# Patient Record
Sex: FEMALE | Race: WHITE | NOT HISPANIC OR LATINO | Employment: OTHER | URBAN - METROPOLITAN AREA
[De-identification: names, ages, dates, MRNs, and addresses within clinical notes are randomized per-mention and may not be internally consistent; named-entity substitution may affect disease eponyms.]

---

## 2017-03-24 ENCOUNTER — ALLIED HEALTH/NURSE VISIT (OUTPATIENT)
Dept: CARDIOLOGY | Facility: CLINIC | Age: 37
End: 2017-03-24
Attending: INTERNAL MEDICINE
Payer: MEDICARE

## 2017-03-24 DIAGNOSIS — I47.20 VENTRICULAR TACHYCARDIA (H): Primary | ICD-10-CM

## 2017-03-24 PROCEDURE — 93282 PRGRMG EVAL IMPLANTABLE DFB: CPT | Mod: ZF

## 2017-03-24 PROCEDURE — 93282 PRGRMG EVAL IMPLANTABLE DFB: CPT | Mod: 26 | Performed by: INTERNAL MEDICINE

## 2017-03-24 NOTE — MR AVS SNAPSHOT
"              After Visit Summary   3/24/2017    Chikis Hannon    MRN: [id number]           Patient Information     Date Of Birth          1980        Visit Information        Provider Department      3/24/2017 10:30 AM 1, Uc Cv Device [facility]        Today's Diagnoses     Ventricular tachycardia (H)    -  1       Follow-ups after your visit        Follow-up notes from your care team     Return in about 6 months (around 9/24/2017) for Medtronic, ICD check.      Your next 10 appointments already scheduled     Mar 24, 2017 10:30 AM CDT   (Arrive by 10:15 AM)   Implanted Defibulator with Uc Cv Device 1   [facility] ([facility])    [address]  [address] [facility] [address] [phone]              Who to contact     If you have questions or need follow up information about today's clinic visit or your schedule please contact [facility] directly at [phone].  Normal or non-critical lab and imaging results will be communicated to you by [employer], letter or phone within 4 business days after the clinic has received the results. If you do not hear from us within 7 days, please contact the clinic through [employer] or phone. If you have a critical or abnormal lab result, we will notify you by phone as soon as possible.  Submit refill requests through [employer] or call your pharmacy and they will forward the refill request to us. Please allow 3 business days for your refill to be completed.          Additional Information About Your Visit        [employer] Information     [employer] lets you send messages to your doctor, view your test results, renew your prescriptions, schedule appointments and more. To sign up, go to [email or web address] . Click on \"Log in\" on the left side of the screen, which will take you to the Welcome page. Then click on \"Sign up Now\" on the right side of the page.     You will be asked to enter the access code listed below, as " well as some personal information. Please follow the directions to create your username and password.     Your access code is: [email or web address]  Expires: 2017 10:20 AM     Your access code will  in 90 days. If you need help or a new code, please call your [place] clinic or [phone].        Care EveryWhere ID     This is your Care EveryWhere ID. This could be used by other organizations to access your [place] medical records  [id number]         Blood Pressure from Last 3 Encounters:   16 114/66   16 100/60   16 124/58    Weight from Last 3 Encounters:   16 78 kg (172 lb)   16 78.5 kg (173 lb)   16 79.7 kg (175 lb 12.8 oz)              We Performed the Following     ICD DEVICE PROGRAMMING EVAL, SINGLE LEAD ICD        Primary Care Provider Office Phone # Fax #    Brenda KIMMIE Dominguez [facility] [phone]       [facility] [address] [place] [address]        Thank you!     Thank you for choosing [facility]  for your care. Our goal is always to provide you with excellent care. Hearing back from our patients is one way we can continue to improve our services. Please take a few minutes to complete the written survey that you may receive in the mail after your visit with us. Thank you!             Your Updated Medication List - Protect others around you: Learn how to safely use, store and throw away your medicines at www.disposemymeds.org.          This list is accurate as of: 3/24/17 10:20 AM.  Always use your most recent med list.                   Brand Name Dispense Instructions for use    ACE NOT PRESCRIBED (INTENTIONAL)     0 each    1 each as needed for other ACE Inhibitor not prescribed due to Other: BP at goal, pending microalbumin test       ALEVE PO      Take 220 mg by mouth       amoxicillin 500 MG capsule    AMOXIL    12 capsule    Take 4 capsules (2000mg) 30-60 minutes prior to dental work.       atorvastatin 40 MG  tablet    LIPITOR    90 tablet    Take 1 tablet (40 mg) by mouth daily       blood glucose monitoring lancets     1 Box    Use to test blood sugar 1 times daily or as directed.       blood glucose monitoring test strip    ACCU-CHEK SMARTVIEW    100 each    Use to test blood sugar 1 times daily or as directed.       gabapentin 300 MG capsule    NEURONTIN    90 capsule    Take 1 tablet (300 mg) every night       medroxyPROGESTERone 150 MG/ML injection    DEPO-PROVERA    1 mL    Inject 1 mL (150 mg) into the muscle every 3 months       MELATONIN PO      Take 3 mg by mouth nightly as needed       metFORMIN 500 MG tablet    GLUCOPHAGE    90 tablet    Take 1 tablet (500 mg) by mouth daily (with dinner)       multivitamin per tablet      ONE DAILY       OMEGA-3 FISH OIL PO          sotalol 80 MG tablet    BETAPACE    180 tablet    Take 1 tablet (80 mg) by mouth every 12 hours       VITAMIN C PO          VITAMIN D (CHOLECALCIFEROL) PO      Take by mouth daily

## 2017-03-24 NOTE — PROGRESS NOTES
Pt seen in the [facility] for evaluation and iterative programming of a Medtronic single lead ICD, per MD orders. Today her intrinsic rhythm is SR 88 bpm. Normal ICD function. 8 VT episodes recorded. The available EGMs show episodes lasting 10 seconds to 5 minutes. The morphology matches her sinus beats. OptiVol thoracic impedance is near her baseline.  <0.1%. No short v-v intervals recorded. Lead trends appear stable. Pt reports that she is feeling well. Battery estimates 10.6 years to [facility]. Plan for pt to send a Carelink remote transmission on 6/26/17 and RTC in 6 months.

## 2017-05-25 ENCOUNTER — OFFICE VISIT (OUTPATIENT)
Dept: FAMILY MEDICINE | Facility: CLINIC | Age: 37
End: 2017-05-25
Payer: MEDICARE

## 2017-05-25 ENCOUNTER — RADIANT APPOINTMENT (OUTPATIENT)
Dept: GENERAL RADIOLOGY | Facility: CLINIC | Age: 37
End: 2017-05-25
Attending: FAMILY MEDICINE
Payer: MEDICARE

## 2017-05-25 VITALS
HEIGHT: 62 IN | BODY MASS INDEX: 33.13 KG/M2 | WEIGHT: 180 LBS | TEMPERATURE: 98.1 F | HEART RATE: 105 BPM | RESPIRATION RATE: 20 BRPM | SYSTOLIC BLOOD PRESSURE: 125 MMHG | DIASTOLIC BLOOD PRESSURE: 72 MMHG

## 2017-05-25 DIAGNOSIS — I47.20 VENTRICULAR TACHYCARDIA (H): ICD-10-CM

## 2017-05-25 DIAGNOSIS — E11.59 TYPE 2 DIABETES MELLITUS WITH OTHER CIRCULATORY COMPLICATIONS (H): Primary | ICD-10-CM

## 2017-05-25 DIAGNOSIS — M79.644 THUMB PAIN, RIGHT: ICD-10-CM

## 2017-05-25 DIAGNOSIS — T78.40XS ALLERGIC STATE, SEQUELA: ICD-10-CM

## 2017-05-25 DIAGNOSIS — G47.00 INSOMNIA, UNSPECIFIED TYPE: ICD-10-CM

## 2017-05-25 DIAGNOSIS — Z30.013 ENCOUNTER FOR INITIAL PRESCRIPTION OF INJECTABLE CONTRACEPTIVE: ICD-10-CM

## 2017-05-25 DIAGNOSIS — Z13.6 CARDIOVASCULAR SCREENING; LDL GOAL LESS THAN 70: ICD-10-CM

## 2017-05-25 DIAGNOSIS — J30.89 SMOKE HYPERSENSITIVITY: ICD-10-CM

## 2017-05-25 DIAGNOSIS — M79.2 NEUROPATHIC PAIN: ICD-10-CM

## 2017-05-25 LAB
BETA HCG QUAL IFA URINE: NEGATIVE
HBA1C MFR BLD: 6.1 % (ref 4.3–6)

## 2017-05-25 PROCEDURE — 80053 COMPREHEN METABOLIC PANEL: CPT | Performed by: FAMILY MEDICINE

## 2017-05-25 PROCEDURE — 73140 X-RAY EXAM OF FINGER(S): CPT | Mod: RT

## 2017-05-25 PROCEDURE — 96372 THER/PROPH/DIAG INJ SC/IM: CPT | Performed by: FAMILY MEDICINE

## 2017-05-25 PROCEDURE — 99215 OFFICE O/P EST HI 40 MIN: CPT | Mod: 25 | Performed by: FAMILY MEDICINE

## 2017-05-25 PROCEDURE — 82043 UR ALBUMIN QUANTITATIVE: CPT | Performed by: FAMILY MEDICINE

## 2017-05-25 PROCEDURE — 84443 ASSAY THYROID STIM HORMONE: CPT | Performed by: FAMILY MEDICINE

## 2017-05-25 PROCEDURE — 84703 CHORIONIC GONADOTROPIN ASSAY: CPT | Performed by: FAMILY MEDICINE

## 2017-05-25 PROCEDURE — 83036 HEMOGLOBIN GLYCOSYLATED A1C: CPT | Performed by: FAMILY MEDICINE

## 2017-05-25 PROCEDURE — 36415 COLL VENOUS BLD VENIPUNCTURE: CPT | Performed by: FAMILY MEDICINE

## 2017-05-25 RX ORDER — SOTALOL HYDROCHLORIDE 80 MG/1
80 TABLET ORAL EVERY 12 HOURS
Qty: 180 TABLET | Refills: 1 | Status: SHIPPED | OUTPATIENT
Start: 2017-05-25 | End: 2017-11-29

## 2017-05-25 RX ORDER — LANCETS
EACH MISCELLANEOUS
Qty: 1 BOX | Refills: 3 | Status: CANCELLED | OUTPATIENT
Start: 2017-05-25

## 2017-05-25 RX ORDER — GABAPENTIN 300 MG/1
CAPSULE ORAL
Qty: 90 CAPSULE | Refills: 1 | Status: SHIPPED | OUTPATIENT
Start: 2017-05-25 | End: 2017-11-24

## 2017-05-25 RX ORDER — AMOXICILLIN 500 MG/1
CAPSULE ORAL
Qty: 12 CAPSULE | Refills: 3 | Status: CANCELLED | OUTPATIENT
Start: 2017-05-25

## 2017-05-25 RX ORDER — ALBUTEROL SULFATE 90 UG/1
2 AEROSOL, METERED RESPIRATORY (INHALATION) EVERY 6 HOURS PRN
Qty: 1 INHALER | Refills: 3 | Status: SHIPPED | OUTPATIENT
Start: 2017-05-25 | End: 2018-08-16

## 2017-05-25 RX ORDER — ATORVASTATIN CALCIUM 40 MG/1
40 TABLET, FILM COATED ORAL DAILY
Qty: 90 TABLET | Refills: 3 | Status: SHIPPED | OUTPATIENT
Start: 2017-05-25 | End: 2019-04-30

## 2017-05-25 RX ORDER — MEDROXYPROGESTERONE ACETATE 150 MG/ML
150 INJECTION, SUSPENSION INTRAMUSCULAR
Qty: 1 ML | Refills: 4 | OUTPATIENT
Start: 2017-05-25 | End: 2018-08-16

## 2017-05-25 ASSESSMENT — ANXIETY QUESTIONNAIRES
1. FEELING NERVOUS, ANXIOUS, OR ON EDGE: SEVERAL DAYS
5. BEING SO RESTLESS THAT IT IS HARD TO SIT STILL: NOT AT ALL
3. WORRYING TOO MUCH ABOUT DIFFERENT THINGS: SEVERAL DAYS
2. NOT BEING ABLE TO STOP OR CONTROL WORRYING: SEVERAL DAYS
GAD7 TOTAL SCORE: 4
7. FEELING AFRAID AS IF SOMETHING AWFUL MIGHT HAPPEN: NOT AT ALL
6. BECOMING EASILY ANNOYED OR IRRITABLE: SEVERAL DAYS

## 2017-05-25 ASSESSMENT — PATIENT HEALTH QUESTIONNAIRE - PHQ9: 5. POOR APPETITE OR OVEREATING: NOT AT ALL

## 2017-05-25 NOTE — LETTER
[facility]  [address] [place], [address]  [phone]      June 1, 2017    Chikis Hannon  [address]     [facility] [address]          Dear Chikis,    The results of your recent tests were normal. All your labs are fine. Remember to take all medications as prescribed.  Enclosed is a copy of the results.  It was a pleasure to see you at your last appointment.  Results for orders placed or performed in visit on 05/25/17   Hemoglobin A1c   Result Value Ref Range    Hemoglobin A1C 6.1 (H) 4.3 - 6.0 %   Albumin Random Urine Quantitative   Result Value Ref Range    Creatinine Urine 18 mg/dL    Albumin Urine mg/L <5 mg/L    Albumin Urine mg/g Cr Unable to calculate due to low value 0 - 25 mg/g Cr   Comprehensive metabolic panel   Result Value Ref Range    Sodium 140 133 - 144 mmol/L    Potassium 3.9 3.4 - 5.3 mmol/L    Chloride 107 94 - 109 mmol/L    Carbon Dioxide 25 20 - 32 mmol/L    Anion Gap 8 3 - 14 mmol/L    Glucose 167 (H) 70 - 99 mg/dL    Urea Nitrogen 13 7 - 30 mg/dL    Creatinine 0.47 (L) 0.52 - 1.04 mg/dL    GFR Estimate >90  Non  GFR Calc   >60 mL/min/1.7m2    GFR Estimate If Black >90   GFR Calc   >60 mL/min/1.7m2    Calcium 9.2 8.5 - 10.1 mg/dL    Bilirubin Total 0.6 0.2 - 1.3 mg/dL    Albumin 4.0 3.4 - 5.0 g/dL    Protein Total 7.9 6.8 - 8.8 g/dL    Alkaline Phosphatase 117 40 - 150 U/L    ALT 39 0 - 50 U/L    AST 22 0 - 45 U/L   Beta HCG qual IFA urine   Result Value Ref Range    Beta HCG Qual IFA Urine Negative NEG   TSH with free T4 reflex   Result Value Ref Range    TSH 2.07 0.40 - 4.00 mU/L       If you have any questions or concerns, please call myself or my nurse at [phone].          Sincerely,    Tammy Benedict MD  [id number]

## 2017-05-25 NOTE — PROGRESS NOTES
SUBJECTIVE:                                                    Chikis Hannon is a 36 year old female who presents to clinic today for the following health issues:    1. Pain in right thumb x 5 months   Pain with bending and twisting. Reports some popping.   Thumb injury about a year ago- cut thumb with .   Is a  and also hand an injury about 6 months ago that injured her thumb.   Reports some numbness and tingling in the thumb. No swelling     2. DM II  Treated with meformin. Last A1c- 6.3.   Blood sugars over the last few days unknown. Usually in the 150-160's.   Needs refills of all meds.     3. Smoke irritation  Was around her father over a few days who smokes and her throat closed up and had tons of mucus.   Took an anti-histamine and a mucus medication which helped some.   Would like something to help when around him  Usually takes benadryl for seasonal allergies and that seems to help.     4. Contraceptive.   Used to be on depo shot but stopped about 6 months ago. Would like to restart that.   No LMP yet.     Also needs refill of all meds.       Problem list and histories reviewed & adjusted, as indicated.  Additional history: as documented    Patient Active Problem List   Diagnosis     Corrected transposition of great vessels     Allergic state     Attention deficit disorder     Impaired fasting glucose     Obesity     Anxiety     GERD (gastroesophageal reflux disease)     Major depression in complete remission (H)     * * * SBE PROPHYLAXIS * * *     CARDIOVASCULAR SCREENING; LDL GOAL LESS THAN 70     Mildly mentally retarded     Transaminase or LDH elevation     Health Care Home     Congenital heart disease     Ventricular tachycardia (H)     Cardiac ICD- Medtronic- single chamber- NOT dependent     Type 2 diabetes, HbA1C goal < 8% (H)     Type 2 diabetes mellitus with other circulatory complications (H)     Patient is followed by the [facility]  "Clinic     Past Surgical History:   Procedure Laterality Date     C NONSPECIFIC PROCEDURE  11/98    open heart surgery x 3, transposition of major vessels     C NONSPECIFIC PROCEDURE  10/03    Additional heart surgery     CARDIAC SURGERY  Had 4 surgeries    transposition of the major arteries     SURGICAL HISTORY OF -   2014    ablation for v. tach; ICD placed       Social History   Substance Use Topics     Smoking status: Never Smoker     Smokeless tobacco: Never Used     Alcohol use No     Family History   Problem Relation Age of Onset     Adopted: Yes     Family History Negative Mother      Family History Negative Father            Reviewed and updated as needed this visit by clinical staff  Tobacco  Allergies  Med Hx  Fam Hx  Soc Hx      Reviewed and updated as needed this visit by Provider         ROS:  Constitutional, HEENT, cardiovascular, pulmonary, GI, , musculoskeletal, neuro, skin, endocrine and psych systems are negative, except as otherwise noted.    OBJECTIVE:                                                    /72 (BP Location: Right arm, Patient Position: Chair, Cuff Size: Adult Large)  Pulse 105  Temp 98.1  F (36.7  C) (Oral)  Resp 20  Ht 5' 2.25\" (1.581 m)  Wt 180 lb (81.6 kg)  LMP   Breastfeeding? No  BMI 32.66 kg/m2  Body mass index is 32.66 kg/(m^2).  GENERAL: healthy, alert and no distress  EYES: Eyes grossly normal to inspection,  HENT: ear canals and TM's normal, nose and mouth without ulcers or lesions  NECK: no adenopathy, no asymmetry, masses, or scars and thyroid normal to palpation  RESP: lungs clear to auscultation - no rales, rhonchi or wheezes  CV: regular rate and rhythm, normal S1 S2, no S3 or S4, no murmur, click or rub, no peripheral edema and peripheral pulses strong  ABDOMEN: soft, nontender, no hepatosplenomegaly, no masses and bowel sounds normal  MS: right thumb- TTP MCP joint, pain with active ROM   PSYCH: mentation appears normal, affect " normal/bright  Diabetic foot exam: normal DP and PT pulses, no trophic changes or ulcerative lesions and normal sensory exam    Diagnostic Test Results:    Lab Results   Component Value Date    A1C 6.1 05/25/2017    A1C 6.3 07/06/2016    A1C 6.9 01/15/2016    A1C 6.6 06/01/2015    A1C 6.7 04/02/2015          XR thumb- I personally reviewed images- no obvious fracture- mild degenerative changes     ASSESSMENT/PLAN:                                                      1. Thumb pain, right  - will place in brace. Discussed physical therapy. Patient will try at home for now and consider hand therapy for later. NSAID prn pain.   - XR Finger Right G/E 2 Views; Future  - order for DME; Thumb splint  Dispense: 1 Package; Refill: 0    2. Type 2 diabetes mellitus with other circulatory complications (H)  -A1c improved, still above goal (<7.0).  - Hemoglobin A1c  - Albumin Random Urine Quantitative  - Comprehensive metabolic panel  - TSH with free T4 reflex  - gabapentin (NEURONTIN) 300 MG capsule; Take 1 tablet (300 mg) every night  Dispense: 90 capsule; Refill: 1  - metFORMIN (GLUCOPHAGE) 500 MG tablet; Take 1 tablet (500 mg) by mouth daily (with dinner)  Dispense: 90 tablet; Refill: 3    3. Encounter for initial prescription of injectable contraceptive  - UPT negative. Will restart on depo for contraceptive management   - Beta HCG qual IFA urine  - medroxyPROGESTERone (DEPO-PROVERA) 150 MG/ML injection; Inject 1 mL (150 mg) into the muscle every 3 months  Dispense: 1 mL; Refill: 4    4. Allergic state, sequela  - patient advised to take OTC allergy med daily    5. Smoke hypersensitivity  - will start on trial of albuterol for when at father's house and having SOB/wheezing.   - albuterol (PROAIR HFA/PROVENTIL HFA/VENTOLIN HFA) 108 (90 BASE) MCG/ACT Inhaler; Inhale 2 puffs into the lungs every 6 hours as needed for shortness of breath / dyspnea or wheezing  Dispense: 1 Inhaler; Refill: 3    6. CARDIOVASCULAR SCREENING; LDL  GOAL LESS THAN 70  -refill   - atorvastatin (LIPITOR) 40 MG tablet; Take 1 tablet (40 mg) by mouth daily  Dispense: 90 tablet; Refill: 3    7. Neuropathic pain  - gabapentin (NEURONTIN) 300 MG capsule; Take 1 tablet (300 mg) every night  Dispense: 90 capsule; Refill: 1    8. Ventricular tachycardia (H)  - stable   - sotalol (BETAPACE) 80 MG tablet; Take 1 tablet (80 mg) by mouth every 12 hours  Dispense: 180 tablet; Refill: 1    9. Insomnia, unspecified type  -refill   - melatonin 3 MG CAPS; Take 3 mg by mouth nightly as needed  Dispense: 90 capsule; Refill: 1    See Patient Instructions    Total time spent face to face was 50 minutes, greater than 50% in education and counseling regarding the above issues.     Tammy Benedict MD  [facility]

## 2017-05-25 NOTE — MR AVS SNAPSHOT
After Visit Summary   5/25/2017    Chikis Hannon    MRN: [id number]           Patient Information     Date Of Birth          1980        Visit Information        Provider Department      5/25/2017 1:00 PM Tammy Benedict MD [facility]        Today's Diagnoses     Type 2 diabetes mellitus with other circulatory complications (H)    -  1    Allergic state, sequela        Thumb pain, right        Encounter for initial prescription of injectable contraceptive        Smoke hypersensitivity        CARDIOVASCULAR SCREENING; LDL GOAL LESS THAN 70        Neuropathic pain        Ventricular tachycardia (H)        Insomnia, unspecified type          Care Instructions    Avoid overuse of albuterol as this can interfere with sotalol.   Use thumb splint especially when working for support.   Follow up in 3 months or sooner if needed  What is De Quervain Tenosynovitis?  De Quervain tenosynovitis is caused by an irritation of tissue at the base of the thumb. Strong fibers called tendons lead into the thumb. The tendons can become compressed and irritated due to thickening of overlying tissues. This can cause pain and swelling.        You may feel pain when you pinch or grasp things, turn or twist your wrist, or make a fist.      Inside your thumb  Tendons connect muscles in your wrist and forearm to the bones in your thumb. The tendons are covered by a protective sheath. This sheath is lined with tissue called synovium. It produces a fluid that lets the tendons slide easily when you move your thumb. The tendons and sheaths are covered by tissue called a retinaculum that creates a tunnel. If the retinaculum becomes thickened, the tendons can become  irritated or injured .  What causes it?  Making the same wrist motion over and over may irritate the tendons (for example, opening jar lids or grasping a tool). Picking up a child under the arms can cause tendon irritation. So can an  "injury to the thumb side of the wrist.  The road to healing  To help the tendons heal, rest, adjust your activity level, and use splints, ice or anti-inflammatory medicines. Sometimes, a cortisone-like injection of the tendon compartment, or even surgery may be needed to treat the condition. After the tendons heal, you may need to regain strength and movement in your thumb. Your doctor may give you exercises. Or you may be referred to a therapist who can help you. Follow your doctor s directions. They will help you get back to your normal activities.    7047-9631 [employer]. [address] [place] [address]. All rights reserved. This information is not intended as a substitute for professional medical care. Always follow your healthcare professional's instructions.                Follow-ups after your visit        Who to contact     If you have questions or need follow up information about today's clinic visit or your schedule please contact [facility] directly at [phone].  Normal or non-critical lab and imaging results will be communicated to you by embraasehart, letter or phone within 4 business days after the clinic has received the results. If you do not hear from us within 7 days, please contact the clinic through [employer] or phone. If you have a critical or abnormal lab result, we will notify you by phone as soon as possible.  Submit refill requests through [employer] or call your pharmacy and they will forward the refill request to us. Please allow 3 business days for your refill to be completed.          Additional Information About Your Visit        [employer] Information     [employer] lets you send messages to your doctor, view your test results, renew your prescriptions, schedule appointments and more. To sign up, go to [email or web address] . Click on \"Log in\" on the left side of the screen, which will take you to the Welcome page. Then click on \"Sign up Now\" on the " "right side of the page.     You will be asked to enter the access code listed below, as well as some personal information. Please follow the directions to create your username and password.     Your access code is: [email or web address]  Expires: 2017 10:20 AM     Your access code will  in 90 days. If you need help or a new code, please call your [place] clinic or [phone].        Care EveryWhere ID     This is your Care EveryWhere ID. This could be used by other organizations to access your [place] medical records  [id number]        Your Vitals Were     Pulse Temperature Respirations Height Breastfeeding?       105 98.1  F (36.7  C) (Oral) 20 5' 2.25\" (1.581 m) No     BMI (Body Mass Index)                   32.66 kg/m2            Blood Pressure from Last 3 Encounters:   17 125/72   16 114/66   16 100/60    Weight from Last 3 Encounters:   17 180 lb (81.6 kg)   16 172 lb (78 kg)   16 173 lb (78.5 kg)              We Performed the Following     Albumin Random Urine Quantitative     Beta HCG qual IFA urine     Comprehensive metabolic panel     Hemoglobin A1c     TSH with free T4 reflex          Today's Medication Changes          These changes are accurate as of: 17  2:31 PM.  If you have any questions, ask your nurse or doctor.               Start taking these medicines.        Dose/Directions    albuterol 108 (90 BASE) MCG/ACT Inhaler   Commonly known as:  PROAIR HFA/PROVENTIL HFA/VENTOLIN HFA   Used for:  Smoke hypersensitivity   Started by:  Tammy Benedict MD        Dose:  2 puff   Inhale 2 puffs into the lungs every 6 hours as needed for shortness of breath / dyspnea or wheezing   Quantity:  1 Inhaler   Refills:  3       order for DME   Used for:  Thumb pain, right   Started by:  Tammy Benedict MD        Thumb splint   Quantity:  1 Package   Refills:  0         These medicines have changed or have updated prescriptions.        " Dose/Directions    melatonin 3 MG Caps   This may have changed:  medication strength   Used for:  Insomnia, unspecified type   Changed by:  Tammy Benedict MD        Dose:  3 mg   Take 3 mg by mouth nightly as needed   Quantity:  90 capsule   Refills:  1            Where to get your medicines      These medications were sent to [facility] [address] [address] [place] [address]  [address] [facility] [address], [facility] [address]     Phone:  [phone]     albuterol 108 (90 BASE) MCG/ACT Inhaler    atorvastatin 40 MG tablet    gabapentin 300 MG capsule    melatonin 3 MG Caps    metFORMIN 500 MG tablet    sotalol 80 MG tablet         Some of these will need a paper prescription and others can be bought over the counter.  Ask your nurse if you have questions.     Bring a paper prescription for each of these medications     order for DME       You don't need a prescription for these medications     medroxyPROGESTERone 150 MG/ML injection                Primary Care Provider Office Phone # Fax #    KIMMIE Jones [facility] [phone]       [facility] [address] [facility] [address]        Thank you!     Thank you for choosing [facility]  for your care. Our goal is always to provide you with excellent care. Hearing back from our patients is one way we can continue to improve our services. Please take a few minutes to complete the written survey that you may receive in the mail after your visit with us. Thank you!             Your Updated Medication List - Protect others around you: Learn how to safely use, store and throw away your medicines at www.disposemymeds.org.          This list is accurate as of: 5/25/17  2:31 PM.  Always use your most recent med list.                   Brand Name Dispense Instructions for use    ACE NOT PRESCRIBED (INTENTIONAL)     0 each    1 each as needed for other ACE Inhibitor  not prescribed due to Other: BP at goal, pending microalbumin test       albuterol 108 (90 BASE) MCG/ACT Inhaler    PROAIR HFA/PROVENTIL HFA/VENTOLIN HFA    1 Inhaler    Inhale 2 puffs into the lungs every 6 hours as needed for shortness of breath / dyspnea or wheezing       ALEVE PO      Take 220 mg by mouth       amoxicillin 500 MG capsule    AMOXIL    12 capsule    Take 4 capsules (2000mg) 30-60 minutes prior to dental work.       atorvastatin 40 MG tablet    LIPITOR    90 tablet    Take 1 tablet (40 mg) by mouth daily       blood glucose monitoring lancets     1 Box    Use to test blood sugar 1 times daily or as directed.       blood glucose monitoring test strip    ACCU-CHEK SMARTVIEW    100 each    Use to test blood sugar 1 times daily or as directed.       gabapentin 300 MG capsule    NEURONTIN    90 capsule    Take 1 tablet (300 mg) every night       medroxyPROGESTERone 150 MG/ML injection    DEPO-PROVERA    1 mL    Inject 1 mL (150 mg) into the muscle every 3 months       melatonin 3 MG Caps     90 capsule    Take 3 mg by mouth nightly as needed       metFORMIN 500 MG tablet    GLUCOPHAGE    90 tablet    Take 1 tablet (500 mg) by mouth daily (with dinner)       multivitamin per tablet      ONE DAILY       OMEGA-3 FISH OIL PO          order for DME     1 Package    Thumb splint       sotalol 80 MG tablet    BETAPACE    180 tablet    Take 1 tablet (80 mg) by mouth every 12 hours       VITAMIN C PO          VITAMIN D (CHOLECALCIFEROL) PO      Take by mouth daily

## 2017-05-25 NOTE — NURSING NOTE
"Chief Complaint   Patient presents with     Physical     PE---non-fasting     Sexual Problem     decreased Libido     Contraception     wanting to restart contraception     Allergies     has bad reaction with cigerette smoke       Initial /72 (BP Location: Right arm, Patient Position: Chair, Cuff Size: Adult Large)  Pulse 105  Temp 98.1  F (36.7  C) (Oral)  Resp 20  Ht 5' 2.25\" (1.581 m)  Wt 180 lb (81.6 kg)  Breastfeeding? No  BMI 32.66 kg/m2 Estimated body mass index is 32.66 kg/(m^2) as calculated from the following:    Height as of this encounter: 5' 2.25\" (1.581 m).    Weight as of this encounter: 180 lb (81.6 kg).  Medication Reconciliation: complete     Marysol Burton/KOSTA  [place]---[facility]          "

## 2017-05-25 NOTE — PATIENT INSTRUCTIONS
Avoid overuse of albuterol as this can interfere with sotalol.   Use thumb splint especially when working for support.   Follow up in 3 months or sooner if needed  What is De Quervain Tenosynovitis?  De Quervain tenosynovitis is caused by an irritation of tissue at the base of the thumb. Strong fibers called tendons lead into the thumb. The tendons can become compressed and irritated due to thickening of overlying tissues. This can cause pain and swelling.        You may feel pain when you pinch or grasp things, turn or twist your wrist, or make a fist.      Inside your thumb  Tendons connect muscles in your wrist and forearm to the bones in your thumb. The tendons are covered by a protective sheath. This sheath is lined with tissue called synovium. It produces a fluid that lets the tendons slide easily when you move your thumb. The tendons and sheaths are covered by tissue called a retinaculum that creates a tunnel. If the retinaculum becomes thickened, the tendons can become  irritated or injured .  What causes it?  Making the same wrist motion over and over may irritate the tendons (for example, opening jar lids or grasping a tool). Picking up a child under the arms can cause tendon irritation. So can an injury to the thumb side of the wrist.  The road to healing  To help the tendons heal, rest, adjust your activity level, and use splints, ice or anti-inflammatory medicines. Sometimes, a cortisone-like injection of the tendon compartment, or even surgery may be needed to treat the condition. After the tendons heal, you may need to regain strength and movement in your thumb. Your doctor may give you exercises. Or you may be referred to a therapist who can help you. Follow your doctor s directions. They will help you get back to your normal activities.    2649-8157 [employer]. [address] [place] [address], [place] [address]. All rights reserved. This information is not intended as a substitute for  professional medical care. Always follow your healthcare professional's instructions.
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
## 2017-05-26 LAB
ALBUMIN SERPL-MCNC: 4 G/DL (ref 3.4–5)
ALP SERPL-CCNC: 117 U/L (ref 40–150)
ALT SERPL W P-5'-P-CCNC: 39 U/L (ref 0–50)
ANION GAP SERPL CALCULATED.3IONS-SCNC: 8 MMOL/L (ref 3–14)
AST SERPL W P-5'-P-CCNC: 22 U/L (ref 0–45)
BILIRUB SERPL-MCNC: 0.6 MG/DL (ref 0.2–1.3)
BUN SERPL-MCNC: 13 MG/DL (ref 7–30)
CALCIUM SERPL-MCNC: 9.2 MG/DL (ref 8.5–10.1)
CHLORIDE SERPL-SCNC: 107 MMOL/L (ref 94–109)
CO2 SERPL-SCNC: 25 MMOL/L (ref 20–32)
CREAT SERPL-MCNC: 0.47 MG/DL (ref 0.52–1.04)
CREAT UR-MCNC: 18 MG/DL
GFR SERPL CREATININE-BSD FRML MDRD: ABNORMAL ML/MIN/1.7M2
GLUCOSE SERPL-MCNC: 167 MG/DL (ref 70–99)
MICROALBUMIN UR-MCNC: <5 MG/L
MICROALBUMIN/CREAT UR: NORMAL MG/G CR (ref 0–25)
POTASSIUM SERPL-SCNC: 3.9 MMOL/L (ref 3.4–5.3)
PROT SERPL-MCNC: 7.9 G/DL (ref 6.8–8.8)
SODIUM SERPL-SCNC: 140 MMOL/L (ref 133–144)
TSH SERPL DL<=0.005 MIU/L-ACNC: 2.07 MU/L (ref 0.4–4)

## 2017-05-26 ASSESSMENT — ANXIETY QUESTIONNAIRES: GAD7 TOTAL SCORE: 4

## 2017-05-26 ASSESSMENT — PATIENT HEALTH QUESTIONNAIRE - PHQ9: SUM OF ALL RESPONSES TO PHQ QUESTIONS 1-9: 2

## 2017-06-01 NOTE — PROGRESS NOTES
Please send patient a letter to let them know results are normal.    All your labs are fine. Remember to take all medications as prescribed.   For further questions or concerns please let us know.     Sincerely,    Dr. Case

## 2017-06-26 ENCOUNTER — ALLIED HEALTH/NURSE VISIT (OUTPATIENT)
Dept: CARDIOLOGY | Facility: CLINIC | Age: 37
End: 2017-06-26
Attending: INTERNAL MEDICINE
Payer: MEDICARE

## 2017-06-26 DIAGNOSIS — I47.20 VENTRICULAR TACHYCARDIA (H): Primary | ICD-10-CM

## 2017-06-26 PROCEDURE — 93296 REM INTERROG EVL PM/IDS: CPT | Mod: ZF

## 2017-06-26 PROCEDURE — 93294 REM INTERROG EVL PM/LDLS PM: CPT | Performed by: INTERNAL MEDICINE

## 2017-06-26 NOTE — MR AVS SNAPSHOT
"              After Visit Summary   2017    Chikis Hannon    MRN: [id number]           Patient Information     Date Of Birth          1980        Visit Information        Provider Department      2017 6:00 AM  ICD REMOTE [facility]        Today's Diagnoses     Ventricular tachycardia (H)    -  1       Follow-ups after your visit        Who to contact     If you have questions or need follow up information about today's clinic visit or your schedule please contact [facility] directly at [phone].  Normal or non-critical lab and imaging results will be communicated to you by [employer], letter or phone within 4 business days after the clinic has received the results. If you do not hear from us within 7 days, please contact the clinic through [employer] or phone. If you have a critical or abnormal lab result, we will notify you by phone as soon as possible.  Submit refill requests through [employer] or call your pharmacy and they will forward the refill request to us. Please allow 3 business days for your refill to be completed.          Additional Information About Your Visit        MyChart Information     [employer] lets you send messages to your doctor, view your test results, renew your prescriptions, schedule appointments and more. To sign up, go to [email or web address] . Click on \"Log in\" on the left side of the screen, which will take you to the Welcome page. Then click on \"Sign up Now\" on the right side of the page.     You will be asked to enter the access code listed below, as well as some personal information. Please follow the directions to create your username and password.     Your access code is: [email or web address]  Expires: 2017 10:33 AM     Your access code will  in 90 days. If you need help or a new code, please call your [place] clinic or [phone].        Care EveryWhere ID     This is your Care EveryWhere ID. This could be used by other organizations to access " your [facility] medical records  [id number]         Blood Pressure from Last 3 Encounters:   05/25/17 125/72   09/23/16 114/66   09/22/16 100/60    Weight from Last 3 Encounters:   05/25/17 81.6 kg (180 lb)   09/23/16 78 kg (172 lb)   09/22/16 78.5 kg (173 lb)              We Performed the Following     INTERROGATION DEVICE EVAL REMOTE, PACER/ICD        Primary Care Provider Office Phone # Fax #    Brenda KIMMIE Dominguez [phone]       [facility] [address]  [facility] [address]        Equal Access to Services     CAREN STARR : Hadii aad shanae vidaleso Sokaty, waaxda luqadaha, qaybta kaalmada adeegyada, sara mohan . So [facility] [phone].    ATENCIÓN: Si habla español, tiene a mustafa disposición servicios gratuitos de asistencia lingüística. Llame al [phone].    We comply with applicable federal civil rights laws and [place] laws. We do not discriminate on the basis of race, color, national origin, age, disability sex, sexual orientation or gender identity.            Thank you!     Thank you for choosing [facility]  for your care. Our goal is always to provide you with excellent care. Hearing back from our patients is one way we can continue to improve our services. Please take a few minutes to complete the written survey that you may receive in the mail after your visit with us. Thank you!             Your Updated Medication List - Protect others around you: Learn how to safely use, store and throw away your medicines at www.disposemymeds.org.          This list is accurate as of: 6/26/17 10:33 AM.  Always use your most recent med list.                   Brand Name Dispense Instructions for use Diagnosis    ACE NOT PRESCRIBED (INTENTIONAL)     0 each    1 each as needed for other ACE Inhibitor not prescribed due to Other: BP at goal, pending microalbumin test    Type 2 diabetes, HbA1c goal < 7% (H)       albuterol 108 (90 BASE)  MCG/ACT Inhaler    PROAIR HFA/PROVENTIL HFA/VENTOLIN HFA    1 Inhaler    Inhale 2 puffs into the lungs every 6 hours as needed for shortness of breath / dyspnea or wheezing    Smoke hypersensitivity       ALEVE PO      Take 220 mg by mouth        amoxicillin 500 MG capsule    AMOXIL    12 capsule    Take 4 capsules (2000mg) 30-60 minutes prior to dental work.    Corrected transposition of great vessels       atorvastatin 40 MG tablet    LIPITOR    90 tablet    Take 1 tablet (40 mg) by mouth daily    CARDIOVASCULAR SCREENING; LDL GOAL LESS THAN 70       blood glucose monitoring lancets     1 Box    Use to test blood sugar 1 times daily or as directed.    Type 2 diabetes mellitus with other circulatory complications (H)       blood glucose monitoring test strip    ACCU-CHEK SMARTVIEW    100 each    Use to test blood sugar 1 times daily or as directed.    Type 2 diabetes mellitus without complication (H)       gabapentin 300 MG capsule    NEURONTIN    90 capsule    Take 1 tablet (300 mg) every night    Type 2 diabetes mellitus with other circulatory complications (H), Neuropathic pain       medroxyPROGESTERone 150 MG/ML injection    DEPO-PROVERA    1 mL    Inject 1 mL (150 mg) into the muscle every 3 months    Encounter for initial prescription of injectable contraceptive       melatonin 3 MG Caps     90 capsule    Take 3 mg by mouth nightly as needed    Insomnia, unspecified type       metFORMIN 500 MG tablet    GLUCOPHAGE    90 tablet    Take 1 tablet (500 mg) by mouth daily (with dinner)    Type 2 diabetes mellitus with other circulatory complications (H)       multivitamin per tablet      ONE DAILY        OMEGA-3 FISH OIL PO           order for DME     1 Package    Thumb splint    Thumb pain, right       sotalol 80 MG tablet    BETAPACE    180 tablet    Take 1 tablet (80 mg) by mouth every 12 hours    Ventricular tachycardia (H)       VITAMIN C PO           VITAMIN D (CHOLECALCIFEROL) PO      Take by mouth daily

## 2017-06-26 NOTE — PROGRESS NOTES
Pt sent in a routine remote ICD check for evaluation per MD order.  Her ICD check shows 5 NSVT episodes, and 24 SVT episodes, all of the stored EGM's show 1:1 AV conduction and the episodes were 4 sec - 7 min 18 sec with average rates 160's - 188 bpm and she was active during all of the episodes.  She RV paces <0.1% of the time, her heart rate histograms show good heart rate variation and her optivol trend is at baseline.  Her ICD battery estimates 10 years left and her ICD is functioning normally.  Pt was called with the results and she reports feeling well and she states that she is working out during the times of her fast heart rates, she feels well and she denies complaints.   We will plan for her to send another remote check on 9/26/17.    Remote ICD

## 2017-09-19 ENCOUNTER — PRE VISIT (OUTPATIENT)
Dept: CARDIOLOGY | Facility: CLINIC | Age: 37
End: 2017-09-19

## 2017-09-19 DIAGNOSIS — I47.20 VENTRICULAR TACHYCARDIA (H): Primary | ICD-10-CM

## 2017-09-19 DIAGNOSIS — Q20.5 CORRECTED TRANSPOSITION OF GREAT VESSELS: ICD-10-CM

## 2017-09-22 ENCOUNTER — ALLIED HEALTH/NURSE VISIT (OUTPATIENT)
Dept: CARDIOLOGY | Facility: CLINIC | Age: 37
End: 2017-09-22
Attending: INTERNAL MEDICINE
Payer: MEDICARE

## 2017-09-22 VITALS
DIASTOLIC BLOOD PRESSURE: 59 MMHG | WEIGHT: 180.1 LBS | HEART RATE: 86 BPM | SYSTOLIC BLOOD PRESSURE: 106 MMHG | BODY MASS INDEX: 33.14 KG/M2 | OXYGEN SATURATION: 96 % | HEIGHT: 62 IN

## 2017-09-22 DIAGNOSIS — Q20.5 CORRECTED TRANSPOSITION OF GREAT VESSELS: ICD-10-CM

## 2017-09-22 DIAGNOSIS — I47.20 VENTRICULAR TACHYCARDIA (H): Primary | ICD-10-CM

## 2017-09-22 DIAGNOSIS — I47.20 VENTRICULAR TACHYCARDIA (H): ICD-10-CM

## 2017-09-22 PROCEDURE — 93010 ELECTROCARDIOGRAM REPORT: CPT | Mod: 59 | Performed by: INTERNAL MEDICINE

## 2017-09-22 PROCEDURE — 93282 PRGRMG EVAL IMPLANTABLE DFB: CPT | Mod: 26 | Performed by: INTERNAL MEDICINE

## 2017-09-22 PROCEDURE — 99212 OFFICE O/P EST SF 10 MIN: CPT | Mod: ZF

## 2017-09-22 PROCEDURE — 93282 PRGRMG EVAL IMPLANTABLE DFB: CPT | Mod: ZF

## 2017-09-22 PROCEDURE — 93005 ELECTROCARDIOGRAM TRACING: CPT

## 2017-09-22 PROCEDURE — 99213 OFFICE O/P EST LOW 20 MIN: CPT | Mod: 25 | Performed by: INTERNAL MEDICINE

## 2017-09-22 ASSESSMENT — PAIN SCALES - GENERAL: PAINLEVEL: NO PAIN (0)

## 2017-09-22 NOTE — LETTER
9/22/2017      RE: Chikis Hannon  [id number]   [facility] [address]       Dear Colleague,    Thank you for the opportunity to participate in the care of your patient, Chikis Hannon, at the [facility] HEART [facility] at [facility]. Please see a copy of my visit note below.    HPI:  Ms. 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 The patient underwent VT ablation on 3/13/2014 and a subsequent ICD implant single-chamber single coil on 3/14/2014, later Rv lead malfunction due to isolation break and failure to deliver shock for recurrent fast VT (VT spontaneously terminated) s/p replacement of entire system 6/2/2015. She was discharged on sotalol 120 mg twice a day. She was seen in clinic on 4/22/14 and her device interrogation showed no recurrence of ventricular arrhythmias. She reported some mild fatigue on Sotalol and her dose was decreased to 80 mg BID. At her July 2014 follow up appointment she only had one 4 second NSVT at 160 bpm which was asymptomatic. At her clinic visit in 2/15 she again had no ventricular arrhythmias and her Sotalol was stopped. After recurrence of VT in June 2015, the patient was restarted on sotalol 80 mg twice a day. We re-attempted VT ablation after holding sotalol for one week on 8/24/2015 with no inducible VT.  [facility] EP Follow up 9/22/15: She presents today for follow-up.  Her device interrogation shows no ventricular  arrhythmias.  Her device site has healed well. She denies any syncope or presyncope, no chest pain or shortness of breath on exertion at rest, no orthopnea, no lower extremity edema. Her EKG shows normal sinus rhythm with RBBB,  ms.  [facility] EP Visit 9/23/17: She presents today for follow up. She reports feeling well. She denies any chest pain, dizziness, lightheadedness, shortness of breath, palpitations, leg/ankle swelling, PND, orthopnea, or syncopal symptoms. She has started exercise classes and is increasing her activity levels. Device interrogation shows normal device function.  17 episodes recorded. 16 VT zone monitored events when she was exercising EGMS shows ST with rates of 168-172 BPM.  1 NSVT for 4 beats at a rate of 211 for 2 second duration.  Intrinsic rhythm = SR @ 90 bpm. = <0.1%. OptiVol fluid index is at baseline. No short v-v intervals recorded.  RV lead impedance trends appear stable. Presenting 12 lead ECG shows SR with RBBB Vent Rate 84 bpm,  ms,  ms, QTc 505 ms. Current cardiac medications include: Sotalol.   She presents today for follow up. She reports feeling well. She denies any chest pain/pressures, dizziness, lightheadedness, shortness of breath, leg/ankle swelling, PND, orthopnea, palpitations, or syncopal symptoms. She continues to exercise and is without limitations. Device interrogation shows normal device function.  2  NSVT episodes recorded, lasting 1-2 seconds. 8 episodes recorded as VT, these EGMs are consistent with ST.  <0.1%. No short v-v intervals recorded. Lead trends appear stable. Presenting 12 lead ECG shows NSR with RBBB Vent Rate 85 bpm,  ms,  ms, QTc 504 ms. Current cardiac medications include: Sotalol.     Current Outpatient Prescriptions   Medication Sig Dispense Refill     sotalol (BETAPACE) 80 MG tablet Take 1 tablet (80 mg) by mouth every 12 hours 60 tablet 3     amoxicillin (AMOXIL) 500 MG capsule Take 4 capsules (2000mg) 30-60  minutes prior to dental work. 12 capsule 3     acetaminophen-codeine (TYLENOL #3) 300-30 MG per tablet Take 1 tablet by mouth every 4 hours as needed for other (mild or moderate pain) 10 tablet 0     Naproxen Sodium (ALEVE PO) Take 220 mg by mouth       fluticasone (FLONASE) 50 MCG/ACT nasal spray Spray 1-2 sprays into both nostrils daily 1 Package 11     metFORMIN (GLUCOPHAGE) 500 MG tablet Take 1 tablet (500 mg) by mouth daily (with dinner) 90 tablet 3     STATIN NOT PRESCRIBED, INTENTIONAL, Statin not prescribed intentionally due to Not indicated based on age 0 each 0     gabapentin (NEURONTIN) 300 MG capsule Take 1 tablet (300 mg) every night 90 capsule 0     MELATONIN PO Take 3 mg by mouth nightly as needed        VITAMIN D, CHOLECALCIFEROL, PO Take by mouth daily       Ascorbic Acid (VITAMIN C PO)        Omega-3 Fatty Acids (OMEGA-3 FISH OIL PO)        ACE NOT PRESCRIBED, INTENTIONAL, 1 each as needed for other ACE Inhibitor not prescribed due to Other: BP at goal, pending microalbumin test 0 each 0     blood glucose (ACCU-CHEK SMARTVIEW) test strip Use to test blood sugar 1 times daily or as directed. 100 strip prn     blood glucose monitoring (ACCU-CHEK FASTCLIX) lancets Use to test blood sugar 1 times daily or as directed. 1 Box prn     medroxyPROGESTERone (DEPO-PROVERA) 150 MG/ML injection Inject 1 mL (150 mg) into the muscle every 3 months 1 mL 4     MULTIVITAMINS PO TABS ONE DAILY  3       Past Medical History   Diagnosis Date     Allergy, unspecified not elsewhere classified      Attention deficit disorder without mention of hyperactivity      Mild MR; difficulty attention span     Corrected transposition of great vessels      still needs SBE prophlaxis     Impaired fasting glucose      103 7/06     Congenital heart disease      Ventricular tachycardia 3/4/2014     s/p ablation and ICD placement 3/13/14     Diabetes mellitus type 2, noninsulin dependent        Past Surgical History   Procedure  Laterality Date     C nonspecific procedure  11/98     open heart surgery x 3, transposition of major vessels     C nonspecific procedure  10/03     Additional heart surgery     Cardiac surgery  Had 4 surgeries     transposition of the major arteries     Surgical history of -   2014     ablation for v. tach; ICD placed      N/A 6/1/2015     Procedure: ANESTHESIA ICD/PACEMAKER CHANGE ADULT;  Surgeon: GENERIC ANESTHESIA PROVIDER;  Location: [facility] OR       Family History   Problem Relation Age of Onset     Adopted: Yes     Family History Negative Mother      Family History Negative Father        History   Substance Use Topics     Smoking status: Never Smoker      Smokeless tobacco: Never Used     Alcohol Use: No       ROS:   A comprehensive 10 system ROS was negative except for HPI.    Physical Examination:  There were no vitals taken for this visit.  GENERAL APPEARANCE: healthy, alert and no distress  HEENT: no icterus, no xanthelasmas, normal pupil size and reaction, normal palate, mucosa moist, no cyanosis.  NECK: no adenopathy, no asymmetry, masses, or scars, thyroid normal to palpation, carotid upstrokes are normal bilaterally, no cervical bruits, JVP is not visible  CHEST: lungs clear to auscultation - no rales, rhonchi or wheezes, no use of accessory muscles, no retractions, respirations are unlabored, normal respiratory rate, no kyphosis, no scoliosis  CARDIOVASCULAR: regular rhythm, S1S2, 3/6 KRISTI best heard at the left sternal border at the base.  ABDOMEN: soft, non tender, without hepatosplenomegaly, no masses palpable, bowel sounds normal, aorta not enlarged by palpation, no abdominal bruits  EXTREMITIES: no clubbing, cyanosis or edema.  NEURO: alert and oriented to person/place/time, normal speech, gait and affect  VASC: Radial, dorsalis pedis and posterior tibialis pulses are normal.  SKIN: no ecchymoses, no rashes    Laboratory: Reviewed.    Procedures:  LAST DEVICE INTERROGATION:  4/8/14  No ventricular  arrhythmias recorded. Intrinsic rhythm = vent rate 74 bpm. Estimated battery longevity = 7.6-9 years. Plan for patient to RTC in 3 mos for next ICD check. MD notified of interrogation results.  LAST ECHO:  3/10/14  IMPRESSION:   1. Normal coronary anatomy with no detectable stenosis or plaque. The coronaries arise from the anteriorly positioned aorta.   2. Please review Radiology report for incidental noncardiac findings that will follow separately.   FINDINGS:   DOMINANCE: Right dominant system.   LEFT MAIN: The left main arises normally from the left coronary cusp and is widely patent without any stenosis or plaque.   LEFT ANTERIOR DESCENDING: The left anterior descending and its major diagonal branches are widely patent without any detectable stenosis or plaque.   CIRCUMFLEX: The circumflex and its major branches are widely patent without any detectable stenosis or plaque. The circumflex is a small caliber vessel patent small obtuse marginal arteries.   RIGHT CORONARY ARTERY: The right coronary artery and its major branches are widely patent without any detectable stenosis or plaque.   ADDITIONAL FINDINGS: The aortic root has an unusual appearance probably from the reconstructive surgery for transposition. The appearance is nearly of 2 aortic roots one small 1 which ends in a cul-de-sac and the other which leads into the ascending aorta. The coronary arteries arise from that aortic root which feeds into the ascending aorta . There is a conduit from the right ventricle to the pulmonary trunk. Normal pulmonary venous anatomy with all four pulmonary veins draining into the left atrium. There is no left atrial or left ventricular mass or thrombus. Normal pericardial thickness.There is no pericardial effusion. The proximal pulmonary arteries are well opacified.  LAST CARDIAC MRA:  3/6/14  FINDINGS:   SITUS: There is a normal spleen in the left upper quadrant. There is situs solitus in the chest, as demonstrated by a  normal airway pulmonary artery relationship bilaterally.   CAVAE: Single right-sided inferior and superior vena cavae drain normally into the right atrium unobstructed.   PULMONARY VEINS: Two right and two left pulmonary veins drain into the left atrium unobstructed.   ATRIA: There is no interatrial communication demonstrated. The atrial sizes are normal.   ATRIOVENTRICULAR CONNECTION: Concordant. Separate mitral and tricuspid valves without evidence of significant regurgitation or stenosis.   VENTRICLES: D-loop ventricles with levocardia. Ventricles are normal in size and contraction. Right ventricular end-diastolic volume is upper limits of normal for BSA. If utilizing an ideal body weight, this would be mildly enlarged. No residual interventricular communication is demonstrated. No myocardial hyperenhancement identified on delayed post gadolinium imaging, except at the ventricular insertion points.   VENTRICULOARTERIAL CONNECTION: Concordant. Trileaflet aortic valve without regurgitation or stenosis. The oversewn native pulmonary artery is directly posterior to the aorta. There is now an RV to PA conduit. Mild stenosis of the RVOT conduit with moderate regurgitation, 31% by direct flow measurement, and 33% by ventricular stroke volume comparison.   AORTA AND SUPRA-AORTIC VESSELS: A left-sided aortic arch is demonstrated with normal cervical branching pattern. No evidence of patent ductus arteriosus, coarctation, or aortopulmonary collateral arteries. The coronary arteries are not well visualized in this study.   PULMONARY ARTERY: The RV to PA conduit is patent with normal pulmonary artery branching pattern.   LAST ECHO:  2/25/14  Color Flow CONCLUSION: Echocardiogram performed in a 33 year old female with d-transposition of the great arteries. Pulmonary stenosis status post Rastelli procedure and RV to PA conduit. There is low normal left ventricular systolic function with an ejection fraction of about 50%. This  is unchanged from previous echocardiogram. There is no obstruction at the level of the RVOT or LVOT. There is a 21 mmHg across the right ventricular to PA conduit. This is also unchanged from previous echo. There is no residual shunt and no pericardial effusion.   M-Mode Report   LV end Diastolic 56 mm   LV end Systolic 46 mm   Shortening Fraction 50%   Intravent Septum 9 mm   LV Post Wall 9 mm     1. ECG shows normal sinus rhythm at a rate of 91 bpm.   2. Normal left atrial dimension.   3. Normal left ventricular dimension and contractility.     2-Dimensional Report   Recordings are performed from parasternal, apical, subcostal and suprasternal notch windows. Anatomy consistent with d-transposition of the great vessels status post Rastelli procedure. The neoaorta, mitral, and tricuspid valve motions are normal. The systemic venous return was demonstrated and is normal. The atrial septum appears intact. The coronary sinus is mildly dilated. There is the presence of a left superior vena cava draining into the coronary sinus was not demonstrated in this exam. Left atrial size is normal. Right atrial size is normal. Right ventricular size and contractility is normal. The left ventricle has normal size and low normal systolic function with an ejection fraction about 50%. This is unchanged from previous echocardiogram. There is a patch in the interventricular septum with no evidence of residual defect. There is no evidence of pericardial effusion. The RV to PA conduit is not very well visualized due to poor acoustic windows. The RPA has normal caliber. The LPA was not visualized.     Not Evaluated   Aortic arch situs and coronary arteries.     Doppler Report   Color flow and spectral Doppler are utilized. There is no mitral regurgitation. There is trivial tricuspid regurgitation. The estimated right ventricular systolic pressure based on TR jet signal is 36 mmHg plus CVP. There is normal flow across the left ventricular  outflow tract (1.1 m/sec), velocity across the neoaortic valve is 1.2 m/sec. There is normal flow in the descending aorta with a velocity of 1 m/sec. There is no abdominal aortic flow pattern. There is 21 mmHg peak gradient in the RV to PA conduit, this is unchanged from previous echocardiogram as well. There is continuous Doppler across the left ventricular outflow tract with a peak gradient of 8 mmHg and a mean gradient of 5 mmHg respectively. There is no residual shunt   across the atrial or ventricular septum     Assessment and recommendations:  Ms. 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 She presents today for follow up. She reports feeling well. She is doing well from an EP standpoint. The patient has had no recurrences of sustained ventricular arrhythmias on sotalol.  Her device interrogation shows normal device function. Renal function stable. QTc stable. She will continue to follow with the device clinic per routine. We will follow-up with the patient on a yearly basis with BMP and EKG.     The patient states understanding and is agreeable with the plan.   This patient was seen and examined with Dr. Ray. The above note reflects our joint assessment and plan.   Joanne  KIMMIE Carias CNP  Electrophysiology Service  Pager: [phone]    EP STAFF NOTE  I have seen and examined the patient as part of a shared visit with Joanne Carias, LEO NP.  I agree with the note above. I reviewed today's vital signs and medications. I have reviewed and discussed with the advanced practice provider their physical examination, assessment, and plan   Briefly, no significant recurrences of VT, on sotalol, doing very well  My key history/exam findings are: RRR.   The key management decisions made by me: f/u yearly.    Vimal Ray MD [facility]  Cardiology - Electrophysiology

## 2017-09-22 NOTE — MR AVS SNAPSHOT
After Visit Summary   9/22/2017    Chikis Hannon    MRN: [id number]           Patient Information     Date Of Birth          1980        Visit Information        Provider Department      9/22/2017 11:00 AM 1[facility] Cv Device [facility]        Today's Diagnoses     Ventricular tachycardia (H)    -  1      Care Instructions    It was a pleasure to see you in clinic today. Please do not hesitate to call with any questions or concerns. You are scheduled for remote ICD transmissions on 12/26/17, 3/27/18 and 6/27/18. These will happen automatically in the night. We will call in 1-2 business days to discuss the results with you. We look forward to seeing you in clinic at your next device check in 1 year.    Kaylyn Alves, RN  Electrophysiology Nurse Clinician  [facility]  During business hours call:  [phone]  After business hours please call: [phone]- select option #4 and ask for job code [phone].            Follow-ups after your visit        Follow-up notes from your care team     Return in about 1 year (around 9/22/2018) for [employer], ICD check.      Your next 10 appointments already scheduled     Nov 02, 2017  9:15 AM CDT   Return Visit with Rh Suly   [facility] ([facility])    [address]  [facility] [address] [phone]            Nov 02, 2017 10:00 AM CDT   Return Visit with Betsy Valle MD   [facility] ([facility])    [address]  [facility] [id number] [phone]              Who to contact     If you have questions or need follow up information about today's clinic visit or your schedule please contact [facility] directly at [phone].  Normal or non-critical lab and imaging results will be communicated to you by MyChart, letter or phone within 4 business days after the clinic has received the results. If  "you do not hear from us within 7 days, please contact the clinic through [facility] or phone. If you have a critical or abnormal lab result, we will notify you by phone as soon as possible.  Submit refill requests through [facility] or call your pharmacy and they will forward the refill request to us. Please allow 3 business days for your refill to be completed.          Additional Information About Your Visit        [employer] Information     [facility] lets you send messages to your doctor, view your test results, renew your prescriptions, schedule appointments and more. To sign up, go to [email or web address] . Click on \"Log in\" on the left side of the screen, which will take you to the Welcome page. Then click on \"Sign up Now\" on the right side of the page.     You will be asked to enter the access code listed below, as well as some personal information. Please follow the directions to create your username and password.     Your access code is: [email or web address]  Expires: 2017 10:33 AM     Your access code will  in 90 days. If you need help or a new code, please call your [place] clinic or [phone].        Care EveryWhere ID     This is your Care EveryWhere ID. This could be used by other organizations to access your [place] medical records  [id number]         Blood Pressure from Last 3 Encounters:   17 106/59   17 125/72   16 114/66    Weight from Last 3 Encounters:   17 81.7 kg (180 lb 1.6 oz)   17 81.6 kg (180 lb)   16 78 kg (172 lb)              We Performed the Following     ICD DEVICE PROGRAMMING EVAL, SINGLE LEAD ICD        Primary Care Provider Office Phone # Fax #    KIMMIE Jones [facility] [phone]       [address] [facility] [address]        Equal Access to Services     RIVERA STARR : Ev Arango, waaxda luqadaha, qaybta kaalmada adeegyada, sara mohan . So [facility] " [phone].    ATENCIÓN: Si nehemiah olivier, tiene a mustafa disposición servicios gratuitos de asistencia lingüística. Cindy boogie [phone].    We comply with applicable federal civil rights laws and [place] laws. We do not discriminate on the basis of race, color, national origin, age, disability sex, sexual orientation or gender identity.            Thank you!     Thank you for choosing [facility]  for your care. Our goal is always to provide you with excellent care. Hearing back from our patients is one way we can continue to improve our services. Please take a few minutes to complete the written survey that you may receive in the mail after your visit with us. Thank you!             Your Updated Medication List - Protect others around you: Learn how to safely use, store and throw away your medicines at www.disposemymeds.org.          This list is accurate as of: 9/22/17  2:49 PM.  Always use your most recent med list.                   Brand Name Dispense Instructions for use Diagnosis    ACE NOT PRESCRIBED (INTENTIONAL)     0 each    1 each as needed for other ACE Inhibitor not prescribed due to Other: BP at goal, pending microalbumin test    Type 2 diabetes, HbA1c goal < 7% (H)       albuterol 108 (90 BASE) MCG/ACT Inhaler    PROAIR HFA/PROVENTIL HFA/VENTOLIN HFA    1 Inhaler    Inhale 2 puffs into the lungs every 6 hours as needed for shortness of breath / dyspnea or wheezing    Smoke hypersensitivity       ALEVE PO      Take 220 mg by mouth        amoxicillin 500 MG capsule    AMOXIL    12 capsule    Take 4 capsules (2000mg) 30-60 minutes prior to dental work.    Corrected transposition of great vessels       atorvastatin 40 MG tablet    LIPITOR    90 tablet    Take 1 tablet (40 mg) by mouth daily    CARDIOVASCULAR SCREENING; LDL GOAL LESS THAN 70       blood glucose monitoring lancets     1 Box    Use to test blood sugar 1 times daily or as directed.    Type 2 diabetes mellitus with other  circulatory complications (H)       blood glucose monitoring test strip    ACCU-CHEK SMARTVIEW    100 each    Use to test blood sugar 1 times daily or as directed.    Type 2 diabetes mellitus without complication (H)       gabapentin 300 MG capsule    NEURONTIN    90 capsule    Take 1 tablet (300 mg) every night    Type 2 diabetes mellitus with other circulatory complications (H), Neuropathic pain       medroxyPROGESTERone 150 MG/ML injection    DEPO-PROVERA    1 mL    Inject 1 mL (150 mg) into the muscle every 3 months    Encounter for initial prescription of injectable contraceptive       melatonin 3 MG Caps     90 capsule    Take 3 mg by mouth nightly as needed    Insomnia, unspecified type       metFORMIN 500 MG tablet    GLUCOPHAGE    90 tablet    Take 1 tablet (500 mg) by mouth daily (with dinner)    Type 2 diabetes mellitus with other circulatory complications (H)       multivitamin per tablet      ONE DAILY        OMEGA-3 FISH OIL PO           order for DME     1 Package    Thumb splint    Thumb pain, right       sotalol 80 MG tablet    BETAPACE    180 tablet    Take 1 tablet (80 mg) by mouth every 12 hours    Ventricular tachycardia (H)       VITAMIN C PO           VITAMIN D (CHOLECALCIFEROL) PO      Take by mouth daily

## 2017-09-22 NOTE — NURSING NOTE
Chief Complaint   Patient presents with     Follow Up For     heart problem -- 37 yr old female with h/o TGA s/p correctional surgery in 1998, a history of VT with ablation and ICD placement as secondary prevention in 03/2014, now s/p recent new single chamber ICD system 6/1/15 presenting for follow up **EKG needed**     Vitals were taken and medications were reconciled. EKG was performed.  BOBBY Mehta  10:47 AM

## 2017-09-22 NOTE — PATIENT INSTRUCTIONS
It was a pleasure to see you in clinic today. Please do not hesitate to call with any questions or concerns. You are scheduled for remote ICD transmissions on 12/26/17, 3/27/18 and 6/27/18. These will happen automatically in the night. We will call in 1-2 business days to discuss the results with you. We look forward to seeing you in clinic at your next device check in 1 year.    Kaylyn Alves RN  Electrophysiology Nurse Clinician  [facility]  During business hours call:  [phone]  After business hours please call: [phone]- select option #4 and ask for job code [phone].

## 2017-09-22 NOTE — PATIENT INSTRUCTIONS
"You were seen today in the Adult Congenital and Cardiovascular Genetics Clinic at the [facility].    Cardiology Providers you saw during your visit:  Dr. Vimal Ray     Diagnosis:  Transposition of the Great Arteries, VSD, and pulmonic stenosis, s/p Rastelli procedure with VSD closure and RV to PA conduit s/p ICD placement    Results:  No testing today    Recommendations:    1.  Continue to eat a heart healthy, low salt diet.  2.  Continue to get 20-30 minutes of aerobic activity, 4-5 days per week.  Examples of aerobic activity include walking, running, swimming, cycling, etc.  3.  Continue to observe good oral hygiene, with regular dental visits.  4.  No changes today       Vitals:    09/22/17 1050   BP: 106/59   BP Location: Left arm   Patient Position: Chair   Cuff Size: Adult Large   Pulse: 86   SpO2: 96%   Weight: 81.7 kg (180 lb 1.6 oz)   Height: 1.575 m (5' 2\")       SBE prophylaxis:   Yes__X__  No____    Lifelong Bacterial Endocarditis Prophylaxis:  YES_X___  NO____    If YES is checked, follow the recommendations outlined below:  1. Take antibiotic(s) prior to interventional procedures or surgeries (dental, respiratory, urologic, gastrointestinal, gynecologic), or instrumental examinations.   2. Observe good oral hygiene daily, as advised by your dentist. Get regular professional dental care.  3. Keep cuts clean.  4. Infections should be treated promptly.      Exercise restrictions:   Yes__X__  No____         If yes, list restrictions:  Must be allowed to rest if fatigued or SOB      Work restrictions:  Yes____  No_X___         If yes, list restrictions:    FASTING CHOLESTEROL was checked in the last 5 years YES_X__  NO___ 2016  Continue to eat a heart healthy, low salt diet.         ____ Fasting lipid panel order today         ____ No changes in medications          ____ I recommend the following changes in your cholesterol medications.:          ____ Please follow up for cholesterol " screening at your primary care physician      Follow-up:  Follow up with Dr. Ray in 1 year.     Please join us for the 2017 [place] [facility] Walk!  When:  Saturday, September 23, 2017  Where: [facility], [address], [place] [address]  Time: 9:00 AM Registration- 10:00 AM Walk Kick-Off  What:  Offering a family friendly one mile walk!  [email or web address]    For after hours urgent needs, call [phone] and ask to speak to the Adult Congenital Physician on call.  Mention Job Code [phone].    For emergencies call [phone].    For any scheduling needs and to contact your nurse in the Adult Congenital and Cardiovascular Genetics Clinic, please call Felipe Hinkle, Procedure , at [phone].    Thank you for your visit today!  If you have questions or concerns about today's visit, please call me.    Fady Jameson RN, BSN  Cardiology Care Coordinator  [facility]  [email or web address]  [phone]    [facility]  [facility]  Mail Code [id number]  [address] [place]  [address]

## 2017-09-22 NOTE — PROGRESS NOTES
HPI:  Ms. 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 The patient underwent VT ablation on 3/13/2014 and a subsequent ICD implant single-chamber single coil on 3/14/2014, later Rv lead malfunction due to isolation break and failure to deliver shock for recurrent fast VT (VT spontaneously terminated) s/p replacement of entire system 6/2/2015. She was discharged on sotalol 120 mg twice a day. She was seen in clinic on 4/22/14 and her device interrogation showed no recurrence of ventricular arrhythmias. She reported some mild fatigue on Sotalol and her dose was decreased to 80 mg BID. At her July 2014 follow up appointment she only had one 4 second NSVT at 160 bpm which was asymptomatic. At her clinic visit in 2/15 she again had no ventricular arrhythmias and her Sotalol was stopped. After recurrence of VT in June 2015, the patient was restarted on sotalol 80 mg twice a day. We re-attempted VT ablation after holding sotalol for one week on 8/24/2015 with no inducible VT.  [facility] EP Follow up 9/22/15: She presents today for follow-up.  Her device interrogation shows no ventricular arrhythmias.  Her device site has healed well. She denies any syncope or presyncope, no chest pain or shortness of breath on exertion at rest, no orthopnea, no lower extremity edema. Her EKG shows normal sinus rhythm with RBBB,  ms.  [facility] EP Visit 9/23/17: She presents today for follow up. She reports feeling  well. She denies any chest pain, dizziness, lightheadedness, shortness of breath, palpitations, leg/ankle swelling, PND, orthopnea, or syncopal symptoms. She has started exercise classes and is increasing her activity levels. Device interrogation shows normal device function.  17 episodes recorded. 16 VT zone monitored events when she was exercising EGMS shows ST with rates of 168-172 BPM.  1 NSVT for 4 beats at a rate of 211 for 2 second duration.  Intrinsic rhythm = SR @ 90 bpm. = <0.1%. OptiVol fluid index is at baseline. No short v-v intervals recorded.  RV lead impedance trends appear stable. Presenting 12 lead ECG shows SR with RBBB Vent Rate 84 bpm,  ms,  ms, QTc 505 ms. Current cardiac medications include: Sotalol.   She presents today for follow up. She reports feeling well. She denies any chest pain/pressures, dizziness, lightheadedness, shortness of breath, leg/ankle swelling, PND, orthopnea, palpitations, or syncopal symptoms. She continues to exercise and is without limitations. Device interrogation shows normal device function.  2 NSVT episodes recorded, lasting 1-2 seconds. 8 episodes recorded as VT, these EGMs are consistent with ST.  <0.1%. No short v-v intervals recorded. Lead trends appear stable. Presenting 12 lead ECG shows NSR with RBBB Vent Rate 85 bpm,  ms,  ms, QTc 504 ms. Current cardiac medications include: Sotalol.     Current Outpatient Prescriptions   Medication Sig Dispense Refill     sotalol (BETAPACE) 80 MG tablet Take 1 tablet (80 mg) by mouth every 12 hours 60 tablet 3     amoxicillin (AMOXIL) 500 MG capsule Take 4 capsules (2000mg) 30-60 minutes prior to dental work. 12 capsule 3     acetaminophen-codeine (TYLENOL #3) 300-30 MG per tablet Take 1 tablet by mouth every 4 hours as needed for other (mild or moderate pain) 10 tablet 0     Naproxen Sodium (ALEVE PO) Take 220 mg by mouth       fluticasone (FLONASE) 50 MCG/ACT nasal spray Spray 1-2 sprays  into both nostrils daily 1 Package 11     metFORMIN (GLUCOPHAGE) 500 MG tablet Take 1 tablet (500 mg) by mouth daily (with dinner) 90 tablet 3     STATIN NOT PRESCRIBED, INTENTIONAL, Statin not prescribed intentionally due to Not indicated based on age 0 each 0     gabapentin (NEURONTIN) 300 MG capsule Take 1 tablet (300 mg) every night 90 capsule 0     MELATONIN PO Take 3 mg by mouth nightly as needed        VITAMIN D, CHOLECALCIFEROL, PO Take by mouth daily       Ascorbic Acid (VITAMIN C PO)        Omega-3 Fatty Acids (OMEGA-3 FISH OIL PO)        ACE NOT PRESCRIBED, INTENTIONAL, 1 each as needed for other ACE Inhibitor not prescribed due to Other: BP at goal, pending microalbumin test 0 each 0     blood glucose (ACCU-CHEK SMARTVIEW) test strip Use to test blood sugar 1 times daily or as directed. 100 strip prn     blood glucose monitoring (ACCU-CHEK FASTCLIX) lancets Use to test blood sugar 1 times daily or as directed. 1 Box prn     medroxyPROGESTERone (DEPO-PROVERA) 150 MG/ML injection Inject 1 mL (150 mg) into the muscle every 3 months 1 mL 4     MULTIVITAMINS PO TABS ONE DAILY  3       Past Medical History   Diagnosis Date     Allergy, unspecified not elsewhere classified      Attention deficit disorder without mention of hyperactivity      Mild MR; difficulty attention span     Corrected transposition of great vessels      still needs SBE prophlaxis     Impaired fasting glucose      103 7/06     Congenital heart disease      Ventricular tachycardia 3/4/2014     s/p ablation and ICD placement 3/13/14     Diabetes mellitus type 2, noninsulin dependent        Past Surgical History   Procedure Laterality Date     C nonspecific procedure  11/98     open heart surgery x 3, transposition of major vessels     C nonspecific procedure  10/03     Additional heart surgery     Cardiac surgery  Had 4 surgeries     transposition of the major arteries     Surgical history of -   2014     ablation for v. tach; ICD placed       N/A 6/1/2015     Procedure: ANESTHESIA ICD/PACEMAKER CHANGE ADULT;  Surgeon: GENERIC ANESTHESIA PROVIDER;  Location: [facility] OR       Family History   Problem Relation Age of Onset     Adopted: Yes     Family History Negative Mother      Family History Negative Father        History   Substance Use Topics     Smoking status: Never Smoker      Smokeless tobacco: Never Used     Alcohol Use: No       ROS:   A comprehensive 10 system ROS was negative except for HPI.    Physical Examination:  There were no vitals taken for this visit.  GENERAL APPEARANCE: healthy, alert and no distress  HEENT: no icterus, no xanthelasmas, normal pupil size and reaction, normal palate, mucosa moist, no cyanosis.  NECK: no adenopathy, no asymmetry, masses, or scars, thyroid normal to palpation, carotid upstrokes are normal bilaterally, no cervical bruits, JVP is not visible  CHEST: lungs clear to auscultation - no rales, rhonchi or wheezes, no use of accessory muscles, no retractions, respirations are unlabored, normal respiratory rate, no kyphosis, no scoliosis  CARDIOVASCULAR: regular rhythm, S1S2, 3/6 KRISTI best heard at the left sternal border at the base.  ABDOMEN: soft, non tender, without hepatosplenomegaly, no masses palpable, bowel sounds normal, aorta not enlarged by palpation, no abdominal bruits  EXTREMITIES: no clubbing, cyanosis or edema.  NEURO: alert and oriented to person/place/time, normal speech, gait and affect  VASC: Radial, dorsalis pedis and posterior tibialis pulses are normal.  SKIN: no ecchymoses, no rashes    Laboratory: Reviewed.    Procedures:  LAST DEVICE INTERROGATION:  4/8/14  No ventricular arrhythmias recorded. Intrinsic rhythm = vent rate 74 bpm. Estimated battery longevity = 7.6-9 years. Plan for patient to RTC in 3 mos for next ICD check. MD notified of interrogation results.  LAST ECHO:  3/10/14  IMPRESSION:   1. Normal coronary anatomy with no detectable stenosis or plaque. The coronaries arise from the  anteriorly positioned aorta.   2. Please review Radiology report for incidental noncardiac findings that will follow separately.   FINDINGS:   DOMINANCE: Right dominant system.   LEFT MAIN: The left main arises normally from the left coronary cusp and is widely patent without any stenosis or plaque.   LEFT ANTERIOR DESCENDING: The left anterior descending and its major diagonal branches are widely patent without any detectable stenosis or plaque.   CIRCUMFLEX: The circumflex and its major branches are widely patent without any detectable stenosis or plaque. The circumflex is a small caliber vessel patent small obtuse marginal arteries.   RIGHT CORONARY ARTERY: The right coronary artery and its major branches are widely patent without any detectable stenosis or plaque.   ADDITIONAL FINDINGS: The aortic root has an unusual appearance probably from the reconstructive surgery for transposition. The appearance is nearly of 2 aortic roots one small 1 which ends in a cul-de-sac and the other which leads into the ascending aorta. The coronary arteries arise from that aortic root which feeds into the ascending aorta . There is a conduit from the right ventricle to the pulmonary trunk. Normal pulmonary venous anatomy with all four pulmonary veins draining into the left atrium. There is no left atrial or left ventricular mass or thrombus. Normal pericardial thickness.There is no pericardial effusion. The proximal pulmonary arteries are well opacified.  LAST CARDIAC MRA:  3/6/14  FINDINGS:   SITUS: There is a normal spleen in the left upper quadrant. There is situs solitus in the chest, as demonstrated by a normal airway pulmonary artery relationship bilaterally.   CAVAE: Single right-sided inferior and superior vena cavae drain normally into the right atrium unobstructed.   PULMONARY VEINS: Two right and two left pulmonary veins drain into the left atrium unobstructed.   ATRIA: There is no interatrial communication  demonstrated. The atrial sizes are normal.   ATRIOVENTRICULAR CONNECTION: Concordant. Separate mitral and tricuspid valves without evidence of significant regurgitation or stenosis.   VENTRICLES: D-loop ventricles with levocardia. Ventricles are normal in size and contraction. Right ventricular end-diastolic volume is upper limits of normal for BSA. If utilizing an ideal body weight, this would be mildly enlarged. No residual interventricular communication is demonstrated. No myocardial hyperenhancement identified on delayed post gadolinium imaging, except at the ventricular insertion points.   VENTRICULOARTERIAL CONNECTION: Concordant. Trileaflet aortic valve without regurgitation or stenosis. The oversewn native pulmonary artery is directly posterior to the aorta. There is now an RV to PA conduit. Mild stenosis of the RVOT conduit with moderate regurgitation, 31% by direct flow measurement, and 33% by ventricular stroke volume comparison.   AORTA AND SUPRA-AORTIC VESSELS: A left-sided aortic arch is demonstrated with normal cervical branching pattern. No evidence of patent ductus arteriosus, coarctation, or aortopulmonary collateral arteries. The coronary arteries are not well visualized in this study.   PULMONARY ARTERY: The RV to PA conduit is patent with normal pulmonary artery branching pattern.   LAST ECHO:  2/25/14  Color Flow CONCLUSION: Echocardiogram performed in a 33 year old female with d-transposition of the great arteries. Pulmonary stenosis status post Rastelli procedure and RV to PA conduit. There is low normal left ventricular systolic function with an ejection fraction of about 50%. This is unchanged from previous echocardiogram. There is no obstruction at the level of the RVOT or LVOT. There is a 21 mmHg across the right ventricular to PA conduit. This is also unchanged from previous echo. There is no residual shunt and no pericardial effusion.   M-Mode Report   LV end Diastolic 56 mm   LV end  Systolic 46 mm   Shortening Fraction 50%   Intravent Septum 9 mm   LV Post Wall 9 mm     1. ECG shows normal sinus rhythm at a rate of 91 bpm.   2. Normal left atrial dimension.   3. Normal left ventricular dimension and contractility.     2-Dimensional Report   Recordings are performed from parasternal, apical, subcostal and suprasternal notch windows. Anatomy consistent with d-transposition of the great vessels status post Rastelli procedure. The neoaorta, mitral, and tricuspid valve motions are normal. The systemic venous return was demonstrated and is normal. The atrial septum appears intact. The coronary sinus is mildly dilated. There is the presence of a left superior vena cava draining into the coronary sinus was not demonstrated in this exam. Left atrial size is normal. Right atrial size is normal. Right ventricular size and contractility is normal. The left ventricle has normal size and low normal systolic function with an ejection fraction about 50%. This is unchanged from previous echocardiogram. There is a patch in the interventricular septum with no evidence of residual defect. There is no evidence of pericardial effusion. The RV to PA conduit is not very well visualized due to poor acoustic windows. The RPA has normal caliber. The LPA was not visualized.     Not Evaluated   Aortic arch situs and coronary arteries.     Doppler Report   Color flow and spectral Doppler are utilized. There is no mitral regurgitation. There is trivial tricuspid regurgitation. The estimated right ventricular systolic pressure based on TR jet signal is 36 mmHg plus CVP. There is normal flow across the left ventricular outflow tract (1.1 m/sec), velocity across the neoaortic valve is 1.2 m/sec. There is normal flow in the descending aorta with a velocity of 1 m/sec. There is no abdominal aortic flow pattern. There is 21 mmHg peak gradient in the RV to PA conduit, this is unchanged from previous echocardiogram as well. There is  continuous Doppler across the left ventricular outflow tract with a peak gradient of 8 mmHg and a mean gradient of 5 mmHg respectively. There is no residual shunt   across the atrial or ventricular septum     Assessment and recommendations:  Ms. 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 She presents today for follow up. She reports feeling well. She is doing well from an EP standpoint. The patient has had no recurrences of sustained ventricular arrhythmias on sotalol.  Her device interrogation shows normal device function. Renal function stable. QTc stable. She will continue to follow with the device clinic per routine. We will follow-up with the patient on a yearly basis with BMP and EKG.     The patient states understanding and is agreeable with the plan.   This patient was seen and examined with Dr. Ray. The above note reflects our joint assessment and plan.   KIMMIE Blevins CNP  Electrophysiology Service  Pager: [phone]    EP STAFF NOTE  I have seen and examined the patient as part of a shared visit with LEO Blevins NP.  I agree with the note above. I reviewed today's vital signs and medications. I have reviewed and discussed with the advanced practice provider their  physical examination, assessment, and plan   Briefly, no significant recurrences of VT, on sotalol, doing very well  My key history/exam findings are: RRR.   The key management decisions made by me: f/u yearly.    Vimal Ray MD [facility]  Cardiology - Electrophysiology

## 2017-09-22 NOTE — NURSING NOTE
Chief Complaint   Patient presents with     Follow Up For     heart problem -- 37 yr old female with h/o TGA s/p correctional surgery in 1998, a history of VT with ablation and ICD placement as secondary prevention in 03/2014, now s/p recent new single chamber ICD system 6/1/15 presenting for follow up **EKG needed**        Cardiac Testing: Patient given instructions regarding  echocardiogram . Discussed purpose, preparation, procedure and when to expect results reported back to the patient. Patient demonstrated understanding of this information and agreed to call with further questions or concerns.  Med Reconcile: Reviewed and verified all current medications with the patient. The updated medication list was printed and given to the patient.  Return Appointment: Patient given instructions regarding scheduling next clinic visit. Patient demonstrated understanding of this information and agreed to call with further questions or concerns.  Patient stated she understood all health information given and agreed to call with further questions or concerns.     Fady Jameson RN, BSN  Cardiology Care Coordinator  [facility]  [email or web address]  [phone]

## 2017-09-22 NOTE — PROGRESS NOTES
Pt seen in the [facility] for evaluation and iterative programming of a [employer], single lead ICD, per MD orders. She also has an appointment with Dr. Ray. Today her intrinsic rhythm is SR 75 bpm. Normal ICD function. 2 NSVT episodes recorded, lasting 1-2 seconds. 8 episodes recorded as VT. The EGMs show regular ventricular rhythms with rates 164- 182 bpm lasting up to 40 seconds. The morphology is similar to her intrinsic rhythm. OptiVol thoracic impedance is near her baseline.  <0.1%. No short v-v intervals recorded. Lead trends appear stable. Pt reports that she is feeling well. Battery estimates 10.2 years to [facility]. Plan for pt to send remote transmissions on 12/26/17, 3/27/18 and 6/27/18 returning to clinic in 1 year.  Single lead ICD

## 2017-09-22 NOTE — MR AVS SNAPSHOT
"              After Visit Summary   9/22/2017    Chikis Hannon    MRN: [id number]           Patient Information     Date Of Birth          1980        Visit Information        Provider Department      9/22/2017 11:30 AM Vimal Ray MD [facility]        Today's Diagnoses     Ventricular tachycardia (H)        Corrected transposition of great vessels          Care Instructions    You were seen today in the Adult Congenital and Cardiovascular Genetics Clinic at the [facility].    Cardiology Providers you saw during your visit:  Dr. Vimal Ray     Diagnosis:  Transposition of the Great Arteries, VSD, and pulmonic stenosis, s/p Rastelli procedure with VSD closure and RV to PA conduit s/p ICD placement    Results:  No testing today    Recommendations:    1.  Continue to eat a heart healthy, low salt diet.  2.  Continue to get 20-30 minutes of aerobic activity, 4-5 days per week.  Examples of aerobic activity include walking, running, swimming, cycling, etc.  3.  Continue to observe good oral hygiene, with regular dental visits.  4.  No changes today       Vitals:    09/22/17 1050   BP: 106/59   BP Location: Left arm   Patient Position: Chair   Cuff Size: Adult Large   Pulse: 86   SpO2: 96%   Weight: 81.7 kg (180 lb 1.6 oz)   Height: 1.575 m (5' 2\")       SBE prophylaxis:   Yes__X__  No____    Lifelong Bacterial Endocarditis Prophylaxis:  YES_X___  NO____    If YES is checked, follow the recommendations outlined below:  1. Take antibiotic(s) prior to interventional procedures or surgeries (dental, respiratory, urologic, gastrointestinal, gynecologic), or instrumental examinations.   2. Observe good oral hygiene daily, as advised by your dentist. Get regular professional dental care.  3. Keep cuts clean.  4. Infections should be treated promptly.      Exercise restrictions:   Yes__X__  No____         If yes, list restrictions:  Must be allowed to rest if fatigued or SOB      Work restrictions: "  Yes____  No_X___         If yes, list restrictions:    FASTING CHOLESTEROL was checked in the last 5 years YES_X__  NO___ 2016  Continue to eat a heart healthy, low salt diet.         ____ Fasting lipid panel order today         ____ No changes in medications          ____ I recommend the following changes in your cholesterol medications.:          ____ Please follow up for cholesterol screening at your primary care physician      Follow-up:  Follow up with Dr. Ray in 1 year.     Please join us for the 2017 [place] Congenital Heart Walk!  When:  Saturday, September 23, 2017  Where: [place], [address], [place] [address]  Time: 9:00 AM Registration- 10:00 AM Walk Kick-Off  What:  Offering a family friendly one mile walk!  [email or web address]    For after hours urgent needs, call [phone] and ask to speak to the Adult Congenital Physician on call.  Mention Job Code 0401.    For emergencies call 911.    For any scheduling needs and to contact your nurse in the Adult Congenital and Cardiovascular Genetics Clinic, please call Felipe Hinkle, Procedure , at [phone].    Thank you for your visit today!  If you have questions or concerns about today's visit, please call me.    Fady Jameson RN, BSN  Cardiology Care Coordinator  [facility]  [email or web address]  Ph.[phone]    [facility]  [facility]   Clinics and Surgery Center  Mail Code 2121CK  [address] [place]  [address]           Follow-ups after your visit        Follow-up notes from your care team     Return in about 1 year (around 9/22/2018) for ACHD Follow up with Dr. Ray.      Your next 10 appointments already scheduled     Nov 02, 2017 10:00 AM CDT   Return Visit with Betsy Valle MD   [facility] ([facility])    [address] [place]  "[place] [address]   [phone]              Who to contact     If you have questions or need follow up information about today's clinic visit or your schedule please contact [facility] directly at [phone].  Normal or non-critical lab and imaging results will be communicated to you by MyChart, letter or phone within 4 business days after the clinic has received the results. If you do not hear from us within 7 days, please contact the clinic through [employer] or phone. If you have a critical or abnormal lab result, we will notify you by phone as soon as possible.  Submit refill requests through [employer] or call your pharmacy and they will forward the refill request to us. Please allow 3 business days for your refill to be completed.          Additional Information About Your Visit        [employer] Information     [employer] lets you send messages to your doctor, view your test results, renew your prescriptions, schedule appointments and more. To sign up, go to [email or web address] . Click on \"Log in\" on the left side of the screen, which will take you to the Welcome page. Then click on \"Sign up Now\" on the right side of the page.     You will be asked to enter the access code listed below, as well as some personal information. Please follow the directions to create your username and password.     Your access code is: [email or web address]  Expires: 2017 10:33 AM     Your access code will  in 90 days. If you need help or a new code, please call your [place] clinic or [phone].        Care EveryWhere ID     This is your Care EveryWhere ID. This could be used by other organizations to access your [place] medical records  [id number]        Your Vitals Were     Pulse Height Pulse Oximetry BMI (Body Mass Index)          86 1.575 m (5' 2\") 96% 32.94 kg/m2         Blood Pressure from Last 3 Encounters:   17 106/59   17 125/72   16 114/66    Weight from Last 3 Encounters:   17 81.7 kg " (180 lb 1.6 oz)   05/25/17 81.6 kg (180 lb)   09/23/16 78 kg (172 lb)              We Performed the Following     EKG 12-lead, tracing only (Future)        Primary Care Provider Office Phone # Fax #    KIMMIE Jones [facility] [phone]       [address] [facility] [address]        Equal Access to Services     [facility] Center[facility]AREEM : Hadii aad ku hadasho Soomaali, waaxda luqadaha, qaybta kaalmada adeegyada, waxay idiin hayaan adeeg kharash la'aan . So [facility] [phone].    ATENCIÓN: Si nehemiah olivier, tiene a mustafa disposición servicios gratuitos de asistencia lingüística. Llame al [phone].    We comply with applicable federal civil rights laws and [place] laws. We do not discriminate on the basis of race, color, national origin, age, disability sex, sexual orientation or gender identity.            Thank you!     Thank you for choosing [facility]  for your care. Our goal is always to provide you with excellent care. Hearing back from our patients is one way we can continue to improve our services. Please take a few minutes to complete the written survey that you may receive in the mail after your visit with us. Thank you!             Your Updated Medication List - Protect others around you: Learn how to safely use, store and throw away your medicines at www.disposemymeds.org.          This list is accurate as of: 9/22/17 12:01 PM.  Always use your most recent med list.                   Brand Name Dispense Instructions for use Diagnosis    ACE NOT PRESCRIBED (INTENTIONAL)     0 each    1 each as needed for other ACE Inhibitor not prescribed due to Other: BP at goal, pending microalbumin test    Type 2 diabetes, HbA1c goal < 7% (H)       albuterol 108 (90 BASE) MCG/ACT Inhaler    PROAIR HFA/PROVENTIL HFA/VENTOLIN HFA    1 Inhaler    Inhale 2 puffs into the lungs every 6 hours as needed for shortness of breath / dyspnea or wheezing    Smoke hypersensitivity       ALEVE PO      Take 220  mg by mouth        amoxicillin 500 MG capsule    AMOXIL    12 capsule    Take 4 capsules (2000mg) 30-60 minutes prior to dental work.    Corrected transposition of great vessels       atorvastatin 40 MG tablet    LIPITOR    90 tablet    Take 1 tablet (40 mg) by mouth daily    CARDIOVASCULAR SCREENING; LDL GOAL LESS THAN 70       blood glucose monitoring lancets     1 Box    Use to test blood sugar 1 times daily or as directed.    Type 2 diabetes mellitus with other circulatory complications (H)       blood glucose monitoring test strip    ACCU-CHEK SMARTVIEW    100 each    Use to test blood sugar 1 times daily or as directed.    Type 2 diabetes mellitus without complication (H)       gabapentin 300 MG capsule    NEURONTIN    90 capsule    Take 1 tablet (300 mg) every night    Type 2 diabetes mellitus with other circulatory complications (H), Neuropathic pain       medroxyPROGESTERone 150 MG/ML injection    DEPO-PROVERA    1 mL    Inject 1 mL (150 mg) into the muscle every 3 months    Encounter for initial prescription of injectable contraceptive       melatonin 3 MG Caps     90 capsule    Take 3 mg by mouth nightly as needed    Insomnia, unspecified type       metFORMIN 500 MG tablet    GLUCOPHAGE    90 tablet    Take 1 tablet (500 mg) by mouth daily (with dinner)    Type 2 diabetes mellitus with other circulatory complications (H)       multivitamin per tablet      ONE DAILY        OMEGA-3 FISH OIL PO           order for DME     1 Package    Thumb splint    Thumb pain, right       sotalol 80 MG tablet    BETAPACE    180 tablet    Take 1 tablet (80 mg) by mouth every 12 hours    Ventricular tachycardia (H)       VITAMIN C PO           VITAMIN D (CHOLECALCIFEROL) PO      Take by mouth daily

## 2017-09-25 LAB — INTERPRETATION ECG - MUSE: NORMAL

## 2017-11-01 ENCOUNTER — PRE VISIT (OUTPATIENT)
Dept: CARDIOLOGY | Facility: CLINIC | Age: 37
End: 2017-11-01

## 2017-11-02 ENCOUNTER — HOSPITAL ENCOUNTER (OUTPATIENT)
Dept: CARDIOLOGY | Facility: CLINIC | Age: 37
Discharge: HOME | End: 2017-11-02
Attending: INTERNAL MEDICINE | Admitting: INTERNAL MEDICINE
Payer: MEDICARE

## 2017-11-02 ENCOUNTER — OFFICE VISIT (OUTPATIENT)
Dept: PEDIATRIC CARDIOLOGY | Facility: CLINIC | Age: 37
End: 2017-11-02
Attending: INTERNAL MEDICINE
Payer: MEDICARE

## 2017-11-02 VITALS
DIASTOLIC BLOOD PRESSURE: 72 MMHG | SYSTOLIC BLOOD PRESSURE: 121 MMHG | BODY MASS INDEX: 33.43 KG/M2 | HEART RATE: 86 BPM | OXYGEN SATURATION: 97 % | WEIGHT: 181.66 LBS | HEIGHT: 62 IN | RESPIRATION RATE: 20 BRPM

## 2017-11-02 DIAGNOSIS — Q20.3 TGA (TRANSPOSITION OF GREAT ARTERIES): ICD-10-CM

## 2017-11-02 DIAGNOSIS — Q20.3 TGA (TRANSPOSITION OF GREAT ARTERIES): Primary | ICD-10-CM

## 2017-11-02 DIAGNOSIS — I47.20 VENTRICULAR TACHYCARDIA (H): ICD-10-CM

## 2017-11-02 PROCEDURE — 99211 OFF/OP EST MAY X REQ PHY/QHP: CPT | Mod: ZF

## 2017-11-02 PROCEDURE — 94760 N-INVAS EAR/PLS OXIMETRY 1: CPT | Mod: ZF

## 2017-11-02 PROCEDURE — 99214 OFFICE O/P EST MOD 30 MIN: CPT | Mod: ZP | Performed by: INTERNAL MEDICINE

## 2017-11-02 PROCEDURE — 93325 DOPPLER ECHO COLOR FLOW MAPG: CPT

## 2017-11-02 ASSESSMENT — PAIN SCALES - GENERAL: PAINLEVEL: NO PAIN (0)

## 2017-11-02 NOTE — PATIENT INSTRUCTIONS
"You were seen today in the Adult Congenital and Cardiovascular Genetics Clinic at the [facility].    Cardiology Providers you saw during your visit:  Dr. Betsy Valle     Diagnosis:  Transposition of the Great Arteries, VSD, and pulmonic stenosis, s/p Rastelli procedure with VSD closure and RV to PA conduit s/p ICD placement    Results:  The results of your echo were discussed with you today.     Recommendations:    1.  Continue to eat a heart healthy, low salt diet.  2.  Continue to get 20-30 minutes of aerobic activity, 4-5 days per week.  Examples of aerobic activity include walking, running, swimming, cycling, etc.  3.  Continue to observe good oral hygiene, with regular dental visits.  4.  No changes today.      Vitals:    11/02/17 0934   BP: 121/72   BP Location: Left arm   Pulse: 86   Resp: 20   SpO2: 97%   Weight: 82.4 kg (181 lb 10.5 oz)   Height: 1.58 m (5' 2.21\")       SBE prophylaxis:   Yes__X__  No____    Lifelong Bacterial Endocarditis Prophylaxis:  YES__X__  NO____    If YES is checked, follow the recommendations outlined below:  1. Take antibiotic(s) prior to interventional procedures or surgeries (dental, respiratory, urologic, gastrointestinal, gynecologic), or instrumental examinations.   2. Observe good oral hygiene daily, as advised by your dentist. Get regular professional dental care.  3. Keep cuts clean.  4. Infections should be treated promptly.      Exercise restrictions:   Yes__X__  No____         If yes, list restrictions:  Must be allowed to rest if fatigued or SOB      Work restrictions:  Yes____  No_X___         If yes, list restrictions:    FASTING CHOLESTEROL was checked in the last 5 years YES_X__  NO___   Continue to eat a heart healthy, low salt diet.         ____ Fasting lipid panel order today         ____ No changes in medications          ____ I recommend the following changes in your cholesterol medications.:          ____ Please follow up for cholesterol " screening at your primary care physician      Follow-up:  Follow up with Dr. Valle in 1 year with an echo (will make your appointments in conjunction with Dr. Ray)    For after hours urgent needs, call [phone] and ask to speak to the Adult Congenital Physician on call.  Mention Job Code 0401.    For emergencies call 911.    For any scheduling needs and to contact your nurse in the Adult Congenital and Cardiovascular Genetics Clinic, please call Felipe Hinkle, Procedure , at [phone].    Thank you for your visit today!  If you have questions or concerns about today's visit, please call me.    Fady Jameson, RN, BSN  Cardiology Care Coordinator  [facility]  [email or web address]  Ph.[phone]    [facility]  [facility]   Clinics and Surgery Center  Mail Code 2121CK  [address] [place] [address]  [address]

## 2017-11-02 NOTE — NURSING NOTE
Chief Complaint   Patient presents with     RECHECK     follow up        Cardiac Testing: Patient given instructions regarding  echocardiogram . Discussed purpose, preparation, procedure and when to expect results reported back to the patient. Patient demonstrated understanding of this information and agreed to call with further questions or concerns.  Med Reconcile: Reviewed and verified all current medications with the patient. The updated medication list was printed and given to the patient.  Return Appointment: Patient given instructions regarding scheduling next clinic visit. Patient demonstrated understanding of this information and agreed to call with further questions or concerns.  Patient stated she understood all health information given and agreed to call with further questions or concerns.     Fady Jameson RN, BSN  Cardiology Care Coordinator  [facility]  [email or web address]  [phone]

## 2017-11-02 NOTE — NURSING NOTE
"Informant-    Chikis is accompanied by self    Reason for Visit-  Follow up    Vitals signs-  /72 (BP Location: Left arm)  Pulse 86  Resp 20  Ht 1.58 m (5' 2.21\")  Wt 82.4 kg (181 lb 10.5 oz)  SpO2 97%  BMI 33.01 kg/m2    There are concerns about the child's exposure to violence in the home: No    Face to Face time: 5 min      "

## 2017-11-02 NOTE — MR AVS SNAPSHOT
"              After Visit Summary   11/2/2017    Chikis Hannon    MRN: [id number]           Patient Information     Date Of Birth          1980        Visit Information        Provider Department      11/2/2017 10:00 AM Betsy Valle MD [facility]        Today's Diagnoses     TGA (transposition of great arteries)    -  1    Ventricular tachycardia (H)          Care Instructions    You were seen today in the Adult Congenital and Cardiovascular Genetics Clinic at the [facility].    Cardiology Providers you saw during your visit:  Dr. Betsy Valle     Diagnosis:  Transposition of the Great Arteries, VSD, and pulmonic stenosis, s/p Rastelli procedure with VSD closure and RV to PA conduit s/p ICD placement    Results:  The results of your echo were discussed with you today.     Recommendations:    1.  Continue to eat a heart healthy, low salt diet.  2.  Continue to get 20-30 minutes of aerobic activity, 4-5 days per week.  Examples of aerobic activity include walking, running, swimming, cycling, etc.  3.  Continue to observe good oral hygiene, with regular dental visits.  4.  No changes today.      Vitals:    11/02/17 0934   BP: 121/72   BP Location: Left arm   Pulse: 86   Resp: 20   SpO2: 97%   Weight: 82.4 kg (181 lb 10.5 oz)   Height: 1.58 m (5' 2.21\")       SBE prophylaxis:   Yes__X__  No____    Lifelong Bacterial Endocarditis Prophylaxis:  YES__X__  NO____    If YES is checked, follow the recommendations outlined below:  1. Take antibiotic(s) prior to interventional procedures or surgeries (dental, respiratory, urologic, gastrointestinal, gynecologic), or instrumental examinations.   2. Observe good oral hygiene daily, as advised by your dentist. Get regular professional dental care.  3. Keep cuts clean.  4. Infections should be treated promptly.      Exercise restrictions:   Yes__X__  No____         If yes, list restrictions:  Must be allowed to rest if " fatigued or SOB      Work restrictions:  Yes____  No_X___         If yes, list restrictions:    FASTING CHOLESTEROL was checked in the last 5 years YES_X__  NO___   Continue to eat a heart healthy, low salt diet.         ____ Fasting lipid panel order today         ____ No changes in medications          ____ I recommend the following changes in your cholesterol medications.:          ____ Please follow up for cholesterol screening at your primary care physician      Follow-up:  Follow up with Dr. Valle in 1 year with an echo (will make your appointments in conjunction with Dr. Ray)    For after hours urgent needs, call [phone] and ask to speak to the Adult Congenital Physician on call.  Mention Job Code 0401.    For emergencies call 560.    For any scheduling needs and to contact your nurse in the Adult Congenital and Cardiovascular Genetics Clinic, please call Felipe Hinkle Procedure , at [phone].    Thank you for your visit today!  If you have questions or concerns about today's visit, please call me.    Fady Jameson RN, BSN  Cardiology Care Coordinator  [facility]  [email or web address]  Ph.[phone]    [facility]  [facility]   Clinics and Surgery Center  Mail Code 2121CK  [address] [place] [address]  [address]           Follow-ups after your visit        Future tests that were ordered for you today     Open Future Orders        Priority Expected Expires Ordered    Echo pediatric congenital Routine  10/25/2018 10/25/2017            Who to contact     If you have questions or need follow up information about today's clinic visit or your schedule please contact [facility] directly at [phone].  Normal or non-critical lab and imaging results will be communicated to you by MyChart, letter or phone within 4 business days after the clinic has received the  "results. If you do not hear from us within 7 days, please contact the clinic through [employer] or phone. If you have a critical or abnormal lab result, we will notify you by phone as soon as possible.  Submit refill requests through [employer] or call your pharmacy and they will forward the refill request to us. Please allow 3 business days for your refill to be completed.          Additional Information About Your Visit        [employer] Information     [employer] lets you send messages to your doctor, view your test results, renew your prescriptions, schedule appointments and more. To sign up, go to [email or web address] . Click on \"Log in\" on the left side of the screen, which will take you to the Welcome page. Then click on \"Sign up Now\" on the right side of the page.     You will be asked to enter the access code listed below, as well as some personal information. Please follow the directions to create your username and password.     Your access code is: [email or web address]  Expires: 2018 10:49 AM     Your access code will  in 90 days. If you need help or a new code, please call your [place] clinic or [phone].        Care EveryWhere ID     This is your Care EveryWhere ID. This could be used by other organizations to access your [place] medical records  [id number]        Your Vitals Were     Pulse Respirations Height Pulse Oximetry BMI (Body Mass Index)       86 20 1.58 m (5' 2.21\") 97% 33.01 kg/m2        Blood Pressure from Last 3 Encounters:   17 121/72   17 106/59   17 125/72    Weight from Last 3 Encounters:   17 82.4 kg (181 lb 10.5 oz)   17 81.7 kg (180 lb 1.6 oz)   17 81.6 kg (180 lb)               Primary Care Provider Office Phone # Fax #    KIMMIE Jones [facility] [phone]       [address] [facility] [address]        Equal Access to Services     RIVERA STARR AH: Ev Arango, waaxviraj rodarte" sara luongriaz suarez'aan ah. So [facility] [phone].    ATENCIÓN: Si hpyliciala soy, tiene a mustafa disposición servicios gratuitos de asistencia lingüística. Cindy al [phone].    We comply with applicable federal civil rights laws and [place] laws. We do not discriminate on the basis of race, color, national origin, age, disability, sex, sexual orientation, or gender identity.            Thank you!     Thank you for choosing [facility]  for your care. Our goal is always to provide you with excellent care. Hearing back from our patients is one way we can continue to improve our services. Please take a few minutes to complete the written survey that you may receive in the mail after your visit with us. Thank you!             Your Updated Medication List - Protect others around you: Learn how to safely use, store and throw away your medicines at www.disposemymeds.org.          This list is accurate as of: 11/2/17 10:49 AM.  Always use your most recent med list.                   Brand Name Dispense Instructions for use Diagnosis    ACE NOT PRESCRIBED (INTENTIONAL)     0 each    1 each as needed for other ACE Inhibitor not prescribed due to Other: BP at goal, pending microalbumin test    Type 2 diabetes, HbA1c goal < 7% (H)       albuterol 108 (90 BASE) MCG/ACT Inhaler    PROAIR HFA/PROVENTIL HFA/VENTOLIN HFA    1 Inhaler    Inhale 2 puffs into the lungs every 6 hours as needed for shortness of breath / dyspnea or wheezing    Smoke hypersensitivity       ALEVE PO      Take 220 mg by mouth        amoxicillin 500 MG capsule    AMOXIL    12 capsule    Take 4 capsules (2000mg) 30-60 minutes prior to dental work.    Corrected transposition of great vessels       atorvastatin 40 MG tablet    LIPITOR    90 tablet    Take 1 tablet (40 mg) by mouth daily    CARDIOVASCULAR SCREENING; LDL GOAL LESS THAN 70       blood glucose monitoring lancets     1 Box    Use to test  blood sugar 1 times daily or as directed.    Type 2 diabetes mellitus with other circulatory complications       blood glucose monitoring test strip    ACCU-CHEK SMARTVIEW    100 each    Use to test blood sugar 1 times daily or as directed.    Type 2 diabetes mellitus without complication (H)       gabapentin 300 MG capsule    NEURONTIN    90 capsule    Take 1 tablet (300 mg) every night    Type 2 diabetes mellitus with other circulatory complications, Neuropathic pain       medroxyPROGESTERone 150 MG/ML injection    DEPO-PROVERA    1 mL    Inject 1 mL (150 mg) into the muscle every 3 months    Encounter for initial prescription of injectable contraceptive       melatonin 3 MG Caps     90 capsule    Take 3 mg by mouth nightly as needed    Insomnia, unspecified type       metFORMIN 500 MG tablet    GLUCOPHAGE    90 tablet    Take 1 tablet (500 mg) by mouth daily (with dinner)    Type 2 diabetes mellitus with other circulatory complications       multivitamin per tablet      ONE DAILY        OMEGA-3 FISH OIL PO           order for DME     1 Package    Thumb splint    Thumb pain, right       sotalol 80 MG tablet    BETAPACE    180 tablet    Take 1 tablet (80 mg) by mouth every 12 hours    Ventricular tachycardia (H)       VITAMIN C PO           VITAMIN D (CHOLECALCIFEROL) PO      Take by mouth daily

## 2017-11-24 DIAGNOSIS — M79.2 NEUROPATHIC PAIN: ICD-10-CM

## 2017-11-24 NOTE — LETTER
[facility]  [address] [place], [address]  [phone]        November 27, 2017    Chikis Hannon                                                                                                                                      [address]   [facility] [address]            We recently received a call from your pharmacy requesting a refill of Gabapentin.     A review of your chart indicates that an appointment is required with your provider for 3 month recheck per Dr. Benedict at your 5/25/17 visit. Please call the clinic at [phone] to schedule your appointment.     Taking care of your health is important to us and ongoing visits with your provider are vital to your care.  We look forward to seeing you in the near future.     Sincerely,     [facility]

## 2017-11-27 NOTE — TELEPHONE ENCOUNTER
Dr. Benedict- you saw her one time on 5/25/17.  Advised 3 month recheck.  Letter sent.  Not [facility] med.  Sent to provider.  Please advise.  Angeli Simmons RN    5/25/17  ASSESSMENT/PLAN:         1. Thumb pain, right  - will place in brace. Discussed physical therapy. Patient will try at home for now and consider hand therapy for later. NSAID prn pain.   - XR Finger Right G/E 2 Views; Future  - order for DME; Thumb splint  Dispense: 1 Package; Refill: 0     2. Type 2 diabetes mellitus with other circulatory complications (H)  -A1c improved, still above goal (<7.0).  - Hemoglobin A1c  - Albumin Random Urine Quantitative  - Comprehensive metabolic panel  - TSH with free T4 reflex  - gabapentin (NEURONTIN) 300 MG capsule; Take 1 tablet (300 mg) every night  Dispense: 90 capsule; Refill: 1  - metFORMIN (GLUCOPHAGE) 500 MG tablet; Take 1 tablet (500 mg) by mouth daily (with dinner)  Dispense: 90 tablet; Refill: 3     3. Encounter for initial prescription of injectable contraceptive  - UPT negative. Will restart on depo for contraceptive management   - Beta HCG qual IFA urine  - medroxyPROGESTERone (DEPO-PROVERA) 150 MG/ML injection; Inject 1 mL (150 mg) into the muscle every 3 months  Dispense: 1 mL; Refill: 4     4. Allergic state, sequela  - patient advised to take OTC allergy med daily     5. Smoke hypersensitivity  - will start on trial of albuterol for when at father's house and having SOB/wheezing.   - albuterol (PROAIR HFA/PROVENTIL HFA/VENTOLIN HFA) 108 (90 BASE) MCG/ACT Inhaler; Inhale 2 puffs into the lungs every 6 hours as needed for shortness of breath / dyspnea or wheezing  Dispense: 1 Inhaler; Refill: 3     6. CARDIOVASCULAR SCREENING; LDL GOAL LESS THAN 70  -refill   - atorvastatin (LIPITOR) 40 MG tablet; Take 1 tablet (40 mg) by mouth daily  Dispense: 90 tablet; Refill: 3     7. Neuropathic pain  - gabapentin (NEURONTIN) 300 MG capsule; Take 1 tablet (300 mg) every night  Dispense: 90 capsule; Refill:  1     8. Ventricular tachycardia (H)  - stable   - sotalol (BETAPACE) 80 MG tablet; Take 1 tablet (80 mg) by mouth every 12 hours  Dispense: 180 tablet; Refill: 1     9. Insomnia, unspecified type  -refill   - melatonin 3 MG CAPS; Take 3 mg by mouth nightly as needed  Dispense: 90 capsule; Refill: 1     See Patient Instructions     Total time spent face to face was 50 minutes, greater than 50% in education and counseling regarding the above issues.     Tammy Benedict MD  [facility] Instructions    Avoid overuse of albuterol as this can interfere with sotalol.   Use thumb splint especially when working for support.   Follow up in 3 months or sooner if needed

## 2017-11-29 DIAGNOSIS — I47.20 VENTRICULAR TACHYCARDIA (H): ICD-10-CM

## 2017-11-29 RX ORDER — GABAPENTIN 300 MG/1
CAPSULE ORAL
Refills: 0 | COMMUNITY
Start: 2017-11-29 | End: 2017-12-07

## 2017-11-29 NOTE — TELEPHONE ENCOUNTER
Pt calls, informed, has supply, f/u appointment made  Charlene Pete RN, BSN  Message handled by Nurse Triage.

## 2017-11-30 DIAGNOSIS — I47.20 VENTRICULAR TACHYCARDIA (H): ICD-10-CM

## 2017-11-30 RX ORDER — SOTALOL HYDROCHLORIDE 80 MG/1
TABLET ORAL
Qty: 28 TABLET | Refills: 0 | Status: SHIPPED | OUTPATIENT
Start: 2017-11-30 | End: 2017-11-30

## 2017-11-30 NOTE — TELEPHONE ENCOUNTER
Needs visit.  Appt 12/7/17.  2 weeks sent to get her by emerald appt.  Angeli Simmons RN    November 29, 2017   Charlene Pete, RN        11:13 AM   Note      Pt calls, informed, has supply, f/u appointment made  Charlene Pete RN, BSN  Message handled by Nurse Triage.            November 28, 2017   Me        8:54 AM   Note      L/M to call.  Angeli Simmons RN                     8:54 AM      You contacted Tammy Sam MD   to Cr Triage        7:50 AM   Note      Patient needs OV or follow up with PCP.      NWD         November 27, 2017            5:37 PM   You routed this conversation to Tammy Benedict MD    Me        5:33 PM   Note      Dr. Benedict- you saw her one time on 5/25/17.  Advised 3 month recheck.  Letter sent.  Not [facility] med.  Sent to provider.  Please advise.  Angeli Simmons RN     5/25/17  ASSESSMENT/PLAN:          1. Thumb pain, right  - will place in brace. Discussed physical therapy. Patient will try at home for now and consider hand therapy for later. NSAID prn pain.   - XR Finger Right G/E 2 Views; Future  - order for DME; Thumb splint  Dispense: 1 Package; Refill: 0      2. Type 2 diabetes mellitus with other circulatory complications (H)  -A1c improved, still above goal (<7.0).  - Hemoglobin A1c  - Albumin Random Urine Quantitative  - Comprehensive metabolic panel  - TSH with free T4 reflex  - gabapentin (NEURONTIN) 300 MG capsule; Take 1 tablet (300 mg) every night  Dispense: 90 capsule; Refill: 1  - metFORMIN (GLUCOPHAGE) 500 MG tablet; Take 1 tablet (500 mg) by mouth daily (with dinner)  Dispense: 90 tablet; Refill: 3      3. Encounter for initial prescription of injectable contraceptive  - UPT negative. Will restart on depo for contraceptive management   - Beta HCG qual IFA urine  - medroxyPROGESTERone (DEPO-PROVERA) 150 MG/ML injection; Inject 1 mL (150 mg) into the muscle every 3 months  Dispense: 1 mL; Refill: 4      4. Allergic  state, sequela  - patient advised to take OTC allergy med daily      5. Smoke hypersensitivity  - will start on trial of albuterol for when at father's house and having SOB/wheezing.   - albuterol (PROAIR HFA/PROVENTIL HFA/VENTOLIN HFA) 108 (90 BASE) MCG/ACT Inhaler; Inhale 2 puffs into the lungs every 6 hours as needed for shortness of breath / dyspnea or wheezing  Dispense: 1 Inhaler; Refill: 3      6. CARDIOVASCULAR SCREENING; LDL GOAL LESS THAN 70  -refill   - atorvastatin (LIPITOR) 40 MG tablet; Take 1 tablet (40 mg) by mouth daily  Dispense: 90 tablet; Refill: 3      7. Neuropathic pain  - gabapentin (NEURONTIN) 300 MG capsule; Take 1 tablet (300 mg) every night  Dispense: 90 capsule; Refill: 1      8. Ventricular tachycardia (H)  - stable   - sotalol (BETAPACE) 80 MG tablet; Take 1 tablet (80 mg) by mouth every 12 hours  Dispense: 180 tablet; Refill: 1      9. Insomnia, unspecified type  -refill   - melatonin 3 MG CAPS; Take 3 mg by mouth nightly as needed  Dispense: 90 capsule; Refill: 1      See Patient Instructions      Total time spent face to face was 50 minutes, greater than 50% in education and counseling regarding the above issues.      Tammy Benedict MD  [facility] Instructions    Avoid overuse of albuterol as this can interfere with sotalol.   Use thumb splint especially when working for support.   Follow up in 3 months or sooner if needed

## 2017-12-01 RX ORDER — SOTALOL HYDROCHLORIDE 80 MG/1
TABLET ORAL
Status: SHIPPED
Start: 2017-12-01 | End: 2017-12-08

## 2017-12-01 NOTE — TELEPHONE ENCOUNTER
Duplicate-limited sent 11/30/17.  Needs visit and can discuss refills at visit.  Angeli Simmons RN

## 2017-12-07 ENCOUNTER — OFFICE VISIT (OUTPATIENT)
Dept: FAMILY MEDICINE | Facility: CLINIC | Age: 37
End: 2017-12-07
Payer: MEDICARE

## 2017-12-07 VITALS
WEIGHT: 178 LBS | BODY MASS INDEX: 32.34 KG/M2 | HEART RATE: 90 BPM | OXYGEN SATURATION: 97 % | DIASTOLIC BLOOD PRESSURE: 68 MMHG | RESPIRATION RATE: 16 BRPM | TEMPERATURE: 98.2 F | SYSTOLIC BLOOD PRESSURE: 106 MMHG

## 2017-12-07 DIAGNOSIS — M79.2 NEUROPATHIC PAIN: Primary | ICD-10-CM

## 2017-12-07 DIAGNOSIS — N94.89 OTHER SPECIFIED CONDITIONS ASSOCIATED WITH FEMALE GENITAL ORGANS AND MENSTRUAL CYCLE: ICD-10-CM

## 2017-12-07 DIAGNOSIS — E11.40 TYPE 2 DIABETES MELLITUS WITH DIABETIC NEUROPATHY, WITHOUT LONG-TERM CURRENT USE OF INSULIN (H): ICD-10-CM

## 2017-12-07 DIAGNOSIS — I47.20 VENTRICULAR TACHYCARDIA (H): ICD-10-CM

## 2017-12-07 DIAGNOSIS — Z30.42 ENCOUNTER FOR SURVEILLANCE OF INJECTABLE CONTRACEPTIVE: ICD-10-CM

## 2017-12-07 LAB
BETA HCG QUAL IFA URINE: NEGATIVE
CHOLEST SERPL-MCNC: 207 MG/DL
HBA1C MFR BLD: 6 % (ref 4.3–6)
HDLC SERPL-MCNC: 55 MG/DL
LDLC SERPL CALC-MCNC: 132 MG/DL
NONHDLC SERPL-MCNC: 152 MG/DL
TRIGL SERPL-MCNC: 98 MG/DL

## 2017-12-07 PROCEDURE — 36415 COLL VENOUS BLD VENIPUNCTURE: CPT | Performed by: FAMILY MEDICINE

## 2017-12-07 PROCEDURE — 83036 HEMOGLOBIN GLYCOSYLATED A1C: CPT | Performed by: FAMILY MEDICINE

## 2017-12-07 PROCEDURE — 96372 THER/PROPH/DIAG INJ SC/IM: CPT | Performed by: FAMILY MEDICINE

## 2017-12-07 PROCEDURE — 80061 LIPID PANEL: CPT | Performed by: FAMILY MEDICINE

## 2017-12-07 PROCEDURE — 99214 OFFICE O/P EST MOD 30 MIN: CPT | Mod: 25 | Performed by: FAMILY MEDICINE

## 2017-12-07 PROCEDURE — 84703 CHORIONIC GONADOTROPIN ASSAY: CPT | Performed by: FAMILY MEDICINE

## 2017-12-07 RX ORDER — GABAPENTIN 300 MG/1
CAPSULE ORAL
Qty: 90 CAPSULE | Refills: 1 | Status: SHIPPED | OUTPATIENT
Start: 2017-12-07 | End: 2018-08-16

## 2017-12-07 ASSESSMENT — ANXIETY QUESTIONNAIRES
GAD7 TOTAL SCORE: 0
2. NOT BEING ABLE TO STOP OR CONTROL WORRYING: NOT AT ALL
6. BECOMING EASILY ANNOYED OR IRRITABLE: NOT AT ALL
7. FEELING AFRAID AS IF SOMETHING AWFUL MIGHT HAPPEN: NOT AT ALL
5. BEING SO RESTLESS THAT IT IS HARD TO SIT STILL: NOT AT ALL
1. FEELING NERVOUS, ANXIOUS, OR ON EDGE: NOT AT ALL
3. WORRYING TOO MUCH ABOUT DIFFERENT THINGS: NOT AT ALL

## 2017-12-07 ASSESSMENT — PATIENT HEALTH QUESTIONNAIRE - PHQ9
SUM OF ALL RESPONSES TO PHQ QUESTIONS 1-9: 0
5. POOR APPETITE OR OVEREATING: NOT AT ALL

## 2017-12-07 NOTE — PROGRESS NOTES
SUBJECTIVE:   Chikis Hannon is a 37 year old female who presents to clinic today for the following health issues:      Medication Followup of neuropathy    Taking Medication as prescribed: yes    Side Effects:  None    Medication Helping Symptoms:  yes         Diabetes Follow-up      Patient is checking blood sugars: not at all    Diabetic concerns: None     Symptoms of hypoglycemia (low blood sugar): none     Paresthesias (numbness or burning in feet) or sores: Yes      Date of last diabetic eye exam: 2014    BP Readings from Last 2 Encounters:   12/07/17 106/68   11/02/17 121/72     Hemoglobin A1C (%)   Date Value   12/07/2017 6.0   05/25/2017 6.1 (H)     LDL Cholesterol Calculated (mg/dL)   Date Value   12/07/2017 132 (H)   09/22/2016 128 (H)     Also needs depo shot- states she is late by about a month or so.       Problem list and histories reviewed & adjusted, as indicated.  Additional history: as documented    Patient Active Problem List   Diagnosis     Corrected transposition of great vessels     Allergic state     Attention deficit disorder     Impaired fasting glucose     Obesity     Anxiety     GERD (gastroesophageal reflux disease)     Major depression in complete remission (H)     * * * SBE PROPHYLAXIS * * *     CARDIOVASCULAR SCREENING; LDL GOAL LESS THAN 70     Mildly mentally retarded     Transaminase or LDH elevation     Health Care Home     Congenital heart disease     Ventricular tachycardia (H)     Cardiac ICD- Medtronic- single chamber- NOT dependent     Type 2 diabetes, HbA1C goal < 8% (H)     Type 2 diabetes mellitus with other circulatory complications     Patient [facility] by [facility]     Past Surgical History:   Procedure Laterality Date     C NONSPECIFIC PROCEDURE  11/98    open heart surgery x 3, transposition of major vessels     C NONSPECIFIC PROCEDURE  10/03    Additional heart surgery     CARDIAC SURGERY  Had 4 surgeries     transposition of the major arteries     SURGICAL HISTORY OF -   2014    ablation for v. tach; ICD placed       Social History   Substance Use Topics     Smoking status: Never Smoker     Smokeless tobacco: Never Used     Alcohol use No     Family History   Problem Relation Age of Onset     Adopted: Yes     Family History Negative Mother      Family History Negative Father              Reviewed and updated as needed this visit by clinical staffTobacco  Allergies       Reviewed and updated as needed this visit by Provider         ROS:  Constitutional, cardiovascular, pulmonary, neuro, msk systems are negative, except as otherwise noted.      OBJECTIVE:   /68 (BP Location: Right arm, Patient Position: Chair, Cuff Size: Adult Large)  Pulse 90  Temp 98.2  F (36.8  C) (Oral)  Resp 16  Wt 178 lb (80.7 kg)  SpO2 97%  Breastfeeding? No  BMI 32.34 kg/m2  Body mass index is 32.34 kg/(m^2).  GENERAL: healthy, alert and no distress  RESP: lungs clear to auscultation - no rales, rhonchi or wheezes  CV: regular rate and rhythm, normal S1 S2, KRISTI   MS: no edema  PSYCH: mentation appears normal, affect normal/bright    Diagnostic Test Results:  Results for orders placed or performed in visit on 12/07/17 (from the past 24 hour(s))   Hemoglobin A1c   Result Value Ref Range    Hemoglobin A1C 6.0 4.3 - 6.0 %   Lipid panel reflex to direct LDL Non-fasting   Result Value Ref Range    Cholesterol 207 (H) <200 mg/dL    Triglycerides 98 <150 mg/dL    HDL Cholesterol 55 >49 mg/dL    LDL Cholesterol Calculated 132 (H) <100 mg/dL    Non HDL Cholesterol 152 (H) <130 mg/dL   Beta HCG qual IFA urine - [facility] and [place]   Result Value Ref Range    Beta HCG Qual IFA Urine Negative NEG^Negative          ASSESSMENT/PLAN:         1. Neuropathic pain  - stable. Patient to continue with gabapentin at current dose   - gabapentin (NEURONTIN) 300 MG capsule; TAKE 1 CAPSULE(300 MG) BY MOUTH EVERY NIGHT  Dispense: 90 capsule; Refill: 1    2.  Encounter for surveillance of injectable contraceptive  - UPT negative   - Beta HCG qual IFA urine - [facility] and [place]    3. Other specified conditions associated with female genital organs and menstrual cycle   - Beta HCG qual IFA urine - [facility] and [place]    4. Type 2 diabetes mellitus with diabetic neuropathy, without long-term current use of insulin (H)  - A1c at goal. Patient past due for eye exam- advised to get done as soon as possible   - Hemoglobin A1c  - Lipid panel reflex to direct LDL Non-fasting    5. Ventricular Tachycardia (H)  - stable.   - she is followed by cardiology- per their note to continue on sotalol. Rx sent.     See Patient Instructions    Tammy Benedict MD  [facility]

## 2017-12-07 NOTE — MR AVS SNAPSHOT
"              After Visit Summary   12/7/2017    Chikis Hannon    MRN: [id number]           Patient Information     Date Of Birth          1980        Visit Information        Provider Department      12/7/2017 1:00 PM Tammy Benedict MD [facility]        Today's Diagnoses     Neuropathic pain    -  1    Encounter for surveillance of injectable contraceptive        Other specified conditions associated with female genital organs and menstrual cycle           Care Instructions    Follow up in 6 months           Follow-ups after your visit        Who to contact     If you have questions or need follow up information about today's clinic visit or your schedule please contact [facility] directly at [phone].  Normal or non-critical lab and imaging results will be communicated to you by MyChart, letter or phone within 4 business days after the clinic has received the results. If you do not hear from us within 7 days, please contact the clinic through MyChart or phone. If you have a critical or abnormal lab result, we will notify you by phone as soon as possible.  Submit refill requests through [facility] or call your pharmacy and they will forward the refill request to us. Please allow 3 business days for your refill to be completed.          Additional Information About Your Visit        MyChart Information     [facility] lets you send messages to your doctor, view your test results, renew your prescriptions, schedule appointments and more. To sign up, go to [email or web address] . Click on \"Log in\" on the left side of the screen, which will take you to the Welcome page. Then click on \"Sign up Now\" on the right side of the page.     You will be asked to enter the access code listed below, as well as some personal information. Please follow the directions to create your username and password.     Your access code is: [email or web address]  Expires: 1/31/2018  9:49 AM     Your " access code will  in 90 days. If you need help or a new code, please call your [facility] clinic or [phone].        Care EveryWhere ID     This is your Care EveryWhere ID. This could be used by other organizations to access your [facility] medical records  [id number]        Your Vitals Were     Pulse Temperature Respirations Pulse Oximetry Breastfeeding? BMI (Body Mass Index)    90 98.2  F (36.8  C) (Oral) 16 97% No 32.34 kg/m2       Blood Pressure from Last 3 Encounters:   17 106/68   17 121/72   17 106/59    Weight from Last 3 Encounters:   17 178 lb (80.7 kg)   17 181 lb 10.5 oz (82.4 kg)   17 180 lb 1.6 oz (81.7 kg)              We Performed the Following     HCG quantitative pregnancy          Today's Medication Changes          These changes are accurate as of: 17  1:24 PM.  If you have any questions, ask your nurse or doctor.               These medicines have changed or have updated prescriptions.        Dose/Directions    gabapentin 300 MG capsule   Commonly known as:  NEURONTIN   This may have changed:  See the new instructions.   Used for:  Neuropathic pain   Changed by:  Tammy Benedict MD        TAKE 1 CAPSULE(300 MG) BY MOUTH EVERY NIGHT   Quantity:  90 capsule   Refills:  1            Where to get your medicines      [facility] [address] [place] [address] 42  AT [address] [facility] [address]     Phone:  [phone]     gabapentin 300 MG capsule                Primary Care Provider Office Phone # Fax #    Tammy Benedict [phone] [address] [facility] [address]        Equal Access to Services     [facility] MATTY AH: Ev Arango, waaxda luqadaha, qaybta kaalmada ag, sara lazo. So [facility] [phone].    ATENCIÓN: Si nora nelson  disposición servicios gratuitos de asistencia lingüística. Cindy boogie [phone].    We comply with applicable federal civil rights laws and [place] laws. We do not discriminate on the basis of race, color, national origin, age, disability, sex, sexual orientation, or gender identity.            Thank you!     Thank you for choosing [facility]  for your care. Our goal is always to provide you with excellent care. Hearing back from our patients is one way we can continue to improve our services. Please take a few minutes to complete the written survey that you may receive in the mail after your visit with us. Thank you!             Your Updated Medication List - Protect others around you: Learn how to safely use, store and throw away your medicines at www.disposemymeds.org.          This list is accurate as of: 12/7/17  1:24 PM.  Always use your most recent med list.                   Brand Name Dispense Instructions for use Diagnosis    ACE NOT PRESCRIBED (INTENTIONAL)     0 each    1 each as needed for other ACE Inhibitor not prescribed due to Other: BP at goal, pending microalbumin test    Type 2 diabetes, HbA1c goal < 7% (H)       albuterol 108 (90 BASE) MCG/ACT Inhaler    PROAIR HFA/PROVENTIL HFA/VENTOLIN HFA    1 Inhaler    Inhale 2 puffs into the lungs every 6 hours as needed for shortness of breath / dyspnea or wheezing    Smoke hypersensitivity       ALEVE PO      Take 220 mg by mouth        amoxicillin 500 MG capsule    AMOXIL    12 capsule    Take 4 capsules (2000mg) 30-60 minutes prior to dental work.    Corrected transposition of great vessels       atorvastatin 40 MG tablet    LIPITOR    90 tablet    Take 1 tablet (40 mg) by mouth daily    CARDIOVASCULAR SCREENING; LDL GOAL LESS THAN 70       blood glucose monitoring lancets     1 Box    Use to test blood sugar 1 times daily or as directed.    Type 2 diabetes mellitus with other circulatory complications       blood glucose  monitoring test strip    ACCU-CHEK SMARTVIEW    100 each    Use to test blood sugar 1 times daily or as directed.    Type 2 diabetes mellitus without complication (H)       gabapentin 300 MG capsule    NEURONTIN    90 capsule    TAKE 1 CAPSULE(300 MG) BY MOUTH EVERY NIGHT    Neuropathic pain       medroxyPROGESTERone 150 MG/ML injection    DEPO-PROVERA    1 mL    Inject 1 mL (150 mg) into the muscle every 3 months    Encounter for initial prescription of injectable contraceptive       melatonin 3 MG Caps     90 capsule    Take 3 mg by mouth nightly as needed    Insomnia, unspecified type       metFORMIN 500 MG tablet    GLUCOPHAGE    90 tablet    Take 1 tablet (500 mg) by mouth daily (with dinner)    Type 2 diabetes mellitus with other circulatory complications       multivitamin per tablet      ONE DAILY        OMEGA-3 FISH OIL PO           order for DME     1 Package    Thumb splint    Thumb pain, right       sotalol 80 MG tablet    BETAPACE     TAKE 1 TABLET(80 MG) BY MOUTH EVERY 12 HOURS    Ventricular tachycardia (H)       VITAMIN C PO           VITAMIN D (CHOLECALCIFEROL) PO      Take by mouth daily

## 2017-12-08 RX ORDER — SOTALOL HYDROCHLORIDE 80 MG/1
TABLET ORAL
Qty: 180 TABLET | Refills: 3 | Status: SHIPPED | OUTPATIENT
Start: 2017-12-08 | End: 2021-02-11

## 2017-12-08 ASSESSMENT — ANXIETY QUESTIONNAIRES: GAD7 TOTAL SCORE: 0

## 2018-01-16 NOTE — PROGRESS NOTES
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  At her clinic visit in 2/15 she again had no ventricular arrhythmias and her Sotalol was stopped. She had recurrence of VT in June 2015 and was restarted on sotalol 80 mg twice a day. VT ablation was re-attempted after holding sotalol for one week on 8/24/2015 with no inducible VT.  She was seen by congenital EP in Sept 2015 and found to have no recurrence of VT.  Decision made to continue sotalol therapy.  Was seen by congential EP in Sept 2017 and noted to have no recurrence of significant arrhythmias.  No changes were made to her antiarrhythmic regimen.  Today pt reports that she has been feeling well.  She denies any chest pain or pressure, sob/maher, orthopnea, pnd, palpitations, syncope/presyncope, cathy, change in exercise tolerance or any ICD shocks.  She also denies any problems with her medications and reports compliance.       PAST MEDICAL HISTORY:  Past Medical History:   Diagnosis Date     Allergy, unspecified not elsewhere classified      Attention deficit disorder without mention of hyperactivity     Mild MR; difficulty attention span     Congenital heart disease      Corrected transposition of great vessels     still needs SBE prophlaxis     Diabetes mellitus type 2, noninsulin dependent (H)      Impaired fasting glucose     103 7/06     Ventricular tachycardia (H) 3/4/2014    s/p ablation and ICD placement 3/13/14       FAMILY HISTORY:  Family History   Problem  Relation Age of Onset     Adopted: Yes     Family History Negative Mother      Family History Negative Father        SOCIAL HISTORY:  History     Social History     Marital Status:      Spouse Name: N/A     Number of Children: N/A     Years of Education: N/A     Occupational History      at [employer]             Disabled     Social History Main Topics     Smoking status: Never Smoker      Smokeless tobacco: Never Used     Alcohol Use: No     Drug Use: No     Sexual Activity:     Partners: Male     Other Topics Concern     Special Diet No     working on dairy. fruits and veggies.     Exercise Yes     Peter Monzon tape     Parent/Sibling W/ Cabg, Mi Or Angioplasty Before 65f 55m? No     Social History Narrative       CURRENT MEDICATIONS:  Current Outpatient Prescriptions   Medication Sig Dispense Refill     atorvastatin (LIPITOR) 40 MG tablet Take 1 tablet (40 mg) by mouth daily 90 tablet 3     medroxyPROGESTERone (DEPO-PROVERA) 150 MG/ML injection Inject 1 mL (150 mg) into the muscle every 3 months 1 mL 4     melatonin 3 MG CAPS Take 3 mg by mouth nightly as needed 90 capsule 1     metFORMIN (GLUCOPHAGE) 500 MG tablet Take 1 tablet (500 mg) by mouth daily (with dinner) 90 tablet 3     albuterol (PROAIR HFA/PROVENTIL HFA/VENTOLIN HFA) 108 (90 BASE) MCG/ACT Inhaler Inhale 2 puffs into the lungs every 6 hours as needed for shortness of breath / dyspnea or wheezing 1 Inhaler 3     amoxicillin (AMOXIL) 500 MG capsule Take 4 capsules (2000mg) 30-60 minutes prior to dental work. 12 capsule 3     sotalol (BETAPACE) 80 MG tablet TAKE 1 TABLET(80 MG) BY MOUTH EVERY 12 HOURS 180 tablet 3     gabapentin (NEURONTIN) 300 MG capsule TAKE 1 CAPSULE(300 MG) BY MOUTH EVERY NIGHT 90 capsule 1     order for DME Thumb splint 1 Package 0     blood glucose monitoring (ACCU-CHEK FASTCLIX) lancets Use to test blood sugar 1 times daily or as directed. 1 Box 3     blood glucose monitoring (ACCU-CHEK SMARTVIEW) test strip  Use to test blood sugar 1 times daily or as directed. 100 each 0     Naproxen Sodium (ALEVE PO) Take 220 mg by mouth       VITAMIN D, CHOLECALCIFEROL, PO Take by mouth daily       Ascorbic Acid (VITAMIN C PO)        Omega-3 Fatty Acids (OMEGA-3 FISH OIL PO)        ACE NOT PRESCRIBED, INTENTIONAL, 1 each as needed for other ACE Inhibitor not prescribed due to Other: BP at goal, pending microalbumin test 0 each 0     MULTIVITAMINS PO TABS ONE DAILY  3       ROS:   Constitutional: No fever, chills, or sweats. No weight gain/loss.   ENT: No visual disturbance, ear ache, epistaxis, sore throat.   Allergies/Immunologic: Negative.   Respiratory: No cough, hemoptysis.   Cardiovascular: As per HPI.   GI: No nausea, vomiting, hematemesis, melena, or hematochezia.   : No urinary frequency, dysuria, or hematuria.   Integument: Negative.   Psychiatric: Negative.   Neuro: Negative.   Endocrinology: Negative.   Musculoskeletal: Negative.    EXAM:  /60, HR 87, O2 sat 99%, Weight 183 lbs  General: appears comfortable, alert and articulate  Head: normocephalic, atraumatic  Eyes: anicteric sclera, EOMI  Neck: no adenopathy  Orophyarynx: moist mucosa, no lesions, dentition intact  Heart: Nrl S1, prominent S2, widely split (patient has RBBB), has 3/6 KRISTI best heard at the left sternal border at the base., JVP 6-7cm  Lungs: clear, no rales or wheezing  Abdomen: soft, non-tender, bowel sounds present, no hepatomegaly  Extremities: no clubbing, cyanosis or edema  Neurological: normal speech and affect, no gross motor deficits      Labs:  CBC RESULTS:  Lab Results   Component Value Date    WBC 8.8 08/24/2015    RBC 4.50 08/24/2015    HGB 12.5 08/24/2015    HCT 37.7 08/24/2015    MCV 84 08/24/2015    MCH 27.8 08/24/2015    MCHC 33.2 08/24/2015    RDW 13.4 08/24/2015     08/24/2015       CMP RESULTS:  Lab Results   Component Value Date     05/25/2017    POTASSIUM 3.9 05/25/2017    CHLORIDE 107 05/25/2017    CO2 25  05/25/2017    ANIONGAP 8 05/25/2017     (H) 05/25/2017    BUN 13 05/25/2017    CR 0.47 (L) 05/25/2017    GFRESTIMATED >90  Non  GFR Calc   05/25/2017    GFRESTBLACK >90   GFR Calc   05/25/2017    LITA 9.2 05/25/2017    BILITOTAL 0.6 05/25/2017    ALBUMIN 4.0 05/25/2017    ALKPHOS 117 05/25/2017    ALT 39 05/25/2017    AST 22 05/25/2017        INR RESULTS:  Lab Results   Component Value Date    INR 1.13 06/01/2015       Lab Results   Component Value Date    MAG 2.0 06/02/2015     Lab Results   Component Value Date    NTBNPI 327 12/24/2008     No results found for: NTBNP       CT CORONARY: 3/4/14   IMPRESSION:  1. Normal coronary anatomy with no detectable stenosis or plaque. The coronaries arise from the anteriorly positioned aorta.     ADDITIONAL FINDINGS: The aortic root has an unusual appearance probably from the reconstructive surgery for transposition. The appearance is nearly of 2 aortic roots one small 1 which ends in a cul-de-sac and the other which leads into the ascending aorta. The coronary arteries arise from that aortic root which feeds into the ascending aorta . There is a conduit from the right ventricle to the pulmonary trunk. Normal pulmonary venous anatomy with all four pulmonary veins draining into the left atrium. There is no left atrial or left ventricular mass or thrombus. Normal pericardial thickness.There is no pericardial effusion. The proximal pulmonary arteries are well opacified. Please review Radiology report for incidental noncardiac findings that will follow separately.      CARDIAC MRI/MRA: 3/4/14   IMPRESSION:   1. Transposition of the great arteries with VSD after Rastelli procedure with RV to PA conduit.   2. Minimal flow acceleration across the RV to PA conduit with moderate pulmonary regurgitation (31-33% regurg fraction, Peak PA conduit gradient 18mm Hg).   3. Normal right and left ventricular size and systolic function with no evidence of  fibrosis.    Echo 3/16  Difficult study due to poor acoustic windows. The patient has undergone a Rastelli procedure for d-transposition of the great arteries and replacement of the pulmonary valve. The pulmonary valve is not seen in this study. Shunts were not able to be excluded. Mildly diminished left ventricular systolic function with mild left ventricular enlargement. Normal right ventricular and pulmonary artery pressures.    Echo today reviewed by me  D-TGA status post Rastelli and subsequent placement of a bio-prosthetic valve in the pulmonary position. The calculated single plane left ventricular ejection fraction from the 4 chamber view is 58%. A pacing lead is present in the right atrium and right ventricle. Mild (1+) aortic valve insufficiency. Unable to adequately visualize the RV-PA conduit or the bio-prosthetic valve. Estimated right ventricular systolic pressure is 20-25 mmHg plus right atrial pressure.      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   1. complete TGA and VSD, status post Rastelli procedure with VSD closure and RV to PA conduit, history of RV-PA conduit obstruction requiring replacement during teenage years: Pt continues to feel well, denies any change in exercise tolerance and is euvolemic today by history and exam. Echo today also confirms no significant change from prior. Encouraged patient to continue regular aerobic exercise 20-30 minutes a day, 4-5 times per week  and follow low-salt, heart healthy diet. Continue statin for dyslipidemia.  Also reminded patient the importance of maintaining good oral hygiene and routine dental care with SBE prophylaxis.  2. H/o VT s/p VT ablation and ICD placement: Patient doing well on current dose of sotalol without recurrence of VT. Has recently seen congenital EP,    Follow-up: in one year with repeat echo. Will be happy to see sooner if change in clinical status or new questions/concerns arise.    Betsy Valle MD  Section Head - Advanced Heart Failure, Transplantation and Mechanical Circulatory Support  Co-Director - Adult [facility]ovascular [facility]  Associate Professor of Medicine, [facility]

## 2018-01-18 ENCOUNTER — ALLIED HEALTH/NURSE VISIT (OUTPATIENT)
Dept: CARDIOLOGY | Facility: CLINIC | Age: 38
End: 2018-01-18
Attending: INTERNAL MEDICINE
Payer: MEDICARE

## 2018-01-18 DIAGNOSIS — Q24.9 CONGENITAL HEART DISEASE: Primary | ICD-10-CM

## 2018-01-18 PROCEDURE — 93295 DEV INTERROG REMOTE 1/2/MLT: CPT | Performed by: INTERNAL MEDICINE

## 2018-01-18 PROCEDURE — 93296 REM INTERROG EVL PM/IDS: CPT | Mod: ZF

## 2018-01-18 NOTE — MR AVS SNAPSHOT
"              After Visit Summary   2018    Chikis Hannon    MRN: [id number]           Patient Information     Date Of Birth          1980        Visit Information        Provider Department      2018 6:00 AM [facility] REMOTE [facility]        Today's Diagnoses     Congenital heart disease    -  1       Follow-ups after your visit        Who to contact     If you have questions or need follow up information about today's clinic visit or your schedule please contact [facility] directly at [phone].  Normal or non-critical lab and imaging results will be communicated to you by [employer], letter or phone within 4 business days after the clinic has received the results. If you do not hear from us within 7 days, please contact the clinic through [employer] or phone. If you have a critical or abnormal lab result, we will notify you by phone as soon as possible.  Submit refill requests through [employer] or call your pharmacy and they will forward the refill request to us. Please allow 3 business days for your refill to be completed.          Additional Information About Your Visit        MyChart Information     [employer] lets you send messages to your doctor, view your test results, renew your prescriptions, schedule appointments and more. To sign up, go to [email or web address] . Click on \"Log in\" on the left side of the screen, which will take you to the Welcome page. Then click on \"Sign up Now\" on the right side of the page.     You will be asked to enter the access code listed below, as well as some personal information. Please follow the directions to create your username and password.     Your access code is: [email or web address]  Expires: 2018  9:49 AM     Your access code will  in 90 days. If you need help or a new code, please call your [place] clinic or [phone].        Care EveryWhere ID     This is your Care EveryWhere ID. This could be used by other organizations to access " your [place] medical records  [id number]         Blood Pressure from Last 3 Encounters:   12/07/17 106/68   11/02/17 121/72   09/22/17 106/59    Weight from Last 3 Encounters:   12/07/17 80.7 kg (178 lb)   11/02/17 82.4 kg (181 lb 10.5 oz)   09/22/17 81.7 kg (180 lb 1.6 oz)              We Performed the Following     INTERROGATION DEVICE EVAL REMOTE, PACER/ICD        Primary Care Provider Office Phone # Fax #    Tammy Jenna Benedict [phone]       [address] [facility] [address]        Equal Access to Services     RIVERA STARR : Hadii aad shanae vidaleso Conchita, waaxda corrine, qaybta kaalmada aderiazyamaria isabel, sara mohan . So [facility] [phone].    ATENCIÓN: Si habla español, tiene a mustafa disposición servicios gratuitos de asistencia lingüística. [facility] [phone].    We comply with applicable federal civil rights laws and [place] laws. We do not discriminate on the basis of race, color, national origin, age, disability, sex, sexual orientation, or gender identity.            Thank you!     Thank you for choosing [facility]  for your care. Our goal is always to provide you with excellent care. Hearing back from our patients is one way we can continue to improve our services. Please take a few minutes to complete the written survey that you may receive in the mail after your visit with us. Thank you!             Your Updated Medication List - Protect others around you: Learn how to safely use, store and throw away your medicines at www.disposemymeds.org.          This list is accurate as of: 1/18/18 11:59 PM.  Always use your most recent med list.                   Brand Name Dispense Instructions for use Diagnosis    ACE NOT PRESCRIBED (INTENTIONAL)     0 each    1 each as needed for other ACE Inhibitor not prescribed due to Other: BP at goal, pending microalbumin test    Type 2 diabetes, HbA1c goal < 7% (H)       albuterol 108 (90 BASE) MCG/ACT  Inhaler    PROAIR HFA/PROVENTIL HFA/VENTOLIN HFA    1 Inhaler    Inhale 2 puffs into the lungs every 6 hours as needed for shortness of breath / dyspnea or wheezing    Smoke hypersensitivity       ALEVE PO      Take 220 mg by mouth        amoxicillin 500 MG capsule    AMOXIL    12 capsule    Take 4 capsules (2000mg) 30-60 minutes prior to dental work.    Corrected transposition of great vessels       atorvastatin 40 MG tablet    LIPITOR    90 tablet    Take 1 tablet (40 mg) by mouth daily    CARDIOVASCULAR SCREENING; LDL GOAL LESS THAN 70       blood glucose monitoring lancets     1 Box    Use to test blood sugar 1 times daily or as directed.    Type 2 diabetes mellitus with other circulatory complications       blood glucose monitoring test strip    ACCU-CHEK SMARTVIEW    100 each    Use to test blood sugar 1 times daily or as directed.    Type 2 diabetes mellitus without complication (H)       gabapentin 300 MG capsule    NEURONTIN    90 capsule    TAKE 1 CAPSULE(300 MG) BY MOUTH EVERY NIGHT    Neuropathic pain       medroxyPROGESTERone 150 MG/ML injection    DEPO-PROVERA    1 mL    Inject 1 mL (150 mg) into the muscle every 3 months    Encounter for initial prescription of injectable contraceptive       melatonin 3 MG Caps     90 capsule    Take 3 mg by mouth nightly as needed    Insomnia, unspecified type       metFORMIN 500 MG tablet    GLUCOPHAGE    90 tablet    Take 1 tablet (500 mg) by mouth daily (with dinner)    Type 2 diabetes mellitus with other circulatory complications       multivitamin per tablet      ONE DAILY        OMEGA-3 FISH OIL PO           order for DME     1 Package    Thumb splint    Thumb pain, right       sotalol 80 MG tablet    BETAPACE    180 tablet    TAKE 1 TABLET(80 MG) BY MOUTH EVERY 12 HOURS    Ventricular tachycardia (H)       VITAMIN C PO           VITAMIN D (CHOLECALCIFEROL) PO      Take by mouth daily

## 2018-01-19 NOTE — PROGRESS NOTES
Remote ICD transmission received and reviewed.  Device transmission sent per MD orders.  Patient has a Medtronic single lead ICD.  Normal ICD function.  2 NSVT 1 sec duration 5-8 beats, 16 VT episodes recorded from EGM's available seem to be SVT all in the same heart rate zones from 166 bpm - 182 bpm.   Presenting EGM =SR @ 100 bpm.   = <0.1%.  OptiVol fluid index is near baseline.  Estimated battery longevity to RICKY = 10 years.  No short v-v intervals recorded.  RV lead impedance trends appear stable.  Patient notified of interrogation results via VM. Plan for patient to send remote  in 3 months as scheduled.  4 /18/18    Remote ICD transmission

## 2018-04-18 ENCOUNTER — ALLIED HEALTH/NURSE VISIT (OUTPATIENT)
Dept: CARDIOLOGY | Facility: CLINIC | Age: 38
End: 2018-04-18
Attending: INTERNAL MEDICINE
Payer: MEDICARE

## 2018-04-18 DIAGNOSIS — I47.20 VENTRICULAR TACHYCARDIA (H): Primary | ICD-10-CM

## 2018-04-18 PROCEDURE — 93296 REM INTERROG EVL PM/IDS: CPT | Mod: ZF

## 2018-04-18 PROCEDURE — 93295 DEV INTERROG REMOTE 1/2/MLT: CPT | Performed by: INTERNAL MEDICINE

## 2018-04-18 NOTE — MR AVS SNAPSHOT
"              After Visit Summary   2018    Chikis Hannon    MRN: [id number]           Patient Information     Date Of Birth          1980        Visit Information        Provider Department      2018 6:00 AM  ICD REMOTE [facility]        Today's Diagnoses     Ventricular tachycardia (H)    -  1       Follow-ups after your visit        Who to contact     If you have questions or need follow up information about today's clinic visit or your schedule please contact [facility] directly at [phone].  Normal or non-critical lab and imaging results will be communicated to you by [employer], letter or phone within 4 business days after the clinic has received the results. If you do not hear from us within 7 days, please contact the clinic through [employer] or phone. If you have a critical or abnormal lab result, we will notify you by phone as soon as possible.  Submit refill requests through [employer] or call your pharmacy and they will forward the refill request to us. Please allow 3 business days for your refill to be completed.          Additional Information About Your Visit        MyChart Information     [employer] lets you send messages to your doctor, view your test results, renew your prescriptions, schedule appointments and more. To sign up, go to [email or web address] . Click on \"Log in\" on the left side of the screen, which will take you to the Welcome page. Then click on \"Sign up Now\" on the right side of the page.     You will be asked to enter the access code listed below, as well as some personal information. Please follow the directions to create your username and password.     Your access code is: [email or web address]  Expires: 2018 10:36 AM     Your access code will  in 90 days. If you need help or a new code, please call your [place] clinic or [phone].        Care EveryWhere ID     This is your Care EveryWhere ID. This could be used by other organizations to access " your [place] medical records  [id number]         Blood Pressure from Last 3 Encounters:   12/07/17 106/68   11/02/17 121/72   09/22/17 106/59    Weight from Last 3 Encounters:   12/07/17 80.7 kg (178 lb)   11/02/17 82.4 kg (181 lb 10.5 oz)   09/22/17 81.7 kg (180 lb 1.6 oz)              We Performed the Following     INTERROGATION DEVICE EVAL REMOTE, PACER/ICD        Primary Care Provider Office Phone # Fax #    Tammy Jenna Benedict [phone]       [address] [facility] [address]        Equal Access to Services     RIVERA STARR : Hadii aad shanae vidaleso Conchita, waaxda lulillianadaha, qaybta kaalmada aderiazyamaria isabel, sara mohan . So [facility] [phone].    ATENCIÓN: Si habla español, tiene a mustafa disposición servicios gratuitos de asistencia lingüística. [facility] [phone].    We comply with applicable federal civil rights laws and [place] laws. We do not discriminate on the basis of race, color, national origin, age, disability, sex, sexual orientation, or gender identity.            Thank you!     Thank you for choosing [facility]  for your care. Our goal is always to provide you with excellent care. Hearing back from our patients is one way we can continue to improve our services. Please take a few minutes to complete the written survey that you may receive in the mail after your visit with us. Thank you!             Your Updated Medication List - Protect others around you: Learn how to safely use, store and throw away your medicines at www.disposemymeds.org.          This list is accurate as of 4/18/18 11:59 PM.  Always use your most recent med list.                   Brand Name Dispense Instructions for use Diagnosis    ACE NOT PRESCRIBED (INTENTIONAL)     0 each    1 each as needed for other ACE Inhibitor not prescribed due to Other: BP at goal, pending microalbumin test    Type 2 diabetes, HbA1c goal < 7% (H)       albuterol 108 (90 Base) MCG/ACT  Inhaler    PROAIR HFA/PROVENTIL HFA/VENTOLIN HFA    1 Inhaler    Inhale 2 puffs into the lungs every 6 hours as needed for shortness of breath / dyspnea or wheezing    Smoke hypersensitivity       ALEVE PO      Take 220 mg by mouth        amoxicillin 500 MG capsule    AMOXIL    12 capsule    Take 4 capsules (2000mg) 30-60 minutes prior to dental work.    Corrected transposition of great vessels       atorvastatin 40 MG tablet    LIPITOR    90 tablet    Take 1 tablet (40 mg) by mouth daily    CARDIOVASCULAR SCREENING; LDL GOAL LESS THAN 70       blood glucose monitoring lancets     1 Box    Use to test blood sugar 1 times daily or as directed.    Type 2 diabetes mellitus with other circulatory complications       blood glucose monitoring test strip    ACCU-CHEK SMARTVIEW    100 each    Use to test blood sugar 1 times daily or as directed.    Type 2 diabetes mellitus without complication (H)       gabapentin 300 MG capsule    NEURONTIN    90 capsule    TAKE 1 CAPSULE(300 MG) BY MOUTH EVERY NIGHT    Neuropathic pain       medroxyPROGESTERone 150 MG/ML injection    DEPO-PROVERA    1 mL    Inject 1 mL (150 mg) into the muscle every 3 months    Encounter for initial prescription of injectable contraceptive       melatonin 3 MG Caps     90 capsule    Take 3 mg by mouth nightly as needed    Insomnia, unspecified type       metFORMIN 500 MG tablet    GLUCOPHAGE    90 tablet    Take 1 tablet (500 mg) by mouth daily (with dinner)    Type 2 diabetes mellitus with other circulatory complications       multivitamin per tablet      ONE DAILY        OMEGA-3 FISH OIL PO           order for DME     1 Package    Thumb splint    Thumb pain, right       sotalol 80 MG tablet    BETAPACE    180 tablet    TAKE 1 TABLET(80 MG) BY MOUTH EVERY 12 HOURS    Ventricular tachycardia (H)       VITAMIN C PO           VITAMIN D (CHOLECALCIFEROL) PO      Take by mouth daily

## 2018-04-20 NOTE — PROGRESS NOTES
Preliminary Device Interrogation Results.  Final physician signed paceart report to be scanned and attached.    Remote ICD transmission received and reviewed.  Device transmission sent per MD orders.  Patient has a Medtronic single lead ICD.  Normal ICD function.  5 VT episodes recorded, 167 - 172 bpm - 9 sec - 1 min 21 sec duration EGM's morphology similar to SVT episodes.  2 NSVT episodes recorded 190 - 231 bpm - 1 - 2 sec. Duration.  Presenting EGM = VS @ 75 bpm.     = <0.1%.   OptiVol fluid index is near baseline.  Estimated battery longevity to RICKY = 9.9 years. No short v-v intervals recorded.  RV lead impedance trends appear stable.  Patient notified of interrogation results via VM.   Plan for patient to send remote transmission in 3 months as scheduled.    Remote ICD transmission

## 2018-06-08 ENCOUNTER — HEALTH MAINTENANCE LETTER (OUTPATIENT)
Age: 38
End: 2018-06-08

## 2018-07-10 ENCOUNTER — TELEPHONE (OUTPATIENT)
Dept: FAMILY MEDICINE | Facility: CLINIC | Age: 38
End: 2018-07-10

## 2018-07-10 DIAGNOSIS — Q20.5 CORRECTED TRANSPOSITION OF GREAT VESSELS: ICD-10-CM

## 2018-07-10 RX ORDER — AMOXICILLIN 500 MG/1
CAPSULE ORAL
Qty: 4 CAPSULE | Refills: 0 | Status: SHIPPED | OUTPATIENT
Start: 2018-07-10 | End: 2018-08-16

## 2018-07-10 NOTE — TELEPHONE ENCOUNTER
Dr. Pitt- see below.  T'd up previous [facility].  Please advise.  Angeli Simmons RN    Problem Detail      Noted:  5/15/2010      Priority:  Medium      Overview Signed 5/15/2010  2:50 PM by Charlene Mann     Recommended; see Cardiology consult 2010

## 2018-07-10 NOTE — TELEPHONE ENCOUNTER
Patient called today, looking for an antibiotic. She is going into the dentist on 7/19/18 and has an heart condition said that she usually takes something for it before.     Please follow up with patient if any questions    Aide Coello/FLOYD

## 2018-07-18 ENCOUNTER — ALLIED HEALTH/NURSE VISIT (OUTPATIENT)
Dept: CARDIOLOGY | Facility: CLINIC | Age: 38
End: 2018-07-18
Attending: INTERNAL MEDICINE
Payer: MEDICARE

## 2018-07-18 DIAGNOSIS — I47.20 VENTRICULAR TACHYCARDIA (H): Primary | ICD-10-CM

## 2018-07-18 PROCEDURE — 93295 DEV INTERROG REMOTE 1/2/MLT: CPT | Performed by: INTERNAL MEDICINE

## 2018-07-18 PROCEDURE — 93296 REM INTERROG EVL PM/IDS: CPT | Mod: ZF

## 2018-07-18 NOTE — MR AVS SNAPSHOT
"              After Visit Summary   2018    Chikis Hannon    MRN: [id number]           Patient Information     Date Of Birth          1980        Visit Information        Provider Department      2018 6:00 AM  ICD REMOTE [facility]        Today's Diagnoses     Ventricular tachycardia (H)    -  1       Follow-ups after your visit        Who to contact     If you have questions or need follow up information about today's clinic visit or your schedule please contact [facility] directly at [phone].  Normal or non-critical lab and imaging results will be communicated to you by avocarrothart, letter or phone within 4 business days after the clinic has received the results. If you do not hear from us within 7 days, please contact the clinic through avocarrothart or phone. If you have a critical or abnormal lab result, we will notify you by phone as soon as possible.  Submit refill requests through [email or web address] or call your pharmacy and they will forward the refill request to us. Please allow 3 business days for your refill to be completed.          Additional Information About Your Visit        MyChart Information     [email or web address] lets you send messages to your doctor, view your test results, renew your prescriptions, schedule appointments and more. To sign up, go to [email or web address] . Click on \"Log in\" on the left side of the screen, which will take you to the Welcome page. Then click on \"Sign up Now\" on the right side of the page.     You will be asked to enter the access code listed below, as well as some personal information. Please follow the directions to create your username and password.     Your access code is: [email or web address]  Expires: 10/29/2018  4:09 PM     Your access code will  in 90 days. If you need help or a new code, please call your [place] clinic or [phone].        Care EveryWhere ID     This is your Care EveryWhere ID. This could be used by other organizations to access " your [place] medical records  [id number]         Blood Pressure from Last 3 Encounters:   12/07/17 106/68   11/02/17 121/72   09/22/17 106/59    Weight from Last 3 Encounters:   12/07/17 80.7 kg (178 lb)   11/02/17 82.4 kg (181 lb 10.5 oz)   09/22/17 81.7 kg (180 lb 1.6 oz)              We Performed the Following     (39593)INTERROGATION DEVICE EVAL REMOTE, PACER/ICD        Primary Care Provider Office Phone # Fax #    Tammy Jenna Benedict [phone] [address] [facility] [address]        Equal Access to Services     RIVERA STARR : Hadii aad ku hadasho Sokaty, waaxda luqadaha, qaybta kaalmada adeegyada, sara mohan . So [facility] [phone].    ATENCIÓN: Si habla español, tiene a mustafa disposición servicios gratuitos de asistencia lingüística. [facility] [phone].    We comply with applicable federal civil rights laws and [place] laws. We do not discriminate on the basis of race, color, national origin, age, disability, sex, sexual orientation, or gender identity.            Thank you!     Thank you for choosing [facility]  for your care. Our goal is always to provide you with excellent care. Hearing back from our patients is one way we can continue to improve our services. Please take a few minutes to complete the written survey that you may receive in the mail after your visit with us. Thank you!             Your Updated Medication List - Protect others around you: Learn how to safely use, store and throw away your medicines at www.disposemymeds.org.          This list is accurate as of 7/18/18 11:59 PM.  Always use your most recent med list.                   Brand Name Dispense Instructions for use Diagnosis    ACE NOT PRESCRIBED (INTENTIONAL)     0 each    1 each as needed for other ACE Inhibitor not prescribed due to Other: BP at goal, pending microalbumin test    Type 2 diabetes, HbA1c goal < 7% (H)       albuterol 108 (90 Base)  MCG/ACT Inhaler    PROAIR HFA/PROVENTIL HFA/VENTOLIN HFA    1 Inhaler    Inhale 2 puffs into the lungs every 6 hours as needed for shortness of breath / dyspnea or wheezing    Smoke hypersensitivity       ALEVE PO      Take 220 mg by mouth        * amoxicillin 500 MG capsule    AMOXIL    12 capsule    Take 4 capsules (2000mg) 30-60 minutes prior to dental work.    Corrected transposition of great vessels       * amoxicillin 500 MG capsule    AMOXIL    4 capsule    Take 4 capsules by mouth. 1 hour prior to procedure    Corrected transposition of great vessels       atorvastatin 40 MG tablet    LIPITOR    90 tablet    Take 1 tablet (40 mg) by mouth daily    CARDIOVASCULAR SCREENING; LDL GOAL LESS THAN 70       blood glucose monitoring lancets     1 Box    Use to test blood sugar 1 times daily or as directed.    Type 2 diabetes mellitus with other circulatory complications       blood glucose monitoring test strip    ACCU-CHEK SMARTVIEW    100 each    Use to test blood sugar 1 times daily or as directed.    Type 2 diabetes mellitus without complication (H)       gabapentin 300 MG capsule    NEURONTIN    90 capsule    TAKE 1 CAPSULE(300 MG) BY MOUTH EVERY NIGHT    Neuropathic pain       medroxyPROGESTERone 150 MG/ML injection    DEPO-PROVERA    1 mL    Inject 1 mL (150 mg) into the muscle every 3 months    Encounter for initial prescription of injectable contraceptive       melatonin 3 MG Caps     90 capsule    Take 3 mg by mouth nightly as needed    Insomnia, unspecified type       metFORMIN 500 MG tablet    GLUCOPHAGE    90 tablet    Take 1 tablet (500 mg) by mouth daily (with dinner)    Type 2 diabetes mellitus with other circulatory complications       multivitamin per tablet      ONE DAILY        OMEGA-3 FISH OIL PO           order for DME     1 Package    Thumb splint    Thumb pain, right       sotalol 80 MG tablet    BETAPACE    180 tablet    TAKE 1 TABLET(80 MG) BY MOUTH EVERY 12 HOURS    Ventricular tachycardia  (H)       VITAMIN C PO           VITAMIN D (CHOLECALCIFEROL) PO      Take by mouth daily        * Notice:  This list has 2 medication(s) that are the same as other medications prescribed for you. Read the directions carefully, and ask your doctor or other care provider to review them with you.

## 2018-07-27 ENCOUNTER — TELEPHONE (OUTPATIENT)
Dept: FAMILY MEDICINE | Facility: CLINIC | Age: 38
End: 2018-07-27

## 2018-07-27 NOTE — TELEPHONE ENCOUNTER
Panel Management Review      Patient has the following on her problem list:     Depression / Dysthymia review    Measure:  Needs PHQ-9 score of 4 or less during index window.  Administer PHQ-9 and if score is 5 or more, send encounter to provider for next steps.    5 - 7 month window range:     PHQ-9 SCORE 7/6/2016 5/25/2017 12/7/2017   Total Score - - -   Total Score 1 2 0       If PHQ-9 recheck is 5 or more, route to provider for next steps.    Patient is due for:  PHQ9    Diabetes    ASA: Failed    Last A1C  Lab Results   Component Value Date    A1C 6.0 12/07/2017    A1C 6.1 05/25/2017    A1C 6.3 07/06/2016    A1C 6.9 01/15/2016    A1C 6.6 06/01/2015     A1C tested: Passed    Last LDL:    Lab Results   Component Value Date    CHOL 207 12/07/2017     Lab Results   Component Value Date    HDL 55 12/07/2017     Lab Results   Component Value Date     12/07/2017     Lab Results   Component Value Date    TRIG 98 12/07/2017     Lab Results   Component Value Date    CHOLHDLRATIO 4.7 06/18/2015     Lab Results   Component Value Date    NHDL 152 12/07/2017       Is the patient on a Statin? YES             Is the patient on Aspirin? NO    Medications     HMG CoA Reductase Inhibitors    atorvastatin (LIPITOR) 40 MG tablet          Last three blood pressure readings:  BP Readings from Last 3 Encounters:   12/07/17 106/68   11/02/17 121/72   09/22/17 106/59       Date of last diabetes office visit: 12/07/2017     Tobacco History:     History   Smoking Status     Never Smoker   Smokeless Tobacco     Never Used           Composite cancer screening  Chart review shows that this patient is due/due soon for the following Pap Smear  Summary:    Patient is due/failing the following:   A1C, PAP and PHQ9    Action needed:   Patient needs office visit for PE/PAP and f/u diabetes. and Patient needs to do PHQ9.    Type of outreach:    panel pool    Questions for provider review:    None                                                                                                                                     Marysol Burton/[facility]---[facility]       Chart routed to Care Team .

## 2018-07-31 NOTE — PROGRESS NOTES
Preliminary Device Interrogation Results.  Final physician signed paceart report to be scanned and attached.    Remote ICD transmission received and reviewed.  Device transmission sent per MD orders.  Patient has a Medtronic Evera VR single lead ICD.  Normal ICD function.  3 VT monitored episodes and 12 SVT-Wavelet episodes recorded lasting 5 seconds to 1 min 25 second recorded. Presenting EGM = Regular VS @ 82 bpm.   = <0.1%. OptiVol fluid index is slightly elevated above baseline.  Estimated battery longevity to RICKY = 9.7 years No short v-v intervals recorded.  RV lead impedance trends appear stable.  Patient notified of interrogation results and a reminder for next remote appointment transmission October 18 2018 left in a voicemail.    Remote ICD transmission

## 2018-08-06 NOTE — TELEPHONE ENCOUNTER
Patient called back message given.  Was on vacation right now didn't want to schedule.  Said she would call when she gets back to schedule.

## 2018-08-16 ENCOUNTER — OFFICE VISIT (OUTPATIENT)
Dept: FAMILY MEDICINE | Facility: CLINIC | Age: 38
End: 2018-08-16
Payer: MEDICARE

## 2018-08-16 VITALS
SYSTOLIC BLOOD PRESSURE: 130 MMHG | DIASTOLIC BLOOD PRESSURE: 75 MMHG | HEIGHT: 62 IN | RESPIRATION RATE: 16 BRPM | HEART RATE: 87 BPM | TEMPERATURE: 97.8 F | WEIGHT: 186 LBS | BODY MASS INDEX: 34.23 KG/M2

## 2018-08-16 DIAGNOSIS — E11.59 TYPE 2 DIABETES MELLITUS WITH OTHER CIRCULATORY COMPLICATION, WITHOUT LONG-TERM CURRENT USE OF INSULIN (H): ICD-10-CM

## 2018-08-16 DIAGNOSIS — L30.4 INTERTRIGO: ICD-10-CM

## 2018-08-16 DIAGNOSIS — Z30.42 ENCOUNTER FOR SURVEILLANCE OF INJECTABLE CONTRACEPTIVE: ICD-10-CM

## 2018-08-16 DIAGNOSIS — Z00.00 ROUTINE GENERAL MEDICAL EXAMINATION AT A HEALTH CARE FACILITY: Primary | ICD-10-CM

## 2018-08-16 DIAGNOSIS — M79.2 NEUROPATHIC PAIN: ICD-10-CM

## 2018-08-16 LAB
ALBUMIN SERPL-MCNC: 3.8 G/DL (ref 3.4–5)
ALP SERPL-CCNC: 104 U/L (ref 40–150)
ALT SERPL W P-5'-P-CCNC: 34 U/L (ref 0–50)
ANION GAP SERPL CALCULATED.3IONS-SCNC: 10 MMOL/L (ref 3–14)
AST SERPL W P-5'-P-CCNC: 22 U/L (ref 0–45)
BETA HCG QUAL IFA URINE: NEGATIVE
BILIRUB SERPL-MCNC: 0.9 MG/DL (ref 0.2–1.3)
BUN SERPL-MCNC: 10 MG/DL (ref 7–30)
CALCIUM SERPL-MCNC: 8.9 MG/DL (ref 8.5–10.1)
CHLORIDE SERPL-SCNC: 103 MMOL/L (ref 94–109)
CO2 SERPL-SCNC: 26 MMOL/L (ref 20–32)
CREAT SERPL-MCNC: 0.58 MG/DL (ref 0.52–1.04)
GFR SERPL CREATININE-BSD FRML MDRD: >90 ML/MIN/1.7M2
GLUCOSE SERPL-MCNC: 128 MG/DL (ref 70–99)
HBA1C MFR BLD: 6.1 % (ref 0–5.6)
POTASSIUM SERPL-SCNC: 3.9 MMOL/L (ref 3.4–5.3)
PROT SERPL-MCNC: 7.8 G/DL (ref 6.8–8.8)
SODIUM SERPL-SCNC: 139 MMOL/L (ref 133–144)
TSH SERPL DL<=0.005 MIU/L-ACNC: 1.99 MU/L (ref 0.4–4)

## 2018-08-16 PROCEDURE — 84443 ASSAY THYROID STIM HORMONE: CPT | Performed by: FAMILY MEDICINE

## 2018-08-16 PROCEDURE — 99213 OFFICE O/P EST LOW 20 MIN: CPT | Mod: 25 | Performed by: FAMILY MEDICINE

## 2018-08-16 PROCEDURE — 99396 PREV VISIT EST AGE 40-64: CPT | Mod: 25 | Performed by: FAMILY MEDICINE

## 2018-08-16 PROCEDURE — 96372 THER/PROPH/DIAG INJ SC/IM: CPT | Performed by: FAMILY MEDICINE

## 2018-08-16 PROCEDURE — 87624 HPV HI-RISK TYP POOLED RSLT: CPT | Performed by: FAMILY MEDICINE

## 2018-08-16 PROCEDURE — 82043 UR ALBUMIN QUANTITATIVE: CPT | Performed by: FAMILY MEDICINE

## 2018-08-16 PROCEDURE — 84703 CHORIONIC GONADOTROPIN ASSAY: CPT | Performed by: FAMILY MEDICINE

## 2018-08-16 PROCEDURE — G0145 SCR C/V CYTO,THINLAYER,RESCR: HCPCS | Performed by: FAMILY MEDICINE

## 2018-08-16 PROCEDURE — G0476 HPV COMBO ASSAY CA SCREEN: HCPCS | Performed by: FAMILY MEDICINE

## 2018-08-16 PROCEDURE — 36415 COLL VENOUS BLD VENIPUNCTURE: CPT | Performed by: FAMILY MEDICINE

## 2018-08-16 PROCEDURE — 83036 HEMOGLOBIN GLYCOSYLATED A1C: CPT | Performed by: FAMILY MEDICINE

## 2018-08-16 PROCEDURE — 80053 COMPREHEN METABOLIC PANEL: CPT | Performed by: FAMILY MEDICINE

## 2018-08-16 RX ORDER — MEDROXYPROGESTERONE ACETATE 150 MG/ML
150 INJECTION, SUSPENSION INTRAMUSCULAR
Qty: 1 ML | Refills: 4 | OUTPATIENT
Start: 2018-08-16 | End: 2019-07-19

## 2018-08-16 RX ORDER — CLOTRIMAZOLE 1 %
CREAM (GRAM) TOPICAL 2 TIMES DAILY
Qty: 15 G | Refills: 0 | Status: SHIPPED | OUTPATIENT
Start: 2018-08-16 | End: 2019-04-25

## 2018-08-16 RX ORDER — GABAPENTIN 300 MG/1
CAPSULE ORAL
Qty: 90 CAPSULE | Refills: 1 | Status: SHIPPED | OUTPATIENT
Start: 2018-08-16 | End: 2019-04-25

## 2018-08-16 ASSESSMENT — PATIENT HEALTH QUESTIONNAIRE - PHQ9: 5. POOR APPETITE OR OVEREATING: NOT AT ALL

## 2018-08-16 ASSESSMENT — ANXIETY QUESTIONNAIRES
3. WORRYING TOO MUCH ABOUT DIFFERENT THINGS: NOT AT ALL
2. NOT BEING ABLE TO STOP OR CONTROL WORRYING: NOT AT ALL
6. BECOMING EASILY ANNOYED OR IRRITABLE: NOT AT ALL
5. BEING SO RESTLESS THAT IT IS HARD TO SIT STILL: NOT AT ALL
IF YOU CHECKED OFF ANY PROBLEMS ON THIS QUESTIONNAIRE, HOW DIFFICULT HAVE THESE PROBLEMS MADE IT FOR YOU TO DO YOUR WORK, TAKE CARE OF THINGS AT HOME, OR GET ALONG WITH OTHER PEOPLE: NOT DIFFICULT AT ALL
7. FEELING AFRAID AS IF SOMETHING AWFUL MIGHT HAPPEN: NOT AT ALL
1. FEELING NERVOUS, ANXIOUS, OR ON EDGE: NOT AT ALL
GAD7 TOTAL SCORE: 0

## 2018-08-16 NOTE — MR AVS SNAPSHOT
After Visit Summary   8/16/2018    Chikis Hannon    MRN: [id number]           Patient Information     Date Of Birth          1980        Visit Information        Provider Department      8/16/2018 11:30 AM Tammy Benedict MD [facility]        Today's Diagnoses     Routine general medical examination at a health care facility    -  1    Encounter for surveillance of injectable contraceptive        Type 2 diabetes mellitus with other circulatory complication, without long-term current use of insulin (H)        Intertrigo- right breast         Neuropathic pain          Care Instructions      Follow up in 6 months or sooner if needed    Preventive Health Recommendations  Female Ages 40 to 49    Yearly exam:     See your health care provider every year in order to  1. Review health changes.   2. Discuss preventive care.    3. Review your medicines if your doctor prescribed any.      Get a Pap test every three years (unless you have an abnormal result and your provider advises testing more often).      If you get Pap tests with HPV test, you only need to test every 5 years, unless you have an abnormal result. You do not need a Pap test if your uterus was removed (hysterectomy) and you have not had cancer.      You should be tested each year for STDs (sexually transmitted diseases), if you're at risk.     Ask your doctor if you should have a mammogram.      Have a colonoscopy (test for colon cancer) if someone in your family has had colon cancer or polyps before age 50.       Have a cholesterol test every 5 years.       Have a diabetes test (fasting glucose) after age 45. If you are at risk for diabetes, you should have this test every 3 years.    Shots: Get a flu shot each year. Get a tetanus shot every 10 years.     Nutrition:     Eat at least 5 servings of fruits and vegetables each day.    Eat whole-grain bread, whole-wheat pasta and brown rice instead of white grains  "and rice.    Get adequate Calcium and Vitamin D.      Lifestyle    Exercise at least 150 minutes a week (an average of 30 minutes a day, 5 days a week). This will help you control your weight and prevent disease.    Limit alcohol to one drink per day.    No smoking.     Wear sunscreen to prevent skin cancer.    See your dentist every six months for an exam and cleaning.          Follow-ups after your visit        Follow-up notes from your care team     Return in about 6 months (around 2/16/2019).      Future tests that were ordered for you today     Open Future Orders        Priority Expected Expires Ordered    JUST IN CASE Routine  8/16/2018 8/16/2018            Who to contact     If you have questions or need follow up information about today's clinic visit or your schedule please contact [facility] directly at [phone].  Normal or non-critical lab and imaging results will be communicated to you by Expreemhart, letter or phone within 4 business days after the clinic has received the results. If you do not hear from us within 7 days, please contact the clinic through Expreemhart or phone. If you have a critical or abnormal lab result, we will notify you by phone as soon as possible.  Submit refill requests through MediaVast or call your pharmacy and they will forward the refill request to us. Please allow 3 business days for your refill to be completed.          Additional Information About Your Visit        MediaVast Information     MediaVast lets you send messages to your doctor, view your test results, renew your prescriptions, schedule appointments and more. To sign up, go to [email or web address] . Click on \"Log in\" on the left side of the screen, which will take you to the Welcome page. Then click on \"Sign up Now\" on the right side of the page.     You will be asked to enter the access code listed below, as well as some personal information. Please follow the directions to create your username and " "password.     Your access code is: [email or web address]  Expires: 10/29/2018  4:09 PM     Your access code will  in 90 days. If you need help or a new code, please call your [place] clinic or [phone].        Care EveryWhere ID     This is your Care EveryWhere ID. This could be used by other organizations to access your [place] medical records  [id number]        Your Vitals Were     Pulse Temperature Respirations Height Breastfeeding? BMI (Body Mass Index)    87 97.8  F (36.6  C) (Oral) 16 5' 2\" (1.575 m) No 34.02 kg/m2       Blood Pressure from Last 3 Encounters:   18 130/75   17 106/68   17 121/72    Weight from Last 3 Encounters:   18 186 lb (84.4 kg)   17 178 lb (80.7 kg)   17 181 lb 10.5 oz (82.4 kg)              We Performed the Following     Albumin Random Urine Quantitative with Creat Ratio     Beta HCG Qual, Urine - [facility] and [place] ([id number])     Comprehensive metabolic panel     Hemoglobin A1c     HPV High Risk Types DNA Cervical     Pap imaged thin layer screen with HPV - recommended age 30 - 65 years (select HPV order below)     TSH with free T4 reflex          Today's Medication Changes          These changes are accurate as of 18 12:17 PM.  If you have any questions, ask your nurse or doctor.               Start taking these medicines.        Dose/Directions    clotrimazole 1 % cream   Commonly known as:  LOTRIMIN   Used for:  Intertrigo   Started by:  Tammy Benedict MD        Apply topically 2 times daily   Quantity:  15 g   Refills:  0         Stop taking these medicines if you haven't already. Please contact your care team if you have questions.     albuterol 108 (90 Base) MCG/ACT inhaler   Commonly known as:  PROAIR HFA/PROVENTIL HFA/VENTOLIN HFA   Stopped by:  Tammy Benedict MD           amoxicillin 500 MG capsule   Commonly known as:  AMOXIL   Stopped by:  Tammy Benedict MD           melatonin 3 MG Caps "   Stopped by:  Tammy Benedict MD           OMEGA-3 FISH OIL PO   Stopped by:  Tammy Benedict MD           order for DME   Stopped by:  Tammy Benedict MD           VITAMIN C PO   Stopped by:  Tammy Benedict MD                Where to get your medicines      These medications were sent to [facility] [address] [facility] [address] [place] [address]  [address] [facility] [address] [address]     Phone:  [phone]     clotrimazole 1 % cream    gabapentin 300 MG capsule    metFORMIN 500 MG tablet         Some of these will need a paper prescription and others can be bought over the counter.  Ask your nurse if you have questions.     You don't need a prescription for these medications     medroxyPROGESTERone 150 MG/ML injection                Primary Care Provider Office Phone # Fax #    Tammy Benedict [phone] [address] [facility] [address]        Equal Access to Services     [facility]: Hadii aad ku hadasho Soomaali, waaxda luqadaha, qaybta kaalmada adeegyada, sara mohan . So [facility] [phone].    ATENCIÓN: Si habla español, tiene a mustafa disposición servicios gratUNM Children's Hospitalos de asistencia lingüística. [facility] [phone].    We comply with applicable federal civil rights laws and [place] laws. We do not discriminate on the basis of race, color, national origin, age, disability, sex, sexual orientation, or gender identity.            Thank you!     Thank you for choosing [facility]  for your care. Our goal is always to provide you with excellent care. Hearing back from our patients is one way we can continue to improve our services. Please take a few minutes to complete the written survey that you may receive in the mail after your visit with us. Thank you!             Your Updated Medication List - Protect others around you:  Learn how to safely use, store and throw away your medicines at www.disposemymeds.org.          This list is accurate as of 8/16/18 12:17 PM.  Always use your most recent med list.                   Brand Name Dispense Instructions for use Diagnosis    ACE NOT PRESCRIBED (INTENTIONAL)     0 each    1 each as needed for other ACE Inhibitor not prescribed due to Other: BP at goal, pending microalbumin test    Type 2 diabetes, HbA1c goal < 7% (H)       ALEVE PO      Take 220 mg by mouth        atorvastatin 40 MG tablet    LIPITOR    90 tablet    Take 1 tablet (40 mg) by mouth daily    CARDIOVASCULAR SCREENING; LDL GOAL LESS THAN 70       blood glucose monitoring lancets     1 Box    Use to test blood sugar 1 times daily or as directed.    Type 2 diabetes mellitus with other circulatory complications       blood glucose monitoring test strip    ACCU-CHEK SMARTVIEW    100 each    Use to test blood sugar 1 times daily or as directed.    Type 2 diabetes mellitus without complication (H)       clotrimazole 1 % cream    LOTRIMIN    15 g    Apply topically 2 times daily    Intertrigo       gabapentin 300 MG capsule    NEURONTIN    90 capsule    TAKE 1 CAPSULE(300 MG) BY MOUTH EVERY NIGHT    Neuropathic pain       medroxyPROGESTERone 150 MG/ML injection    DEPO-PROVERA    1 mL    Inject 1 mL (150 mg) into the muscle every 3 months    Encounter for surveillance of injectable contraceptive       metFORMIN 500 MG tablet    GLUCOPHAGE    90 tablet    Take 1 tablet (500 mg) by mouth daily (with dinner)    Type 2 diabetes mellitus with other circulatory complication, without long-term current use of insulin (H)       multivitamin per tablet      ONE DAILY        sotalol 80 MG tablet    BETAPACE    180 tablet    TAKE 1 TABLET(80 MG) BY MOUTH EVERY 12 HOURS    Ventricular tachycardia (H)       VITAMIN D (CHOLECALCIFEROL) PO      Take by mouth daily

## 2018-08-16 NOTE — PATIENT INSTRUCTIONS
Follow up in 6 months or sooner if needed    Preventive Health Recommendations  Female Ages 40 to 49    Yearly exam:     See your health care provider every year in order to  1. Review health changes.   2. Discuss preventive care.    3. Review your medicines if your doctor prescribed any.      Get a Pap test every three years (unless you have an abnormal result and your provider advises testing more often).      If you get Pap tests with HPV test, you only need to test every 5 years, unless you have an abnormal result. You do not need a Pap test if your uterus was removed (hysterectomy) and you have not had cancer.      You should be tested each year for STDs (sexually transmitted diseases), if you're at risk.     Ask your doctor if you should have a mammogram.      Have a colonoscopy (test for colon cancer) if someone in your family has had colon cancer or polyps before age 50.       Have a cholesterol test every 5 years.       Have a diabetes test (fasting glucose) after age 45. If you are at risk for diabetes, you should have this test every 3 years.    Shots: Get a flu shot each year. Get a tetanus shot every 10 years.     Nutrition:     Eat at least 5 servings of fruits and vegetables each day.    Eat whole-grain bread, whole-wheat pasta and brown rice instead of white grains and rice.    Get adequate Calcium and Vitamin D.      Lifestyle    Exercise at least 150 minutes a week (an average of 30 minutes a day, 5 days a week). This will help you control your weight and prevent disease.    Limit alcohol to one drink per day.    No smoking.     Wear sunscreen to prevent skin cancer.    See your dentist every six months for an exam and cleaning.

## 2018-08-16 NOTE — PROGRESS NOTES
SUBJECTIVE:   CC: Chikis Hannon is an 38 year old woman who presents for preventive health visit.     Physical   Annual:     Getting at least 3 servings of Calcium per day:  Yes    Bi-annual eye exam:  Yes    Dental care twice a year:  Yes    Sleep apnea or symptoms of sleep apnea:  None    Diet:  Regular (no restrictions)    Frequency of exercise:  2-3 days/week    Duration of exercise:  45-60 minutes    Taking medications regularly:  Yes    Medication side effects:  None    Additional concerns today:  No  Contraception     Diabetes     Diet:  Regular (no restrictions)  Frequency of exercise:  2-3 days/week  Duration of exercise:  45-60 minutes  Taking medications regularly:  Yes  Medication side effects:  None  Additional concerns today:  No    Contraception- patient would like to restart depo shot.   Also notes a rash under her left breast that is irritating.         Today's PHQ-2 Score:   PHQ-2 ( 1999 Pf[employer]) 8/16/2018   Q1: Little interest or pleasure in doing things 0   Q2: Feeling down, depressed or hopeless 0   PHQ-2 Score 0   Q1: Little interest or pleasure in doing things Not at all   Q2: Feeling down, depressed or hopeless Not at all   PHQ-2 Score 0       Abuse: Current or Past(Physical, Sexual or Emotional)- No  Do you feel safe in your environment - Yes    Social History   Substance Use Topics     Smoking status: Never Smoker     Smokeless tobacco: Never Used     Alcohol use No     Alcohol Use 8/16/2018   If you drink alcohol do you typically have greater than 3 drinks per day OR greater than 7 drinks per week? No   No flowsheet data found.    Reviewed orders with patient.  Reviewed health maintenance and updated orders accordingly - Yes  Labs reviewed in EPIC    Mammogram not appropriate for this patient based on age.    Pertinent mammograms are reviewed under the imaging tab.  History of abnormal Pap smear: NO - age 30- 65 PAP every 3 years recommended  PAP / HPV 4/2/2015 1/10/2012 12/28/2010   PAP  "NIL NIL NIL     Reviewed and updated as needed this visit by clinical staff  Tobacco  Meds         Reviewed and updated as needed this visit by Provider            Review of Systems  CONSTITUTIONAL: NEGATIVE for fever, chills, change in weight  INTEGUMENTARY/SKIN: POSITIVE for rash under left breast   EYES: NEGATIVE for vision changes or irritation  ENT: NEGATIVE for ear, mouth and throat problems  RESP: NEGATIVE for significant cough or SOB  BREAST: NEGATIVE for masses, tenderness or discharge  CV: NEGATIVE for chest pain, palpitations or peripheral edema  GI: NEGATIVE for nausea, abdominal pain, heartburn, or change in bowel habits  : NEGATIVE for unusual urinary or vaginal symptoms. Periods are regular.  MUSCULOSKELETAL: NEGATIVE for significant arthralgias or myalgia  NEURO: NEGATIVE for weakness, dizziness or paresthesias  PSYCHIATRIC: NEGATIVE for changes in mood or affect     OBJECTIVE:   /75 (BP Location: Left arm, Patient Position: Chair, Cuff Size: Adult Large)  Pulse 87  Temp 97.8  F (36.6  C) (Oral)  Resp 16  Ht 5' 2\" (1.575 m)  Wt 186 lb (84.4 kg)  Breastfeeding? No  BMI 34.02 kg/m2  Physical Exam  GENERAL: healthy, alert and no distress  EYES: Eyes grossly normal to inspection, PERRL and conjunctivae and sclerae normal  HENT: ear canals and TM's normal, nose and mouth without ulcers or lesions  NECK: no adenopathy, no asymmetry, masses, or scars and thyroid normal to palpation  RESP: lungs clear to auscultation - no rales, rhonchi or wheezes  BREAST: normal without masses, tenderness or nipple discharge and no palpable axillary masses or adenopathy  CV: systolic murmur 3/6- best at left sternal border, S2 split   ABDOMEN: soft, nontender, no hepatosplenomegaly, no masses and bowel sounds normal   (female): normal female external genitalia, normal urethral meatus, vaginal mucosa pink, moist, well rugated, and normal cervix/adnexa/uterus without masses or discharge  MS: no gross " musculoskeletal defects noted, no edema  SKIN: erythematous rash under left breast, no macerations   NEURO: Normal strength and tone, mentation intact and speech normal  PSYCH: mentation appears normal, affect normal/bright  Diabetic foot exam: normal DP and PT pulses, no trophic changes or ulcerative lesions and normal sensory exam    Diagnostic Test Results:  Lab Results   Component Value Date    A1C 6.1 08/16/2018    A1C 6.0 12/07/2017    A1C 6.1 05/25/2017    A1C 6.3 07/06/2016    A1C 6.9 01/15/2016         ASSESSMENT/PLAN:   1. Routine general medical examination at a health care facility  - pap done today   - Pap imaged thin layer screen with HPV - recommended age 30 - 65 years (select HPV order below)  - HPV High Risk Types DNA Cervical    2. Encounter for surveillance of injectable contraceptive  - Beta HCG Qual, Urine - [facility] and [place] ([id number])  - medroxyPROGESTERone (DEPO-PROVERA) 150 MG/ML injection; Inject 1 mL (150 mg) into the muscle every 3 months  Dispense: 1 mL; Refill: 4  - Medroxyprogesterone inj  1mg   (Depo Provera J-Code)  - INJECTION INTRAMUSCULAR OR SUB-Q    3. Type 2 diabetes mellitus with other circulatory complication, without long-term current use of insulin (H)  - A1c stable. Continue current regimen   - TSH with free T4 reflex  - Comprehensive metabolic panel  - Albumin Random Urine Quantitative with Creat Ratio  - metFORMIN (GLUCOPHAGE) 500 MG tablet; Take 1 tablet (500 mg) by mouth daily (with dinner)  Dispense: 90 tablet; Refill: 1    4. Intertrigo- right breast   - skin care reviewed   - clotrimazole (LOTRIMIN) 1 % cream; Apply topically 2 times daily  Dispense: 15 g; Refill: 0    5. Neuropathic pain  -stable   - gabapentin (NEURONTIN) 300 MG capsule; TAKE 1 CAPSULE(300 MG) BY MOUTH EVERY NIGHT  Dispense: 90 capsule; Refill: 1    COUNSELING:  Reviewed preventive health counseling, as reflected in patient instructions       Regular exercise       Healthy diet/nutrition        "Safe sex practices/STD prevention    BP Readings from Last 1 Encounters:   08/16/18 130/75     Estimated body mass index is 34.02 kg/(m^2) as calculated from the following:    Height as of this encounter: 5' 2\" (1.575 m).    Weight as of this encounter: 186 lb (84.4 kg).      Weight management plan: Discussed healthy diet and exercise guidelines and patient will follow up in 12 months in clinic to re-evaluate.     reports that she has never smoked. She has never used smokeless tobacco.      Counseling Resources:  ATP IV Guidelines  Pooled Cohorts Equation Calculator  Breast Cancer Risk Calculator  FRAX Risk Assessment  ICSI Preventive Guidelines  Dietary Guidelines for Americans, 2010  USDA's MyPlate  ASA Prophylaxis  Lung CA Screening    Tammy Benedict MD  [facility]  Answers for HPI/ROS submitted by the patient on 8/16/2018   PHQ-2 Score: 0    "

## 2018-08-17 LAB
CREAT UR-MCNC: 10 MG/DL
MICROALBUMIN UR-MCNC: <5 MG/L
MICROALBUMIN/CREAT UR: NORMAL MG/G CR (ref 0–25)

## 2018-08-17 ASSESSMENT — PATIENT HEALTH QUESTIONNAIRE - PHQ9: SUM OF ALL RESPONSES TO PHQ QUESTIONS 1-9: 0

## 2018-08-17 ASSESSMENT — ANXIETY QUESTIONNAIRES: GAD7 TOTAL SCORE: 0

## 2018-08-17 NOTE — PROGRESS NOTES
The rest of your results from your physical look good.   Keep up the good work.   See you in 6 months.     Best Wishes,    Dr. Case

## 2018-08-20 LAB
COPATH REPORT: NORMAL
PAP: NORMAL

## 2018-08-22 LAB
FINAL DIAGNOSIS: NORMAL
HPV HR 12 DNA CVX QL NAA+PROBE: NEGATIVE
HPV16 DNA SPEC QL NAA+PROBE: NEGATIVE
HPV18 DNA SPEC QL NAA+PROBE: NEGATIVE
SPECIMEN DESCRIPTION: NORMAL
SPECIMEN SOURCE CVX/VAG CYTO: NORMAL

## 2018-10-18 ENCOUNTER — ALLIED HEALTH/NURSE VISIT (OUTPATIENT)
Dept: CARDIOLOGY | Facility: CLINIC | Age: 38
End: 2018-10-18
Attending: INTERNAL MEDICINE
Payer: MEDICARE

## 2018-10-18 DIAGNOSIS — I47.20 VENTRICULAR TACHYCARDIA (H): Primary | ICD-10-CM

## 2018-10-18 PROCEDURE — 93295 DEV INTERROG REMOTE 1/2/MLT: CPT | Performed by: INTERNAL MEDICINE

## 2018-10-18 PROCEDURE — 93296 REM INTERROG EVL PM/IDS: CPT | Mod: ZF

## 2018-10-18 NOTE — MR AVS SNAPSHOT
"              After Visit Summary   10/18/2018    Chikis Hannon    MRN: [id number]           Patient Information     Date Of Birth          1980        Visit Information        Provider Department      10/18/2018 6:00 AM  ICD REMOTE [facility]        Today's Diagnoses     Ventricular tachycardia (H)    -  1       Follow-ups after your visit        Who to contact     If you have questions or need follow up information about today's clinic visit or your schedule please contact [facility] directly at [phone].  Normal or non-critical lab and imaging results will be communicated to you by Herrenschmiedehart, letter or phone within 4 business days after the clinic has received the results. If you do not hear from us within 7 days, please contact the clinic through Herrenschmiedehart or phone. If you have a critical or abnormal lab result, we will notify you by phone as soon as possible.  Submit refill requests through [employer] or call your pharmacy and they will forward the refill request to us. Please allow 3 business days for your refill to be completed.          Additional Information About Your Visit        MyChart Information     [employer] lets you send messages to your doctor, view your test results, renew your prescriptions, schedule appointments and more. To sign up, go to [email or web address] . Click on \"Log in\" on the left side of the screen, which will take you to the Welcome page. Then click on \"Sign up Now\" on the right side of the page.     You will be asked to enter the access code listed below, as well as some personal information. Please follow the directions to create your username and password.     Your access code is: [email or web address]  Expires: 10/29/2018  4:09 PM     Your access code will  in 90 days. If you need help or a new code, please call your [place] clinic or [phone].        Care EveryWhere ID     This is your Care EveryWhere ID. This could be used by other organizations to " access your [place] medical records  [id number]         Blood Pressure from Last 3 Encounters:   08/16/18 130/75   12/07/17 106/68   11/02/17 121/72    Weight from Last 3 Encounters:   08/16/18 84.4 kg (186 lb)   12/07/17 80.7 kg (178 lb)   11/02/17 82.4 kg (181 lb 10.5 oz)              We Performed the Following     (94379)INTERROGATION DEVICE EVAL REMOTE, PACER/ICD        Primary Care Provider Office Phone # Fax #    Tammy Jenna Benedict [phone]       [address] [facility] [address]        Equal Access to Services     RIVERA STARR : Hadii tricia vidaleso Conchita, waaxda luqadaha, qaybta kaalmada aderiazyamaria isabel, sara mohan . So [facility] [phone].    ATENCIÓN: Si habla español, tiene a mustafa disposición servicios gratuitos de asistencia lingüística. [facility] [phone].    We comply with applicable federal civil rights laws and [place] laws. We do not discriminate on the basis of race, color, national origin, age, disability, sex, sexual orientation, or gender identity.            Thank you!     Thank you for choosing [facility]  for your care. Our goal is always to provide you with excellent care. Hearing back from our patients is one way we can continue to improve our services. Please take a few minutes to complete the written survey that you may receive in the mail after your visit with us. Thank you!             Your Updated Medication List - Protect others around you: Learn how to safely use, store and throw away your medicines at www.disposemymeds.org.          This list is accurate as of 10/18/18 11:59 PM.  Always use your most recent med list.                   Brand Name Dispense Instructions for use Diagnosis    ACE NOT PRESCRIBED (INTENTIONAL)     0 each    1 each as needed for other ACE Inhibitor not prescribed due to Other: BP at goal, pending microalbumin test    Type 2 diabetes, HbA1c goal < 7% (H)       ALEVE PO      Take 220 mg  by mouth        atorvastatin 40 MG tablet    LIPITOR    90 tablet    Take 1 tablet (40 mg) by mouth daily    CARDIOVASCULAR SCREENING; LDL GOAL LESS THAN 70       blood glucose monitoring lancets     1 Box    Use to test blood sugar 1 times daily or as directed.    Type 2 diabetes mellitus with other circulatory complications (H)       blood glucose monitoring test strip    ACCU-CHEK SMARTVIEW    100 each    Use to test blood sugar 1 times daily or as directed.    Type 2 diabetes mellitus without complication (H)       clotrimazole 1 % cream    LOTRIMIN    15 g    Apply topically 2 times daily    Intertrigo       gabapentin 300 MG capsule    NEURONTIN    90 capsule    TAKE 1 CAPSULE(300 MG) BY MOUTH EVERY NIGHT    Neuropathic pain       medroxyPROGESTERone 150 MG/ML injection    DEPO-PROVERA    1 mL    Inject 1 mL (150 mg) into the muscle every 3 months    Encounter for surveillance of injectable contraceptive       metFORMIN 500 MG tablet    GLUCOPHAGE    90 tablet    Take 1 tablet (500 mg) by mouth daily (with dinner)    Type 2 diabetes mellitus with other circulatory complication, without long-term current use of insulin (H)       multivitamin per tablet      ONE DAILY        sotalol 80 MG tablet    BETAPACE    180 tablet    TAKE 1 TABLET(80 MG) BY MOUTH EVERY 12 HOURS    Ventricular tachycardia (H)       VITAMIN D (CHOLECALCIFEROL) PO      Take by mouth daily

## 2018-10-22 NOTE — PROGRESS NOTES
Preliminary Device Interrogation Results.  Final physician signed paceart report to be scanned and attached.    Remote ICD transmission received and reviewed.  Device transmission sent per MD orders.  Patient has a Medtronic Evera VR single lead ICD.  Normal ICD function.  3 monitored VT and 4 SVT episodes recorded @ rates 160-170 bpm lasting 15 seconds - 2 min 24 seconds and both episode types look similar to the presenting EGM. All stored events display a gradual increase in rate, with active sensor which may indicate in this young patient to reveal a sinus tachycardia with activity. Presenting EGM = SR @ 85 bpm.   =<0.1%. OptiVol fluid index is near baseline.  Estimated battery longevity to RICKY = 9.4 years. No short v-v intervals recorded.  RV lead impedance trends appear stable.  Patient notified of interrogation results via voicemail.   Plan for patient to send a remote transmission 1/21/2019 and a task was sent to  to schedule a device and physician visit in 6 months.    Remote ICD transmission

## 2018-11-01 ENCOUNTER — HOSPITAL ENCOUNTER (OUTPATIENT)
Dept: CARDIOLOGY | Facility: CLINIC | Age: 38
Discharge: HOME | End: 2018-11-01
Attending: INTERNAL MEDICINE | Admitting: INTERNAL MEDICINE
Payer: MEDICARE

## 2018-11-01 ENCOUNTER — OFFICE VISIT (OUTPATIENT)
Dept: PEDIATRIC CARDIOLOGY | Facility: CLINIC | Age: 38
End: 2018-11-01
Attending: INTERNAL MEDICINE
Payer: MEDICARE

## 2018-11-01 VITALS
DIASTOLIC BLOOD PRESSURE: 69 MMHG | OXYGEN SATURATION: 97 % | HEART RATE: 86 BPM | SYSTOLIC BLOOD PRESSURE: 107 MMHG | BODY MASS INDEX: 34.77 KG/M2 | WEIGHT: 188.93 LBS | RESPIRATION RATE: 16 BRPM | HEIGHT: 62 IN

## 2018-11-01 DIAGNOSIS — Q20.3 TGA (TRANSPOSITION OF GREAT ARTERIES): ICD-10-CM

## 2018-11-01 DIAGNOSIS — Q24.9 CONGENITAL HEART DISEASE: Primary | ICD-10-CM

## 2018-11-01 PROCEDURE — G0463 HOSPITAL OUTPT CLINIC VISIT: HCPCS | Mod: 25

## 2018-11-01 PROCEDURE — 99214 OFFICE O/P EST MOD 30 MIN: CPT | Mod: ZP | Performed by: INTERNAL MEDICINE

## 2018-11-01 PROCEDURE — 93325 DOPPLER ECHO COLOR FLOW MAPG: CPT

## 2018-11-01 PROCEDURE — 93010 ELECTROCARDIOGRAM REPORT: CPT | Mod: ZP | Performed by: INTERNAL MEDICINE

## 2018-11-01 ASSESSMENT — PAIN SCALES - GENERAL: PAINLEVEL: MILD PAIN (2)

## 2018-11-01 NOTE — NURSING NOTE
Chief Complaint   Patient presents with     RECHECK     Transposition of great arteries, VSD and pulmonic stenosis        Cardiac Testing: Patient given instructions regarding  cMRI. Discussed purpose, preparation, procedure and when to expect results reported back to the patient. Patient demonstrated understanding of this information and agreed to call with further questions or concerns.  Med Reconcile: Reviewed and verified all current medications with the patient. The updated medication list was printed and given to the patient.  Return Appointment: Patient given instructions regarding scheduling next clinic visit. Patient demonstrated understanding of this information and agreed to call with further questions or concerns.  Patient stated she understood all health information given and agreed to call with further questions or concerns.     Fady Jameson RN, BSN  Cardiology Care Coordinator  [facility]  [email or web address]  [phone]

## 2018-11-01 NOTE — PATIENT INSTRUCTIONS
"You were seen today in the Adult Congenital and Cardiovascular Genetics Clinic at the [facility].    Cardiology Providers you saw during your visit:  Dr. Betsy Valle     Diagnosis:  TGA s/p ICD    Results:  The results of your echo was discussed with you today.     Recommendations:    1.  Continue to eat a heart healthy, low salt diet.  2.  Continue to get 20-30 minutes of aerobic activity, 4-5 days per week.  Examples of aerobic activity include walking, running, swimming, cycling, etc.  3.  Continue to observe good oral hygiene, with regular dental visits.  4.  No changes today.       Vitals:    11/01/18 0903   BP: 107/69   Pulse: 86   Resp: 16   SpO2: 97%   Weight: 85.7 kg (188 lb 15 oz)   Height: 1.582 m (5' 2.28\")     SBE prophylaxis:   Yes__X__  No____    Lifelong Bacterial Endocarditis Prophylaxis:  YES__X__  NO____    If YES is checked, follow the recommendations outlined below:  1. Take antibiotic(s) prior to recommended dental procedures and procedures on the respiratory tract or with infected skin, muscle or bones. SBE prophylaxis is not needed for routine GI and  procedures (ie. Colonoscopy or vaginal delivery)  2. Observe good oral hygiene daily, as advised by your dentist. Get regular professional dental care.  3. Keep cuts clean.  4. Infections should be treated promptly.  5. Symptoms of Infective Endocarditis could include: fever lasting more than 4-5 days or a recurrent fever that initially resolves but returns within 1-2 days)      Exercise restrictions:   Yes__X__  No____         If yes, list restrictions:  Must be allowed to rest if fatigued or SOB      Work restrictions:  Yes____  No__X__         If yes, list restrictions:    FASTING CHOLESTEROL was checked in the last 5 years YES_X__  NO___   Continue to eat a heart healthy, low salt diet.         ____ Fasting lipid panel order today         ____ No changes in medications          ____ I recommend the following changes in your " cholesterol medications.:          ____ Please follow up for cholesterol screening at your primary care physician      Follow-up:  Follow up with Dr. Valle and Dr. Ray in (May- Sept) with an MRI/CT and device check prior to the appt.     If you have questions or concerns please contact us at:    Fady Jameson, RN, BSN   Chepe Garcia (Scheduling)  Nurse Coordinator     Clinic   Adult Congenital and CV Genetics Adult Congenital and CV Genetic  [facility]  (P)[phone]    (P) [phone]  [email or web address] (F)[phone]        For after hours urgent needs, call [phone] and ask to speak to the Adult Congenital Physician on call.  Mention Job Code 0401.    For emergencies call 911.    [facility]  [facility]  Mail Code [id number]  [address] [place]  [address]

## 2018-11-01 NOTE — NURSING NOTE
"Informant-    Chikis is accompanied by self    Reason for Visit-  Transposition of great arteries, VSD and pulmonic stenosis    Vitals signs-  /69  Pulse 86  Resp 16  Ht 1.582 m (5' 2.28\")  Wt 85.7 kg (188 lb 15 oz)  SpO2 97%  BMI 34.24 kg/m2    There are concerns about the child's exposure to violence in the home: No    Face to Face time: 5 minutes  Sheyla Carpenter MA      "

## 2018-11-01 NOTE — MR AVS SNAPSHOT
"              After Visit Summary   11/1/2018    Chikis Hannon    MRN: [id number]           Patient Information     Date Of Birth          1980        Visit Information        Provider Department      11/1/2018 10:00 AM Betsy Valle MD [facility]        Today's Diagnoses     Congenital heart disease    -  1      Care Instructions    You were seen today in the Adult Congenital and Cardiovascular Genetics Clinic at the [facility].    Cardiology Providers you saw during your visit:  Dr. Betsy Valle     Diagnosis:  TGA s/p ICD    Results:  The results of your echo was discussed with you today.     Recommendations:    1.  Continue to eat a heart healthy, low salt diet.  2.  Continue to get 20-30 minutes of aerobic activity, 4-5 days per week.  Examples of aerobic activity include walking, running, swimming, cycling, etc.  3.  Continue to observe good oral hygiene, with regular dental visits.  4.  No changes today.       Vitals:    11/01/18 0903   BP: 107/69   Pulse: 86   Resp: 16   SpO2: 97%   Weight: 85.7 kg (188 lb 15 oz)   Height: 1.582 m (5' 2.28\")     SBE prophylaxis:   Yes__X__  No____    Lifelong Bacterial Endocarditis Prophylaxis:  YES__X__  NO____    If YES is checked, follow the recommendations outlined below:  1. Take antibiotic(s) prior to recommended dental procedures and procedures on the respiratory tract or with infected skin, muscle or bones. SBE prophylaxis is not needed for routine GI and  procedures (ie. Colonoscopy or vaginal delivery)  2. Observe good oral hygiene daily, as advised by your dentist. Get regular professional dental care.  3. Keep cuts clean.  4. Infections should be treated promptly.  5. Symptoms of Infective Endocarditis could include: fever lasting more than 4-5 days or a recurrent fever that initially resolves but returns within 1-2 days)      Exercise restrictions:   Yes__X__  No____         If yes, list restrictions:  " Must be allowed to rest if fatigued or SOB      Work restrictions:  Yes____  No__X__         If yes, list restrictions:    FASTING CHOLESTEROL was checked in the last 5 years YES_X__  NO___   Continue to eat a heart healthy, low salt diet.         ____ Fasting lipid panel order today         ____ No changes in medications          ____ I recommend the following changes in your cholesterol medications.:          ____ Please follow up for cholesterol screening at your primary care physician      Follow-up:  Follow up with Dr. Valle and Dr. Ray in (May- Sept) with an MRI/CT and device check prior to the appt.     If you have questions or concerns please contact us at:    Fady Jameson, RN, BSN   Chepe Garcia (Scheduling)  Nurse Coordinator     Clinic   Adult Congenital and CV Genetics Adult Congenital and CV Genetic  [facility]  (P)[phone]    (P) [phone]  [email or web address] (F)[phone]        For after hours urgent needs, call [phone] and ask to speak to the Adult Congenital Physician on call.  Mention Job Code 0401.    For emergencies call 033.    [facility]  Mail Code [id number]  [address] [place]  [address]           Follow-ups after your visit        Your next 10 appointments already scheduled     Jan 22, 2019 11:00 AM CST   (Arrive by 10:45 AM)   Implanted Defibulator with Uc Cv Device 1   [facility])    [address] [place] [address]  3rd Floor  [id number]               Jan 25, 2019 11:30 AM CST   (Arrive by 11:15 AM)   RETURN CONGENITAL HEART with Vimal Ray MD   [facility])    [address] [place] [address]  [address]  [facility] [address] [phone]              Future tests that were ordered for you today      "Open Future Orders        Priority Expected Expires Ordered    Echo pediatric congenital Routine  10/31/2019 10/31/2018            Who to contact     If you have questions or need follow up information about today's clinic visit or your schedule please contact [facility] directly at [phone].  Normal or non-critical lab and imaging results will be communicated to you by MyChart, letter or phone within 4 business days after the clinic has received the results. If you do not hear from us within 7 days, please contact the clinic through "[employer]hart or phone. If you have a critical or abnormal lab result, we will notify you by phone as soon as possible.  Submit refill requests through [employer] or call your pharmacy and they will forward the refill request to us. Please allow 3 business days for your refill to be completed.          Additional Information About Your Visit        MyChart Information     [employer] lets you send messages to your doctor, view your test results, renew your prescriptions, schedule appointments and more. To sign up, go to [email or web address] . Click on \"Log in\" on the left side of the screen, which will take you to the Welcome page. Then click on \"Sign up Now\" on the right side of the page.     You will be asked to enter the access code listed below, as well as some personal information. Please follow the directions to create your username and password.     Your access code is: [email or web address]  Expires: 2019 10:03 AM     Your access code will  in 90 days. If you need help or a new code, please call your [facility] clinic or [phone].        Care EveryWhere ID     This is your Care EveryWhere ID. This could be used by other organizations to access your [facility] medical records  [id number]        Your Vitals Were     Pulse Respirations Height Pulse Oximetry BMI (Body Mass Index)       86 16 1.582 m (5' 2.28\") 97% 34.24 kg/m2        Blood Pressure from " Last 3 Encounters:   11/01/18 107/69   08/16/18 130/75   12/07/17 106/68    Weight from Last 3 Encounters:   11/01/18 85.7 kg (188 lb 15 oz)   08/16/18 84.4 kg (186 lb)   12/07/17 80.7 kg (178 lb)              We Performed the Following     ELECTROCARDIOGRAM REPORT        Primary Care Provider Office Phone # Fax #    Tammy Benedict [phone] [address] [facility] [address]        Equal Access to Services     [facility]: Hadii aad ku hadasho Soomaali, waaxda luqadaha, qaybta kaalmada aderiazyamaria isabel, sara mohan . So [facility] [phone].    ATENCIÓN: Si habla español, tiene a mustafa disposición servicios gratuitos de asistencia lingüística. [facility] [phone].    We comply with applicable federal civil rights laws and [place] laws. We do not discriminate on the basis of race, color, national origin, age, disability, sex, sexual orientation, or gender identity.            Thank you!     Thank you for choosing [facility]  for your care. Our goal is always to provide you with excellent care. Hearing back from our patients is one way we can continue to improve our services. Please take a few minutes to complete the written survey that you may receive in the mail after your visit with us. Thank you!             Your Updated Medication List - Protect others around you: Learn how to safely use, store and throw away your medicines at www.disposemymeds.org.          This list is accurate as of 11/1/18 10:04 AM.  Always use your most recent med list.                   Brand Name Dispense Instructions for use Diagnosis    ACE NOT PRESCRIBED (INTENTIONAL)     0 each    1 each as needed for other ACE Inhibitor not prescribed due to Other: BP at goal, pending microalbumin test    Type 2 diabetes, HbA1c goal < 7% (H)       ALEVE PO      Take 220 mg by mouth        atorvastatin 40 MG tablet    LIPITOR    90 tablet    Take 1 tablet  (40 mg) by mouth daily    CARDIOVASCULAR SCREENING; LDL GOAL LESS THAN 70       blood glucose monitoring lancets     1 Box    Use to test blood sugar 1 times daily or as directed.    Type 2 diabetes mellitus with other circulatory complications (H)       blood glucose monitoring test strip    ACCU-CHEK SMARTVIEW    100 each    Use to test blood sugar 1 times daily or as directed.    Type 2 diabetes mellitus without complication (H)       clotrimazole 1 % cream    LOTRIMIN    15 g    Apply topically 2 times daily    Intertrigo       gabapentin 300 MG capsule    NEURONTIN    90 capsule    TAKE 1 CAPSULE(300 MG) BY MOUTH EVERY NIGHT    Neuropathic pain       medroxyPROGESTERone 150 MG/ML injection    DEPO-PROVERA    1 mL    Inject 1 mL (150 mg) into the muscle every 3 months    Encounter for surveillance of injectable contraceptive       metFORMIN 500 MG tablet    GLUCOPHAGE    90 tablet    Take 1 tablet (500 mg) by mouth daily (with dinner)    Type 2 diabetes mellitus with other circulatory complication, without long-term current use of insulin (H)       multivitamin per tablet      ONE DAILY        sotalol 80 MG tablet    BETAPACE    180 tablet    TAKE 1 TABLET(80 MG) BY MOUTH EVERY 12 HOURS    Ventricular tachycardia (H)       VITAMIN D (CHOLECALCIFEROL) PO      Take by mouth daily

## 2018-11-01 NOTE — PROGRESS NOTES
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  At her clinic visit in 2/15 she again had no ventricular arrhythmias and her Sotalol was stopped. She had recurrence of VT in June 2015 and was restarted on sotalol 80 mg twice a day. VT ablation was re-attempted after holding sotalol for one week on 8/24/2015 with no inducible VT.  She was seen by congenital EP in Sept 2015 and found to have no recurrence of VT.  Decision made to continue sotalol therapy.  Was seen by congential EP in Sept 2017 and noted to have no recurrence of significant arrhythmias.  No changes were made to her antiarrhythmic regimen.      Today pt reports that she has been feeling well.  She denies any chest pain or pressure, sob/maher, orthopnea, pnd, palpitations, syncope/presyncope, cathy, change in exercise tolerance or any ICD shocks.  She continues to work full-time as a  obtaining at least 20,000 steps and up to 32,000 steps on those days.  She also denies any problems with her medications and reports compliance.       PAST MEDICAL HISTORY:  Past Medical History:   Diagnosis Date     Allergy, unspecified not elsewhere classified      Attention deficit disorder without mention of hyperactivity     Mild MR; difficulty attention span     Congenital heart disease      Corrected transposition of great vessels     still needs SBE prophlaxis     Diabetes mellitus type 2, noninsulin dependent (H)      Impaired fasting glucose     103 7/06      Ventricular tachycardia (H) 3/4/2014    s/p ablation and ICD placement 3/13/14       FAMILY HISTORY:  Family History   Problem Relation Age of Onset     Adopted: Yes     Family History Negative Mother      Family History Negative Father        SOCIAL HISTORY:  History     Social History     Marital Status:      Spouse Name: N/A     Number of Children: N/A     Years of Education: N/A     Occupational History      at [employer]             Disabled     Social History Main Topics     Smoking status: Never Smoker      Smokeless tobacco: Never Used     Alcohol Use: No     Drug Use: No     Sexual Activity:     Partners: Male     Other Topics Concern     Special Diet No     working on dairy. fruits and veggies.     Exercise Yes     Peter Monzon tape     Parent/Sibling W/ Cabg, Mi Or Angioplasty Before 65f 55m? No     Social History Narrative       CURRENT MEDICATIONS:  Current Outpatient Prescriptions   Medication Sig Dispense Refill     ACE NOT PRESCRIBED, INTENTIONAL, 1 each as needed for other ACE Inhibitor not prescribed due to Other: BP at goal, pending microalbumin test 0 each 0     atorvastatin (LIPITOR) 40 MG tablet Take 1 tablet (40 mg) by mouth daily 90 tablet 3     blood glucose monitoring (ACCU-CHEK FASTCLIX) lancets Use to test blood sugar 1 times daily or as directed. 1 Box 3     blood glucose monitoring (ACCU-CHEK SMARTVIEW) test strip Use to test blood sugar 1 times daily or as directed. 100 each 0     clotrimazole (LOTRIMIN) 1 % cream Apply topically 2 times daily 15 g 0     gabapentin (NEURONTIN) 300 MG capsule TAKE 1 CAPSULE(300 MG) BY MOUTH EVERY NIGHT 90 capsule 1     medroxyPROGESTERone (DEPO-PROVERA) 150 MG/ML injection Inject 1 mL (150 mg) into the muscle every 3 months 1 mL 4     metFORMIN (GLUCOPHAGE) 500 MG tablet Take 1 tablet (500 mg) by mouth daily (with dinner) 90 tablet 1     MULTIVITAMINS PO TABS ONE DAILY  3     Naproxen Sodium (ALEVE PO) Take 220 mg by mouth        "sotalol (BETAPACE) 80 MG tablet TAKE 1 TABLET(80 MG) BY MOUTH EVERY 12 HOURS 180 tablet 3     VITAMIN D, CHOLECALCIFEROL, PO Take by mouth daily         ROS:   Constitutional: No fever, chills, or sweats. No weight gain/loss.   ENT: No visual disturbance, ear ache, epistaxis, sore throat.   Allergies/Immunologic: Negative.   Respiratory: No cough, hemoptysis.   Cardiovascular: As per HPI.   GI: No nausea, vomiting, hematemesis, melena, or hematochezia.   : No urinary frequency, dysuria, or hematuria.   Integument: Negative.   Psychiatric: Negative.   Neuro: Negative.   Endocrinology: Negative.   Musculoskeletal: Negative.    EXAM:  /69  Pulse 86  Resp 16  Ht 1.582 m (5' 2.28\")  Wt 85.7 kg (188 lb 15 oz)  SpO2 97%  BMI 34.24 kg/m2  General: appears comfortable, alert and articulate  Head: normocephalic, atraumatic  Eyes: anicteric sclera, EOMI  Neck: no adenopathy, 2+ carotids  Orophyarynx: moist mucosa, no lesions, dentition intact  Heart: Nrl S1, prominent S2, widely split (patient has RBBB), has 3/6 KRISTI best heard at the left sternal border at the base., JVP 6-7cm  Lungs: clear, no rales or wheezing  Abdomen: soft, non-tender, bowel sounds present, no hepatomegaly  Extremities: no clubbing, cyanosis or edema  Neurological: normal speech and affect, no gross motor deficits      Labs:  CBC RESULTS:  Lab Results   Component Value Date    WBC 8.8 08/24/2015    RBC 4.50 08/24/2015    HGB 12.5 08/24/2015    HCT 37.7 08/24/2015    MCV 84 08/24/2015    MCH 27.8 08/24/2015    MCHC 33.2 08/24/2015    RDW 13.4 08/24/2015     08/24/2015       CMP RESULTS:  Lab Results   Component Value Date     08/16/2018    POTASSIUM 3.9 08/16/2018    CHLORIDE 103 08/16/2018    CO2 26 08/16/2018    ANIONGAP 10 08/16/2018     (H) 08/16/2018    BUN 10 08/16/2018    CR 0.58 08/16/2018    GFRESTIMATED >90 08/16/2018    GFRESTBLACK >90 08/16/2018    LITA 8.9 08/16/2018    BILITOTAL 0.9 08/16/2018    ALBUMIN 3.8 " 08/16/2018    ALKPHOS 104 08/16/2018    ALT 34 08/16/2018    AST 22 08/16/2018        INR RESULTS:  Lab Results   Component Value Date    INR 1.13 06/01/2015       Lab Results   Component Value Date    MAG 2.0 06/02/2015     Lab Results   Component Value Date    NTBNPI 327 12/24/2008     No results found for: NTBNP       CT CORONARY: 3/4/14   IMPRESSION:  1. Normal coronary anatomy with no detectable stenosis or plaque. The coronaries arise from the anteriorly positioned aorta.     ADDITIONAL FINDINGS: The aortic root has an unusual appearance probably from the reconstructive surgery for transposition. The appearance is nearly of 2 aortic roots one small 1 which ends in a cul-de-sac and the other which leads into the ascending aorta. The coronary arteries arise from that aortic root which feeds into the ascending aorta . There is a conduit from the right ventricle to the pulmonary trunk. Normal pulmonary venous anatomy with all four pulmonary veins draining into the left atrium. There is no left atrial or left ventricular mass or thrombus. Normal pericardial thickness.There is no pericardial effusion. The proximal pulmonary arteries are well opacified. Please review Radiology report for incidental noncardiac findings that will follow separately.      CARDIAC MRI/MRA: 3/4/14   IMPRESSION:   1. Transposition of the great arteries with VSD after Rastelli procedure with RV to PA conduit.   2. Minimal flow acceleration across the RV to PA conduit with moderate pulmonary regurgitation (31-33% regurg fraction, Peak PA conduit gradient 18mm Hg).   3. Normal right and left ventricular size and systolic function with no evidence of fibrosis.    Echo 3/16  Difficult study due to poor acoustic windows. The patient has undergone a Rastelli procedure for d-transposition of the great arteries and replacement of the pulmonary valve. The pulmonary valve is not seen in this study. Shunts were not able to be excluded. Mildly  diminished left ventricular systolic function with mild left ventricular enlargement. Normal right ventricular and pulmonary artery pressures.    Echo 11/17  D-TGA status post Rastelli and subsequent placement of a bio-prosthetic valve in the pulmonary position. The calculated single plane left ventricular ejection fraction from the 4 chamber view is 58%. A pacing lead is present in the right atrium and right ventricle. Mild (1+) aortic valve insufficiency. Unable to adequately visualize the RV-PA conduit or the bio-prosthetic valve. Estimated right ventricular systolic pressure is 20-25 mmHg plus right atrial pressure.    Echo today reviewed by me  D-TGA status post Rastelli and subsequent placement of a bio-prosthetic valve in the pulmonary position. There is no obvious residual ventricular level shunt. Mild (1+) aortic valve insufficiency. Unable to adequately visualize the RV-PA conduit or the bio-prosthetic valve, however normal antegrade flow is visualized in the branch pulmonary arteries. The calculated single plane left ventricular ejection fraction from the 4 chamber view is 56%. A pacing lead is present in the right atrium and right ventricle. No pericardial effusion. Technically difficult study due to poor acoustic windows.    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   1. complete TGA and VSD, status post Rastelli procedure with VSD closure and RV to PA conduit,  history of RV-PA conduit obstruction requiring replacement during teenage years: Pt continues to feel well, denies any change in exercise tolerance and is euvolemic today by history and exam. Echo today also confirms no significant change from prior. Encouraged patient to continue regular aerobic exercise 20-30 minutes a day, 4-5 times per week and follow low-salt, heart healthy diet. Continue statin for dyslipidemia. Pt aware that she should not become pregnant while on statin therapy.  Pt currently on depo-provera and not interested in pregnancy at this time.  Also reminded patient the importance of maintaining good oral hygiene and routine dental care with SBE prophylaxis.  2. H/o VT s/p VT ablation and ICD placement: Patient doing well on current dose of sotalol without recurrence of VT.  Recent ICD interrogation without significant arrhythmias.  EKG today as above.  Will schedule appt at the [facility] clinic with congenital EP in 7-10 months on same day as appt with me.    Follow-up: in 7-10 months at the [facility] with an appt with me, congenital EP and cardiac MRI/CT depending on ICD compatibility. Will be happy to see sooner if change in clinical status or new questions/concerns arise.    Betsy Valle MD  Section Head - Advanced Heart Failure, Transplantation and Mechanical Circulatory Support  Co-Director - Adult Congenital and Cardiovascular Genetics Center  Associate Professor of Medicine, [facility]

## 2018-11-02 DIAGNOSIS — Z86.79 HX OF VENTRICULAR TACHYCARDIA: ICD-10-CM

## 2018-11-02 DIAGNOSIS — Q20.3 D-TGA (DEXTRO-TRANSPOSITION OF GREAT ARTERIES): Primary | ICD-10-CM

## 2018-11-02 DIAGNOSIS — R79.9 ABNORMAL FINDING OF BLOOD CHEMISTRY: ICD-10-CM

## 2018-11-15 ENCOUNTER — ALLIED HEALTH/NURSE VISIT (OUTPATIENT)
Dept: NURSING | Facility: CLINIC | Age: 38
End: 2018-11-15
Payer: MEDICARE

## 2018-11-15 PROCEDURE — 99207 ZZC NO CHARGE NURSE ONLY: CPT

## 2018-11-15 PROCEDURE — 96372 THER/PROPH/DIAG INJ SC/IM: CPT

## 2019-01-21 ENCOUNTER — ANCILLARY PROCEDURE (OUTPATIENT)
Dept: CARDIOLOGY | Facility: CLINIC | Age: 39
End: 2019-01-21
Attending: INTERNAL MEDICINE
Payer: MEDICARE

## 2019-01-21 DIAGNOSIS — I47.20 VENTRICULAR TACHYCARDIA (H): ICD-10-CM

## 2019-01-21 PROCEDURE — 93296 REM INTERROG EVL PM/IDS: CPT | Mod: ZF

## 2019-01-21 PROCEDURE — 93295 DEV INTERROG REMOTE 1/2/MLT: CPT | Performed by: INTERNAL MEDICINE

## 2019-01-30 LAB
MDC_IDC_EPISODE_DTM: NORMAL
MDC_IDC_EPISODE_DURATION: 112 S
MDC_IDC_EPISODE_DURATION: 186 S
MDC_IDC_EPISODE_DURATION: 39 S
MDC_IDC_EPISODE_DURATION: 41 S
MDC_IDC_EPISODE_DURATION: 50 S
MDC_IDC_EPISODE_DURATION: 56 S
MDC_IDC_EPISODE_DURATION: 56 S
MDC_IDC_EPISODE_DURATION: 6 S
MDC_IDC_EPISODE_DURATION: 9 S
MDC_IDC_EPISODE_DURATION: 94 S
MDC_IDC_EPISODE_ID: 337
MDC_IDC_EPISODE_ID: 338
MDC_IDC_EPISODE_ID: 339
MDC_IDC_EPISODE_ID: 340
MDC_IDC_EPISODE_ID: 341
MDC_IDC_EPISODE_ID: 342
MDC_IDC_EPISODE_ID: 343
MDC_IDC_EPISODE_ID: 344
MDC_IDC_EPISODE_ID: 345
MDC_IDC_EPISODE_ID: 346
MDC_IDC_EPISODE_TYPE: NORMAL
MDC_IDC_LEAD_IMPLANT_DT: NORMAL
MDC_IDC_LEAD_LOCATION: NORMAL
MDC_IDC_LEAD_LOCATION_DETAIL_1: NORMAL
MDC_IDC_LEAD_MFG: NORMAL
MDC_IDC_LEAD_MODEL: NORMAL
MDC_IDC_LEAD_POLARITY_TYPE: NORMAL
MDC_IDC_LEAD_SERIAL: NORMAL
MDC_IDC_MSMT_BATTERY_DTM: NORMAL
MDC_IDC_MSMT_BATTERY_REMAINING_LONGEVITY: 110 MO
MDC_IDC_MSMT_BATTERY_RRT_TRIGGER: 2.73
MDC_IDC_MSMT_BATTERY_STATUS: NORMAL
MDC_IDC_MSMT_BATTERY_VOLTAGE: 3.01 V
MDC_IDC_MSMT_CAP_CHARGE_DTM: NORMAL
MDC_IDC_MSMT_CAP_CHARGE_ENERGY: 18 J
MDC_IDC_MSMT_CAP_CHARGE_TIME: 3.65
MDC_IDC_MSMT_CAP_CHARGE_TYPE: NORMAL
MDC_IDC_MSMT_LEADCHNL_RV_IMPEDANCE_VALUE: 304 OHM
MDC_IDC_MSMT_LEADCHNL_RV_IMPEDANCE_VALUE: 342 OHM
MDC_IDC_MSMT_LEADCHNL_RV_PACING_THRESHOLD_AMPLITUDE: 0.62 V
MDC_IDC_MSMT_LEADCHNL_RV_PACING_THRESHOLD_PULSEWIDTH: 0.4 MS
MDC_IDC_MSMT_LEADCHNL_RV_SENSING_INTR_AMPL: 10.12 MV
MDC_IDC_MSMT_LEADCHNL_RV_SENSING_INTR_AMPL: 10.12 MV
MDC_IDC_PG_IMPLANT_DTM: NORMAL
MDC_IDC_PG_MFG: NORMAL
MDC_IDC_PG_MODEL: NORMAL
MDC_IDC_PG_SERIAL: NORMAL
MDC_IDC_PG_TYPE: NORMAL
MDC_IDC_SESS_CLINIC_NAME: NORMAL
MDC_IDC_SESS_DTM: NORMAL
MDC_IDC_SESS_TYPE: NORMAL
MDC_IDC_SET_BRADY_HYSTRATE: NORMAL
MDC_IDC_SET_BRADY_LOWRATE: 40 {BEATS}/MIN
MDC_IDC_SET_BRADY_MODE: NORMAL
MDC_IDC_SET_LEADCHNL_RV_PACING_AMPLITUDE: 2 V
MDC_IDC_SET_LEADCHNL_RV_PACING_ANODE_ELECTRODE_1: NORMAL
MDC_IDC_SET_LEADCHNL_RV_PACING_ANODE_LOCATION_1: NORMAL
MDC_IDC_SET_LEADCHNL_RV_PACING_CAPTURE_MODE: NORMAL
MDC_IDC_SET_LEADCHNL_RV_PACING_CATHODE_ELECTRODE_1: NORMAL
MDC_IDC_SET_LEADCHNL_RV_PACING_CATHODE_LOCATION_1: NORMAL
MDC_IDC_SET_LEADCHNL_RV_PACING_POLARITY: NORMAL
MDC_IDC_SET_LEADCHNL_RV_PACING_PULSEWIDTH: 0.4 MS
MDC_IDC_SET_LEADCHNL_RV_SENSING_ANODE_ELECTRODE_1: NORMAL
MDC_IDC_SET_LEADCHNL_RV_SENSING_ANODE_LOCATION_1: NORMAL
MDC_IDC_SET_LEADCHNL_RV_SENSING_CATHODE_ELECTRODE_1: NORMAL
MDC_IDC_SET_LEADCHNL_RV_SENSING_CATHODE_LOCATION_1: NORMAL
MDC_IDC_SET_LEADCHNL_RV_SENSING_POLARITY: NORMAL
MDC_IDC_SET_LEADCHNL_RV_SENSING_SENSITIVITY: 0.3 MV
MDC_IDC_SET_ZONE_DETECTION_BEATS_DENOMINATOR: 40 {BEATS}
MDC_IDC_SET_ZONE_DETECTION_BEATS_NUMERATOR: 30 {BEATS}
MDC_IDC_SET_ZONE_DETECTION_INTERVAL: 270 MS
MDC_IDC_SET_ZONE_DETECTION_INTERVAL: 360 MS
MDC_IDC_SET_ZONE_DETECTION_INTERVAL: 370 MS
MDC_IDC_SET_ZONE_DETECTION_INTERVAL: NORMAL
MDC_IDC_SET_ZONE_TYPE: NORMAL
MDC_IDC_STAT_BRADY_DTM_END: NORMAL
MDC_IDC_STAT_BRADY_DTM_START: NORMAL
MDC_IDC_STAT_BRADY_RV_PERCENT_PACED: 0.01 %
MDC_IDC_STAT_EPISODE_RECENT_COUNT: 0
MDC_IDC_STAT_EPISODE_RECENT_COUNT: 4
MDC_IDC_STAT_EPISODE_RECENT_COUNT_DTM_END: NORMAL
MDC_IDC_STAT_EPISODE_RECENT_COUNT_DTM_START: NORMAL
MDC_IDC_STAT_EPISODE_TOTAL_COUNT: 0
MDC_IDC_STAT_EPISODE_TOTAL_COUNT: 1
MDC_IDC_STAT_EPISODE_TOTAL_COUNT: 11
MDC_IDC_STAT_EPISODE_TOTAL_COUNT: 146
MDC_IDC_STAT_EPISODE_TOTAL_COUNT_DTM_END: NORMAL
MDC_IDC_STAT_EPISODE_TOTAL_COUNT_DTM_START: NORMAL
MDC_IDC_STAT_EPISODE_TYPE: NORMAL
MDC_IDC_STAT_TACHYTHERAPY_ATP_DELIVERED_RECENT: 0
MDC_IDC_STAT_TACHYTHERAPY_ATP_DELIVERED_TOTAL: 0
MDC_IDC_STAT_TACHYTHERAPY_RECENT_DTM_END: NORMAL
MDC_IDC_STAT_TACHYTHERAPY_RECENT_DTM_START: NORMAL
MDC_IDC_STAT_TACHYTHERAPY_SHOCKS_ABORTED_RECENT: 0
MDC_IDC_STAT_TACHYTHERAPY_SHOCKS_ABORTED_TOTAL: 0
MDC_IDC_STAT_TACHYTHERAPY_SHOCKS_DELIVERED_RECENT: 0
MDC_IDC_STAT_TACHYTHERAPY_SHOCKS_DELIVERED_TOTAL: 1
MDC_IDC_STAT_TACHYTHERAPY_TOTAL_DTM_END: NORMAL
MDC_IDC_STAT_TACHYTHERAPY_TOTAL_DTM_START: NORMAL

## 2019-04-25 ENCOUNTER — OFFICE VISIT (OUTPATIENT)
Dept: FAMILY MEDICINE | Facility: CLINIC | Age: 39
End: 2019-04-25
Payer: MEDICARE

## 2019-04-25 VITALS
HEART RATE: 106 BPM | TEMPERATURE: 98.4 F | DIASTOLIC BLOOD PRESSURE: 78 MMHG | SYSTOLIC BLOOD PRESSURE: 123 MMHG | WEIGHT: 187 LBS | HEIGHT: 62 IN | BODY MASS INDEX: 34.41 KG/M2 | RESPIRATION RATE: 20 BRPM

## 2019-04-25 DIAGNOSIS — E11.59 TYPE 2 DIABETES MELLITUS WITH OTHER CIRCULATORY COMPLICATION, WITHOUT LONG-TERM CURRENT USE OF INSULIN (H): Primary | ICD-10-CM

## 2019-04-25 DIAGNOSIS — M79.2 NEUROPATHIC PAIN: ICD-10-CM

## 2019-04-25 DIAGNOSIS — Z30.09 GENERAL COUNSELLING AND ADVICE ON CONTRACEPTION: ICD-10-CM

## 2019-04-25 DIAGNOSIS — I47.20 VENTRICULAR TACHYCARDIA (H): ICD-10-CM

## 2019-04-25 LAB — HBA1C MFR BLD: 6.1 % (ref 0–5.6)

## 2019-04-25 PROCEDURE — 80061 LIPID PANEL: CPT | Performed by: FAMILY MEDICINE

## 2019-04-25 PROCEDURE — 36415 COLL VENOUS BLD VENIPUNCTURE: CPT | Performed by: FAMILY MEDICINE

## 2019-04-25 PROCEDURE — 83036 HEMOGLOBIN GLYCOSYLATED A1C: CPT | Performed by: FAMILY MEDICINE

## 2019-04-25 PROCEDURE — 99214 OFFICE O/P EST MOD 30 MIN: CPT | Performed by: FAMILY MEDICINE

## 2019-04-25 RX ORDER — GABAPENTIN 300 MG/1
CAPSULE ORAL
Qty: 90 CAPSULE | Refills: 1 | Status: SHIPPED | OUTPATIENT
Start: 2019-04-25 | End: 2019-10-07

## 2019-04-25 ASSESSMENT — MIFFLIN-ST. JEOR: SCORE: 1471.48

## 2019-04-25 NOTE — PROGRESS NOTES
SUBJECTIVE:   Chikis Hannon is a 38 year old female who presents to clinic today for the following health issues:      HPI    Hx of ventricular tachycardia- followed by cardiology. Admits has not been taking her statin for quite some time.       Diabetes Follow-up      Patient is checking blood sugars: not at all    Diabetic concerns: None     Symptoms of hypoglycemia (low blood sugar): none     Paresthesias (numbness or burning in feet) or sores: Yes - takes gabapentin      Date of last diabetic eye exam: 5/2018    BP Readings from Last 2 Encounters:   04/25/19 123/78   11/01/18 107/69     Hemoglobin A1C (%)   Date Value   04/25/2019 6.1 (H)   08/16/2018 6.1 (H)     LDL Cholesterol Calculated (mg/dL)   Date Value   04/25/2019 142 (H)   12/07/2017 132 (H)       Diabetes Management Resources    Patient would also like to discuss other contraceptive methods. States for the years she has been on the depo she has had a decreased sex drive. Over the last few weeks has noticed this is slowly returning as she missed her dose of depo.   She would like to consider other options.         Additional history: as documented    Reviewed and updated as needed this visit by clinical staff  Allergies  Meds         Reviewed and updated as needed this visit by Provider             Patient Active Problem List   Diagnosis     Corrected transposition of great vessels     Allergic state     Attention deficit disorder     Impaired fasting glucose     Obesity     Anxiety     GERD (gastroesophageal reflux disease)     Major depression in complete remission (H)     * * * SBE PROPHYLAXIS * * *     CARDIOVASCULAR SCREENING; LDL GOAL LESS THAN 70     Mildly mentally retarded     Transaminase or LDH elevation     Health Care Home     Congenital heart disease     Ventricular tachycardia (H)     Cardiac ICD- Med[employer]- single chamber- NOT dependent     Type 2 diabetes, HbA1C goal < 8% (H)     Type 2 diabetes mellitus with other circulatory  "complications (H)     Patient [facility] by [facility]     Past Surgical History:   Procedure Laterality Date     C NONSPECIFIC PROCEDURE  11/98    open heart surgery x 3, transposition of major vessels     C NONSPECIFIC PROCEDURE  10/03    Additional heart surgery     CARDIAC SURGERY  Had 4 surgeries    transposition of the major arteries     SURGICAL HISTORY OF -   2014    ablation for v. tach; ICD placed       Social History     Tobacco Use     Smoking status: Never Smoker     Smokeless tobacco: Never Used   Substance Use Topics     Alcohol use: No     Alcohol/week: 0.0 oz     Family History   Adopted: Yes   Problem Relation Age of Onset     Family History Negative Mother      Family History Negative Father            ROS:  Constitutional, HEENT, cardiovascular, pulmonary, GI, , musculoskeletal, neuro, skin, endocrine and psych systems are negative, except as otherwise noted.    OBJECTIVE:     /78 (BP Location: Right arm, Patient Position: Chair, Cuff Size: Adult Large)   Pulse 106   Temp 98.4  F (36.9  C) (Oral)   Resp 20   Ht 1.575 m (5' 2\")   Wt 84.8 kg (187 lb)   Breastfeeding? No   BMI 34.20 kg/m    Body mass index is 34.2 kg/m .  GENERAL: healthy, alert and no distress  NECK: no adenopathy, no asymmetry, masses, or scars and thyroid normal to palpation  RESP: lungs clear to auscultation - no rales, rhonchi or wheezes  CV: regular rate and rhythm, normal S1 S2, no S3 or S4, no murmur, click or rub, no peripheral edema and peripheral pulses strong  ABDOMEN: soft, nontender, no hepatosplenomegaly, no masses and bowel sounds normal  MS: no gross musculoskeletal defects noted, no edema  PSYCH: mentation appears normal and tearful    Diagnostic Test Results:     Lab Results   Component Value Date    A1C 6.1 04/25/2019    A1C 6.1 08/16/2018    A1C 6.0 12/07/2017    A1C 6.1 05/25/2017    A1C 6.3 07/06/2016         ASSESSMENT/PLAN:         1. Type 2 " diabetes mellitus with other circulatory complication, without long-term current use of insulin (H)  - stable   - Hemoglobin A1c  - JUST IN CASE; Future  - Lipid panel reflex to direct LDL Non-fasting  - metFORMIN (GLUCOPHAGE) 500 MG tablet; Take 1 tablet (500 mg) by mouth daily (with dinner)  Dispense: 90 tablet; Refill: 1    2. Ventricular tachycardia (H)  - Lipid panel reflex to direct LDL Non-fasting    3. General counselling and advice on contraception  - reviewed options including nexplanon, IUD, OCP, nuvaring. She opts for the nexplanon and will schedule this next week. Side effects were discussed.     4. Neuropathic pain  - stable   - gabapentin (NEURONTIN) 300 MG capsule; TAKE 1 CAPSULE(300 MG) BY MOUTH EVERY NIGHT  Dispense: 90 capsule; Refill: 1    See Patient Instructions    Tammy Benedict MD  [facility]

## 2019-04-26 ENCOUNTER — TELEPHONE (OUTPATIENT)
Dept: FAMILY MEDICINE | Facility: CLINIC | Age: 39
End: 2019-04-26

## 2019-04-26 LAB
CHOLEST SERPL-MCNC: 215 MG/DL
HDLC SERPL-MCNC: 48 MG/DL
LDLC SERPL CALC-MCNC: 142 MG/DL
NONHDLC SERPL-MCNC: 167 MG/DL
TRIGL SERPL-MCNC: 125 MG/DL

## 2019-04-30 DIAGNOSIS — Z13.6 CARDIOVASCULAR SCREENING; LDL GOAL LESS THAN 70: ICD-10-CM

## 2019-04-30 RX ORDER — ATORVASTATIN CALCIUM 40 MG/1
40 TABLET, FILM COATED ORAL DAILY
Qty: 90 TABLET | Refills: 3 | Status: SHIPPED | OUTPATIENT
Start: 2019-04-30 | End: 2020-05-04

## 2019-04-30 NOTE — RESULT ENCOUNTER NOTE
Please restart your cholesterol medication.   Prescription has been sent to your pharmacy.   For further questions or concerns please let us know.

## 2019-05-02 ENCOUNTER — OFFICE VISIT (OUTPATIENT)
Dept: FAMILY MEDICINE | Facility: CLINIC | Age: 39
End: 2019-05-02
Payer: MEDICARE

## 2019-05-02 VITALS
SYSTOLIC BLOOD PRESSURE: 108 MMHG | HEART RATE: 108 BPM | RESPIRATION RATE: 16 BRPM | DIASTOLIC BLOOD PRESSURE: 68 MMHG | TEMPERATURE: 98.6 F | BODY MASS INDEX: 34.09 KG/M2 | WEIGHT: 186.4 LBS

## 2019-05-02 DIAGNOSIS — Z30.017 ENCOUNTER FOR INITIAL PRESCRIPTION OF IMPLANTABLE SUBDERMAL CONTRACEPTIVE: Primary | ICD-10-CM

## 2019-05-02 LAB — HCG UR QL: NEGATIVE

## 2019-05-02 PROCEDURE — 11981 INSERTION DRUG DLVR IMPLANT: CPT | Performed by: FAMILY MEDICINE

## 2019-05-02 PROCEDURE — 81025 URINE PREGNANCY TEST: CPT | Performed by: FAMILY MEDICINE

## 2019-05-02 ASSESSMENT — ANXIETY QUESTIONNAIRES
7. FEELING AFRAID AS IF SOMETHING AWFUL MIGHT HAPPEN: NOT AT ALL
1. FEELING NERVOUS, ANXIOUS, OR ON EDGE: NOT AT ALL
5. BEING SO RESTLESS THAT IT IS HARD TO SIT STILL: NOT AT ALL
6. BECOMING EASILY ANNOYED OR IRRITABLE: NOT AT ALL
3. WORRYING TOO MUCH ABOUT DIFFERENT THINGS: NOT AT ALL
GAD7 TOTAL SCORE: 0
IF YOU CHECKED OFF ANY PROBLEMS ON THIS QUESTIONNAIRE, HOW DIFFICULT HAVE THESE PROBLEMS MADE IT FOR YOU TO DO YOUR WORK, TAKE CARE OF THINGS AT HOME, OR GET ALONG WITH OTHER PEOPLE: NOT DIFFICULT AT ALL
2. NOT BEING ABLE TO STOP OR CONTROL WORRYING: NOT AT ALL

## 2019-05-02 ASSESSMENT — PATIENT HEALTH QUESTIONNAIRE - PHQ9
SUM OF ALL RESPONSES TO PHQ QUESTIONS 1-9: 1
5. POOR APPETITE OR OVEREATING: NOT AT ALL

## 2019-05-02 NOTE — PATIENT INSTRUCTIONS
IMPLANON AFTERCARE INSTRUCTIONS     1. You may have some pain at the site of the Implanon insertion. You can help relieve the discomfort with Tylenol (acetaminophen), Aspirin or Advil (ibuprofen). If your discomfort worsens or you notice redness spreading on the skin around the insertion site, please call the clinic.   2. Irregular bleeding is common with Implanon, especially in the first 6-12 months of use. After one year, approximately 20% of women who use Implanon will stop having periods completely. Some women have longer, heavier periods. Some women will have increased spotting between periods. You may find that your periods may be hard to predict.   3. The Implanon does not protect against sexually transmitted infections including the AIDS virus (HIV), warts (HPV), gonorrhea, Chlamydia, and herpes. Condoms should be used to decrease the risk sexually transmitted infections. If you think that you have been exposed to a sexually transmitted infection, please call the clinic.   4. If you had Implanon placed for birth control, it is effective immediately if it was inserted within five days after the start of your period. If you have Implanon inserted at any other time during your menstrual cycle, use another method of birth control, like condoms for at least 7 days.   5. The Implanon should be removed and/or replaced by a health care provider after three years.   Warning Signs   Call the clinic if any of the following occurs:     You have bleeding, pus, or increasing redness, or pain at insertion site.     You have fever or chills     The implant comes out or you have concerns about its location.     You have a positive pregnancy test or suspect you might be pregnant.

## 2019-05-02 NOTE — PROGRESS NOTES
Nexplanon Insertion:    Is a pregnancy test required: Yes.  Was it positive or negative?  Negative  Was a consent obtained?  Yes    Subjective: Chikis Hannon is a 38 year old  presents for Nexplanon.    Patient has been given the opportunity to ask questions about all forms of birth control, including all options appropriate for Chikis Hannon. Discussed that no method of birth control, except abstinence is 100% effective against pregnancy or sexually transmitted infection.     Chikis Hannon understands she may have the Nexplanon removed at any time and it should be removed by a health care provider.    The entire insertion procedure was reviewed with the patient, including care after placement.    No LMP recorded. Patient has had an injection. . No allergy to betadine or shellfish.     HCG Qual Urine   Date Value Ref Range Status   2019 Negative NEG^Negative Final     Comment:     This test is for screening purposes.  Results should be interpreted along with   the clinical picture.  Confirmation testing is available if warranted by   ordering [id number], HCG Quantitative Pregnancy.           /68 (BP Location: Right arm, Patient Position: Sitting, Cuff Size: Adult Regular)   Pulse 108   Temp 98.6  F (37  C) (Oral)   Resp 16   Wt 84.6 kg (186 lb 6.4 oz)   BMI 34.09 kg/m      PROCEDURE NOTE: -- Nexplanon Insertion    Reason for Insertion: contraception    Patient was placed supine with right arm exposed.  Abhi was made 8-10 cm above medial epicondyle and a guiding abhi 4 cm above the first.  Arm was prepped with Betadine. Insertion point was anesthetized with 5 mL 1% lidocaine. After stretching the skin with thumb and index finger around the insertion site, skin punctured with the tip of the needle inserted at 30 degrees and then lowered to horizontal position. The needle was then advanced to its full length. Applicator was then stabilized and slider was unlocked. Slider was pulled back until it  stopped and then removed.    Correct placement of the implant was confirmed by palpation in the patient's arm and visualizing the purple top of the obturator.   Bandage and pressure dressing applied to insertion site.    Lot # [id number]  Exp: 01/2021    EBL: minimal    Complications: none    ASSESSMENT:     ICD-10-CM    1. Encounter for initial prescription of implantable subdermal contraceptive Z30.017 HCG Qual, Urine ([id number])     etonogestrel (IMPLANON/NEXPLANON) subdermal implant 68 mg     INSERTION NON-BIODEGRADABLE DRUG DELIVERY IMPLANT   2. Insertion of implantable subdermal contraceptive Z30.017         PLAN:    Given 's handouts, including when to have Nexplanon removed, list of danger s/sx, side effects and follow up recommended. Encouraged condom use for prevention of STD. Back up contraception advised for 7 days. Advised to call for any fever, for prolonged or severe pain or bleeding, abnormal vaginal dischage. She was advised to use pain medications (ibuprofen) as needed for mild to moderate pain.     Tammy Benedict MD

## 2019-05-03 ASSESSMENT — ANXIETY QUESTIONNAIRES: GAD7 TOTAL SCORE: 0

## 2019-05-07 ENCOUNTER — CARE COORDINATION (OUTPATIENT)
Dept: CARDIOLOGY | Facility: CLINIC | Age: 39
End: 2019-05-07

## 2019-05-07 DIAGNOSIS — Q20.3 TGA (TRANSPOSITION OF GREAT ARTERIES): Primary | ICD-10-CM

## 2019-05-07 DIAGNOSIS — I47.20 VT (VENTRICULAR TACHYCARDIA) (H): ICD-10-CM

## 2019-05-30 ENCOUNTER — NURSE TRIAGE (OUTPATIENT)
Dept: FAMILY MEDICINE | Facility: CLINIC | Age: 39
End: 2019-05-30

## 2019-05-30 NOTE — TELEPHONE ENCOUNTER
Attempted to call patient with no answer.   nexplanon does not affect blood sugars. Recommend monitoring sugars and most importantly watching her diet. Can always follow up with me in clinic if continues to have any issues with blood sugars. A1c has been stable.     GUERITA

## 2019-05-30 NOTE — TELEPHONE ENCOUNTER
Spoke with pt who agrees to continue to monitor her sugars, watch diet (sugars, carbs), exercise. She will contact clinic with ongoing concerns or worsening sugars for an appt with PCP    Judith RUIZ RN

## 2019-05-30 NOTE — TELEPHONE ENCOUNTER
Pt calls with and prompted by her community  on speaker phone.   Pt reported to advisor has had a 3-4 PM elevated blood sugar readings over the last 2-3 weeks.    9:30, 10, or 10:30 -230.   Her AM glucose is are normal.    Her only recent med or other change is Nexplanon 5/2/19.   Theron Coffey RN

## 2019-07-19 ENCOUNTER — HOSPITAL ENCOUNTER (EMERGENCY)
Facility: CLINIC | Age: 39
Discharge: HOME | End: 2019-07-19
Attending: EMERGENCY MEDICINE | Admitting: EMERGENCY MEDICINE
Payer: MEDICARE

## 2019-07-19 ENCOUNTER — APPOINTMENT (OUTPATIENT)
Dept: CT IMAGING | Facility: CLINIC | Age: 39
End: 2019-07-19
Attending: EMERGENCY MEDICINE
Payer: MEDICARE

## 2019-07-19 VITALS
TEMPERATURE: 98 F | OXYGEN SATURATION: 97 % | RESPIRATION RATE: 16 BRPM | BODY MASS INDEX: 34.04 KG/M2 | HEIGHT: 62 IN | DIASTOLIC BLOOD PRESSURE: 84 MMHG | HEART RATE: 89 BPM | SYSTOLIC BLOOD PRESSURE: 106 MMHG | WEIGHT: 185 LBS

## 2019-07-19 DIAGNOSIS — R07.9 CHEST PAIN, UNSPECIFIED TYPE: ICD-10-CM

## 2019-07-19 LAB
ALBUMIN UR-MCNC: NEGATIVE MG/DL
ANION GAP SERPL CALCULATED.3IONS-SCNC: 6 MMOL/L (ref 3–14)
APPEARANCE UR: CLEAR
BACTERIA #/AREA URNS HPF: ABNORMAL /HPF
BASOPHILS # BLD AUTO: 0.1 10E9/L (ref 0–0.2)
BASOPHILS NFR BLD AUTO: 0.6 %
BILIRUB UR QL STRIP: NEGATIVE
BUN SERPL-MCNC: 9 MG/DL (ref 7–30)
CALCIUM SERPL-MCNC: 9 MG/DL (ref 8.5–10.1)
CHLORIDE SERPL-SCNC: 108 MMOL/L (ref 94–109)
CO2 SERPL-SCNC: 24 MMOL/L (ref 20–32)
COLOR UR AUTO: ABNORMAL
CREAT SERPL-MCNC: 0.57 MG/DL (ref 0.52–1.04)
D DIMER PPP FEU-MCNC: 0.9 UG/ML FEU (ref 0–0.5)
DIFFERENTIAL METHOD BLD: NORMAL
EOSINOPHIL # BLD AUTO: 0.1 10E9/L (ref 0–0.7)
EOSINOPHIL NFR BLD AUTO: 0.9 %
ERYTHROCYTE [DISTWIDTH] IN BLOOD BY AUTOMATED COUNT: 12.9 % (ref 10–15)
GFR SERPL CREATININE-BSD FRML MDRD: >90 ML/MIN/{1.73_M2}
GLUCOSE BLDC GLUCOMTR-MCNC: 127 MG/DL (ref 70–99)
GLUCOSE SERPL-MCNC: 124 MG/DL (ref 70–99)
GLUCOSE UR STRIP-MCNC: NEGATIVE MG/DL
HCT VFR BLD AUTO: 41.5 % (ref 35–47)
HGB BLD-MCNC: 13.3 G/DL (ref 11.7–15.7)
HGB UR QL STRIP: NEGATIVE
IMM GRANULOCYTES # BLD: 0 10E9/L (ref 0–0.4)
IMM GRANULOCYTES NFR BLD: 0.3 %
INTERPRETATION ECG - MUSE: NORMAL
KETONES UR STRIP-MCNC: NEGATIVE MG/DL
LEUKOCYTE ESTERASE UR QL STRIP: NEGATIVE
LYMPHOCYTES # BLD AUTO: 1.4 10E9/L (ref 0.8–5.3)
LYMPHOCYTES NFR BLD AUTO: 15.3 %
MCH RBC QN AUTO: 28.6 PG (ref 26.5–33)
MCHC RBC AUTO-ENTMCNC: 32 G/DL (ref 31.5–36.5)
MCV RBC AUTO: 89 FL (ref 78–100)
MONOCYTES # BLD AUTO: 0.6 10E9/L (ref 0–1.3)
MONOCYTES NFR BLD AUTO: 6.3 %
NEUTROPHILS # BLD AUTO: 6.9 10E9/L (ref 1.6–8.3)
NEUTROPHILS NFR BLD AUTO: 76.6 %
NITRATE UR QL: NEGATIVE
NRBC # BLD AUTO: 0 10*3/UL
NRBC BLD AUTO-RTO: 0 /100
PH UR STRIP: 6.5 PH (ref 5–7)
PLATELET # BLD AUTO: 164 10E9/L (ref 150–450)
POTASSIUM SERPL-SCNC: 3.8 MMOL/L (ref 3.4–5.3)
RBC # BLD AUTO: 4.65 10E12/L (ref 3.8–5.2)
RBC #/AREA URNS AUTO: <1 /HPF (ref 0–2)
SODIUM SERPL-SCNC: 138 MMOL/L (ref 133–144)
SOURCE: ABNORMAL
SP GR UR STRIP: 1 (ref 1–1.03)
SQUAMOUS #/AREA URNS AUTO: 1 /HPF (ref 0–1)
TROPONIN I SERPL-MCNC: <0.015 UG/L (ref 0–0.04)
TROPONIN I SERPL-MCNC: <0.015 UG/L (ref 0–0.04)
UROBILINOGEN UR STRIP-MCNC: NORMAL MG/DL (ref 0–2)
WBC # BLD AUTO: 9 10E9/L (ref 4–11)
WBC #/AREA URNS AUTO: 0 /HPF (ref 0–5)

## 2019-07-19 PROCEDURE — 93005 ELECTROCARDIOGRAM TRACING: CPT

## 2019-07-19 PROCEDURE — 25000128 H RX IP 250 OP 636: Performed by: EMERGENCY MEDICINE

## 2019-07-19 PROCEDURE — 85025 COMPLETE CBC W/AUTO DIFF WBC: CPT | Performed by: EMERGENCY MEDICINE

## 2019-07-19 PROCEDURE — 85379 FIBRIN DEGRADATION QUANT: CPT | Performed by: EMERGENCY MEDICINE

## 2019-07-19 PROCEDURE — 80048 BASIC METABOLIC PNL TOTAL CA: CPT | Performed by: EMERGENCY MEDICINE

## 2019-07-19 PROCEDURE — 25000132 ZZH RX MED GY IP 250 OP 250 PS 637: Mod: GY | Performed by: EMERGENCY MEDICINE

## 2019-07-19 PROCEDURE — 00000146 ZZHCL STATISTIC GLUCOSE BY METER IP

## 2019-07-19 PROCEDURE — 99285 EMERGENCY DEPT VISIT HI MDM: CPT | Mod: 25

## 2019-07-19 PROCEDURE — 81001 URINALYSIS AUTO W/SCOPE: CPT | Performed by: EMERGENCY MEDICINE

## 2019-07-19 PROCEDURE — 36415 COLL VENOUS BLD VENIPUNCTURE: CPT | Performed by: EMERGENCY MEDICINE

## 2019-07-19 PROCEDURE — 87086 URINE CULTURE/COLONY COUNT: CPT | Performed by: EMERGENCY MEDICINE

## 2019-07-19 PROCEDURE — 84484 ASSAY OF TROPONIN QUANT: CPT | Performed by: EMERGENCY MEDICINE

## 2019-07-19 PROCEDURE — 71260 CT THORAX DX C+: CPT

## 2019-07-19 RX ORDER — ASPIRIN 81 MG/1
324 TABLET, CHEWABLE ORAL ONCE
Status: COMPLETED | OUTPATIENT
Start: 2019-07-19 | End: 2019-07-19

## 2019-07-19 RX ORDER — IOPAMIDOL 755 MG/ML
500 INJECTION, SOLUTION INTRAVASCULAR ONCE
Status: COMPLETED | OUTPATIENT
Start: 2019-07-19 | End: 2019-07-19

## 2019-07-19 RX ADMIN — ASPIRIN 81 MG 324 MG: 81 TABLET ORAL at 11:55

## 2019-07-19 RX ADMIN — IOPAMIDOL 75 ML: 755 INJECTION, SOLUTION INTRAVENOUS at 13:25

## 2019-07-19 ASSESSMENT — ENCOUNTER SYMPTOMS
DIZZINESS: 1
NAUSEA: 0
LIGHT-HEADEDNESS: 0

## 2019-07-19 ASSESSMENT — MIFFLIN-ST. JEOR: SCORE: 1457.4

## 2019-07-19 NOTE — ED NOTES
Bed: ED32  Expected date: 7/19/19  Expected time:   Means of arrival:   Comments:  BV-1  39F  ETA5  Chest pain

## 2019-07-19 NOTE — ED PROVIDER NOTES
"  History     Chief Complaint:  Chest Pain    The history is provided by the patient.      Chikis Hannon is a 39 year old female with history of non-insulin dependent type II diabetes mellitus on Metformin who presents with chest pain. 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 Here, she feels better, but still has some chest discomfort, though the arm pain has resolved. She notes a similar episode last 4 days ago in which she was doing the same type of outside work, which resolved, but decided to come into do since this is her second episode, prompting her presentation. She believes this is due to dehydration, noting that she has been trying to drink fluids today. She denies any lightheadedness or nausea, but endorses some dizziness.    CARDIAC RISK FACTORS:  Sex:    Female  Tobacco:   No  Hypertension:   No  Hyperlipidemia:  No  Diabetes:   Non-insulin, Type II  Family History:  No    PE/DVT RISK FACTORS:  Sex:    Female  Tobacco:   No  Cancer:   No  Family history:  No    Allergies:  No known drug allergies    Medications:    Atorvastatin  Gabapentin  Metformin    Past Medical History:    Allergies  ADD  Congenital heart disease  Corrected transposition of great vessels  Diabetes mellitus type 2, non-insulin dependent  Impaired fasting glucose  Ventricular tachycardia    Past Surgical History:    4x open heart surgery, transposition of major vessels and arteries  Ablation, ICD placed    Family History:    History reviewed. No pertinent family history.     Social History:  Smoking status: Never smoker  Alcohol use: No  Drug use: No  PCP: Tammy Benedict  Presents to the ED with her mother  Marital Status:        Review of Systems   Cardiovascular: Positive for chest pain (\"squeezing\").   Gastrointestinal: Negative for nausea.   Neurological: Positive for dizziness. " "Negative for light-headedness.   All other systems reviewed and are negative.    Physical Exam     Patient Vitals for the past 24 hrs:   BP Temp Temp src Pulse Heart Rate Resp SpO2 Height Weight   07/19/19 1445 109/56 -- -- 90 90 19 97 % -- --   07/19/19 1430 112/63 -- -- 91 90 18 97 % -- --   07/19/19 1415 114/63 -- -- 92 85 16 92 % -- --   07/19/19 1400 116/63 -- -- 90 90 19 96 % -- --   07/19/19 1345 118/60 -- -- 90 88 8 98 % -- --   07/19/19 1315 125/61 -- -- -- 93 29 96 % -- --   07/19/19 1129 135/69 98  F (36.7  C) Oral 106 106 20 98 % 1.575 m (5' 2\") 83.9 kg (185 lb)       Physical Exam  Constitutional: Vital signs reviewed as above.   Head: No external signs of trauma noted.  Eyes: Pupils are equal, round, and reactive to light.   Neck: No JVD noted  Cardiovascular: Normal rate, regular rhythm and normal heart sounds.  No murmur heard. Equal B/L peripheral pulses.  Pulmonary/Chest: Effort normal and breath sounds normal. No respiratory distress. Patient has no wheezes. Patient has no rales.   Gastrointestinal: Soft. There is no tenderness.   Musculoskeletal/Extremities: No edema noted. Normal tone.  Neurological: Patient is alert and oriented to person, place, and time.   Skin: Skin is warm and dry. There is no diaphoresis noted.   Psychiatric: The patient appears calm.    Emergency Department Course   ECG (11:33:40):  Rate 96 bpm. CA interval 174. QRS duration 144. QT/QTc 398/502. P-R-T axes 67 -12 52. Normal sinus rhythm. Right bundle branch block. Abnormal EKG. Agree with computer interpretation. No significant change compared to EKG dated 11/1/2018. Interpreted at 1137 by Chris Quarles DO.    Imaging:   Radiographic findings were discussed with the patient who voiced understanding of the findings.    CT Chest PE with Contrast  1. No evidence of pulmonary embolism or acute thoracic aortic  abnormality.  2. Postoperative changes from prior repair of great vessel  transposition.  As read by " Radiology.    Laboratory:  D dimer quantitative: 0.9 (H)  CBC: WNL (WBC 9.0, HGB 13.3, )  BMP: Glucose 124 (H) o/w WNL (Creatinine 0.57)  Troponin I (1137): <0.015  Troponin I (1505): <0.015  Glucose: 127 (H)    UA with Microscopic: Few bacteria  Urine Culture: In process    Interventions:  1155: 324 mg Aspirin PO    Emergency Department Course:  Past medical records, nursing notes, and vitals reviewed.  1138: I performed an exam of the patient and obtained history, as documented above.    EKG obtained, findings above.    UA obtained, findings above.    IV inserted and blood drawn.    The patient was sent for a chest PE CT while in the emergency department, findings above.    1454: I rechecked the patient. Explained findings to patient.    1600: I rechecked the patient. Explained findings to patient. Patient discharged home with instructions regarding supportive care, medications, and reasons to return. The importance of close follow-up was reviewed.       Impression & Plan    Medical Decision Making:  This 39-year-old female patient presents the ED due to chest pain.  Please see the HPI and exam for specifics.  She does not have any known coronary disease but does have a history of some kind of an arrhythmia leading to the pacemaker defibrillator placement.  The patient also had transposition of the great vessels surgery when she was very young but has not had any direct heart surgery since.  The patient remained well in the ED.  EKG does not appear concerning and 2 troponins are normal.  As the patient feels well I believe she can be discharged and should follow closely in the outpatient setting.  Anticipatory guidance given prior to discharge.    Diagnosis:    ICD-10-CM    1. Chest pain, unspecified type R07.9        Disposition:  discharged to home    Discharge Medications:     Medication List      There are no discharge medications for this visit.         IAna, am serving as a scribe at  11:38 AM on 7/19/2019 to document services personally performed by Chris Quarles DO based on my observations and the provider's statements to me.     Ana Da Silva  7/19/2019   [facility] EMERGENCY DEPARTMENT       Chris Quarles DO  07/19/19 3266

## 2019-07-19 NOTE — ED AVS SNAPSHOT
[facility] Emergency Department  [address]  [facility] [address]  Phone:  [phone]  Fax:  [phone]                                    Chikis Hannon   MRN: [id number]    Department:  [facility] Emergency Department   Date of Visit:  7/19/2019           After Visit Summary Signature Page    I have received my discharge instructions, and my questions have been answered. I have discussed any challenges I see with this plan with the nurse or doctor.    ..........................................................................................................................................  Patient/Patient Representative Signature      ..........................................................................................................................................  Patient Representative Print Name and Relationship to Patient    ..................................................               ................................................  Date                                   Time    ..........................................................................................................................................  Reviewed by Signature/Title    ...................................................              ..............................................  Date                                               Time          22EPIC Rev 08/18

## 2019-07-19 NOTE — LETTER
July 19, 2019      To Whom It May Concern:      Chikis Hannon was seen in our Emergency Department today, 07/19/19.  She is stable for discharge from the emergency department.  These excuse her for the time she missed today.  The patient should be cleared to go back to work when she is next scheduled.      Sincerely,            Chris Quarles, DO

## 2019-07-20 LAB
BACTERIA SPEC CULT: NORMAL
Lab: NORMAL
SPECIMEN SOURCE: NORMAL

## 2019-07-25 ENCOUNTER — HOSPITAL ENCOUNTER (OUTPATIENT)
Dept: CT IMAGING | Facility: CLINIC | Age: 39
Discharge: HOME | End: 2019-07-25
Attending: INTERNAL MEDICINE | Admitting: INTERNAL MEDICINE
Payer: MEDICARE

## 2019-07-25 ENCOUNTER — ANCILLARY PROCEDURE (OUTPATIENT)
Dept: CARDIOLOGY | Facility: CLINIC | Age: 39
End: 2019-07-25
Attending: INTERNAL MEDICINE
Payer: MEDICARE

## 2019-07-25 DIAGNOSIS — Z86.79 HX OF VENTRICULAR TACHYCARDIA: ICD-10-CM

## 2019-07-25 DIAGNOSIS — I47.20 VT (VENTRICULAR TACHYCARDIA) (H): ICD-10-CM

## 2019-07-25 DIAGNOSIS — I47.20 VENTRICULAR TACHYCARDIA (H): ICD-10-CM

## 2019-07-25 DIAGNOSIS — Q20.3 TGA (TRANSPOSITION OF GREAT ARTERIES): ICD-10-CM

## 2019-07-25 DIAGNOSIS — Q20.3 D-TGA (DEXTRO-TRANSPOSITION OF GREAT ARTERIES): ICD-10-CM

## 2019-07-25 PROCEDURE — 75573 CT HRT C+ STRUX CGEN HRT DS: CPT | Mod: 26 | Performed by: INTERNAL MEDICINE

## 2019-07-25 PROCEDURE — 75573 CT HRT C+ STRUX CGEN HRT DS: CPT

## 2019-07-25 PROCEDURE — 40000556 ZZH STATISTIC PERIPHERAL IV START W US GUIDANCE

## 2019-07-25 PROCEDURE — 25000128 H RX IP 250 OP 636: Performed by: INTERNAL MEDICINE

## 2019-07-25 RX ORDER — IOPAMIDOL 755 MG/ML
122 INJECTION, SOLUTION INTRAVASCULAR ONCE
Status: COMPLETED | OUTPATIENT
Start: 2019-07-25 | End: 2019-07-25

## 2019-07-25 RX ADMIN — IOPAMIDOL 122 ML: 755 INJECTION, SOLUTION INTRAVENOUS at 11:53

## 2019-07-25 NOTE — PATIENT INSTRUCTIONS
It was a pleasure to see you in clinic today.  Please do not hesitate to call with any questions or concerns.  You are scheduled for a remote transmission on 10/29/19.  We look forward to seeing you in clinic at your next device check in 6 months.    Franci Weber, RN, MS, CCRN  Electrophysiology Nurse Clinician  [facility]    During Business Hours Please Call:  [phone]  After Hours Please Call:  [phone] - select option #4 and ask for job code [phone]

## 2019-08-01 ENCOUNTER — OFFICE VISIT (OUTPATIENT)
Dept: PEDIATRIC CARDIOLOGY | Facility: CLINIC | Age: 39
End: 2019-08-01
Attending: INTERNAL MEDICINE
Payer: MEDICARE

## 2019-08-01 VITALS
HEIGHT: 62 IN | OXYGEN SATURATION: 95 % | HEART RATE: 101 BPM | RESPIRATION RATE: 18 BRPM | BODY MASS INDEX: 34.36 KG/M2 | WEIGHT: 186.73 LBS | SYSTOLIC BLOOD PRESSURE: 122 MMHG | DIASTOLIC BLOOD PRESSURE: 78 MMHG

## 2019-08-01 DIAGNOSIS — Z29.89 SBE (SUBACUTE BACTERIAL ENDOCARDITIS) PROPHYLAXIS CANDIDATE: Primary | ICD-10-CM

## 2019-08-01 DIAGNOSIS — Q20.3 D-TGA (DEXTRO-TRANSPOSITION OF GREAT ARTERIES): ICD-10-CM

## 2019-08-01 DIAGNOSIS — Z86.79 HX OF VENTRICULAR TACHYCARDIA: ICD-10-CM

## 2019-08-01 PROCEDURE — 93005 ELECTROCARDIOGRAM TRACING: CPT | Mod: ZF

## 2019-08-01 PROCEDURE — G0463 HOSPITAL OUTPT CLINIC VISIT: HCPCS | Mod: ZF

## 2019-08-01 PROCEDURE — 93010 ELECTROCARDIOGRAM REPORT: CPT | Mod: ZP | Performed by: INTERNAL MEDICINE

## 2019-08-01 PROCEDURE — 99214 OFFICE O/P EST MOD 30 MIN: CPT | Mod: 25 | Performed by: INTERNAL MEDICINE

## 2019-08-01 RX ORDER — AMOXICILLIN 500 MG/1
CAPSULE ORAL
Qty: 4 CAPSULE | Refills: 3 | Status: SHIPPED | OUTPATIENT
Start: 2019-08-01 | End: 2020-10-12

## 2019-08-01 ASSESSMENT — MIFFLIN-ST. JEOR: SCORE: 1469.74

## 2019-08-01 ASSESSMENT — PAIN SCALES - GENERAL: PAINLEVEL: NO PAIN (0)

## 2019-08-01 NOTE — NURSING NOTE
"Informant-    Chikis is accompanied by self    Reason for Visit-  Transposition of great arteries, VSD and pulmonic stenosis    Vitals signs-  /78   Pulse 101   Resp 18   Ht 1.582 m (5' 2.28\")   Wt 84.7 kg (186 lb 11.7 oz)   SpO2 95%   BMI 33.84 kg/m      There are concerns about the child's exposure to violence in the home: No    Face to Face time: 5 minutes  Sheyla Carpenter MA      "

## 2019-08-01 NOTE — PROGRESS NOTES
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  At her clinic visit in 2/15 she again had no ventricular arrhythmias and her Sotalol was stopped. She had recurrence of VT in June 2015 and was restarted on sotalol 80 mg twice a day. VT ablation was re-attempted after holding sotalol for one week on 8/24/2015 with no inducible VT.  She was seen by congenital EP in Sept 2015 and found to have no recurrence of VT.  Decision made to continue sotalol therapy.  Was seen by congential EP in Sept 2017 and noted to have no recurrence of significant arrhythmias.  No changes were made to her antiarrhythmic regimen.      Today pt reports that she has been feeling well.  She denies any chest pain or pressure, sob/maher, orthopnea, pnd, palpitations, syncope/presyncope, cathy, change in exercise tolerance or any ICD shocks.  She admits that she has not been taking her sotalol for the past several months.      PAST MEDICAL HISTORY:  Past Medical History:   Diagnosis Date     Allergy, unspecified not elsewhere classified      Attention deficit disorder without mention of hyperactivity     Mild MR; difficulty attention span     Congenital heart disease      Corrected transposition of great vessels     still needs SBE prophlaxis     Diabetes mellitus type 2, noninsulin dependent (H)      Impaired fasting glucose     103 7/06     Ventricular tachycardia (H) 3/4/2014    s/p ablation and ICD placement 3/13/14       FAMILY HISTORY:  Family History    Adopted: Yes   Problem Relation Age of Onset     Family History Negative Mother      Family History Negative Father        SOCIAL HISTORY:  History     Social History     Marital Status:      Spouse Name: N/A     Number of Children: N/A     Years of Education: N/A     Occupational History      at [employer]             Disabled     Social History Main Topics     Smoking status: Never Smoker      Smokeless tobacco: Never Used     Alcohol Use: No     Drug Use: No     Sexual Activity:     Partners: Male     Other Topics Concern     Special Diet No     working on dairy. fruits and veggies.     Exercise Yes     Peter Monzon tape     Parent/Sibling W/ Cabg, Mi Or Angioplasty Before 65f 55m? No     Social History Narrative       CURRENT MEDICATIONS:  Current Outpatient Medications   Medication Sig Dispense Refill     amoxicillin (AMOXIL) 500 MG capsule Please take 2,000 mg (4 capsule) 30-60 min prior to all dental procedures 4 capsule 3     ACE NOT PRESCRIBED, INTENTIONAL, 1 each as needed for other ACE Inhibitor not prescribed due to Other: BP at goal, pending microalbumin test (Patient not taking: Reported on 5/2/2019) 0 each 0     atorvastatin (LIPITOR) 40 MG tablet Take 1 tablet (40 mg) by mouth daily 90 tablet 3     blood glucose monitoring (ACCU-CHEK FASTCLIX) lancets Use to test blood sugar 1 times daily or as directed. 1 Box 3     blood glucose monitoring (ACCU-CHEK SMARTVIEW) test strip Use to test blood sugar 1 times daily or as directed. 100 each 0     gabapentin (NEURONTIN) 300 MG capsule TAKE 1 CAPSULE(300 MG) BY MOUTH EVERY NIGHT 90 capsule 1     metFORMIN (GLUCOPHAGE) 500 MG tablet Take 1 tablet (500 mg) by mouth daily (with dinner) 90 tablet 1     MULTIVITAMINS PO TABS ONE DAILY  3     Naproxen Sodium (ALEVE PO) Take 220 mg by mouth       sotalol (BETAPACE) 80 MG tablet TAKE 1 TABLET(80 MG) BY MOUTH EVERY 12 HOURS 180 tablet 3       ROS:   Constitutional: No fever, chills, or sweats.  "No weight gain/loss.   ENT: No visual disturbance, ear ache, epistaxis, sore throat.   Allergies/Immunologic: Negative.   Respiratory: No cough, hemoptysis.   Cardiovascular: As per HPI.   GI: No nausea, vomiting, hematemesis, melena, or hematochezia.   : No urinary frequency, dysuria, or hematuria.   Integument: Negative.   Psychiatric: Negative.   Neuro: Negative.   Endocrinology: Negative.   Musculoskeletal: Negative.    EXAM:  /78   Pulse 101   Resp 18   Ht 1.582 m (5' 2.28\")   Wt 84.7 kg (186 lb 11.7 oz)   SpO2 95%   BMI 33.84 kg/m    General: appears comfortable, alert and articulate  Head: normocephalic, atraumatic  Eyes: anicteric sclera, EOMI  Neck: no adenopathy, 2+ carotids  Orophyarynx: moist mucosa, no lesions, dentition intact  Heart: Nrl S1, prominent S2, widely split (patient has RBBB), has 3/6 KRISTI best heard at the left sternal border at the base., JVP 6-7cm  Lungs: clear, no rales or wheezing  Abdomen: soft, non-tender, bowel sounds present, no hepatomegaly  Extremities: no clubbing, cyanosis or edema  Neurological: normal speech and affect, no gross motor deficits      Labs:  CBC RESULTS:  Lab Results   Component Value Date    WBC 9.0 07/19/2019    RBC 4.65 07/19/2019    HGB 13.3 07/19/2019    HCT 41.5 07/19/2019    MCV 89 07/19/2019    MCH 28.6 07/19/2019    MCHC 32.0 07/19/2019    RDW 12.9 07/19/2019     07/19/2019       CMP RESULTS:  Lab Results   Component Value Date     07/19/2019    POTASSIUM 3.8 07/19/2019    CHLORIDE 108 07/19/2019    CO2 24 07/19/2019    ANIONGAP 6 07/19/2019     (H) 07/19/2019    BUN 9 07/19/2019    CR 0.57 07/19/2019    GFRESTIMATED >90 07/19/2019    GFRESTBLACK >90 07/19/2019    LITA 9.0 07/19/2019    BILITOTAL 0.9 08/16/2018    ALBUMIN 3.8 08/16/2018    ALKPHOS 104 08/16/2018    ALT 34 08/16/2018    AST 22 08/16/2018        INR RESULTS:  Lab Results   Component Value Date    INR 1.13 06/01/2015       Lab Results   Component Value Date "    MAG 2.0 06/02/2015     Lab Results   Component Value Date    NTBNPI 327 12/24/2008     No results found for: NTBNP       CT CORONARY: 3/4/14   IMPRESSION:  1. Normal coronary anatomy with no detectable stenosis or plaque. The coronaries arise from the anteriorly positioned aorta.     ADDITIONAL FINDINGS: The aortic root has an unusual appearance probably from the reconstructive surgery for transposition. The appearance is nearly of 2 aortic roots one small 1 which ends in a cul-de-sac and the other which leads into the ascending aorta. The coronary arteries arise from that aortic root which feeds into the ascending aorta . There is a conduit from the right ventricle to the pulmonary trunk. Normal pulmonary venous anatomy with all four pulmonary veins draining into the left atrium. There is no left atrial or left ventricular mass or thrombus. Normal pericardial thickness.There is no pericardial effusion. The proximal pulmonary arteries are well opacified. Please review Radiology report for incidental noncardiac findings that will follow separately.      CARDIAC MRI/MRA: 3/4/14   IMPRESSION:   1. Transposition of the great arteries with VSD after Rastelli procedure with RV to PA conduit.   2. Minimal flow acceleration across the RV to PA conduit with moderate pulmonary regurgitation (31-33% regurg fraction, Peak PA conduit gradient 18mm Hg).   3. Normal right and left ventricular size and systolic function with no evidence of fibrosis.    Echo 3/16  Difficult study due to poor acoustic windows. The patient has undergone a Rastelli procedure for d-transposition of the great arteries and replacement of the pulmonary valve. The pulmonary valve is not seen in this study. Shunts were not able to be excluded. Mildly diminished left ventricular systolic function with mild left ventricular enlargement. Normal right ventricular and pulmonary artery pressures.    Echo 11/17  D-TGA status post Rastelli and subsequent  placement of a bio-prosthetic valve in the pulmonary position. The calculated single plane left ventricular ejection fraction from the 4 chamber view is 58%. A pacing lead is present in the right atrium and right ventricle. Mild (1+) aortic valve insufficiency. Unable to adequately visualize the RV-PA conduit or the bio-prosthetic valve. Estimated right ventricular systolic pressure is 20-25 mmHg plus right atrial pressure.    Echo Nov 2018  D-TGA status post Rastelli and subsequent placement of a bio-prosthetic valve in the pulmonary position. There is no obvious residual ventricular level shunt. Mild (1+) aortic valve insufficiency. Unable to adequately visualize the RV-PA conduit or the bio-prosthetic valve, however normal antegrade flow is visualized in the branch pulmonary arteries. The calculated single plane left ventricular ejection fraction from the 4 chamber view is 56%. A pacing lead is present in the right atrium and right ventricle. No pericardial effusion. Technically difficult study due to poor acoustic windows.        CTA ANGIOGRAM CONGENITAL HEART DISEASE 7/25/2019   1.  D-transposition of aorta s/p Rastelli with bioprosthetic pulmonic valve  2.  Mildly reduced left ventricular function with LV EF 52%.  3.  Normal RV size and function with RV EF 55%.  4.  RV-PA valved conduit without visual evidence of stenosis.  5.  Mild pulmonic regurgitation (<30 mL) using biventricular stroke  volumes.  6.  No significant change from CT scan in 2014.  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   1. complete TGA and VSD, status post Rastelli procedure with VSD closure and RV to PA conduit, history of RV-PA conduit obstruction requiring replacement during teenage years: Pt continues to feel well, denies any change in exercise tolerance and is euvolemic today by history and exam. Recent cardiac CT also confirms no significant change from prior. Encouraged patient to continue regular aerobic exercise 20-30 minutes a day, 4-5 times per week and follow low-salt, heart healthy diet. Continue statin for dyslipidemia. Pt aware that she should not become pregnant while on statin therapy.  Pt currently on depo-provera and not interested in pregnancy at this time.  Also reminded patient the importance of maintaining good oral hygiene and routine dental care with SBE prophylaxis.  2. H/o VT s/p VT ablation and ICD placement: Patient doing well off of sotalol without recurrence of VT.   Will schedule appt at the [facility] with congenital EP in 6 months on same day as appt with me.    Follow-up: in 6months at the [facility] with an appt with me, congenital EP and cardiac MRI/CT depending on ICD compatibility. Will be happy to see sooner if change in clinical status or new questions/concerns arise.    Betsy Valle MD  Section Head - Advanced Heart Failure, Transplantation and Mechanical Circulatory Support  Co-Director - Adult Congenital and Cardiovascular Genetics Center  Associate Professor of Medicine, [employer] [facility]

## 2019-08-07 LAB
MDC_IDC_EPISODE_DTM: NORMAL
MDC_IDC_EPISODE_DURATION: 10 S
MDC_IDC_EPISODE_DURATION: 14 S
MDC_IDC_EPISODE_DURATION: 16 S
MDC_IDC_EPISODE_DURATION: 178 S
MDC_IDC_EPISODE_DURATION: 18 S
MDC_IDC_EPISODE_DURATION: 18 S
MDC_IDC_EPISODE_DURATION: 22 S
MDC_IDC_EPISODE_DURATION: 29 S
MDC_IDC_EPISODE_DURATION: 30 S
MDC_IDC_EPISODE_DURATION: 36 S
MDC_IDC_EPISODE_DURATION: 36 S
MDC_IDC_EPISODE_DURATION: 38 S
MDC_IDC_EPISODE_DURATION: 61 S
MDC_IDC_EPISODE_DURATION: 7 S
MDC_IDC_EPISODE_DURATION: 76 S
MDC_IDC_EPISODE_DURATION: 79 S
MDC_IDC_EPISODE_DURATION: 9 S
MDC_IDC_EPISODE_ID: 348
MDC_IDC_EPISODE_ID: 349
MDC_IDC_EPISODE_ID: 350
MDC_IDC_EPISODE_ID: 351
MDC_IDC_EPISODE_ID: 352
MDC_IDC_EPISODE_ID: 353
MDC_IDC_EPISODE_ID: 354
MDC_IDC_EPISODE_ID: 355
MDC_IDC_EPISODE_ID: 356
MDC_IDC_EPISODE_ID: 357
MDC_IDC_EPISODE_ID: 358
MDC_IDC_EPISODE_ID: 359
MDC_IDC_EPISODE_ID: 360
MDC_IDC_EPISODE_ID: 361
MDC_IDC_EPISODE_ID: 362
MDC_IDC_EPISODE_ID: 363
MDC_IDC_EPISODE_ID: 364
MDC_IDC_EPISODE_TYPE: NORMAL
MDC_IDC_LEAD_IMPLANT_DT: NORMAL
MDC_IDC_LEAD_LOCATION: NORMAL
MDC_IDC_LEAD_LOCATION_DETAIL_1: NORMAL
MDC_IDC_LEAD_MFG: NORMAL
MDC_IDC_LEAD_MODEL: NORMAL
MDC_IDC_LEAD_POLARITY_TYPE: NORMAL
MDC_IDC_LEAD_SERIAL: NORMAL
MDC_IDC_MSMT_BATTERY_DTM: NORMAL
MDC_IDC_MSMT_BATTERY_REMAINING_LONGEVITY: 103 MO
MDC_IDC_MSMT_BATTERY_RRT_TRIGGER: 2.73
MDC_IDC_MSMT_BATTERY_STATUS: NORMAL
MDC_IDC_MSMT_BATTERY_VOLTAGE: 2.95 V
MDC_IDC_MSMT_CAP_CHARGE_DTM: NORMAL
MDC_IDC_MSMT_CAP_CHARGE_ENERGY: 18 J
MDC_IDC_MSMT_CAP_CHARGE_TIME: 3.67
MDC_IDC_MSMT_CAP_CHARGE_TYPE: NORMAL
MDC_IDC_MSMT_LEADCHNL_RV_IMPEDANCE_VALUE: 266 OHM
MDC_IDC_MSMT_LEADCHNL_RV_IMPEDANCE_VALUE: 380 OHM
MDC_IDC_MSMT_LEADCHNL_RV_PACING_THRESHOLD_AMPLITUDE: 0.75 V
MDC_IDC_MSMT_LEADCHNL_RV_PACING_THRESHOLD_PULSEWIDTH: 0.4 MS
MDC_IDC_MSMT_LEADCHNL_RV_SENSING_INTR_AMPL: 11 MV
MDC_IDC_MSMT_LEADCHNL_RV_SENSING_INTR_AMPL: 9.12 MV
MDC_IDC_PG_IMPLANT_DTM: NORMAL
MDC_IDC_PG_MFG: NORMAL
MDC_IDC_PG_MODEL: NORMAL
MDC_IDC_PG_SERIAL: NORMAL
MDC_IDC_PG_TYPE: NORMAL
MDC_IDC_SESS_CLINIC_NAME: NORMAL
MDC_IDC_SESS_DTM: NORMAL
MDC_IDC_SESS_TYPE: NORMAL
MDC_IDC_SET_BRADY_HYSTRATE: NORMAL
MDC_IDC_SET_BRADY_LOWRATE: 40 {BEATS}/MIN
MDC_IDC_SET_BRADY_MODE: NORMAL
MDC_IDC_SET_LEADCHNL_RV_PACING_AMPLITUDE: 2 V
MDC_IDC_SET_LEADCHNL_RV_PACING_ANODE_ELECTRODE_1: NORMAL
MDC_IDC_SET_LEADCHNL_RV_PACING_ANODE_LOCATION_1: NORMAL
MDC_IDC_SET_LEADCHNL_RV_PACING_CAPTURE_MODE: NORMAL
MDC_IDC_SET_LEADCHNL_RV_PACING_CATHODE_ELECTRODE_1: NORMAL
MDC_IDC_SET_LEADCHNL_RV_PACING_CATHODE_LOCATION_1: NORMAL
MDC_IDC_SET_LEADCHNL_RV_PACING_POLARITY: NORMAL
MDC_IDC_SET_LEADCHNL_RV_PACING_PULSEWIDTH: 0.4 MS
MDC_IDC_SET_LEADCHNL_RV_SENSING_ANODE_ELECTRODE_1: NORMAL
MDC_IDC_SET_LEADCHNL_RV_SENSING_ANODE_LOCATION_1: NORMAL
MDC_IDC_SET_LEADCHNL_RV_SENSING_CATHODE_ELECTRODE_1: NORMAL
MDC_IDC_SET_LEADCHNL_RV_SENSING_CATHODE_LOCATION_1: NORMAL
MDC_IDC_SET_LEADCHNL_RV_SENSING_POLARITY: NORMAL
MDC_IDC_SET_LEADCHNL_RV_SENSING_SENSITIVITY: 0.3 MV
MDC_IDC_SET_ZONE_DETECTION_BEATS_DENOMINATOR: 40 {BEATS}
MDC_IDC_SET_ZONE_DETECTION_BEATS_NUMERATOR: 30 {BEATS}
MDC_IDC_SET_ZONE_DETECTION_INTERVAL: 270 MS
MDC_IDC_SET_ZONE_DETECTION_INTERVAL: 360 MS
MDC_IDC_SET_ZONE_DETECTION_INTERVAL: 370 MS
MDC_IDC_SET_ZONE_DETECTION_INTERVAL: NORMAL
MDC_IDC_SET_ZONE_TYPE: NORMAL
MDC_IDC_STAT_BRADY_DTM_END: NORMAL
MDC_IDC_STAT_BRADY_DTM_START: NORMAL
MDC_IDC_STAT_BRADY_RV_PERCENT_PACED: 0.03 %
MDC_IDC_STAT_EPISODE_RECENT_COUNT: 0
MDC_IDC_STAT_EPISODE_RECENT_COUNT: 8
MDC_IDC_STAT_EPISODE_RECENT_COUNT: 9
MDC_IDC_STAT_EPISODE_RECENT_COUNT_DTM_END: NORMAL
MDC_IDC_STAT_EPISODE_RECENT_COUNT_DTM_START: NORMAL
MDC_IDC_STAT_EPISODE_TOTAL_COUNT: 0
MDC_IDC_STAT_EPISODE_TOTAL_COUNT: 1
MDC_IDC_STAT_EPISODE_TOTAL_COUNT: 11
MDC_IDC_STAT_EPISODE_TOTAL_COUNT: 156
MDC_IDC_STAT_EPISODE_TOTAL_COUNT_DTM_END: NORMAL
MDC_IDC_STAT_EPISODE_TOTAL_COUNT_DTM_START: NORMAL
MDC_IDC_STAT_EPISODE_TYPE: NORMAL
MDC_IDC_STAT_TACHYTHERAPY_ATP_DELIVERED_RECENT: 0
MDC_IDC_STAT_TACHYTHERAPY_ATP_DELIVERED_TOTAL: 0
MDC_IDC_STAT_TACHYTHERAPY_RECENT_DTM_END: NORMAL
MDC_IDC_STAT_TACHYTHERAPY_RECENT_DTM_START: NORMAL
MDC_IDC_STAT_TACHYTHERAPY_SHOCKS_ABORTED_RECENT: 0
MDC_IDC_STAT_TACHYTHERAPY_SHOCKS_ABORTED_TOTAL: 0
MDC_IDC_STAT_TACHYTHERAPY_SHOCKS_DELIVERED_RECENT: 0
MDC_IDC_STAT_TACHYTHERAPY_SHOCKS_DELIVERED_TOTAL: 1
MDC_IDC_STAT_TACHYTHERAPY_TOTAL_DTM_END: NORMAL
MDC_IDC_STAT_TACHYTHERAPY_TOTAL_DTM_START: NORMAL

## 2019-08-22 ENCOUNTER — TELEPHONE (OUTPATIENT)
Dept: FAMILY MEDICINE | Facility: CLINIC | Age: 39
End: 2019-08-22

## 2019-08-22 NOTE — TELEPHONE ENCOUNTER
Review of chart indicates pt has multiple refills for amoxicillin available at [facility]. Left VM on pt's phone requesting call back.   KAIDEN HindsN, RN  Message handled by Nurse Triage

## 2019-08-22 NOTE — TELEPHONE ENCOUNTER
Patient called, wants to get an antibiotic for her upcoming dental work.        Please check with PCP     Aide Coello/FLOYD

## 2019-08-23 NOTE — TELEPHONE ENCOUNTER
Patient calling back.  Advised to contact pharmacy as RX has refills.  RX was sent by cardiology.  Angeli Simmons RN

## 2019-08-26 ENCOUNTER — TELEPHONE (OUTPATIENT)
Dept: FAMILY MEDICINE | Facility: CLINIC | Age: 39
End: 2019-08-26

## 2019-08-26 NOTE — TELEPHONE ENCOUNTER
Panel Management Review      Patient has the following on her problem list:     Depression / Dysthymia review    Measure:  Needs PHQ-9 score of 4 or less during index window.  Administer PHQ-9 and if score is 5 or more, send encounter to provider for next steps.    5 - 7 month window range:     PHQ-9 SCORE 12/7/2017 8/16/2018 5/2/2019   PHQ-9 Total Score - - -   PHQ-9 Total Score 0 0 1       If PHQ-9 recheck is 5 or more, route to provider for next steps.    Patient is due for:  None    Diabetes    ASA: Passed    Last A1C  Lab Results   Component Value Date    A1C 6.1 04/25/2019    A1C 6.1 08/16/2018    A1C 6.0 12/07/2017    A1C 6.1 05/25/2017    A1C 6.3 07/06/2016     A1C tested: Passed    Last LDL:    Lab Results   Component Value Date    CHOL 215 04/25/2019     Lab Results   Component Value Date    HDL 48 04/25/2019     Lab Results   Component Value Date     04/25/2019     Lab Results   Component Value Date    TRIG 125 04/25/2019     Lab Results   Component Value Date    CHOLHDLRATIO 4.7 06/18/2015     Lab Results   Component Value Date    NHDL 167 04/25/2019       Is the patient on a Statin? YES             Is the patient on Aspirin? YES    Medications     HMG CoA Reductase Inhibitors     atorvastatin (LIPITOR) 40 MG tablet             Last three blood pressure readings:  BP Readings from Last 3 Encounters:   08/01/19 122/78   07/19/19 106/84   05/02/19 108/68       Date of last diabetes office visit: 08/01/2019     Tobacco History:     History   Smoking Status     Never Smoker   Smokeless Tobacco     Never Used           Composite cancer screening  Chart review shows that this patient is due/due soon for the following None  Summary:    Patient is due/failing the following:   Eye exam    Action needed:   Schedule an eye exam    Type of outreach:    Phone, left message for patient to call back.     Questions for provider review:    None                                                                                                                                     Marysol Burton/[facility]---[facility] [place]       Chart routed to care teams .

## 2019-08-26 NOTE — LETTER
[facility]  [address] [address] [facility] [place] [address]  [phone]  August 30, 2019    Chiiks Hannon  [address]   [place] [address]    Dear Chikis,    I care about your health and have reviewed your health plan. I have reviewed your medical conditions, medication list, and lab results and am making recommendations based on this review, to better manage your health.    You are in particular need of attention regarding:  -Diabetes    I am recommending that you:  Eye exam      Thank you for trusting [facility] and we appreciate the opportunity to serve you.  We look forward to supporting your healthcare needs in the future.    Healthy Regards,    Tammy Benedict MD

## 2019-10-04 RX ORDER — GABAPENTIN 300 MG/1
CAPSULE ORAL
Qty: 90 CAPSULE | Refills: 1 | Status: CANCELLED | OUTPATIENT
Start: 2019-10-04

## 2019-10-04 NOTE — PROGRESS NOTES
Pre-Visit Planning     Future Appointments   Date Time Provider Department Center   10/7/2019  3:45 PM Tammy Benedict MD [facility]   10/29/2019 12:00 AM  ICD REMOTE UCCVSV [facility]   2/28/2020 10:00 AM  LAB UCLAB [facility]   2/28/2020 10:30 AM  CV DEVICE 1 CVCV [facility]   2/28/2020 11:00 AM Betsy Valle MD [facility]   2/28/2020 11:30 AM Vimal Ray MD [facility]     Appointment Notes for this encounter:   having anxiety-would like to go back on medication     Holding Sotalol per cards 8/1/19 for 6 months, sooner if symptoms return   We asked last eye exam, pt states staff told her does not need exam. Will discuss with PCP at visit.   Flu shot.   Feet hurt when she walks, requests referral to podiatrist.     Patient preferred phone number: [phone]    Patient contacted.  Confirmed visit.   Theron Coffey, RN

## 2019-10-07 ENCOUNTER — OFFICE VISIT (OUTPATIENT)
Dept: FAMILY MEDICINE | Facility: CLINIC | Age: 39
End: 2019-10-07
Payer: MEDICARE

## 2019-10-07 VITALS
OXYGEN SATURATION: 99 % | SYSTOLIC BLOOD PRESSURE: 138 MMHG | WEIGHT: 183.2 LBS | RESPIRATION RATE: 16 BRPM | TEMPERATURE: 98.2 F | DIASTOLIC BLOOD PRESSURE: 82 MMHG | HEART RATE: 74 BPM | BODY MASS INDEX: 33.2 KG/M2

## 2019-10-07 DIAGNOSIS — Z23 NEED FOR PROPHYLACTIC VACCINATION AND INOCULATION AGAINST INFLUENZA: ICD-10-CM

## 2019-10-07 DIAGNOSIS — F41.9 ANXIETY: ICD-10-CM

## 2019-10-07 DIAGNOSIS — F33.1 MODERATE EPISODE OF RECURRENT MAJOR DEPRESSIVE DISORDER (H): Primary | ICD-10-CM

## 2019-10-07 DIAGNOSIS — E11.59 TYPE 2 DIABETES MELLITUS WITH OTHER CIRCULATORY COMPLICATION, WITHOUT LONG-TERM CURRENT USE OF INSULIN (H): ICD-10-CM

## 2019-10-07 DIAGNOSIS — F33.1 MODERATE EPISODE OF RECURRENT MAJOR DEPRESSIVE DISORDER (H): ICD-10-CM

## 2019-10-07 DIAGNOSIS — M79.2 NEUROPATHIC PAIN: ICD-10-CM

## 2019-10-07 LAB — HBA1C MFR BLD: 6.6 % (ref 0–5.6)

## 2019-10-07 PROCEDURE — 84443 ASSAY THYROID STIM HORMONE: CPT | Performed by: FAMILY MEDICINE

## 2019-10-07 PROCEDURE — 90686 IIV4 VACC NO PRSV 0.5 ML IM: CPT | Performed by: FAMILY MEDICINE

## 2019-10-07 PROCEDURE — G0008 ADMIN INFLUENZA VIRUS VAC: HCPCS | Performed by: FAMILY MEDICINE

## 2019-10-07 PROCEDURE — 99214 OFFICE O/P EST MOD 30 MIN: CPT | Mod: 25 | Performed by: FAMILY MEDICINE

## 2019-10-07 PROCEDURE — 82043 UR ALBUMIN QUANTITATIVE: CPT | Performed by: FAMILY MEDICINE

## 2019-10-07 PROCEDURE — 36415 COLL VENOUS BLD VENIPUNCTURE: CPT | Performed by: FAMILY MEDICINE

## 2019-10-07 PROCEDURE — 99207 C FOOT EXAM  NO CHARGE: CPT | Mod: 25 | Performed by: FAMILY MEDICINE

## 2019-10-07 PROCEDURE — 83036 HEMOGLOBIN GLYCOSYLATED A1C: CPT | Performed by: FAMILY MEDICINE

## 2019-10-07 RX ORDER — GABAPENTIN 300 MG/1
CAPSULE ORAL
Qty: 90 CAPSULE | Refills: 1 | Status: SHIPPED | OUTPATIENT
Start: 2019-10-07 | End: 2020-03-23

## 2019-10-07 RX ORDER — SERTRALINE HYDROCHLORIDE 25 MG/1
25 TABLET, FILM COATED ORAL DAILY
Qty: 30 TABLET | Refills: 0 | Status: SHIPPED | OUTPATIENT
Start: 2019-10-07 | End: 2019-11-05

## 2019-10-07 NOTE — LETTER
October 10, 2019      Chikis Hannon  [address]   [place] [address]        Dear Ms.Parvez,    We are writing to inform you of your test results.    Your labs from recent visit are normal.   For further questions or concerns please let us know.           Resulted Orders   Albumin Random Urine Quantitative with Creat Ratio   Result Value Ref Range    Creatinine Urine 119 mg/dL    Albumin Urine mg/L 28 mg/L    Albumin Urine mg/g Cr 23.95 0 - 25 mg/g Cr   TSH WITH FREE T4 REFLEX   Result Value Ref Range    TSH 2.06 0.40 - 4.00 mU/L   Hemoglobin A1c   Result Value Ref Range    Hemoglobin A1C 6.6 (H) 0 - 5.6 %      Comment:      Normal <5.7% Prediabetes 5.7-6.4%  Diabetes 6.5% or higher - adopted from ADA   consensus guidelines.         If you have any questions or concerns, please call the clinic at the number listed above.       Sincerely,        Tammy Benedict MD

## 2019-10-07 NOTE — TELEPHONE ENCOUNTER
"Requested Prescriptions   Pending Prescriptions Disp Refills     sertraline (ZOLOFT) 25 MG tablet [Pharmacy Med Name: SERTRALINE 25MG TABLETS]  Medication may not be due for refill  Last Written Prescription Date:  10/7/2019  Last Fill Quantity: 30 tablet,  # refills: 0   Last office visit: No previous visit found with prescribing provider:  10/7/2019Marichuy   Future Office Visit:   Next 5 appointments (look out 90 days)    Nov 04, 2019 10:20 AM CST  Office Visit with Tammy Benedict MD, CR EXAM ROOM [address]  [address] [address]  [phone]   Nov 14, 2019  9:30 AM CST  Office Visit with Tammy Benedict MD, CR EXAM ROOM [address]  [address] [address]  [phone]          90 tablet 0     Sig: TAKE 1 TABLET(25 MG) BY MOUTH DAILY       SSRIs Protocol Passed - 10/7/2019  4:34 PM        Passed - PHQ-9 score less than 5 in past 6 months     Please review last PHQ-9 score.           Passed - Medication is active on med list        Passed - Patient is age 18 or older        Passed - No active pregnancy on record        Passed - No positive pregnancy test in last 12 months        Passed - Recent (6 mo) or future (30 days) visit within the authorizing provider's specialty     Patient had office visit in the last 6 months or has a visit in the next 30 days with authorizing provider or within the authorizing provider's specialty.  See \"Patient Info\" tab in inbasket, or \"Choose Columns\" in Meds & Orders section of the refill encounter.              "

## 2019-10-07 NOTE — PROGRESS NOTES
Subjective     Chikis Hannon is a 39 year old female who presents to clinic today for the following health issues:    HPI   Abnormal Mood Symptoms  Onset: one or two months    Description:   Depression: YES- a little   Anxiety: YES    Accompanying Signs & Symptoms:  Still participating in activities that you used to enjoy: YES  Fatigue: YES  Irritability: YES  Difficulty concentrating: YES  Changes in appetite: YES- eating more  Problems with sleep: no   Heart racing/beating fast : YES- a lot  Thoughts of hurting yourself or others: sometimes when she gets over stressed    History:   Recent stress: YES- phone breaking   Prior depression hospitalization: None  Family history of depression: no  Family history of anxiety: no    Precipitating factors:   Alcohol/drug use: no    Alleviating factors:  Watching tv    Therapies Tried and outcome: None    Chikis has a history of depression and anxiety- Used to be on medicine but stopped taking it because she felt better.  notes she has started worrying about things that are beyond her control and she also gets upset very easily.     Diabetes Follow-up      How often are you checking your blood sugar? Not at all    What time of day are you checking your blood sugars (select all that apply)?  n/a    Have you had any blood sugars above 200?  No    Have you had any blood sugars below 70?  No    What symptoms do you notice when your blood sugar is low?  None    What concerns do you have today about your diabetes? None     Do you have any of these symptoms? (Select all that apply)  Burning in feet     Have you had a diabetic eye exam in the last 12 months? No    BP Readings from Last 2 Encounters:   10/07/19 138/82   08/01/19 122/78     Hemoglobin A1C (%)   Date Value   10/07/2019 6.6 (H)   04/25/2019 6.1 (H)     LDL Cholesterol Calculated (mg/dL)   Date Value   04/25/2019 142 (H)   12/07/2017 132 (H)       Diabetes Management Resources    Patient Active Problem List    Diagnosis     Corrected transposition of great vessels     Allergic state     Attention deficit disorder     Impaired fasting glucose     Obesity     Anxiety     GERD (gastroesophageal reflux disease)     Major depression in complete remission (H)     * * * SBE PROPHYLAXIS * * *     CARDIOVASCULAR SCREENING; LDL GOAL LESS THAN 70     Mildly mentally retarded     Transaminase or LDH elevation     Health Care Home     Congenital heart disease     Ventricular tachycardia (H)     Cardiac ICD- Medtronic- single chamber- NOT dependent     Type 2 diabetes, HbA1C goal < 8% (H)     Type 2 diabetes mellitus with other circulatory complications (H)     Patient is followed by the Adult Congenital and Cardiovascular Genetics Clinic     Past Surgical History:   Procedure Laterality Date     C NONSPECIFIC PROCEDURE  11/98    open heart surgery x 3, transposition of major vessels     C NONSPECIFIC PROCEDURE  10/03    Additional heart surgery     CARDIAC SURGERY  Had 4 surgeries    transposition of the major arteries     SURGICAL HISTORY OF -   2014    ablation for v. tach; ICD placed       Social History     Tobacco Use     Smoking status: Never Smoker     Smokeless tobacco: Never Used   Substance Use Topics     Alcohol use: No     Alcohol/week: 0.0 standard drinks     Family History   Adopted: Yes   Problem Relation Age of Onset     Family History Negative Mother      Family History Negative Father            Reviewed and updated as needed this visit by Provider  Tobacco  Allergies  Meds  Problems  Med Hx  Surg Hx  Fam Hx         Review of Systems   ROS COMP: Constitutional, HEENT, cardiovascular, pulmonary, GI, , musculoskeletal, neuro, skin, endocrine and psych systems are negative, except as otherwise noted.      Objective    /82 (BP Location: Right arm, Patient Position: Chair, Cuff Size: Adult Large)   Pulse 74   Temp 98.2  F (36.8  C) (Oral)   Resp 16   Wt 83.1 kg (183 lb 3.2 oz)   SpO2 99%   BMI 33.20  kg/m    Body mass index is 33.2 kg/m .  Physical Exam   GENERAL: healthy, alert and no distress  RESP: lungs clear to auscultation - no rales, rhonchi or wheezes  CV: normal S1, wide S2 split, KRISTI- 3/6  ABDOMEN: soft, nontender,  and bowel sounds normal  MS: no gross musculoskeletal defects noted, no edema  PSYCH: mentation appears normal, tearful and anxious  Diabetic foot exam: normal DP and PT pulses, no trophic changes or ulcerative lesions and normal sensory exam      Diagnostic Test Results:  Labs reviewed in Epic  Lab Results   Component Value Date    A1C 6.6 10/07/2019    A1C 6.1 04/25/2019    A1C 6.1 08/16/2018    A1C 6.0 12/07/2017    A1C 6.1 05/25/2017             Assessment & Plan     1. Moderate episode of recurrent major depressive disorder (H)  - worsening.  will restart on medication and recheck in 1 month. Side effects reviewed.   - sertraline (ZOLOFT) 25 MG tablet; Take 1 tablet (25 mg) by mouth daily  Dispense: 30 tablet; Refill: 0  - MENTAL HEALTH REFERRAL  - Adult; Outpatient Treatment; Individual/Couples/Family/Group Therapy/Health Psychology; [facility]: [facility] [phone]; We will contact you to schedule the appointment or please call with any questions    2. Anxiety  - worsening   - sertraline (ZOLOFT) 25 MG tablet; Take 1 tablet (25 mg) by mouth daily  Dispense: 30 tablet; Refill: 0  - MENTAL HEALTH REFERRAL  - Adult; Outpatient Treatment; Individual/Couples/Family/Group Therapy/Health Psychology; [facility]: [facility] [phone]; We will contact you to schedule the appointment or please call with any questions    3. Type 2 diabetes mellitus with other circulatory complication, without long-term current use of insulin (H)  - A1c stable. Past due for eye exam- advised to schedule once she returns from vacation.   - FOOT EXAM  - Albumin Random Urine Quantitative with Creat Ratio  - TSH WITH FREE T4 REFLEX  - Hemoglobin A1c  - metFORMIN (GLUCOPHAGE) 500 MG  tablet; Take 1 tablet (500 mg) by mouth daily (with dinner)  Dispense: 90 tablet; Refill: 1    4. Neuropathic pain  -stable   - gabapentin (NEURONTIN) 300 MG capsule; TAKE 1 CAPSULE(300 MG) BY MOUTH EVERY NIGHT  Dispense: 90 capsule; Refill: 1    5. Need for prophylactic vaccination and inoculation against influenza  - INFLUENZA VACCINE IM > 6 MONTHS VALENT IIV4 [64464]  - Vaccine Administration, Initial [31722]             See Patient Instructions    Return in about 1 month (around 11/7/2019) for anxiety/depression .    Tammy Benedict MD  [facility]

## 2019-10-08 LAB
CREAT UR-MCNC: 119 MG/DL
MICROALBUMIN UR-MCNC: 28 MG/L
MICROALBUMIN/CREAT UR: 23.95 MG/G CR (ref 0–25)
TSH SERPL DL<=0.005 MIU/L-ACNC: 2.06 MU/L (ref 0.4–4)

## 2019-10-08 RX ORDER — SERTRALINE HYDROCHLORIDE 25 MG/1
TABLET, FILM COATED ORAL
Qty: 90 TABLET | Refills: 0 | OUTPATIENT
Start: 2019-10-08

## 2019-10-08 ASSESSMENT — ANXIETY QUESTIONNAIRES
1. FEELING NERVOUS, ANXIOUS, OR ON EDGE: MORE THAN HALF THE DAYS
7. FEELING AFRAID AS IF SOMETHING AWFUL MIGHT HAPPEN: MORE THAN HALF THE DAYS
5. BEING SO RESTLESS THAT IT IS HARD TO SIT STILL: SEVERAL DAYS
GAD7 TOTAL SCORE: 14
6. BECOMING EASILY ANNOYED OR IRRITABLE: NEARLY EVERY DAY
2. NOT BEING ABLE TO STOP OR CONTROL WORRYING: MORE THAN HALF THE DAYS
IF YOU CHECKED OFF ANY PROBLEMS ON THIS QUESTIONNAIRE, HOW DIFFICULT HAVE THESE PROBLEMS MADE IT FOR YOU TO DO YOUR WORK, TAKE CARE OF THINGS AT HOME, OR GET ALONG WITH OTHER PEOPLE: SOMEWHAT DIFFICULT
3. WORRYING TOO MUCH ABOUT DIFFERENT THINGS: MORE THAN HALF THE DAYS

## 2019-10-08 ASSESSMENT — PATIENT HEALTH QUESTIONNAIRE - PHQ9
SUM OF ALL RESPONSES TO PHQ QUESTIONS 1-9: 10
5. POOR APPETITE OR OVEREATING: MORE THAN HALF THE DAYS

## 2019-10-08 NOTE — TELEPHONE ENCOUNTER
Pending Prescriptions:                       Disp   Refills    sertraline (ZOLOFT) 25 MG tablet [Pharmacy*90 tab*0        Sig: TAKE 1 TABLET(25 MG) BY MOUTH DAILY    Routing refill request to provider for review/approval because:  Patient is requesting 90 days supply.  #30 was given by provider yesterday.    Oswald Mae, RN, BSN      (Covering RN, please route response(s) to your care team for any follow up that is needed. Thank you!)

## 2019-10-09 ASSESSMENT — ANXIETY QUESTIONNAIRES: GAD7 TOTAL SCORE: 14

## 2019-10-10 NOTE — RESULT ENCOUNTER NOTE
Please send patient a letter to let them know results are normal.    Your labs from recent visit are normal.   For further questions or concerns please let us know.

## 2019-10-11 ENCOUNTER — TELEPHONE (OUTPATIENT)
Dept: FAMILY MEDICINE | Facility: CLINIC | Age: 39
End: 2019-10-11

## 2019-10-11 NOTE — TELEPHONE ENCOUNTER
Pt  Returned the call, given the msg about eye exam.pt is on vacation she will take an appointment after she is back from vacation.  Jade Ceja MA  [facility].

## 2019-10-17 ENCOUNTER — TRANSFERRED RECORDS (OUTPATIENT)
Dept: HEALTH INFORMATION MANAGEMENT | Facility: CLINIC | Age: 39
End: 2019-10-17

## 2019-10-17 LAB — RETINOPATHY: NEGATIVE

## 2019-10-29 ENCOUNTER — ANCILLARY PROCEDURE (OUTPATIENT)
Dept: CARDIOLOGY | Facility: CLINIC | Age: 39
End: 2019-10-29
Attending: INTERNAL MEDICINE
Payer: MEDICARE

## 2019-10-29 DIAGNOSIS — Z86.79 HX OF VENTRICULAR TACHYCARDIA: ICD-10-CM

## 2019-10-29 PROCEDURE — 93295 DEV INTERROG REMOTE 1/2/MLT: CPT | Mod: ZP | Performed by: INTERNAL MEDICINE

## 2019-10-29 PROCEDURE — 93296 REM INTERROG EVL PM/IDS: CPT | Mod: ZF

## 2019-11-05 DIAGNOSIS — F41.9 ANXIETY: ICD-10-CM

## 2019-11-05 DIAGNOSIS — F33.1 MODERATE EPISODE OF RECURRENT MAJOR DEPRESSIVE DISORDER (H): ICD-10-CM

## 2019-11-05 RX ORDER — SERTRALINE HYDROCHLORIDE 25 MG/1
TABLET, FILM COATED ORAL
Qty: 90 TABLET | Refills: 0 | OUTPATIENT
Start: 2019-11-05

## 2019-11-05 RX ORDER — SERTRALINE HYDROCHLORIDE 25 MG/1
TABLET, FILM COATED ORAL
Qty: 30 TABLET | Refills: 0 | Status: SHIPPED | OUTPATIENT
Start: 2019-11-05 | End: 2019-11-14

## 2019-11-05 NOTE — TELEPHONE ENCOUNTER
Zoloft  Medication is being filled for 1 time refill only due to:  Patient needs to be seen because mood follow up.    Next 5 appointments (look out 90 days)    Nov 14, 2019  3:30 PM CST  Office Visit with [facility] Benedict MD, [facility] [address] [place] [id number]  [phone]        Sarah Jewell RN, BSN

## 2019-11-05 NOTE — TELEPHONE ENCOUNTER
"Last Written Prescription Date:  11/05/19  Last Fill Quantity: 30 tablet,  # refills: 0   Last office visit: 10/7/2019 with prescribing provider:  Marichuy   Future Office Visit:   Next 5 appointments (look out 90 days)    Nov 14, 2019  3:30 PM CST  Office Visit with Tammy Benedict MD, CR EXAM ROOM 24  [facility] [address] [place] [id number] [phone]         [facility] Follow-up to PHQ 8/16/2018 5/2/2019 10/8/2019   PHQ-9 9. Suicide Ideation past 2 weeks Not at all Not at all Not at all     FREDIS-7 SCORE 8/16/2018 5/2/2019 10/8/2019   Total Score - - -   Total Score 0 0 14       Requested Prescriptions   Pending Prescriptions Disp Refills     sertraline (ZOLOFT) 25 MG tablet [Pharmacy Med Name: SERTRALINE 25MG TABLETS] 90 tablet 0     Sig: TAKE 1 TABLET BY MOUTH EVERY DAY       SSRIs Protocol Failed - 11/5/2019  2:30 PM        Failed - PHQ-9 score less than 5 in past 6 months     Please review last PHQ-9 score.           Passed - Medication is active on med list        Passed - Patient is age 18 or older        Passed - No active pregnancy on record        Passed - No positive pregnancy test in last 12 months        Passed - Recent (6 mo) or future (30 days) visit within the authorizing provider's specialty     Patient had office visit in the last 6 months or has a visit in the next 30 days with authorizing provider or within the authorizing provider's specialty.  See \"Patient Info\" tab in inbasket, or \"Choose Columns\" in Meds & Orders section of the refill encounter.              "

## 2019-11-06 DIAGNOSIS — E11.59 TYPE 2 DIABETES MELLITUS WITH OTHER CIRCULATORY COMPLICATION, WITHOUT LONG-TERM CURRENT USE OF INSULIN (H): ICD-10-CM

## 2019-11-06 DIAGNOSIS — M79.2 NEUROPATHIC PAIN: ICD-10-CM

## 2019-11-06 RX ORDER — GABAPENTIN 300 MG/1
CAPSULE ORAL
Qty: 90 CAPSULE | Refills: 0 | OUTPATIENT
Start: 2019-11-06

## 2019-11-06 NOTE — TELEPHONE ENCOUNTER
Duplicate request. Medications filled on 10/7/19 for 6 month supply. Mirna Wade RN on 11/6/2019 at 1:52 PM  Requested Prescriptions   Refused Prescriptions Disp Refills     gabapentin (NEURONTIN) 300 MG capsule [Pharmacy Med Name: GABAPENTIN 300MG CAPSULES] 90 capsule 0     Sig: TAKE 1 CAPSULE(300 MG) BY MOUTH EVERY NIGHT       There is no refill protocol information for this order        metFORMIN (GLUCOPHAGE) 500 MG tablet [Pharmacy Med Name: METFORMIN 500MG TABLETS] 90 tablet 0     Sig: TAKE 1 TABLET(500 MG) BY MOUTH DAILY WITH DINNER       Biguanide Agents Passed - 11/6/2019  3:21 AM        Passed - Blood pressure less than 140/90 in past 6 months     BP Readings from Last 3 Encounters:   10/07/19 138/82   08/01/19 122/78   07/19/19 106/84                 Passed - Patient has documented LDL within the past 12 mos.     Recent Labs   Lab Test 04/25/19  0933   *             Passed - Patient has had a Microalbumin in the past 15 mos.     Recent Labs   Lab Test 10/07/19  1616   MICROL 28   UMALCR 23.95             Passed - Patient is age 10 or older        Passed - Patient has documented A1c within the specified period of time.     If HgbA1C is 8 or greater, it needs to be on file within the past 3 months.  If less than 8, must be on file within the past 6 months.     Recent Labs   Lab Test 10/07/19  1549   A1C 6.6*             Passed - Patient's CR is NOT>1.4 OR Patient's EGFR is NOT<45 within past 12 mos.     Recent Labs   Lab Test 07/19/19  1137   GFRESTIMATED >90   GFRESTBLACK >90       Recent Labs   Lab Test 07/19/19  1137   CR 0.57             Passed - Patient does NOT have a diagnosis of CHF.        Passed - Medication is active on med list        Passed - Patient is not pregnant        Passed - Patient has not had a positive pregnancy test within the past 12 mos.         Passed - Recent (6 mo) or future (30 days) visit within the authorizing provider's specialty     Patient had office visit in  "the last 6 months or has a visit in the next 30 days with authorizing provider or within the authorizing provider's specialty.  See \"Patient Info\" tab in inbasket, or \"Choose Columns\" in Meds & Orders section of the refill encounter.              "

## 2019-11-12 NOTE — PROGRESS NOTES
Pre-Visit Planning     Future Appointments   Date Time Provider Department Center   11/14/2019  3:30 PM Tammy Benedict MD [facility]   1/23/2020  1:30 PM Blanca Manzanares, [facility] [place]   2/28/2020 10:00 AM  LAB [facility]   2/28/2020 10:30 AM  CV DEVICE 1 [facility]   2/28/2020 11:00 AM Betsy Valle MD [facility]   2/28/2020 11:30 AM Vimal Ray MD [facility]   5/28/2020 12:00 AM  ICD REMOTE [facility]SV [facility]     Appointment Notes for this encounter:   depression/anxiety med check  Questionnaires Reviewed/Assigned  Additional questionnaires assigned  PHQ-9   Patient preferred phone number: [phone]    Spoke to patient via phone. Confirmed appointment.   Theron Coffey, RN

## 2019-11-14 ENCOUNTER — OFFICE VISIT (OUTPATIENT)
Dept: FAMILY MEDICINE | Facility: CLINIC | Age: 39
End: 2019-11-14
Payer: MEDICARE

## 2019-11-14 VITALS
DIASTOLIC BLOOD PRESSURE: 70 MMHG | HEART RATE: 100 BPM | WEIGHT: 182.3 LBS | BODY MASS INDEX: 33.04 KG/M2 | OXYGEN SATURATION: 97 % | SYSTOLIC BLOOD PRESSURE: 120 MMHG | TEMPERATURE: 97.7 F

## 2019-11-14 DIAGNOSIS — F33.1 MODERATE EPISODE OF RECURRENT MAJOR DEPRESSIVE DISORDER (H): ICD-10-CM

## 2019-11-14 DIAGNOSIS — F41.9 ANXIETY: ICD-10-CM

## 2019-11-14 PROCEDURE — 99214 OFFICE O/P EST MOD 30 MIN: CPT | Performed by: FAMILY MEDICINE

## 2019-11-14 RX ORDER — SERTRALINE HYDROCHLORIDE 25 MG/1
25 TABLET, FILM COATED ORAL DAILY
Qty: 90 TABLET | Refills: 1 | Status: SHIPPED | OUTPATIENT
Start: 2019-11-14 | End: 2020-08-05

## 2019-11-14 ASSESSMENT — ANXIETY QUESTIONNAIRES
7. FEELING AFRAID AS IF SOMETHING AWFUL MIGHT HAPPEN: SEVERAL DAYS
3. WORRYING TOO MUCH ABOUT DIFFERENT THINGS: SEVERAL DAYS
1. FEELING NERVOUS, ANXIOUS, OR ON EDGE: SEVERAL DAYS
GAD7 TOTAL SCORE: 5
7. FEELING AFRAID AS IF SOMETHING AWFUL MIGHT HAPPEN: SEVERAL DAYS
5. BEING SO RESTLESS THAT IT IS HARD TO SIT STILL: NOT AT ALL
GAD7 TOTAL SCORE: 5
6. BECOMING EASILY ANNOYED OR IRRITABLE: SEVERAL DAYS
2. NOT BEING ABLE TO STOP OR CONTROL WORRYING: SEVERAL DAYS
GAD7 TOTAL SCORE: 5
4. TROUBLE RELAXING: NOT AT ALL

## 2019-11-14 ASSESSMENT — PATIENT HEALTH QUESTIONNAIRE - PHQ9
SUM OF ALL RESPONSES TO PHQ QUESTIONS 1-9: 2
10. IF YOU CHECKED OFF ANY PROBLEMS, HOW DIFFICULT HAVE THESE PROBLEMS MADE IT FOR YOU TO DO YOUR WORK, TAKE CARE OF THINGS AT HOME, OR GET ALONG WITH OTHER PEOPLE: NOT DIFFICULT AT ALL
SUM OF ALL RESPONSES TO PHQ QUESTIONS 1-9: 2

## 2019-11-14 NOTE — PROGRESS NOTES
Subjective     Chikis Hannon is a 39 year old female who presents to clinic today for the following health issues:    History of Present Illness        Mental Health Follow-up:  Patient presents to follow-up on Depression & Anxiety.Patient's depression since last visit has been:  Better  The patient is not having other symptoms associated with depression.  Patient's anxiety since last visit has been:  Better  The patient is not having other symptoms associated with anxiety.  Any significant life events: No  Patient is feeling anxious or having panic attacks.  Patient has no concerns about alcohol or drug use.     Social History  Tobacco Use    Smoking status: Never Smoker    Smokeless tobacco: Never Used  Alcohol use: No    Alcohol/week: 0.0 standard drinks  Drug use: No      Today's PHQ-9         PHQ-9 Total Score:     (P) 2   PHQ-9 Q9 Thoughts of better off dead/self-harm past 2 weeks :   (P) Not at all   Thoughts of suicide or self harm:      Self-harm Plan:        Self-harm Action:          Safety concerns for self or others:           She eats 2-3 servings of fruits and vegetables daily.She consumes 1 sweetened beverage(s) daily.She is missing 1 dose(s) of medications per week.  She is not taking prescribed medications regularly due to other.     Medication Followup of Zoloft    Taking Medication as prescribed: yes    Side Effects:  None    Medication Helping Symptoms:  yes     Feels medication is really helping. She is not having as much outbursts as she was prior to starting on the medication.   Denies any medication side effects at this time.       Wt Readings from Last 4 Encounters:   11/14/19 82.7 kg (182 lb 4.8 oz)   10/07/19 83.1 kg (183 lb 3.2 oz)   08/01/19 84.7 kg (186 lb 11.7 oz)   07/19/19 83.9 kg (185 lb)         Patient Active Problem List   Diagnosis     Corrected transposition of great vessels     Allergic state     Attention deficit disorder     Impaired fasting glucose     Obesity     Anxiety      GERD (gastroesophageal reflux disease)     Major depression in complete remission (H)     * * * SBE PROPHYLAXIS * * *     CARDIOVASCULAR SCREENING; LDL GOAL LESS THAN 70     Mildly mentally retarded     Transaminase or LDH elevation     Health Care Home     Congenital heart disease     Ventricular tachycardia (H)     Cardiac ICD- Medtronic- single chamber- NOT dependent     Type 2 diabetes, HbA1C goal < 8% (H)     Type 2 diabetes mellitus with other circulatory complications (H)     Patient is followed by the Adult Congenital and Cardiovascular Genetics Clinic     Past Surgical History:   Procedure Laterality Date     C NONSPECIFIC PROCEDURE  11/98    open heart surgery x 3, transposition of major vessels     C NONSPECIFIC PROCEDURE  10/03    Additional heart surgery     CARDIAC SURGERY  Had 4 surgeries    transposition of the major arteries     SURGICAL HISTORY OF -   2014    ablation for v. tach; ICD placed       Social History     Tobacco Use     Smoking status: Never Smoker     Smokeless tobacco: Never Used   Substance Use Topics     Alcohol use: No     Alcohol/week: 0.0 standard drinks     Family History   Adopted: Yes   Problem Relation Age of Onset     Family History Negative Mother      Family History Negative Father            Reviewed and updated as needed this visit by Provider         Review of Systems   ROS COMP: Constitutional, psych  systems are negative, except as otherwise noted.      Objective    /70 (BP Location: Right arm, Patient Position: Chair, Cuff Size: Adult Regular)   Pulse 100   Temp 97.7  F (36.5  C) (Oral)   Wt 82.7 kg (182 lb 4.8 oz)   SpO2 97%   BMI 33.04 kg/m    Body mass index is 33.04 kg/m .  Physical Exam   GENERAL: healthy, alert and no distress  PSYCH: mentation appears normal, affect normal/bright    Diagnostic Test Results:  Labs reviewed in Epic  none         Assessment & Plan     1. Moderate episode of recurrent major depressive disorder (H)  - improving.  Needs improvement. Will continue current regimen since she is tolerating it well.   - sertraline (ZOLOFT) 25 MG tablet; Take 1 tablet (25 mg) by mouth daily  Dispense: 90 tablet; Refill: 1    2. Anxiety  - improving    - sertraline (ZOLOFT) 25 MG tablet; Take 1 tablet (25 mg) by mouth daily  Dispense: 90 tablet; Refill: 1             See Patient Instructions    Return in about 5 months (around 4/14/2020) for diabetes, medication recheck.    Tammy Benedict MD  [facility]    Answers for HPI/ROS submitted by the patient on 11/14/2019   Chronic problems general questions HPI Form  If you checked off any problems, how difficult have these problems made it for you to do your work, take care of things at home, or get along with other people?: Not difficult at all  PHQ9 TOTAL SCORE: 2  FREDIS 7 TOTAL SCORE: 5

## 2019-11-14 NOTE — PATIENT INSTRUCTIONS
Follow up in 5 months for diabetes and all other medication recheck.   Continue with all meds as prescribed

## 2019-11-15 ASSESSMENT — PATIENT HEALTH QUESTIONNAIRE - PHQ9: SUM OF ALL RESPONSES TO PHQ QUESTIONS 1-9: 2

## 2019-11-15 ASSESSMENT — ANXIETY QUESTIONNAIRES: GAD7 TOTAL SCORE: 5

## 2019-11-19 LAB
MDC_IDC_EPISODE_DTM: NORMAL
MDC_IDC_EPISODE_DURATION: 13 S
MDC_IDC_EPISODE_DURATION: 153 S
MDC_IDC_EPISODE_DURATION: 29 S
MDC_IDC_EPISODE_DURATION: 33 S
MDC_IDC_EPISODE_DURATION: 37 S
MDC_IDC_EPISODE_DURATION: 37 S
MDC_IDC_EPISODE_DURATION: 39 S
MDC_IDC_EPISODE_DURATION: 4 S
MDC_IDC_EPISODE_DURATION: 52 S
MDC_IDC_EPISODE_DURATION: 59 S
MDC_IDC_EPISODE_DURATION: 9 S
MDC_IDC_EPISODE_ID: 365
MDC_IDC_EPISODE_ID: 366
MDC_IDC_EPISODE_ID: 367
MDC_IDC_EPISODE_ID: 368
MDC_IDC_EPISODE_ID: 369
MDC_IDC_EPISODE_ID: 370
MDC_IDC_EPISODE_ID: 371
MDC_IDC_EPISODE_ID: 372
MDC_IDC_EPISODE_ID: 373
MDC_IDC_EPISODE_ID: 374
MDC_IDC_EPISODE_ID: 375
MDC_IDC_EPISODE_TYPE: NORMAL
MDC_IDC_LEAD_IMPLANT_DT: NORMAL
MDC_IDC_LEAD_LOCATION: NORMAL
MDC_IDC_LEAD_LOCATION_DETAIL_1: NORMAL
MDC_IDC_LEAD_MFG: NORMAL
MDC_IDC_LEAD_MODEL: NORMAL
MDC_IDC_LEAD_POLARITY_TYPE: NORMAL
MDC_IDC_LEAD_SERIAL: NORMAL
MDC_IDC_MSMT_BATTERY_DTM: NORMAL
MDC_IDC_MSMT_BATTERY_REMAINING_LONGEVITY: 100 MO
MDC_IDC_MSMT_BATTERY_RRT_TRIGGER: 2.73
MDC_IDC_MSMT_BATTERY_STATUS: NORMAL
MDC_IDC_MSMT_BATTERY_VOLTAGE: 2.98 V
MDC_IDC_MSMT_CAP_CHARGE_DTM: NORMAL
MDC_IDC_MSMT_CAP_CHARGE_ENERGY: 18 J
MDC_IDC_MSMT_CAP_CHARGE_TIME: 3.66
MDC_IDC_MSMT_CAP_CHARGE_TYPE: NORMAL
MDC_IDC_MSMT_LEADCHNL_RV_IMPEDANCE_VALUE: 304 OHM
MDC_IDC_MSMT_LEADCHNL_RV_IMPEDANCE_VALUE: 342 OHM
MDC_IDC_MSMT_LEADCHNL_RV_PACING_THRESHOLD_AMPLITUDE: 0.5 V
MDC_IDC_MSMT_LEADCHNL_RV_PACING_THRESHOLD_PULSEWIDTH: 0.4 MS
MDC_IDC_MSMT_LEADCHNL_RV_SENSING_INTR_AMPL: 9.5 MV
MDC_IDC_MSMT_LEADCHNL_RV_SENSING_INTR_AMPL: 9.5 MV
MDC_IDC_PG_IMPLANT_DTM: NORMAL
MDC_IDC_PG_MFG: NORMAL
MDC_IDC_PG_MODEL: NORMAL
MDC_IDC_PG_SERIAL: NORMAL
MDC_IDC_PG_TYPE: NORMAL
MDC_IDC_SESS_CLINIC_NAME: NORMAL
MDC_IDC_SESS_DTM: NORMAL
MDC_IDC_SESS_TYPE: NORMAL
MDC_IDC_SET_BRADY_HYSTRATE: NORMAL
MDC_IDC_SET_BRADY_LOWRATE: 40 {BEATS}/MIN
MDC_IDC_SET_BRADY_MODE: NORMAL
MDC_IDC_SET_LEADCHNL_RV_PACING_AMPLITUDE: 2 V
MDC_IDC_SET_LEADCHNL_RV_PACING_ANODE_ELECTRODE_1: NORMAL
MDC_IDC_SET_LEADCHNL_RV_PACING_ANODE_LOCATION_1: NORMAL
MDC_IDC_SET_LEADCHNL_RV_PACING_CAPTURE_MODE: NORMAL
MDC_IDC_SET_LEADCHNL_RV_PACING_CATHODE_ELECTRODE_1: NORMAL
MDC_IDC_SET_LEADCHNL_RV_PACING_CATHODE_LOCATION_1: NORMAL
MDC_IDC_SET_LEADCHNL_RV_PACING_POLARITY: NORMAL
MDC_IDC_SET_LEADCHNL_RV_PACING_PULSEWIDTH: 0.4 MS
MDC_IDC_SET_LEADCHNL_RV_SENSING_ANODE_ELECTRODE_1: NORMAL
MDC_IDC_SET_LEADCHNL_RV_SENSING_ANODE_LOCATION_1: NORMAL
MDC_IDC_SET_LEADCHNL_RV_SENSING_CATHODE_ELECTRODE_1: NORMAL
MDC_IDC_SET_LEADCHNL_RV_SENSING_CATHODE_LOCATION_1: NORMAL
MDC_IDC_SET_LEADCHNL_RV_SENSING_POLARITY: NORMAL
MDC_IDC_SET_LEADCHNL_RV_SENSING_SENSITIVITY: 0.3 MV
MDC_IDC_SET_ZONE_DETECTION_BEATS_DENOMINATOR: 40 {BEATS}
MDC_IDC_SET_ZONE_DETECTION_BEATS_NUMERATOR: 30 {BEATS}
MDC_IDC_SET_ZONE_DETECTION_INTERVAL: 270 MS
MDC_IDC_SET_ZONE_DETECTION_INTERVAL: 360 MS
MDC_IDC_SET_ZONE_DETECTION_INTERVAL: 370 MS
MDC_IDC_SET_ZONE_DETECTION_INTERVAL: NORMAL
MDC_IDC_SET_ZONE_TYPE: NORMAL
MDC_IDC_STAT_BRADY_DTM_END: NORMAL
MDC_IDC_STAT_BRADY_DTM_START: NORMAL
MDC_IDC_STAT_BRADY_RV_PERCENT_PACED: 0.02 %
MDC_IDC_STAT_EPISODE_RECENT_COUNT: 0
MDC_IDC_STAT_EPISODE_RECENT_COUNT: 5
MDC_IDC_STAT_EPISODE_RECENT_COUNT: 6
MDC_IDC_STAT_EPISODE_RECENT_COUNT_DTM_END: NORMAL
MDC_IDC_STAT_EPISODE_RECENT_COUNT_DTM_START: NORMAL
MDC_IDC_STAT_EPISODE_TOTAL_COUNT: 0
MDC_IDC_STAT_EPISODE_TOTAL_COUNT: 1
MDC_IDC_STAT_EPISODE_TOTAL_COUNT: 11
MDC_IDC_STAT_EPISODE_TOTAL_COUNT: 162
MDC_IDC_STAT_EPISODE_TOTAL_COUNT_DTM_END: NORMAL
MDC_IDC_STAT_EPISODE_TOTAL_COUNT_DTM_START: NORMAL
MDC_IDC_STAT_EPISODE_TYPE: NORMAL
MDC_IDC_STAT_TACHYTHERAPY_ATP_DELIVERED_RECENT: 0
MDC_IDC_STAT_TACHYTHERAPY_ATP_DELIVERED_TOTAL: 0
MDC_IDC_STAT_TACHYTHERAPY_RECENT_DTM_END: NORMAL
MDC_IDC_STAT_TACHYTHERAPY_RECENT_DTM_START: NORMAL
MDC_IDC_STAT_TACHYTHERAPY_SHOCKS_ABORTED_RECENT: 0
MDC_IDC_STAT_TACHYTHERAPY_SHOCKS_ABORTED_TOTAL: 0
MDC_IDC_STAT_TACHYTHERAPY_SHOCKS_DELIVERED_RECENT: 0
MDC_IDC_STAT_TACHYTHERAPY_SHOCKS_DELIVERED_TOTAL: 1
MDC_IDC_STAT_TACHYTHERAPY_TOTAL_DTM_END: NORMAL
MDC_IDC_STAT_TACHYTHERAPY_TOTAL_DTM_START: NORMAL

## 2020-02-20 DIAGNOSIS — Q20.3 TGA (TRANSPOSITION OF GREAT ARTERIES): ICD-10-CM

## 2020-02-20 DIAGNOSIS — Q20.3 D-TGA (DEXTRO-TRANSPOSITION OF GREAT ARTERIES): Primary | ICD-10-CM

## 2020-02-20 NOTE — PROGRESS NOTES
During chart review, per Dr. Valle's last note, patient due for echo. Adding to schedule for 2/28/2020.

## 2020-02-21 DIAGNOSIS — Q20.3 TGA (TRANSPOSITION OF GREAT ARTERIES): ICD-10-CM

## 2020-02-21 DIAGNOSIS — Q20.3 D-TGA (DEXTRO-TRANSPOSITION OF GREAT ARTERIES): Primary | ICD-10-CM

## 2020-02-28 ENCOUNTER — OFFICE VISIT (OUTPATIENT)
Dept: CARDIOLOGY | Facility: CLINIC | Age: 40
End: 2020-02-28
Attending: INTERNAL MEDICINE
Payer: MEDICARE

## 2020-02-28 VITALS
DIASTOLIC BLOOD PRESSURE: 70 MMHG | SYSTOLIC BLOOD PRESSURE: 107 MMHG | WEIGHT: 179.2 LBS | OXYGEN SATURATION: 95 % | HEART RATE: 86 BPM | BODY MASS INDEX: 32.97 KG/M2 | HEIGHT: 62 IN

## 2020-02-28 VITALS
OXYGEN SATURATION: 95 % | SYSTOLIC BLOOD PRESSURE: 107 MMHG | HEART RATE: 86 BPM | BODY MASS INDEX: 33.07 KG/M2 | WEIGHT: 179.7 LBS | HEIGHT: 62 IN | DIASTOLIC BLOOD PRESSURE: 70 MMHG

## 2020-02-28 DIAGNOSIS — Q20.3 D-TGA (DEXTRO-TRANSPOSITION OF GREAT ARTERIES): ICD-10-CM

## 2020-02-28 DIAGNOSIS — Q20.3 TGA (TRANSPOSITION OF GREAT ARTERIES): ICD-10-CM

## 2020-02-28 DIAGNOSIS — Q24.9 CONGENITAL HEART DISEASE: ICD-10-CM

## 2020-02-28 DIAGNOSIS — I47.20 VENTRICULAR TACHYCARDIA (H): ICD-10-CM

## 2020-02-28 DIAGNOSIS — Q20.3 D-TGA (DEXTRO-TRANSPOSITION OF GREAT ARTERIES): Primary | ICD-10-CM

## 2020-02-28 DIAGNOSIS — Z86.79 HX OF VENTRICULAR TACHYCARDIA: ICD-10-CM

## 2020-02-28 DIAGNOSIS — I47.20 VENTRICULAR TACHYCARDIA (H): Primary | ICD-10-CM

## 2020-02-28 LAB
ALBUMIN SERPL-MCNC: 3.8 G/DL (ref 3.4–5)
ALP SERPL-CCNC: 134 U/L (ref 40–150)
ALT SERPL W P-5'-P-CCNC: 47 U/L (ref 0–50)
ANION GAP SERPL CALCULATED.3IONS-SCNC: 6 MMOL/L (ref 3–14)
AST SERPL W P-5'-P-CCNC: 27 U/L (ref 0–45)
BASOPHILS # BLD AUTO: 0.1 10E9/L (ref 0–0.2)
BASOPHILS NFR BLD AUTO: 0.7 %
BILIRUB SERPL-MCNC: 0.9 MG/DL (ref 0.2–1.3)
BUN SERPL-MCNC: 9 MG/DL (ref 7–30)
CALCIUM SERPL-MCNC: 9.2 MG/DL (ref 8.5–10.1)
CHLORIDE SERPL-SCNC: 106 MMOL/L (ref 94–109)
CO2 SERPL-SCNC: 26 MMOL/L (ref 20–32)
CREAT SERPL-MCNC: 0.56 MG/DL (ref 0.52–1.04)
DIFFERENTIAL METHOD BLD: ABNORMAL
EOSINOPHIL # BLD AUTO: 0.1 10E9/L (ref 0–0.7)
EOSINOPHIL NFR BLD AUTO: 1.6 %
ERYTHROCYTE [DISTWIDTH] IN BLOOD BY AUTOMATED COUNT: 12.3 % (ref 10–15)
GFR SERPL CREATININE-BSD FRML MDRD: >90 ML/MIN/{1.73_M2}
GLUCOSE SERPL-MCNC: 140 MG/DL (ref 70–99)
HCT VFR BLD AUTO: 41.9 % (ref 35–47)
HGB BLD-MCNC: 13.8 G/DL (ref 11.7–15.7)
IMM GRANULOCYTES # BLD: 0 10E9/L (ref 0–0.4)
IMM GRANULOCYTES NFR BLD: 0.2 %
LYMPHOCYTES # BLD AUTO: 1.3 10E9/L (ref 0.8–5.3)
LYMPHOCYTES NFR BLD AUTO: 15.8 %
MCH RBC QN AUTO: 29.1 PG (ref 26.5–33)
MCHC RBC AUTO-ENTMCNC: 32.9 G/DL (ref 31.5–36.5)
MCV RBC AUTO: 88 FL (ref 78–100)
MONOCYTES # BLD AUTO: 0.6 10E9/L (ref 0–1.3)
MONOCYTES NFR BLD AUTO: 7 %
NEUTROPHILS # BLD AUTO: 6.3 10E9/L (ref 1.6–8.3)
NEUTROPHILS NFR BLD AUTO: 74.7 %
NRBC # BLD AUTO: 0 10*3/UL
NRBC BLD AUTO-RTO: 0 /100
PLATELET # BLD AUTO: 138 10E9/L (ref 150–450)
POTASSIUM SERPL-SCNC: 3.9 MMOL/L (ref 3.4–5.3)
PROT SERPL-MCNC: 7.8 G/DL (ref 6.8–8.8)
RBC # BLD AUTO: 4.75 10E12/L (ref 3.8–5.2)
SODIUM SERPL-SCNC: 139 MMOL/L (ref 133–144)
WBC # BLD AUTO: 8.4 10E9/L (ref 4–11)

## 2020-02-28 PROCEDURE — 36415 COLL VENOUS BLD VENIPUNCTURE: CPT | Performed by: INTERNAL MEDICINE

## 2020-02-28 PROCEDURE — 99214 OFFICE O/P EST MOD 30 MIN: CPT | Mod: 25 | Performed by: INTERNAL MEDICINE

## 2020-02-28 PROCEDURE — 80053 COMPREHEN METABOLIC PANEL: CPT | Performed by: INTERNAL MEDICINE

## 2020-02-28 PROCEDURE — G0463 HOSPITAL OUTPT CLINIC VISIT: HCPCS | Mod: 25,ZF

## 2020-02-28 PROCEDURE — 93010 ELECTROCARDIOGRAM REPORT: CPT | Mod: ZP | Performed by: INTERNAL MEDICINE

## 2020-02-28 PROCEDURE — 93005 ELECTROCARDIOGRAM TRACING: CPT | Mod: ZF

## 2020-02-28 PROCEDURE — 99214 OFFICE O/P EST MOD 30 MIN: CPT | Mod: ZP | Performed by: INTERNAL MEDICINE

## 2020-02-28 PROCEDURE — 85025 COMPLETE CBC W/AUTO DIFF WBC: CPT | Performed by: INTERNAL MEDICINE

## 2020-02-28 ASSESSMENT — PAIN SCALES - GENERAL
PAINLEVEL: NO PAIN (0)
PAINLEVEL: NO PAIN (0)

## 2020-02-28 ASSESSMENT — MIFFLIN-ST. JEOR
SCORE: 1433.36
SCORE: 1431.1

## 2020-02-28 NOTE — PATIENT INSTRUCTIONS
It was a pleasure to see you in clinic today.  Please do not hesitate to call with any questions or concerns.  You are scheduled for a remote transmission on 5/28/20.  We look forward to seeing you in clinic at your next device check in 6 months.    Joanne Lui RN, BSN  Electrophysiology Nurse Clinician  [facility]    During Business Hours Please Call:  [phone]  After Hours Please Call:  [phone] - select option #4 and ask for job code [phone]

## 2020-02-28 NOTE — NURSING NOTE
Cardiac Testing: Patient given instructions regarding  echocardiogram . Discussed purpose, preparation, procedure and when to expect results reported back to the patient. Patient demonstrated understanding of this information and agreed to call with further questions or concerns.    Diet: Patient instructed regarding a heart healthy diet, including discussion of reduced fat and sodium intake. Patient demonstrated understanding of this information and agreed to call with further questions or concerns.    Med Reconcile: Reviewed and verified all current medications with the patient. The updated medication list was printed and given to the patient.    Return Appointment: Patient given instructions regarding scheduling next clinic visit. Patient demonstrated understanding of this information and agreed to call with further questions or concerns.    Patient stated she understood all health information given and agreed to call with further questions or concerns.    Chasity Wild, MSN, RN, CNL  Cardiology Care Coordinator  [facility]  [phone]

## 2020-02-28 NOTE — PATIENT INSTRUCTIONS
You were seen today in the Adult Congenital and Cardiovascular Genetics Clinic at the [facility].    Cardiology Providers you saw during your visit:  Betsy Valle MD and Vimal Ray MD    Diagnosis:  TGA    Results:  Betsy Valle MD and Vimal Ray MD reviewed the results of your lab, device, echo and EKG testing today in clinic.    Recommendations:    1. Continue to eat a heart healthy, low salt diet.  2. Continue to get 20-30 minutes of aerobic activity, 4-5 days per week.  Examples of aerobic activity include walking, running, swimming, cycling, etc.  3. Continue to observe good oral hygiene, with regular dental visits.  4. Before becoming pregnant, please call us to discuss impacts of pregnancy on your health.    SBE prophylaxis:   Yes_X___  No____    Lifelong Bacterial Endocarditis Prophylaxis:  YES____  NO____    If YES is checked, follow the recommendations outlined below:  1. Take antibiotic(s) prior to recommended dental procedures and procedures on the respiratory tract or with infected skin, muscle or bones. SBE prophylaxis is not needed for routine GI and  procedures (ie. Colonoscopy or vaginal delivery)  2. Observe good oral hygiene daily, as advised by your dentist. Get regular professional dental care.  3. Keep cuts clean.  4. Infections should be treated promptly.  5. Symptoms of Infective Endocarditis could include: fever lasting more than 4-5 days or a recurrent fever that initially resolves but returns within 1-2 days)      Exercise restrictions:   Yes__X__  No____         If yes, list restrictions:  Must be allowed to rest if fatigued or SOB      Work restrictions:  Yes____  No_X___         If yes, list restrictions:    FASTING CHOLESTEROL was checked in the last 5 years YES_X__  NO___ (2019)  Continue to eat a heart healthy, low salt diet.         ____ Fasting lipid panel order today         ____ No changes in medications          ____ I recommend the following changes in  your cholesterol medications.:          ____ Please follow up for cholesterol screening at your primary care physician      Follow-up:  Follow up in one year with Dr. Valle and Dr. Ray with EKG, echo and labs prior.    If you have questions or concerns please contact us at:    Chasity Wild, MSN, RN, CNL    Nora Collins (Scheduling)  Nurse Care Coordinator     Clinic   Adult Congenital and CV Genetics   Adult Congenital and CV Genetic  [facility]   [facility]  (P) [phone]     (P) [phone]  [email or web address]   (F) [phone]        For after hours urgent needs, call [phone] and ask to speak to the Adult Congenital Physician on call.  Mention Job Code [id number].    For emergencies call 911.    [facility]  [facility]   Clinics and Surgery Center  Mail Code [id number]  [address] [place]  [address]

## 2020-02-28 NOTE — NURSING NOTE
Cardiac Testing: Patient given instructions regarding  echocardiogram . Discussed purpose, preparation, procedure and when to expect results reported back to the patient. Patient demonstrated understanding of this information and agreed to call with further questions or concerns.    Diet: Patient instructed regarding a heart healthy diet, including discussion of reduced fat and sodium intake. Patient demonstrated understanding of this information and agreed to call with further questions or concerns.    Med Reconcile: Reviewed and verified all current medications with the patient. The updated medication list was printed and given to the patient.    Return Appointment: Patient given instructions regarding scheduling next clinic visit. Patient demonstrated understanding of this information and agreed to call with further questions or concerns.    Patient stated she understood all health information given and agreed to call with further questions or concerns.    Chasity Wild, MSN, RN, CNL  Cardiology Care Coordinator  [facility]  [phone]

## 2020-02-28 NOTE — LETTER
2/28/2020      RE: Cihkis Hannon  [id number]  [facility] [address]       Dear Colleague,    Thank you for the opportunity to participate in the care of your patient, Chikis Hannon, at the [facility] HEART [facility] at [facility]. Please see a copy of my visit note below.    [facility] Electrophysiology Clinic Visit  HPI:  Ms. 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 The patient underwent VT ablation on 3/13/2014 and a subsequent ICD implant single-chamber single coil on 3/14/2014, later Rv lead malfunction due to isolation break and failure to deliver shock for recurrent fast VT (VT spontaneously terminated) s/p replacement of entire system 6/2/2015. She was discharged on sotalol 120 mg twice a day. She was seen in clinic on 4/22/14 and her device interrogation showed no recurrence of ventricular arrhythmias. She reported some mild fatigue on Sotalol and her dose was decreased to 80 mg BID. At her July 2014 follow up appointment she only had one 4 second NSVT at 160 bpm which was asymptomatic. At her clinic visit in 2/15 she again had no ventricular arrhythmias and her Sotalol was stopped. After recurrence of VT in June 2015, the patient was restarted on sotalol 80 mg twice a day. We re-attempted VT ablation after holding sotalol for one week on 8/24/2015 with no inducible VT.  [facility] She presents today for follow-up.  Her  device interrogation shows no ventricular arrhythmias.  Her device site has healed well. She denies any syncope or presyncope, no chest pain or shortness of breath on exertion at rest, no orthopnea, no lower extremity edema. Her EKG shows normal sinus rhythm with RBBB,  ms.  [facility] EP Visit 9/23/17: She presents today for follow up. She reports feeling well. She denies any chest pain, dizziness, lightheadedness, shortness of breath, palpitations, leg/ankle swelling, PND, orthopnea, or syncopal symptoms. She has started exercise classes and is increasing her activity levels. Device interrogation shows normal device function.  17 episodes recorded. 16 VT zone monitored events when she was exercising EGMS shows ST with rates of 168-172 BPM.  1 NSVT for 4 beats at a rate of 211 for 2 second duration.  Intrinsic rhythm = SR @ 90 bpm. = <0.1%. OptiVol fluid index is at baseline. No short v-v intervals recorded.  RV lead impedance trends appear stable. Presenting 12 lead ECG shows SR with RBBB Vent Rate 84 bpm,  ms,  ms, QTc 505 ms. Current cardiac medications include: Sotalol.   Ep Visit 9/2017: She presents today for follow up. She reports feeling well. She denies any chest pain/pressures, dizziness, lightheadedness, shortness of breath, leg/ankle swelling, PND, orthopnea, palpitations, or syncopal symptoms. She continues to exercise and is without limitations. Device interrogation shows normal device function.  2 NSVT episodes recorded, lasting 1-2 seconds. 8 episodes recorded as VT, these EGMs are consistent with ST.  <0.1%. No short v-v intervals recorded. Lead trends appear stable. Presenting 12 lead ECG shows NSR with RBBB Vent Rate 85 bpm,  ms,  ms, QTc 504 ms. Current cardiac medications include: Sotalol.     She presents today for follow up. She reports feeling well. She states she has not been taking her Sotalol for the last many months. She denies any chest pain/pressures,  dizziness, lightheadedness, worsening shortness of breath, leg/ankle swelling, PND, orthopnea, palpitations, or syncopal symptoms. Device interrogation shows   normal ICD function.  Since last remote transmission on 10/29/19, there have been 5 SVT-Wavelet, 7 monitored VT and 2 NSVT episodes recorded.  The SVT episodes last 5-30 seconds at 167-176 bpm, occur with activity and appear to be ST.  The monitored VT episodes last 5-56 seconds at rates of 167-171 bpm.  These also occur with activity and appear  to be ST with several occurring on 11/27/19.  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  The NSVT episodes last 1 second in duration @ 231-226 bpm.   Intrinsic rhythm = SR @ 80 bpm.    =<0.1%. No short v-v intervals recorded.  Lead trends appear stable.  Echo today is stable with normal systemic function. No current cardiac medications.       Current Outpatient Prescriptions   Medication Sig Dispense Refill     sotalol (BETAPACE) 80 MG tablet Take 1 tablet (80 mg) by mouth every 12 hours 60 tablet 3     amoxicillin (AMOXIL) 500 MG capsule Take 4 capsules (2000mg) 30-60 minutes prior to dental work. 12 capsule 3     acetaminophen-codeine (TYLENOL #3) 300-30 MG per tablet Take 1 tablet by mouth every 4 hours as needed for other (mild or moderate pain) 10 tablet 0     Naproxen Sodium (ALEVE PO) Take 220 mg by mouth       fluticasone (FLONASE) 50 MCG/ACT nasal spray Spray 1-2 sprays into both nostrils daily 1 Package 11     metFORMIN (GLUCOPHAGE) 500 MG tablet Take 1 tablet (500 mg) by mouth daily (with dinner) 90 tablet 3     STATIN NOT PRESCRIBED, INTENTIONAL, Statin not prescribed intentionally due to Not indicated based on age 0 each 0     gabapentin (NEURONTIN) 300 MG capsule Take 1 tablet (300 mg) every night 90 capsule 0     MELATONIN PO Take 3 mg by mouth nightly as needed        VITAMIN D, CHOLECALCIFEROL, PO Take by mouth daily        Ascorbic Acid (VITAMIN C PO)        Omega-3 Fatty Acids (OMEGA-3 FISH OIL PO)        ACE NOT PRESCRIBED, INTENTIONAL, 1 each as needed for other ACE Inhibitor not prescribed due to Other: BP at goal, pending microalbumin test 0 each 0     blood glucose (ACCU-CHEK SMARTVIEW) test strip Use to test blood sugar 1 times daily or as directed. 100 strip prn     blood glucose monitoring (ACCU-CHEK FASTCLIX) lancets Use to test blood sugar 1 times daily or as directed. 1 Box prn     medroxyPROGESTERone (DEPO-PROVERA) 150 MG/ML injection Inject 1 mL (150 mg) into the muscle every 3 months 1 mL 4     MULTIVITAMINS PO TABS ONE DAILY  3       Past Medical History   Diagnosis Date     Allergy, unspecified not elsewhere classified      Attention deficit disorder without mention of hyperactivity      Mild MR; difficulty attention span     Corrected transposition of great vessels      still needs SBE prophlaxis     Impaired fasting glucose      103 7/06     Congenital heart disease      Ventricular tachycardia 3/4/2014     s/p ablation and ICD placement 3/13/14     Diabetes mellitus type 2, noninsulin dependent        Past Surgical History   Procedure Laterality Date     C nonspecific procedure  11/98     open heart surgery x 3, transposition of major vessels     C nonspecific procedure  10/03     Additional heart surgery     Cardiac surgery  Had 4 surgeries     transposition of the major arteries     Surgical history of -   2014     ablation for v. tach; ICD placed      N/A 6/1/2015     Procedure: ANESTHESIA ICD/PACEMAKER CHANGE ADULT;  Surgeon: GENERIC ANESTHESIA PROVIDER;  Location: [facility] OR       Family History   Problem Relation Age of Onset     Adopted: Yes     Family History Negative Mother      Family History Negative Father        History   Substance Use Topics     Smoking status: Never Smoker      Smokeless tobacco: Never Used     Alcohol Use: No       ROS:   A comprehensive 10 system ROS was negative except for  "HPI.    Physical Examination:  /70 (BP Location: Left arm, Patient Position: Chair, Cuff Size: Adult Large)   Pulse 86   Ht 1.575 m (5' 2\")   Wt 81.3 kg (179 lb 3.2 oz)   SpO2 95%   BMI 32.78 kg/m     GENERAL APPEARANCE: healthy, alert and no distress  HEENT: no icterus, no xanthelasmas, normal pupil size and reaction, normal palate, mucosa moist, no cyanosis.  NECK: no adenopathy, no asymmetry, masses, or scars, thyroid normal to palpation, carotid upstrokes are normal bilaterally, no cervical bruits, JVP is not visible  CHEST: lungs clear to auscultation - no rales, rhonchi or wheezes, no use of accessory muscles, no retractions, respirations are unlabored, normal respiratory rate, no kyphosis, no scoliosis  CARDIOVASCULAR: regular rhythm, S1S2, 3/6 KRISTI best heard at the left sternal border at the base.  ABDOMEN: soft, non tender, without hepatosplenomegaly, no masses palpable, bowel sounds normal, aorta not enlarged by palpation, no abdominal bruits  EXTREMITIES: no clubbing, cyanosis or edema.  NEURO: alert and oriented to person/place/time, normal speech, gait and affect  VASC: Radial, dorsalis pedis and posterior tibialis pulses are normal.  SKIN: no ecchymoses, no rashes    Laboratory: Reviewed.    Procedures:  LAST CARDIAC MRA:  3/6/14  FINDINGS:   SITUS: There is a normal spleen in the left upper quadrant. There is situs solitus in the chest, as demonstrated by a normal airway pulmonary artery relationship bilaterally.   CAVAE: Single right-sided inferior and superior vena cavae drain normally into the right atrium unobstructed.   PULMONARY VEINS: Two right and two left pulmonary veins drain into the left atrium unobstructed.   ATRIA: There is no interatrial communication demonstrated. The atrial sizes are normal.   ATRIOVENTRICULAR CONNECTION: Concordant. Separate mitral and tricuspid valves without evidence of significant regurgitation or stenosis.   VENTRICLES: D-loop ventricles with " levocardia. Ventricles are normal in size and contraction. Right ventricular end-diastolic volume is upper limits of normal for BSA. If utilizing an ideal body weight, this would be mildly enlarged. No residual interventricular communication is demonstrated. No myocardial hyperenhancement identified on delayed post gadolinium imaging, except at the ventricular insertion points.   VENTRICULOARTERIAL CONNECTION: Concordant. Trileaflet aortic valve without regurgitation or stenosis. The oversewn native pulmonary artery is directly posterior to the aorta. There is now an RV to PA conduit. Mild stenosis of the RVOT conduit with moderate regurgitation, 31% by direct flow measurement, and 33% by ventricular stroke volume comparison.   AORTA AND SUPRA-AORTIC VESSELS: A left-sided aortic arch is demonstrated with normal cervical branching pattern. No evidence of patent ductus arteriosus, coarctation, or aortopulmonary collateral arteries. The coronary arteries are not well visualized in this study.   PULMONARY ARTERY: The RV to PA conduit is patent with normal pulmonary artery branching pattern.   LAST ECHO:  2/2020  ------CONCLUSIONS------  Technically difficult study due to poor acoustic windows. D-TGA status post  Rastelli and subsequent placement of a bio-prosthetic valve in the pulmonary  position. There is no obvious residual ventricular level shunt. Mild (1+)  aortic valve insufficiency. Unable to adequately visualize the RV-PA conduit  or the bio-prosthetic valve; however, normal antegrade flow is visualized in  the branch pulmonary arteries. A transvenous pacemaker lead is seen in the  right ventricle, with the tip of the lead implanted into the right ventricular  apex. Trivial tricuspid valve insufficiency. Estimated right ventricular  systolic pressure is 26 mmHg plus right atrial pressure. The left and right  ventricles have normal chamber size, wall thickness, and systolic function.  The calculated biplane left  ventricular ejection fraction is 57%. No  pericardial effusion.  No significant change from last echocardiogram.  7/25/19 CTA:  IMPRESSION:  1.  D-transposition of aorta s/p Rastelli with bioprosthetic pulmonic  valve  2.  Mildly reduced left ventricular function with LV EF 52%.  3.  Normal RV size and function with RV EF 55%.  4.  RV-PA valved conduit without visual evidence of stenosis.  5.  Mild pulmonic regurgitation (<30 mL) using biventricular stroke  volumes.  6.  No significant change from CT scan in 2014.  7.  Please review radiology review for incidental non-cardiovascular  findings to follow separately    Assessment and recommendations:  Ms. 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 She presents today for follow up. She reports feeling well. She states she has not been taking her Sotalol for the last many months. She denies any chest pain/pressures, dizziness, lightheadedness, worsening shortness of breath, leg/ankle swelling, PND, orthopnea, palpitations, or syncopal symptoms. Device interrogation shows   normal ICD function.  Since last remote transmission on 10/29/19, there have been 5 SVT-Wavelet, 7 monitored VT and 2 NSVT episodes recorded.  The SVT episodes last  5-30 seconds at 167-176 bpm, occur with activity and appear to be ST.  The monitored VT episodes last 5-56 seconds at rates of 167-171 bpm.  These also occur with activity and appear  to be ST with several occurring on 11/27/19.  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  The NSVT episodes last 1 second in duration @ 231-226 bpm.   Intrinsic rhythm = SR @ 80 bpm.    =<0.1%. No short v-v intervals recorded.  Lead trends appear stable.  Echo today is stable with normal systemic function. No current cardiac medications.     She is doing well from an EP standpoint. She is off AAT and is not having any arrhyhmias. Device has normal function and stable lead parameters. Echo is stable with good systemic function. No further interventions required. She will continue to follow with Dr. Valle and device team per routine. [facility] or sooner if need arises.     The patient states understanding and is agreeable with the plan.   This patient was seen and examined with Dr. Ray. The above note reflects our joint assessment and plan.   KIMMIE Blevins CNP  Electrophysiology Service  Pager: [phone]    EP STAFF NOTE  I have seen and examined the patient as part of a shared visit with LEO Blevins NP.  I agree with the note above. I reviewed today's vital signs and medications. I have reviewed and discussed with the advanced practice provider their physical examination, assessment, and plan   Briefly, doing well off sotalol  My key history/exam findings are: RRR.   The key management decisions made by me: f/u in one year.    Vimal Ray MD [facility]  Cardiology - Electrophysiology

## 2020-02-28 NOTE — LETTER
2/28/2020      RE: Chikis Hannon  [address]  [facility] [address]       Dear Colleague,    Thank you for the opportunity to participate in the care of your patient, Chikis Hannon, at the [facility] HEART [facility]. Please see a copy of my visit note below.    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  Today pt reports that she has been feeling well.  She denies any chest pain or pressure, sob/maher, orthopnea, pnd, palpitations, syncope/presyncope, cathy, change in exercise tolerance or any ICD shocks.      PAST MEDICAL HISTORY:  Past Medical History:   Diagnosis Date     Allergy, unspecified not elsewhere classified      Attention deficit disorder without mention of hyperactivity     Mild MR; difficulty attention span     Congenital heart disease      Corrected transposition of great vessels     still needs SBE prophlaxis     Diabetes mellitus type 2, noninsulin dependent (H)      Impaired fasting glucose     103 7/06     Ventricular tachycardia (H) 3/4/2014    s/p ablation and ICD placement 3/13/14       FAMILY HISTORY:  Family History   Adopted: Yes   Problem Relation Age of Onset     Family History Negative Mother      Family History Negative Father        SOCIAL HISTORY:  History     Social History     Marital Status:      Spouse Name: N/A     Number of Children: N/A     Years of Education: N/A     Occupational History      at [employer]              Disabled     Social History Main Topics     Smoking status: Never Smoker      Smokeless tobacco: Never Used     Alcohol Use: No     Drug Use: No     Sexual Activity:     Partners: Male     Other Topics Concern     Special Diet No     working on dairy. fruits and veggies.     Exercise Yes     Peter Monzon tape     Parent/Sibling W/ Cabg, Mi Or Angioplasty Before 65f 55m? No     Social History Narrative       CURRENT MEDICATIONS:  Current Outpatient Medications   Medication Sig Dispense Refill     ACE NOT PRESCRIBED, INTENTIONAL, 1 each as needed for other ACE Inhibitor not prescribed due to Other: BP at goal, pending microalbumin test 0 each 0     amoxicillin (AMOXIL) 500 MG capsule Please take 2,000 mg (4 capsule) 30-60 min prior to all dental procedures 4 capsule 3     atorvastatin (LIPITOR) 40 MG tablet Take 1 tablet (40 mg) by mouth daily 90 tablet 3     blood glucose monitoring (ACCU-CHEK FASTCLIX) lancets Use to test blood sugar 1 times daily or as directed. 1 Box 3     blood glucose monitoring (ACCU-CHEK SMARTVIEW) test strip Use to test blood sugar 1 times daily or as directed. 100 each 0     gabapentin (NEURONTIN) 300 MG capsule TAKE 1 CAPSULE(300 MG) BY MOUTH EVERY NIGHT 90 capsule 1     metFORMIN (GLUCOPHAGE) 500 MG tablet Take 1 tablet (500 mg) by mouth daily (with dinner) 90 tablet 1     MULTIVITAMINS PO TABS ONE DAILY  3     Naproxen Sodium (ALEVE PO) Take 220 mg by mouth       sertraline (ZOLOFT) 25 MG tablet Take 1 tablet (25 mg) by mouth daily 90 tablet 1     sotalol (BETAPACE) 80 MG tablet TAKE 1 TABLET(80 MG) BY MOUTH EVERY 12 HOURS (Patient not taking: Reported on 2/28/2020) 180 tablet 3       ROS:   Constitutional: No fever, chills, or sweats. No weight gain/loss.   ENT: No visual disturbance, ear ache, epistaxis, sore throat.   Allergies/Immunologic: Negative.   Respiratory: No cough, hemoptysis.   Cardiovascular: As per HPI.   GI: No nausea, vomiting, hematemesis, melena, or  "hematochezia.   : No urinary frequency, dysuria, or hematuria.   Integument: Negative.   Psychiatric: Negative.   Neuro: Negative.   Endocrinology: Negative.   Musculoskeletal: Negative.    EXAM:  /70 (BP Location: Left arm, Patient Position: Chair, Cuff Size: Adult Large)   Pulse 86   Ht 1.575 m (5' 2\")   Wt 81.5 kg (179 lb 11.2 oz)   SpO2 95%   BMI 32.87 kg/m    General: appears comfortable, alert and articulate  Head: normocephalic, atraumatic  Eyes: anicteric sclera, EOMI  Neck: no adenopathy, 2+ carotids  Orophyarynx: moist mucosa, no lesions, dentition intact  Heart: Nrl S1, prominent S2, widely split (patient has RBBB), has 3/6 KRISTI best heard at the left sternal border at the base., JVP 6-7cm  Lungs: clear, no rales or wheezing  Abdomen: soft, non-tender, bowel sounds present, no hepatomegaly  Extremities: no clubbing, cyanosis or edema  Neurological: normal speech and affect, no gross motor deficits      Labs:  CBC RESULTS:  Lab Results   Component Value Date    WBC 8.4 02/28/2020    RBC 4.75 02/28/2020    HGB 13.8 02/28/2020    HCT 41.9 02/28/2020    MCV 88 02/28/2020    MCH 29.1 02/28/2020    MCHC 32.9 02/28/2020    RDW 12.3 02/28/2020     (L) 02/28/2020       CMP RESULTS:  Lab Results   Component Value Date     02/28/2020    POTASSIUM 3.9 02/28/2020    CHLORIDE 106 02/28/2020    CO2 26 02/28/2020    ANIONGAP 6 02/28/2020     (H) 02/28/2020    BUN 9 02/28/2020    CR 0.56 02/28/2020    GFRESTIMATED >90 02/28/2020    GFRESTBLACK >90 02/28/2020    LITA 9.2 02/28/2020    BILITOTAL 0.9 02/28/2020    ALBUMIN 3.8 02/28/2020    ALKPHOS 134 02/28/2020    ALT 47 02/28/2020    AST 27 02/28/2020        INR RESULTS:  Lab Results   Component Value Date    INR 1.13 06/01/2015       Lab Results   Component Value Date    MAG 2.0 06/02/2015     Lab Results   Component Value Date    NTBNPI 327 12/24/2008     No results found for: NTBNP       CT CORONARY: 3/4/14   IMPRESSION:  1. Normal " coronary anatomy with no detectable stenosis or plaque. The coronaries arise from the anteriorly positioned aorta.     ADDITIONAL FINDINGS: The aortic root has an unusual appearance probably from the reconstructive surgery for transposition. The appearance is nearly of 2 aortic roots one small 1 which ends in a cul-de-sac and the other which leads into the ascending aorta. The coronary arteries arise from that aortic root which feeds into the ascending aorta . There is a conduit from the right ventricle to the pulmonary trunk. Normal pulmonary venous anatomy with all four pulmonary veins draining into the left atrium. There is no left atrial or left ventricular mass or thrombus. Normal pericardial thickness.There is no pericardial effusion. The proximal pulmonary arteries are well opacified. Please review Radiology report for incidental noncardiac findings that will follow separately.      CARDIAC MRI/MRA: 3/4/14   IMPRESSION:   1. Transposition of the great arteries with VSD after Rastelli procedure with RV to PA conduit.   2. Minimal flow acceleration across the RV to PA conduit with moderate pulmonary regurgitation (31-33% regurg fraction, Peak PA conduit gradient 18mm Hg).   3. Normal right and left ventricular size and systolic function with no evidence of fibrosis.    Echo 3/16  Difficult study due to poor acoustic windows. The patient has undergone a Rastelli procedure for d-transposition of the great arteries and replacement of the pulmonary valve. The pulmonary valve is not seen in this study. Shunts were not able to be excluded. Mildly diminished left ventricular systolic function with mild left ventricular enlargement. Normal right ventricular and pulmonary artery pressures.    Echo 11/17  D-TGA status post Rastelli and subsequent placement of a bio-prosthetic valve in the pulmonary position. The calculated single plane left ventricular ejection fraction from the 4 chamber view is 58%. A pacing lead is  present in the right atrium and right ventricle. Mild (1+) aortic valve insufficiency. Unable to adequately visualize the RV-PA conduit or the bio-prosthetic valve. Estimated right ventricular systolic pressure is 20-25 mmHg plus right atrial pressure.    Echo Nov 2018  D-TGA status post Rastelli and subsequent placement of a bio-prosthetic valve in the pulmonary position. There is no obvious residual ventricular level shunt. Mild (1+) aortic valve insufficiency. Unable to adequately visualize the RV-PA conduit or the bio-prosthetic valve, however normal antegrade flow is visualized in the branch pulmonary arteries. The calculated single plane left ventricular ejection fraction from the 4 chamber view is 56%. A pacing lead is present in the right atrium and right ventricle. No pericardial effusion. Technically difficult study due to poor acoustic windows.        CTA ANGIOGRAM CONGENITAL HEART DISEASE 7/25/2019   1.  D-transposition of aorta s/p Rastelli with bioprosthetic pulmonic valve  2.  Mildly reduced left ventricular function with LV EF 52%.  3.  Normal RV size and function with RV EF 55%.  4.  RV-PA valved conduit without visual evidence of stenosis.  5.  Mild pulmonic regurgitation (<30 mL) using biventricular stroke  volumes.  6.  No significant change from CT scan in 2014.    Echo today:  Technically difficult study due to poor acoustic windows. D-TGA status post  Rastelli and subsequent placement of a bio-prosthetic valve in the pulmonary  position. There is no obvious residual ventricular level shunt. Mild (1+)  aortic valve insufficiency. Unable to adequately visualize the RV-PA conduit  or the bio-prosthetic valve; however, normal antegrade flow is visualized in  the branch pulmonary arteries. A transvenous pacemaker lead is seen in the  right ventricle, with the tip of the lead implanted into the right ventricular  apex. Trivial tricuspid valve insufficiency. Estimated right ventricular  systolic  pressure is 26 mmHg plus right atrial pressure. The left and right  ventricles have normal chamber size, wall thickness, and systolic function.  The calculated biplane left ventricular ejection fraction is 57%. No  pericardial effusion.  No significant change from last echocardiogram.      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   1. complete TGA and VSD, status post Rastelli procedure with VSD closure and RV to PA conduit, history of RV-PA conduit obstruction requiring replacement during teenage years: Pt continues to feel well, denies any change in exercise tolerance and is euvolemic today by history and exam. Echo today confirms no significant change from prior. Encouraged patient to continue regular aerobic exercise 20-30 minutes a day, 4-5 times per week and follow low-salt, heart healthy diet. Continue statin for dyslipidemia. Pt aware that she should not become pregnant while on statin therapy.  Pt currently on depo-provera and not interested in pregnancy at this time.  Also reminded patient the importance of maintaining good oral hygiene and routine dental care with SBE prophylaxis.  2. H/o VT s/p VT ablation and ICD placement: Please see congential EP note from today for detailed findings and plan.    Follow-up: one year  with EKG, echo and labs prior. Will be happy to see sooner if change in clinical status or new questions/concerns arise.    Betsy Valle MD  Section Head - Advanced Heart  Failure, Transplantation and Mechanical Circulatory Support  Director - Adult Congenital and Cardiovascular Genetics Center  Associate Professor of Medicine, [facility]

## 2020-02-28 NOTE — LETTER
February 28, 2020      RE: Chikis Hannon  [address]  [facility] [address]         To Whom It May Concern:    Chikis Hannon is a patient I follow in the cardiology clinic at the [facility]. Given Chikis's complex congenital heart condition, maintaining an ideal body weight and and body mass index (BMI) is important to optimizing her overall health. It is important for her to maintain a goal weight of 135 pounds and a BMI less than 25. This should be accomplished through both healthy eating and exercise.    Please do not hesitate to contact our clinic if you have any questions or concerns at [phone].    Sincerely,      Betsy Valle MD

## 2020-02-28 NOTE — PROGRESS NOTES
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  Today pt reports that she has been feeling well.  She denies any chest pain or pressure, sob/maher, orthopnea, pnd, palpitations, syncope/presyncope, cathy, change in exercise tolerance or any ICD shocks.      PAST MEDICAL HISTORY:  Past Medical History:   Diagnosis Date     Allergy, unspecified not elsewhere classified      Attention deficit disorder without mention of hyperactivity     Mild MR; difficulty attention span     Congenital heart disease      Corrected transposition of great vessels     still needs SBE prophlaxis     Diabetes mellitus type 2, noninsulin dependent (H)      Impaired fasting glucose     103 7/06     Ventricular tachycardia (H) 3/4/2014    s/p ablation and ICD placement 3/13/14       FAMILY HISTORY:  Family History   Adopted: Yes   Problem Relation Age of Onset     Family History Negative Mother      Family History Negative Father        SOCIAL HISTORY:  History     Social History     Marital Status:      Spouse Name: N/A     Number of Children: N/A     Years of Education: N/A     Occupational History      at [employer]             Disabled     Social History Main Topics     Smoking status: Never Smoker      Smokeless tobacco: Never Used     Alcohol Use: No     Drug Use: No     Sexual Activity:     Partners: Male     Other Topics Concern     Special Diet No     working on dairy. fruits and veggies.     Exercise Yes     Peter Monzon  "tape     Parent/Sibling W/ Cabg, Mi Or Angioplasty Before 65f 55m? No     Social History Narrative       CURRENT MEDICATIONS:  Current Outpatient Medications   Medication Sig Dispense Refill     ACE NOT PRESCRIBED, INTENTIONAL, 1 each as needed for other ACE Inhibitor not prescribed due to Other: BP at goal, pending microalbumin test 0 each 0     amoxicillin (AMOXIL) 500 MG capsule Please take 2,000 mg (4 capsule) 30-60 min prior to all dental procedures 4 capsule 3     atorvastatin (LIPITOR) 40 MG tablet Take 1 tablet (40 mg) by mouth daily 90 tablet 3     blood glucose monitoring (ACCU-CHEK FASTCLIX) lancets Use to test blood sugar 1 times daily or as directed. 1 Box 3     blood glucose monitoring (ACCU-CHEK SMARTVIEW) test strip Use to test blood sugar 1 times daily or as directed. 100 each 0     gabapentin (NEURONTIN) 300 MG capsule TAKE 1 CAPSULE(300 MG) BY MOUTH EVERY NIGHT 90 capsule 1     metFORMIN (GLUCOPHAGE) 500 MG tablet Take 1 tablet (500 mg) by mouth daily (with dinner) 90 tablet 1     MULTIVITAMINS PO TABS ONE DAILY  3     Naproxen Sodium (ALEVE PO) Take 220 mg by mouth       sertraline (ZOLOFT) 25 MG tablet Take 1 tablet (25 mg) by mouth daily 90 tablet 1     sotalol (BETAPACE) 80 MG tablet TAKE 1 TABLET(80 MG) BY MOUTH EVERY 12 HOURS (Patient not taking: Reported on 2/28/2020) 180 tablet 3       ROS:   Constitutional: No fever, chills, or sweats. No weight gain/loss.   ENT: No visual disturbance, ear ache, epistaxis, sore throat.   Allergies/Immunologic: Negative.   Respiratory: No cough, hemoptysis.   Cardiovascular: As per HPI.   GI: No nausea, vomiting, hematemesis, melena, or hematochezia.   : No urinary frequency, dysuria, or hematuria.   Integument: Negative.   Psychiatric: Negative.   Neuro: Negative.   Endocrinology: Negative.   Musculoskeletal: Negative.    EXAM:  /70 (BP Location: Left arm, Patient Position: Chair, Cuff Size: Adult Large)   Pulse 86   Ht 1.575 m (5' 2\")   Wt " 81.5 kg (179 lb 11.2 oz)   SpO2 95%   BMI 32.87 kg/m    General: appears comfortable, alert and articulate  Head: normocephalic, atraumatic  Eyes: anicteric sclera, EOMI  Neck: no adenopathy, 2+ carotids  Orophyarynx: moist mucosa, no lesions, dentition intact  Heart: Nrl S1, prominent S2, widely split (patient has RBBB), has 3/6 KRISTI best heard at the left sternal border at the base., JVP 6-7cm  Lungs: clear, no rales or wheezing  Abdomen: soft, non-tender, bowel sounds present, no hepatomegaly  Extremities: no clubbing, cyanosis or edema  Neurological: normal speech and affect, no gross motor deficits      Labs:  CBC RESULTS:  Lab Results   Component Value Date    WBC 8.4 02/28/2020    RBC 4.75 02/28/2020    HGB 13.8 02/28/2020    HCT 41.9 02/28/2020    MCV 88 02/28/2020    MCH 29.1 02/28/2020    MCHC 32.9 02/28/2020    RDW 12.3 02/28/2020     (L) 02/28/2020       CMP RESULTS:  Lab Results   Component Value Date     02/28/2020    POTASSIUM 3.9 02/28/2020    CHLORIDE 106 02/28/2020    CO2 26 02/28/2020    ANIONGAP 6 02/28/2020     (H) 02/28/2020    BUN 9 02/28/2020    CR 0.56 02/28/2020    GFRESTIMATED >90 02/28/2020    GFRESTBLACK >90 02/28/2020    LITA 9.2 02/28/2020    BILITOTAL 0.9 02/28/2020    ALBUMIN 3.8 02/28/2020    ALKPHOS 134 02/28/2020    ALT 47 02/28/2020    AST 27 02/28/2020        INR RESULTS:  Lab Results   Component Value Date    INR 1.13 06/01/2015       Lab Results   Component Value Date    MAG 2.0 06/02/2015     Lab Results   Component Value Date    NTBNPI 327 12/24/2008     No results found for: NTBNP       CT CORONARY: 3/4/14   IMPRESSION:  1. Normal coronary anatomy with no detectable stenosis or plaque. The coronaries arise from the anteriorly positioned aorta.     ADDITIONAL FINDINGS: The aortic root has an unusual appearance probably from the reconstructive surgery for transposition. The appearance is nearly of 2 aortic roots one small 1 which ends in a cul-de-sac and  the other which leads into the ascending aorta. The coronary arteries arise from that aortic root which feeds into the ascending aorta . There is a conduit from the right ventricle to the pulmonary trunk. Normal pulmonary venous anatomy with all four pulmonary veins draining into the left atrium. There is no left atrial or left ventricular mass or thrombus. Normal pericardial thickness.There is no pericardial effusion. The proximal pulmonary arteries are well opacified. Please review Radiology report for incidental noncardiac findings that will follow separately.      CARDIAC MRI/MRA: 3/4/14   IMPRESSION:   1. Transposition of the great arteries with VSD after Rastelli procedure with RV to PA conduit.   2. Minimal flow acceleration across the RV to PA conduit with moderate pulmonary regurgitation (31-33% regurg fraction, Peak PA conduit gradient 18mm Hg).   3. Normal right and left ventricular size and systolic function with no evidence of fibrosis.    Echo 3/16  Difficult study due to poor acoustic windows. The patient has undergone a Rastelli procedure for d-transposition of the great arteries and replacement of the pulmonary valve. The pulmonary valve is not seen in this study. Shunts were not able to be excluded. Mildly diminished left ventricular systolic function with mild left ventricular enlargement. Normal right ventricular and pulmonary artery pressures.    Echo 11/17  D-TGA status post Rastelli and subsequent placement of a bio-prosthetic valve in the pulmonary position. The calculated single plane left ventricular ejection fraction from the 4 chamber view is 58%. A pacing lead is present in the right atrium and right ventricle. Mild (1+) aortic valve insufficiency. Unable to adequately visualize the RV-PA conduit or the bio-prosthetic valve. Estimated right ventricular systolic pressure is 20-25 mmHg plus right atrial pressure.    Echo Nov 2018  D-TGA status post Rastelli and subsequent placement of a  bio-prosthetic valve in the pulmonary position. There is no obvious residual ventricular level shunt. Mild (1+) aortic valve insufficiency. Unable to adequately visualize the RV-PA conduit or the bio-prosthetic valve, however normal antegrade flow is visualized in the branch pulmonary arteries. The calculated single plane left ventricular ejection fraction from the 4 chamber view is 56%. A pacing lead is present in the right atrium and right ventricle. No pericardial effusion. Technically difficult study due to poor acoustic windows.        CTA ANGIOGRAM CONGENITAL HEART DISEASE 7/25/2019   1.  D-transposition of aorta s/p Rastelli with bioprosthetic pulmonic valve  2.  Mildly reduced left ventricular function with LV EF 52%.  3.  Normal RV size and function with RV EF 55%.  4.  RV-PA valved conduit without visual evidence of stenosis.  5.  Mild pulmonic regurgitation (<30 mL) using biventricular stroke  volumes.  6.  No significant change from CT scan in 2014.    Echo today:  Technically difficult study due to poor acoustic windows. D-TGA status post  Rastelli and subsequent placement of a bio-prosthetic valve in the pulmonary  position. There is no obvious residual ventricular level shunt. Mild (1+)  aortic valve insufficiency. Unable to adequately visualize the RV-PA conduit  or the bio-prosthetic valve; however, normal antegrade flow is visualized in  the branch pulmonary arteries. A transvenous pacemaker lead is seen in the  right ventricle, with the tip of the lead implanted into the right ventricular  apex. Trivial tricuspid valve insufficiency. Estimated right ventricular  systolic pressure is 26 mmHg plus right atrial pressure. The left and right  ventricles have normal chamber size, wall thickness, and systolic function.  The calculated biplane left ventricular ejection fraction is 57%. No  pericardial effusion.  No significant change from last echocardiogram.      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   1. complete TGA and VSD, status post Rastelli procedure with VSD closure and RV to PA conduit, history of RV-PA conduit obstruction requiring replacement during teenage years: Pt continues to feel well, denies any change in exercise tolerance and is euvolemic today by history and exam. Echo today confirms no significant change from prior. Encouraged patient to continue regular aerobic exercise 20-30 minutes a day, 4-5 times per week and follow low-salt, heart healthy diet. Continue statin for dyslipidemia. Pt aware that she should not become pregnant while on statin therapy.  Pt currently on depo-provera and not interested in pregnancy at this time.  Also reminded patient the importance of maintaining good oral hygiene and routine dental care with SBE prophylaxis.  2. H/o VT s/p VT ablation and ICD placement: Please see congential EP note from today for detailed findings and plan.    Follow-up: one year  with EKG, echo and labs prior. Will be happy to see sooner if change in clinical status or new questions/concerns arise.    Betsy Valle MD  Section Head - Advanced Heart Failure, Transplantation and Mechanical Circulatory Support  Director - Adult Congenital [facility]ovascular [facility]  Associate Professor of Medicine, [facility]

## 2020-02-28 NOTE — NURSING NOTE
Cardiac Testing: Patient given instructions regarding  echocardiogram . Discussed purpose, preparation, procedure and when to expect results reported back to the patient. Patient demonstrated understanding of this information and agreed to call with further questions or concerns.    Diet: Patient instructed regarding a heart healthy diet, including discussion of reduced fat and sodium intake. Patient demonstrated understanding of this information and agreed to call with further questions or concerns.    Med Reconcile: Reviewed and verified all current medications with the patient. The updated medication list was printed and given to the patient.    Return Appointment: Patient given instructions regarding scheduling next clinic visit. Patient demonstrated understanding of this information and agreed to call with further questions or concerns.    Patient stated she understood all health information given and agreed to call with further questions or concerns.    Chasity Wild, MSN, RN, CNL  Cardiology Care Coordinator  [facility]  [phone]

## 2020-02-28 NOTE — LETTER
February 28, 2020      TO: Chikis Hannon  [id number]  [facility] [address]         Dear Chikis,    ***    It was a pleasure to see you at your last clinic visit. Please do not hesitate to call me if you have any questions or concerns.    Sincerely,      Betsy Valle {[email or web address]:[id number]}

## 2020-02-28 NOTE — PROGRESS NOTES
[facility]  HPI:  Ms. 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 The patient underwent VT ablation on 3/13/2014 and a subsequent ICD implant single-chamber single coil on 3/14/2014, later Rv lead malfunction due to isolation break and failure to deliver shock for recurrent fast VT (VT spontaneously terminated) s/p replacement of entire system 6/2/2015. She was discharged on sotalol 120 mg twice a day. She was seen in clinic on 4/22/14 and her device interrogation showed no recurrence of ventricular arrhythmias. She reported some mild fatigue on Sotalol and her dose was decreased to 80 mg BID. At her July 2014 follow up appointment she only had one 4 second NSVT at 160 bpm which was asymptomatic. At her clinic visit in 2/15 she again had no ventricular arrhythmias and her Sotalol was stopped. After recurrence of VT in June 2015, the patient was restarted on sotalol 80 mg twice a day. We re-attempted VT ablation after holding sotalol for one week on 8/24/2015 with no inducible VT.  [facility]: She presents today for follow-up.  Her device interrogation shows no ventricular arrhythmias.  Her device site has healed well. She denies any syncope or presyncope, no chest pain or shortness of breath on exertion at rest, no orthopnea, no lower extremity edema. Her EKG shows normal sinus rhythm with RBBB,  ms.  [facility] EP Visit 9/23/17: She presents today  for follow up. She reports feeling well. She denies any chest pain, dizziness, lightheadedness, shortness of breath, palpitations, leg/ankle swelling, PND, orthopnea, or syncopal symptoms. She has started exercise classes and is increasing her activity levels. Device interrogation shows normal device function.  17 episodes recorded. 16 VT zone monitored events when she was exercising EGMS shows ST with rates of 168-172 BPM.  1 NSVT for 4 beats at a rate of 211 for 2 second duration.  Intrinsic rhythm = SR @ 90 bpm. = <0.1%. OptiVol fluid index is at baseline. No short v-v intervals recorded.  RV lead impedance trends appear stable. Presenting 12 lead ECG shows SR with RBBB Vent Rate 84 bpm,  ms,  ms, QTc 505 ms. Current cardiac medications include: Sotalol.   Ep Visit 9/2017: She presents today for follow up. She reports feeling well. She denies any chest pain/pressures, dizziness, lightheadedness, shortness of breath, leg/ankle swelling, PND, orthopnea, palpitations, or syncopal symptoms. She continues to exercise and is without limitations. Device interrogation shows normal device function.  2 NSVT episodes recorded, lasting 1-2 seconds. 8 episodes recorded as VT, these EGMs are consistent with ST.  <0.1%. No short v-v intervals recorded. Lead trends appear stable. Presenting 12 lead ECG shows NSR with RBBB Vent Rate 85 bpm,  ms,  ms, QTc 504 ms. Current cardiac medications include: Sotalol.     She presents today for follow up. She reports feeling well. She states she has not been taking her Sotalol for the last many months. She denies any chest pain/pressures, dizziness, lightheadedness, worsening shortness of breath, leg/ankle swelling, PND, orthopnea, palpitations, or syncopal symptoms. Device interrogation shows  normal ICD function.  Since last remote transmission on 10/29/19, there have been 5 SVT-Wavelet, 7 monitored VT and 2 NSVT episodes recorded.  The SVT episodes last 5-30  seconds at 167-176 bpm, occur with activity and appear to be ST.  The monitored VT episodes last 5-56 seconds at rates of 167-171 bpm.  These also occur with activity and appear  to be ST with several occurring on 11/27/19.  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  The NSVT episodes last 1 second in duration @ 231-226 bpm.   Intrinsic rhythm = SR @ 80 bpm.    =<0.1%. No short v-v intervals recorded.  Lead trends appear stable.  Echo today is stable with normal systemic function. No current cardiac medications.       Current Outpatient Prescriptions   Medication Sig Dispense Refill     sotalol (BETAPACE) 80 MG tablet Take 1 tablet (80 mg) by mouth every 12 hours 60 tablet 3     amoxicillin (AMOXIL) 500 MG capsule Take 4 capsules (2000mg) 30-60 minutes prior to dental work. 12 capsule 3     acetaminophen-codeine (TYLENOL #3) 300-30 MG per tablet Take 1 tablet by mouth every 4 hours as needed for other (mild or moderate pain) 10 tablet 0     Naproxen Sodium (ALEVE PO) Take 220 mg by mouth       fluticasone (FLONASE) 50 MCG/ACT nasal spray Spray 1-2 sprays into both nostrils daily 1 Package 11     metFORMIN (GLUCOPHAGE) 500 MG tablet Take 1 tablet (500 mg) by mouth daily (with dinner) 90 tablet 3     STATIN NOT PRESCRIBED, INTENTIONAL, Statin not prescribed intentionally due to Not indicated based on age 0 each 0     gabapentin (NEURONTIN) 300 MG capsule Take 1 tablet (300 mg) every night 90 capsule 0     MELATONIN PO Take 3 mg by mouth nightly as needed        VITAMIN D, CHOLECALCIFEROL, PO Take by mouth daily       Ascorbic Acid (VITAMIN C PO)        Omega-3 Fatty Acids (OMEGA-3 FISH OIL PO)        ACE NOT PRESCRIBED, INTENTIONAL, 1 each as needed for other ACE Inhibitor not prescribed due to Other: BP at goal, pending microalbumin test 0 each 0     blood glucose (ACCU-CHEK SMARTVIEW) test strip Use to test blood sugar 1 times daily or as  "directed. 100 strip prn     blood glucose monitoring (ACCU-CHEK FASTCLIX) lancets Use to test blood sugar 1 times daily or as directed. 1 Box prn     medroxyPROGESTERone (DEPO-PROVERA) 150 MG/ML injection Inject 1 mL (150 mg) into the muscle every 3 months 1 mL 4     MULTIVITAMINS PO TABS ONE DAILY  3       Past Medical History   Diagnosis Date     Allergy, unspecified not elsewhere classified      Attention deficit disorder without mention of hyperactivity      Mild MR; difficulty attention span     Corrected transposition of great vessels      still needs SBE prophlaxis     Impaired fasting glucose      103 7/06     Congenital heart disease      Ventricular tachycardia 3/4/2014     s/p ablation and ICD placement 3/13/14     Diabetes mellitus type 2, noninsulin dependent        Past Surgical History   Procedure Laterality Date     C nonspecific procedure  11/98     open heart surgery x 3, transposition of major vessels     C nonspecific procedure  10/03     Additional heart surgery     Cardiac surgery  Had 4 surgeries     transposition of the major arteries     Surgical history of -   2014     ablation for v. tach; ICD placed      N/A 6/1/2015     Procedure: ANESTHESIA ICD/PACEMAKER CHANGE ADULT;  Surgeon: GENERIC ANESTHESIA PROVIDER;  Location:  OR       Family History   Problem Relation Age of Onset     Adopted: Yes     Family History Negative Mother      Family History Negative Father        History   Substance Use Topics     Smoking status: Never Smoker      Smokeless tobacco: Never Used     Alcohol Use: No       ROS:   A comprehensive 10 system ROS was negative except for HPI.    Physical Examination:  /70 (BP Location: Left arm, Patient Position: Chair, Cuff Size: Adult Large)   Pulse 86   Ht 1.575 m (5' 2\")   Wt 81.3 kg (179 lb 3.2 oz)   SpO2 95%   BMI 32.78 kg/m    GENERAL APPEARANCE: healthy, alert and no distress  HEENT: no icterus, no xanthelasmas, normal pupil size and reaction, normal " palate, mucosa moist, no cyanosis.  NECK: no adenopathy, no asymmetry, masses, or scars, thyroid normal to palpation, carotid upstrokes are normal bilaterally, no cervical bruits, JVP is not visible  CHEST: lungs clear to auscultation - no rales, rhonchi or wheezes, no use of accessory muscles, no retractions, respirations are unlabored, normal respiratory rate, no kyphosis, no scoliosis  CARDIOVASCULAR: regular rhythm, S1S2, 3/6 KRISTI best heard at the left sternal border at the base.  ABDOMEN: soft, non tender, without hepatosplenomegaly, no masses palpable, bowel sounds normal, aorta not enlarged by palpation, no abdominal bruits  EXTREMITIES: no clubbing, cyanosis or edema.  NEURO: alert and oriented to person/place/time, normal speech, gait and affect  VASC: Radial, dorsalis pedis and posterior tibialis pulses are normal.  SKIN: no ecchymoses, no rashes    Laboratory: Reviewed.    Procedures:  LAST CARDIAC MRA:  3/6/14  FINDINGS:   SITUS: There is a normal spleen in the left upper quadrant. There is situs solitus in the chest, as demonstrated by a normal airway pulmonary artery relationship bilaterally.   CAVAE: Single right-sided inferior and superior vena cavae drain normally into the right atrium unobstructed.   PULMONARY VEINS: Two right and two left pulmonary veins drain into the left atrium unobstructed.   ATRIA: There is no interatrial communication demonstrated. The atrial sizes are normal.   ATRIOVENTRICULAR CONNECTION: Concordant. Separate mitral and tricuspid valves without evidence of significant regurgitation or stenosis.   VENTRICLES: D-loop ventricles with levocardia. Ventricles are normal in size and contraction. Right ventricular end-diastolic volume is upper limits of normal for BSA. If utilizing an ideal body weight, this would be mildly enlarged. No residual interventricular communication is demonstrated. No myocardial hyperenhancement identified on delayed post gadolinium imaging, except at  the ventricular insertion points.   VENTRICULOARTERIAL CONNECTION: Concordant. Trileaflet aortic valve without regurgitation or stenosis. The oversewn native pulmonary artery is directly posterior to the aorta. There is now an RV to PA conduit. Mild stenosis of the RVOT conduit with moderate regurgitation, 31% by direct flow measurement, and 33% by ventricular stroke volume comparison.   AORTA AND SUPRA-AORTIC VESSELS: A left-sided aortic arch is demonstrated with normal cervical branching pattern. No evidence of patent ductus arteriosus, coarctation, or aortopulmonary collateral arteries. The coronary arteries are not well visualized in this study.   PULMONARY ARTERY: The RV to PA conduit is patent with normal pulmonary artery branching pattern.   LAST ECHO:  2/2020  ------CONCLUSIONS------  Technically difficult study due to poor acoustic windows. D-TGA status post  Rastelli and subsequent placement of a bio-prosthetic valve in the pulmonary  position. There is no obvious residual ventricular level shunt. Mild (1+)  aortic valve insufficiency. Unable to adequately visualize the RV-PA conduit  or the bio-prosthetic valve; however, normal antegrade flow is visualized in  the branch pulmonary arteries. A transvenous pacemaker lead is seen in the  right ventricle, with the tip of the lead implanted into the right ventricular  apex. Trivial tricuspid valve insufficiency. Estimated right ventricular  systolic pressure is 26 mmHg plus right atrial pressure. The left and right  ventricles have normal chamber size, wall thickness, and systolic function.  The calculated biplane left ventricular ejection fraction is 57%. No  pericardial effusion.  No significant change from last echocardiogram.  7/25/19 CTA:  IMPRESSION:  1.  D-transposition of aorta s/p Rastelli with bioprosthetic pulmonic  valve  2.  Mildly reduced left ventricular function with LV EF 52%.  3.  Normal RV size and function with RV EF 55%.  4.  RV-PA valved  conduit without visual evidence of stenosis.  5.  Mild pulmonic regurgitation (<30 mL) using biventricular stroke  volumes.  6.  No significant change from CT scan in 2014.  7.  Please review radiology review for incidental non-cardiovascular  findings to follow separately    Assessment and recommendations:  Ms. 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 She presents today for follow up. She reports feeling well. She states she has not been taking her Sotalol for the last many months. She denies any chest pain/pressures, dizziness, lightheadedness, worsening shortness of breath, leg/ankle swelling, PND, orthopnea, palpitations, or syncopal symptoms. Device interrogation shows  normal ICD function.  Since last remote transmission on 10/29/19, there have been 5 SVT-Wavelet, 7 monitored VT and 2 NSVT episodes recorded.  The SVT episodes last 5-30 seconds at 167-176 bpm, occur with activity and appear to be ST.  The monitored VT episodes last 5-56 seconds at rates of 167-171 bpm.  These also occur with activity and appear  to be ST with several occurring on 11/27/19.  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  The  NSVT episodes last 1 second in duration @ 231-226 bpm.   Intrinsic rhythm = SR @ 80 bpm.    =<0.1%. No short v-v intervals recorded.  Lead trends appear stable.  Echo today is stable with normal systemic function. No current cardiac medications.     She is doing well from an EP standpoint. She is off AAT and is not having any arrhyhmias. Device has normal function and stable lead parameters. Echo is stable with good systemic function. No further interventions required. She will continue to follow with Dr. Valle and device team per routine. We will have her follow up with [facility] EP in 1 year or sooner if need arises.     The patient states understanding and is agreeable with the plan.   This patient was seen and examined with Dr. Ray. The above note reflects our joint assessment and plan.   KIMMIE Blevins CNP  Electrophysiology Service  Pager: [phone]    EP STAFF NOTE  I have seen and examined the patient as part of a shared visit with LEO Blevins NP.  I agree with the note above. I reviewed today's vital signs and medications. I have reviewed and discussed with the advanced practice provider their physical examination, assessment, and plan   Briefly, doing well off sotalol  My key history/exam findings are: RRR.   The key management decisions made by me: f/u in one year.    Vimal Ray MD [facility]  Cardiology - Electrophysiology

## 2020-03-02 LAB — INTERPRETATION ECG - MUSE: NORMAL

## 2020-03-04 LAB
MDC_IDC_EPISODE_DTM: NORMAL
MDC_IDC_EPISODE_DURATION: 1 S
MDC_IDC_EPISODE_DURATION: 1 S
MDC_IDC_EPISODE_DURATION: 10 S
MDC_IDC_EPISODE_DURATION: 13 S
MDC_IDC_EPISODE_DURATION: 15 S
MDC_IDC_EPISODE_DURATION: 19 S
MDC_IDC_EPISODE_DURATION: 22 S
MDC_IDC_EPISODE_DURATION: 30 S
MDC_IDC_EPISODE_DURATION: 33 S
MDC_IDC_EPISODE_DURATION: 36 S
MDC_IDC_EPISODE_DURATION: 5 S
MDC_IDC_EPISODE_DURATION: 5 S
MDC_IDC_EPISODE_DURATION: 56 S
MDC_IDC_EPISODE_DURATION: 7 S
MDC_IDC_EPISODE_ID: 376
MDC_IDC_EPISODE_ID: 377
MDC_IDC_EPISODE_ID: 378
MDC_IDC_EPISODE_ID: 379
MDC_IDC_EPISODE_ID: 380
MDC_IDC_EPISODE_ID: 381
MDC_IDC_EPISODE_ID: 382
MDC_IDC_EPISODE_ID: 383
MDC_IDC_EPISODE_ID: 384
MDC_IDC_EPISODE_ID: 385
MDC_IDC_EPISODE_ID: 386
MDC_IDC_EPISODE_ID: 387
MDC_IDC_EPISODE_ID: 388
MDC_IDC_EPISODE_ID: 389
MDC_IDC_EPISODE_TYPE: NORMAL
MDC_IDC_LEAD_IMPLANT_DT: NORMAL
MDC_IDC_LEAD_LOCATION: NORMAL
MDC_IDC_LEAD_LOCATION_DETAIL_1: NORMAL
MDC_IDC_LEAD_MFG: NORMAL
MDC_IDC_LEAD_MODEL: NORMAL
MDC_IDC_LEAD_POLARITY_TYPE: NORMAL
MDC_IDC_LEAD_SERIAL: NORMAL
MDC_IDC_MSMT_BATTERY_DTM: NORMAL
MDC_IDC_MSMT_BATTERY_REMAINING_LONGEVITY: 94 MO
MDC_IDC_MSMT_BATTERY_RRT_TRIGGER: 2.73
MDC_IDC_MSMT_BATTERY_STATUS: NORMAL
MDC_IDC_MSMT_BATTERY_VOLTAGE: 3.01 V
MDC_IDC_MSMT_CAP_CHARGE_DTM: NORMAL
MDC_IDC_MSMT_CAP_CHARGE_ENERGY: 18 J
MDC_IDC_MSMT_CAP_CHARGE_TIME: 3.68
MDC_IDC_MSMT_CAP_CHARGE_TYPE: NORMAL
MDC_IDC_MSMT_LEADCHNL_RV_IMPEDANCE_VALUE: 304 OHM
MDC_IDC_MSMT_LEADCHNL_RV_IMPEDANCE_VALUE: 380 OHM
MDC_IDC_MSMT_LEADCHNL_RV_PACING_THRESHOLD_AMPLITUDE: 0.62 V
MDC_IDC_MSMT_LEADCHNL_RV_PACING_THRESHOLD_AMPLITUDE: 0.75 V
MDC_IDC_MSMT_LEADCHNL_RV_PACING_THRESHOLD_PULSEWIDTH: 0.4 MS
MDC_IDC_MSMT_LEADCHNL_RV_PACING_THRESHOLD_PULSEWIDTH: 0.4 MS
MDC_IDC_MSMT_LEADCHNL_RV_SENSING_INTR_AMPL: 8.38 MV
MDC_IDC_MSMT_LEADCHNL_RV_SENSING_INTR_AMPL: 8.62 MV
MDC_IDC_PG_IMPLANT_DTM: NORMAL
MDC_IDC_PG_MFG: NORMAL
MDC_IDC_PG_MODEL: NORMAL
MDC_IDC_PG_SERIAL: NORMAL
MDC_IDC_PG_TYPE: NORMAL
MDC_IDC_SESS_CLINIC_NAME: NORMAL
MDC_IDC_SESS_DTM: NORMAL
MDC_IDC_SESS_TYPE: NORMAL
MDC_IDC_SET_BRADY_HYSTRATE: NORMAL
MDC_IDC_SET_BRADY_LOWRATE: 40 {BEATS}/MIN
MDC_IDC_SET_BRADY_MODE: NORMAL
MDC_IDC_SET_LEADCHNL_RV_PACING_AMPLITUDE: 2 V
MDC_IDC_SET_LEADCHNL_RV_PACING_ANODE_ELECTRODE_1: NORMAL
MDC_IDC_SET_LEADCHNL_RV_PACING_ANODE_LOCATION_1: NORMAL
MDC_IDC_SET_LEADCHNL_RV_PACING_CAPTURE_MODE: NORMAL
MDC_IDC_SET_LEADCHNL_RV_PACING_CATHODE_ELECTRODE_1: NORMAL
MDC_IDC_SET_LEADCHNL_RV_PACING_CATHODE_LOCATION_1: NORMAL
MDC_IDC_SET_LEADCHNL_RV_PACING_POLARITY: NORMAL
MDC_IDC_SET_LEADCHNL_RV_PACING_PULSEWIDTH: 0.4 MS
MDC_IDC_SET_LEADCHNL_RV_SENSING_ANODE_ELECTRODE_1: NORMAL
MDC_IDC_SET_LEADCHNL_RV_SENSING_ANODE_LOCATION_1: NORMAL
MDC_IDC_SET_LEADCHNL_RV_SENSING_CATHODE_ELECTRODE_1: NORMAL
MDC_IDC_SET_LEADCHNL_RV_SENSING_CATHODE_LOCATION_1: NORMAL
MDC_IDC_SET_LEADCHNL_RV_SENSING_POLARITY: NORMAL
MDC_IDC_SET_LEADCHNL_RV_SENSING_SENSITIVITY: 0.3 MV
MDC_IDC_SET_ZONE_DETECTION_BEATS_DENOMINATOR: 40 {BEATS}
MDC_IDC_SET_ZONE_DETECTION_BEATS_NUMERATOR: 30 {BEATS}
MDC_IDC_SET_ZONE_DETECTION_INTERVAL: 270 MS
MDC_IDC_SET_ZONE_DETECTION_INTERVAL: 360 MS
MDC_IDC_SET_ZONE_DETECTION_INTERVAL: 370 MS
MDC_IDC_SET_ZONE_DETECTION_INTERVAL: NORMAL
MDC_IDC_SET_ZONE_TYPE: NORMAL
MDC_IDC_STAT_BRADY_DTM_END: NORMAL
MDC_IDC_STAT_BRADY_DTM_START: NORMAL
MDC_IDC_STAT_BRADY_RV_PERCENT_PACED: 0.04 %
MDC_IDC_STAT_EPISODE_RECENT_COUNT: 0
MDC_IDC_STAT_EPISODE_RECENT_COUNT: 13
MDC_IDC_STAT_EPISODE_RECENT_COUNT: 2
MDC_IDC_STAT_EPISODE_RECENT_COUNT_DTM_END: NORMAL
MDC_IDC_STAT_EPISODE_RECENT_COUNT_DTM_START: NORMAL
MDC_IDC_STAT_EPISODE_TOTAL_COUNT: 0
MDC_IDC_STAT_EPISODE_TOTAL_COUNT: 1
MDC_IDC_STAT_EPISODE_TOTAL_COUNT: 13
MDC_IDC_STAT_EPISODE_TOTAL_COUNT: 169
MDC_IDC_STAT_EPISODE_TOTAL_COUNT_DTM_END: NORMAL
MDC_IDC_STAT_EPISODE_TOTAL_COUNT_DTM_START: NORMAL
MDC_IDC_STAT_EPISODE_TYPE: NORMAL
MDC_IDC_STAT_TACHYTHERAPY_ATP_DELIVERED_RECENT: 0
MDC_IDC_STAT_TACHYTHERAPY_ATP_DELIVERED_TOTAL: 0
MDC_IDC_STAT_TACHYTHERAPY_RECENT_DTM_END: NORMAL
MDC_IDC_STAT_TACHYTHERAPY_RECENT_DTM_START: NORMAL
MDC_IDC_STAT_TACHYTHERAPY_SHOCKS_ABORTED_RECENT: 0
MDC_IDC_STAT_TACHYTHERAPY_SHOCKS_ABORTED_TOTAL: 0
MDC_IDC_STAT_TACHYTHERAPY_SHOCKS_DELIVERED_RECENT: 0
MDC_IDC_STAT_TACHYTHERAPY_SHOCKS_DELIVERED_TOTAL: 1
MDC_IDC_STAT_TACHYTHERAPY_TOTAL_DTM_END: NORMAL
MDC_IDC_STAT_TACHYTHERAPY_TOTAL_DTM_START: NORMAL

## 2020-03-27 ENCOUNTER — TELEPHONE (OUTPATIENT)
Dept: FAMILY MEDICINE | Facility: CLINIC | Age: 40
End: 2020-03-27

## 2020-03-27 NOTE — LETTER
March 27, 2020      Chikis Hannon  [id number]   [place] [id number]        To: [employer] Leave and Disability Department   Fax: [phone]    Please allow Chikis to be off work for the next 30 days due to her complex medical conditions.     Sincerely,        Tammy Benedict MD

## 2020-03-27 NOTE — TELEPHONE ENCOUNTER
We called [phone]. It is a fax number.   We called Chikis. She states her employer said she cannot work because she has diabetes and a heart condition so working as a  puts her at greater for COVID-19.   ?   She is going to call her employer to find out what they are requesting from her/us.   Theron Coffey RN

## 2020-03-27 NOTE — TELEPHONE ENCOUNTER
Chikis requests  a letter faxed to [employer] department that states she has diabetes and a heart condition so that she may get paid to take a leave of absence.  She reports she applied for leave and her employer agreed but they need a letter from PCP.   Dr. Peterson, PCP is out of office until April 2. Please advise. Letter t'd up for review/edit/signature. Thanks.   Theron Coffey RN

## 2020-03-27 NOTE — TELEPHONE ENCOUNTER
Pt contacted us, she was very upset and crying.  Pt states is not currently working at [employer] d/t health condition and the Covid Virus.  Pt states we are needing to call [phone] to get info    Marysol Burton/KOSTA  [employer]

## 2020-05-08 DIAGNOSIS — Q20.3 D-TGA (DEXTRO-TRANSPOSITION OF GREAT ARTERIES): Primary | ICD-10-CM

## 2020-05-28 ENCOUNTER — ANCILLARY PROCEDURE (OUTPATIENT)
Dept: CARDIOLOGY | Facility: CLINIC | Age: 40
End: 2020-05-28
Attending: INTERNAL MEDICINE
Payer: MEDICARE

## 2020-05-28 DIAGNOSIS — Z86.79 HX OF VENTRICULAR TACHYCARDIA: ICD-10-CM

## 2020-05-28 DIAGNOSIS — Q20.3 D-TGA (DEXTRO-TRANSPOSITION OF GREAT ARTERIES): ICD-10-CM

## 2020-05-28 PROCEDURE — 93295 DEV INTERROG REMOTE 1/2/MLT: CPT | Performed by: INTERNAL MEDICINE

## 2020-05-28 PROCEDURE — 93296 REM INTERROG EVL PM/IDS: CPT | Mod: ZF

## 2020-06-04 LAB
MDC_IDC_EPISODE_DTM: NORMAL
MDC_IDC_EPISODE_DURATION: 108 S
MDC_IDC_EPISODE_DURATION: 12 S
MDC_IDC_EPISODE_DURATION: 12 S
MDC_IDC_EPISODE_DURATION: 15 S
MDC_IDC_EPISODE_DURATION: 18 S
MDC_IDC_EPISODE_DURATION: 19 S
MDC_IDC_EPISODE_DURATION: 3 S
MDC_IDC_EPISODE_DURATION: 3 S
MDC_IDC_EPISODE_DURATION: 5 S
MDC_IDC_EPISODE_DURATION: 52 S
MDC_IDC_EPISODE_DURATION: 59 S
MDC_IDC_EPISODE_DURATION: 69 S
MDC_IDC_EPISODE_ID: 390
MDC_IDC_EPISODE_ID: 391
MDC_IDC_EPISODE_ID: 392
MDC_IDC_EPISODE_ID: 393
MDC_IDC_EPISODE_ID: 394
MDC_IDC_EPISODE_ID: 395
MDC_IDC_EPISODE_ID: 396
MDC_IDC_EPISODE_ID: 397
MDC_IDC_EPISODE_ID: 398
MDC_IDC_EPISODE_ID: 399
MDC_IDC_EPISODE_ID: 400
MDC_IDC_EPISODE_ID: 401
MDC_IDC_EPISODE_TYPE: NORMAL
MDC_IDC_LEAD_IMPLANT_DT: NORMAL
MDC_IDC_LEAD_LOCATION: NORMAL
MDC_IDC_LEAD_LOCATION_DETAIL_1: NORMAL
MDC_IDC_LEAD_MFG: NORMAL
MDC_IDC_LEAD_MODEL: NORMAL
MDC_IDC_LEAD_POLARITY_TYPE: NORMAL
MDC_IDC_LEAD_SERIAL: NORMAL
MDC_IDC_MSMT_BATTERY_DTM: NORMAL
MDC_IDC_MSMT_BATTERY_REMAINING_LONGEVITY: 89 MO
MDC_IDC_MSMT_BATTERY_RRT_TRIGGER: 2.73
MDC_IDC_MSMT_BATTERY_STATUS: NORMAL
MDC_IDC_MSMT_BATTERY_VOLTAGE: 3.01 V
MDC_IDC_MSMT_CAP_CHARGE_DTM: NORMAL
MDC_IDC_MSMT_CAP_CHARGE_ENERGY: 18 J
MDC_IDC_MSMT_CAP_CHARGE_TIME: 3.73
MDC_IDC_MSMT_CAP_CHARGE_TYPE: NORMAL
MDC_IDC_MSMT_LEADCHNL_RV_IMPEDANCE_VALUE: 304 OHM
MDC_IDC_MSMT_LEADCHNL_RV_IMPEDANCE_VALUE: 342 OHM
MDC_IDC_MSMT_LEADCHNL_RV_PACING_THRESHOLD_AMPLITUDE: 0.5 V
MDC_IDC_MSMT_LEADCHNL_RV_PACING_THRESHOLD_PULSEWIDTH: 0.4 MS
MDC_IDC_MSMT_LEADCHNL_RV_SENSING_INTR_AMPL: 9.12 MV
MDC_IDC_MSMT_LEADCHNL_RV_SENSING_INTR_AMPL: 9.12 MV
MDC_IDC_PG_IMPLANT_DTM: NORMAL
MDC_IDC_PG_MFG: NORMAL
MDC_IDC_PG_MODEL: NORMAL
MDC_IDC_PG_SERIAL: NORMAL
MDC_IDC_PG_TYPE: NORMAL
MDC_IDC_SESS_CLINIC_NAME: NORMAL
MDC_IDC_SESS_DTM: NORMAL
MDC_IDC_SESS_TYPE: NORMAL
MDC_IDC_SET_BRADY_HYSTRATE: NORMAL
MDC_IDC_SET_BRADY_LOWRATE: 40 {BEATS}/MIN
MDC_IDC_SET_BRADY_MODE: NORMAL
MDC_IDC_SET_LEADCHNL_RV_PACING_AMPLITUDE: 2 V
MDC_IDC_SET_LEADCHNL_RV_PACING_ANODE_ELECTRODE_1: NORMAL
MDC_IDC_SET_LEADCHNL_RV_PACING_ANODE_LOCATION_1: NORMAL
MDC_IDC_SET_LEADCHNL_RV_PACING_CAPTURE_MODE: NORMAL
MDC_IDC_SET_LEADCHNL_RV_PACING_CATHODE_ELECTRODE_1: NORMAL
MDC_IDC_SET_LEADCHNL_RV_PACING_CATHODE_LOCATION_1: NORMAL
MDC_IDC_SET_LEADCHNL_RV_PACING_POLARITY: NORMAL
MDC_IDC_SET_LEADCHNL_RV_PACING_PULSEWIDTH: 0.4 MS
MDC_IDC_SET_LEADCHNL_RV_SENSING_ANODE_ELECTRODE_1: NORMAL
MDC_IDC_SET_LEADCHNL_RV_SENSING_ANODE_LOCATION_1: NORMAL
MDC_IDC_SET_LEADCHNL_RV_SENSING_CATHODE_ELECTRODE_1: NORMAL
MDC_IDC_SET_LEADCHNL_RV_SENSING_CATHODE_LOCATION_1: NORMAL
MDC_IDC_SET_LEADCHNL_RV_SENSING_POLARITY: NORMAL
MDC_IDC_SET_LEADCHNL_RV_SENSING_SENSITIVITY: 0.3 MV
MDC_IDC_SET_ZONE_DETECTION_BEATS_DENOMINATOR: 40 {BEATS}
MDC_IDC_SET_ZONE_DETECTION_BEATS_NUMERATOR: 30 {BEATS}
MDC_IDC_SET_ZONE_DETECTION_INTERVAL: 270 MS
MDC_IDC_SET_ZONE_DETECTION_INTERVAL: 360 MS
MDC_IDC_SET_ZONE_DETECTION_INTERVAL: 370 MS
MDC_IDC_SET_ZONE_DETECTION_INTERVAL: NORMAL
MDC_IDC_SET_ZONE_TYPE: NORMAL
MDC_IDC_STAT_BRADY_DTM_END: NORMAL
MDC_IDC_STAT_BRADY_DTM_START: NORMAL
MDC_IDC_STAT_BRADY_RV_PERCENT_PACED: 0.13 %
MDC_IDC_STAT_EPISODE_RECENT_COUNT: 0
MDC_IDC_STAT_EPISODE_RECENT_COUNT: 1
MDC_IDC_STAT_EPISODE_RECENT_COUNT: 7
MDC_IDC_STAT_EPISODE_RECENT_COUNT_DTM_END: NORMAL
MDC_IDC_STAT_EPISODE_RECENT_COUNT_DTM_START: NORMAL
MDC_IDC_STAT_EPISODE_TOTAL_COUNT: 0
MDC_IDC_STAT_EPISODE_TOTAL_COUNT: 1
MDC_IDC_STAT_EPISODE_TOTAL_COUNT: 14
MDC_IDC_STAT_EPISODE_TOTAL_COUNT: 176
MDC_IDC_STAT_EPISODE_TOTAL_COUNT_DTM_END: NORMAL
MDC_IDC_STAT_EPISODE_TOTAL_COUNT_DTM_START: NORMAL
MDC_IDC_STAT_EPISODE_TYPE: NORMAL
MDC_IDC_STAT_TACHYTHERAPY_ATP_DELIVERED_RECENT: 0
MDC_IDC_STAT_TACHYTHERAPY_ATP_DELIVERED_TOTAL: 0
MDC_IDC_STAT_TACHYTHERAPY_RECENT_DTM_END: NORMAL
MDC_IDC_STAT_TACHYTHERAPY_RECENT_DTM_START: NORMAL
MDC_IDC_STAT_TACHYTHERAPY_SHOCKS_ABORTED_RECENT: 0
MDC_IDC_STAT_TACHYTHERAPY_SHOCKS_ABORTED_TOTAL: 0
MDC_IDC_STAT_TACHYTHERAPY_SHOCKS_DELIVERED_RECENT: 0
MDC_IDC_STAT_TACHYTHERAPY_SHOCKS_DELIVERED_TOTAL: 1
MDC_IDC_STAT_TACHYTHERAPY_TOTAL_DTM_END: NORMAL
MDC_IDC_STAT_TACHYTHERAPY_TOTAL_DTM_START: NORMAL

## 2020-08-04 DIAGNOSIS — Z13.6 CARDIOVASCULAR SCREENING; LDL GOAL LESS THAN 70: ICD-10-CM

## 2020-08-04 NOTE — TELEPHONE ENCOUNTER
Routing refill request to provider for review/approval because:  Labs not current:  LDL  Patient needs to be seen because:  Diabetes and med check was due 4/14/20    No upcoming appt    Angeli Simmons RN

## 2020-08-05 ENCOUNTER — VIRTUAL VISIT (OUTPATIENT)
Dept: FAMILY MEDICINE | Facility: CLINIC | Age: 40
End: 2020-08-05
Payer: MEDICARE

## 2020-08-05 DIAGNOSIS — F41.9 ANXIETY: ICD-10-CM

## 2020-08-05 DIAGNOSIS — Z13.6 CARDIOVASCULAR SCREENING; LDL GOAL LESS THAN 70: ICD-10-CM

## 2020-08-05 DIAGNOSIS — F33.1 MODERATE EPISODE OF RECURRENT MAJOR DEPRESSIVE DISORDER (H): ICD-10-CM

## 2020-08-05 DIAGNOSIS — E11.59 TYPE 2 DIABETES MELLITUS WITH OTHER CIRCULATORY COMPLICATION, WITHOUT LONG-TERM CURRENT USE OF INSULIN (H): Primary | ICD-10-CM

## 2020-08-05 PROCEDURE — 99214 OFFICE O/P EST MOD 30 MIN: CPT | Mod: 95 | Performed by: FAMILY MEDICINE

## 2020-08-05 RX ORDER — SERTRALINE HYDROCHLORIDE 25 MG/1
25 TABLET, FILM COATED ORAL DAILY
Qty: 90 TABLET | Refills: 1 | Status: SHIPPED | OUTPATIENT
Start: 2020-08-05 | End: 2021-01-28

## 2020-08-05 RX ORDER — ATORVASTATIN CALCIUM 40 MG/1
TABLET, FILM COATED ORAL
Qty: 90 TABLET | Refills: 0 | Status: SHIPPED | OUTPATIENT
Start: 2020-08-05 | End: 2020-08-07

## 2020-08-05 ASSESSMENT — ANXIETY QUESTIONNAIRES
1. FEELING NERVOUS, ANXIOUS, OR ON EDGE: MORE THAN HALF THE DAYS
5. BEING SO RESTLESS THAT IT IS HARD TO SIT STILL: NOT AT ALL
2. NOT BEING ABLE TO STOP OR CONTROL WORRYING: SEVERAL DAYS
7. FEELING AFRAID AS IF SOMETHING AWFUL MIGHT HAPPEN: NOT AT ALL
6. BECOMING EASILY ANNOYED OR IRRITABLE: SEVERAL DAYS
3. WORRYING TOO MUCH ABOUT DIFFERENT THINGS: SEVERAL DAYS
IF YOU CHECKED OFF ANY PROBLEMS ON THIS QUESTIONNAIRE, HOW DIFFICULT HAVE THESE PROBLEMS MADE IT FOR YOU TO DO YOUR WORK, TAKE CARE OF THINGS AT HOME, OR GET ALONG WITH OTHER PEOPLE: SOMEWHAT DIFFICULT
GAD7 TOTAL SCORE: 5

## 2020-08-05 ASSESSMENT — PATIENT HEALTH QUESTIONNAIRE - PHQ9
5. POOR APPETITE OR OVEREATING: NOT AT ALL
SUM OF ALL RESPONSES TO PHQ QUESTIONS 1-9: 8

## 2020-08-05 NOTE — PROGRESS NOTES
"Chikis Hannon is a 40 year old female who is being evaluated via a billable video visit.      The patient has been notified of following:     \"This video visit will be conducted via a call between you and your physician/provider. We have found that certain health care needs can be provided without the need for an in-person physical exam.  This service lets us provide the care you need with a video conversation.  If a prescription is necessary we can send it directly to your pharmacy.  If lab work is needed we can place an order for that and you can then stop by our lab to have the test done at a later time.    Video visits are billed at different rates depending on your insurance coverage.  Please reach out to your insurance provider with any questions.    If during the course of the call the physician/provider feels a video visit is not appropriate, you will not be charged for this service.\"    Patient has given verbal consent for Video visit? Yes  How would you like to obtain your AVS? MyChart  If you are dropped from the video visit, the video invite should be resent to: Text to cell phone: [phone]  Will anyone else be joining your video visit? No      Subjective     Chikis Hannon is a 40 year old female who presents today via video visit for the following health issues:    HPI    Medication Followup    Taking Medication as prescribed: yes    Side Effects:  None    Medication Helping Symptoms:  yes     Patient being seen today for follow up of her chronic medical issues. Admits over the last few months she has not taken her meds like she is supposed to. Admits feeling overwhelmed with the current pandemic. She has been out of work since April and is scheduled to return next week.   She restarted her meds a few days ago.   Currently she is not taking sotalol and denies any arrhythmia issues.     Video Start Time: 11:25 am         Patient Active Problem List   Diagnosis     Corrected transposition of great " vessels     Allergic state     Attention deficit disorder     Impaired fasting glucose     Obesity     Anxiety     GERD (gastroesophageal reflux disease)     Major depression in complete remission (H)     * * * SBE PROPHYLAXIS * * *     CARDIOVASCULAR SCREENING; LDL GOAL LESS THAN 70     Mildly mentally retarded     Transaminase or LDH elevation     Health Care Home     Congenital heart disease     Ventricular tachycardia (H)     Cardiac ICD- Medtronic- single chamber- NOT dependent     Type 2 diabetes, HbA1C goal < 8% (H)     Type 2 diabetes mellitus with other circulatory complications (H)     Patient [facility] by [facility]     Past Surgical History:   Procedure Laterality Date     C NONSPECIFIC PROCEDURE  11/98    open heart surgery x 3, transposition of major vessels     C NONSPECIFIC PROCEDURE  10/03    Additional heart surgery     CARDIAC SURGERY  Had 4 surgeries    transposition of the major arteries     SURGICAL HISTORY OF -   2014    ablation for v. tach; ICD placed       Social History     Tobacco Use     Smoking status: Never Smoker     Smokeless tobacco: Never Used   Substance Use Topics     Alcohol use: No     Alcohol/week: 0.0 standard drinks     Family History   Adopted: Yes   Problem Relation Age of Onset     Family History Negative Mother      Family History Negative Father            Reviewed and updated as needed this visit by Provider         Review of Systems   Constitutional, HEENT, cardiovascular, pulmonary, GI, , musculoskeletal, neuro, skin, endocrine and psych systems are negative, except as otherwise noted.      Objective             Physical Exam     GENERAL: Healthy, alert and no distress  EYES: Eyes grossly normal to inspection.  No discharge or erythema, or obvious scleral/conjunctival abnormalities.  RESP: No audible wheeze, cough, or visible cyanosis.  No visible retractions or increased work of breathing.    PSYCH: Mentation appears  normal, affect normal/bright, judgement and insight intact, normal speech and appearance well-groomed.      Diagnostic Test Results:  Labs reviewed in Epic  none         Assessment & Plan     1. Type 2 diabetes mellitus with other circulatory complication, without long-term current use of insulin (H)  - stressed  Importance of medication compliance. Will recheck A1c and adjust meds accordingly.   - **A1C FUTURE anytime; Future    2. CARDIOVASCULAR SCREENING; LDL GOAL LESS THAN 70  - Lipid panel reflex to direct LDL Fasting; Future    3. Moderate episode of recurrent major depressive disorder (H)  - advised to restart med. She is feeling somewhat better by talking to her friends and family members. Denies any Si, HI, auditory or visual hallucinations.   - sertraline (ZOLOFT) 25 MG tablet; Take 1 tablet (25 mg) by mouth daily  Dispense: 90 tablet; Refill: 1    4. Anxiety  - see #3  - sertraline (ZOLOFT) 25 MG tablet; Take 1 tablet (25 mg) by mouth daily  Dispense: 90 tablet; Refill: 1           See Patient Instructions    Return in about 1 day (around 8/6/2020) for Lab Work- fasting .    Tammy Benedict MD  [facility] [employer]      Video-Visit Details    Type of service:  Video Visit    Video End Time:11:35 am     Originating Location (pt. Location): Home    Distant Location (provider location):  [facility] [employer]     Platform used for Video Visit: Doximity    No follow-ups on file.       Tammy Benedict MD

## 2020-08-06 DIAGNOSIS — E11.59 TYPE 2 DIABETES MELLITUS WITH OTHER CIRCULATORY COMPLICATION, WITHOUT LONG-TERM CURRENT USE OF INSULIN (H): ICD-10-CM

## 2020-08-06 DIAGNOSIS — Z13.6 CARDIOVASCULAR SCREENING; LDL GOAL LESS THAN 70: ICD-10-CM

## 2020-08-06 LAB — HBA1C MFR BLD: 7.5 % (ref 0–5.6)

## 2020-08-06 PROCEDURE — 83036 HEMOGLOBIN GLYCOSYLATED A1C: CPT | Performed by: FAMILY MEDICINE

## 2020-08-06 PROCEDURE — 36415 COLL VENOUS BLD VENIPUNCTURE: CPT | Performed by: FAMILY MEDICINE

## 2020-08-06 PROCEDURE — 80061 LIPID PANEL: CPT | Performed by: FAMILY MEDICINE

## 2020-08-06 ASSESSMENT — ANXIETY QUESTIONNAIRES: GAD7 TOTAL SCORE: 5

## 2020-08-07 DIAGNOSIS — Z13.6 CARDIOVASCULAR SCREENING; LDL GOAL LESS THAN 70: ICD-10-CM

## 2020-08-07 DIAGNOSIS — M79.2 NEUROPATHIC PAIN: ICD-10-CM

## 2020-08-07 DIAGNOSIS — E11.59 TYPE 2 DIABETES MELLITUS WITH OTHER CIRCULATORY COMPLICATION, WITHOUT LONG-TERM CURRENT USE OF INSULIN (H): ICD-10-CM

## 2020-08-07 LAB
CHOLEST SERPL-MCNC: 205 MG/DL
HDLC SERPL-MCNC: 44 MG/DL
LDLC SERPL CALC-MCNC: 137 MG/DL
NONHDLC SERPL-MCNC: 161 MG/DL
TRIGL SERPL-MCNC: 119 MG/DL

## 2020-08-07 RX ORDER — GABAPENTIN 300 MG/1
CAPSULE ORAL
Qty: 90 CAPSULE | Refills: 1 | Status: SHIPPED | OUTPATIENT
Start: 2020-08-07 | End: 2021-01-28

## 2020-08-07 RX ORDER — ATORVASTATIN CALCIUM 40 MG/1
40 TABLET, FILM COATED ORAL DAILY
Qty: 90 TABLET | Refills: 2 | Status: SHIPPED | OUTPATIENT
Start: 2020-08-07 | End: 2021-02-11

## 2020-08-28 ENCOUNTER — ANCILLARY PROCEDURE (OUTPATIENT)
Dept: CARDIOLOGY | Facility: CLINIC | Age: 40
End: 2020-08-28
Attending: INTERNAL MEDICINE
Payer: MEDICARE

## 2020-08-28 DIAGNOSIS — I47.20 VENTRICULAR TACHYCARDIA (H): ICD-10-CM

## 2020-08-28 PROCEDURE — 93296 REM INTERROG EVL PM/IDS: CPT | Mod: ZF

## 2020-08-28 PROCEDURE — 93295 DEV INTERROG REMOTE 1/2/MLT: CPT | Performed by: INTERNAL MEDICINE

## 2020-09-10 LAB
MDC_IDC_EPISODE_DTM: NORMAL
MDC_IDC_EPISODE_DURATION: 1 S
MDC_IDC_EPISODE_DURATION: 11 S
MDC_IDC_EPISODE_DURATION: 11 S
MDC_IDC_EPISODE_DURATION: 112 S
MDC_IDC_EPISODE_DURATION: 14 S
MDC_IDC_EPISODE_DURATION: 142 S
MDC_IDC_EPISODE_DURATION: 15 S
MDC_IDC_EPISODE_DURATION: 154 S
MDC_IDC_EPISODE_DURATION: 169 S
MDC_IDC_EPISODE_DURATION: 17 S
MDC_IDC_EPISODE_DURATION: 17 S
MDC_IDC_EPISODE_DURATION: 18 S
MDC_IDC_EPISODE_DURATION: 20 S
MDC_IDC_EPISODE_DURATION: 21 S
MDC_IDC_EPISODE_DURATION: 22 S
MDC_IDC_EPISODE_DURATION: 22 S
MDC_IDC_EPISODE_DURATION: 23 S
MDC_IDC_EPISODE_DURATION: 24 S
MDC_IDC_EPISODE_DURATION: 24 S
MDC_IDC_EPISODE_DURATION: 28 S
MDC_IDC_EPISODE_DURATION: 28 S
MDC_IDC_EPISODE_DURATION: 29 S
MDC_IDC_EPISODE_DURATION: 30 S
MDC_IDC_EPISODE_DURATION: 30 S
MDC_IDC_EPISODE_DURATION: 31 S
MDC_IDC_EPISODE_DURATION: 36 S
MDC_IDC_EPISODE_DURATION: 36 S
MDC_IDC_EPISODE_DURATION: 39 S
MDC_IDC_EPISODE_DURATION: 4 S
MDC_IDC_EPISODE_DURATION: 50 S
MDC_IDC_EPISODE_DURATION: 55 S
MDC_IDC_EPISODE_DURATION: 6 S
MDC_IDC_EPISODE_DURATION: 65 S
MDC_IDC_EPISODE_DURATION: 69 S
MDC_IDC_EPISODE_DURATION: 7 S
MDC_IDC_EPISODE_DURATION: 72 S
MDC_IDC_EPISODE_DURATION: 83 S
MDC_IDC_EPISODE_DURATION: 84 S
MDC_IDC_EPISODE_DURATION: 90 S
MDC_IDC_EPISODE_ID: 413
MDC_IDC_EPISODE_ID: 414
MDC_IDC_EPISODE_ID: 415
MDC_IDC_EPISODE_ID: 416
MDC_IDC_EPISODE_ID: 417
MDC_IDC_EPISODE_ID: 418
MDC_IDC_EPISODE_ID: 419
MDC_IDC_EPISODE_ID: 420
MDC_IDC_EPISODE_ID: 421
MDC_IDC_EPISODE_ID: 424
MDC_IDC_EPISODE_ID: 426
MDC_IDC_EPISODE_ID: 428
MDC_IDC_EPISODE_ID: 437
MDC_IDC_EPISODE_ID: 438
MDC_IDC_EPISODE_ID: 439
MDC_IDC_EPISODE_ID: 440
MDC_IDC_EPISODE_ID: 441
MDC_IDC_EPISODE_ID: 442
MDC_IDC_EPISODE_ID: 443
MDC_IDC_EPISODE_ID: 444
MDC_IDC_EPISODE_ID: 445
MDC_IDC_EPISODE_ID: 446
MDC_IDC_EPISODE_ID: 447
MDC_IDC_EPISODE_ID: 448
MDC_IDC_EPISODE_ID: 449
MDC_IDC_EPISODE_ID: 450
MDC_IDC_EPISODE_ID: 451
MDC_IDC_EPISODE_ID: 452
MDC_IDC_EPISODE_ID: 453
MDC_IDC_EPISODE_ID: 454
MDC_IDC_EPISODE_ID: 455
MDC_IDC_EPISODE_ID: 456
MDC_IDC_EPISODE_ID: 457
MDC_IDC_EPISODE_ID: 458
MDC_IDC_EPISODE_ID: 459
MDC_IDC_EPISODE_ID: 460
MDC_IDC_EPISODE_ID: 461
MDC_IDC_EPISODE_ID: 462
MDC_IDC_EPISODE_ID: 463
MDC_IDC_EPISODE_ID: 464
MDC_IDC_EPISODE_ID: 465
MDC_IDC_EPISODE_TYPE: NORMAL
MDC_IDC_LEAD_IMPLANT_DT: NORMAL
MDC_IDC_LEAD_LOCATION: NORMAL
MDC_IDC_LEAD_LOCATION_DETAIL_1: NORMAL
MDC_IDC_LEAD_MFG: NORMAL
MDC_IDC_LEAD_MODEL: NORMAL
MDC_IDC_LEAD_POLARITY_TYPE: NORMAL
MDC_IDC_LEAD_SERIAL: NORMAL
MDC_IDC_MSMT_BATTERY_DTM: NORMAL
MDC_IDC_MSMT_BATTERY_REMAINING_LONGEVITY: 84 MO
MDC_IDC_MSMT_BATTERY_RRT_TRIGGER: 2.73
MDC_IDC_MSMT_BATTERY_STATUS: NORMAL
MDC_IDC_MSMT_BATTERY_VOLTAGE: 2.99 V
MDC_IDC_MSMT_CAP_CHARGE_DTM: NORMAL
MDC_IDC_MSMT_CAP_CHARGE_ENERGY: 18 J
MDC_IDC_MSMT_CAP_CHARGE_TIME: 3.74
MDC_IDC_MSMT_CAP_CHARGE_TYPE: NORMAL
MDC_IDC_MSMT_LEADCHNL_RV_IMPEDANCE_VALUE: 304 OHM
MDC_IDC_MSMT_LEADCHNL_RV_IMPEDANCE_VALUE: 380 OHM
MDC_IDC_MSMT_LEADCHNL_RV_PACING_THRESHOLD_AMPLITUDE: 0.62 V
MDC_IDC_MSMT_LEADCHNL_RV_PACING_THRESHOLD_PULSEWIDTH: 0.4 MS
MDC_IDC_MSMT_LEADCHNL_RV_SENSING_INTR_AMPL: 9.5 MV
MDC_IDC_MSMT_LEADCHNL_RV_SENSING_INTR_AMPL: 9.5 MV
MDC_IDC_PG_IMPLANT_DTM: NORMAL
MDC_IDC_PG_MFG: NORMAL
MDC_IDC_PG_MODEL: NORMAL
MDC_IDC_PG_SERIAL: NORMAL
MDC_IDC_PG_TYPE: NORMAL
MDC_IDC_SESS_CLINIC_NAME: NORMAL
MDC_IDC_SESS_DTM: NORMAL
MDC_IDC_SESS_TYPE: NORMAL
MDC_IDC_SET_BRADY_HYSTRATE: NORMAL
MDC_IDC_SET_BRADY_LOWRATE: 40 {BEATS}/MIN
MDC_IDC_SET_BRADY_MODE: NORMAL
MDC_IDC_SET_LEADCHNL_RV_PACING_AMPLITUDE: 2 V
MDC_IDC_SET_LEADCHNL_RV_PACING_ANODE_ELECTRODE_1: NORMAL
MDC_IDC_SET_LEADCHNL_RV_PACING_ANODE_LOCATION_1: NORMAL
MDC_IDC_SET_LEADCHNL_RV_PACING_CAPTURE_MODE: NORMAL
MDC_IDC_SET_LEADCHNL_RV_PACING_CATHODE_ELECTRODE_1: NORMAL
MDC_IDC_SET_LEADCHNL_RV_PACING_CATHODE_LOCATION_1: NORMAL
MDC_IDC_SET_LEADCHNL_RV_PACING_POLARITY: NORMAL
MDC_IDC_SET_LEADCHNL_RV_PACING_PULSEWIDTH: 0.4 MS
MDC_IDC_SET_LEADCHNL_RV_SENSING_ANODE_ELECTRODE_1: NORMAL
MDC_IDC_SET_LEADCHNL_RV_SENSING_ANODE_LOCATION_1: NORMAL
MDC_IDC_SET_LEADCHNL_RV_SENSING_CATHODE_ELECTRODE_1: NORMAL
MDC_IDC_SET_LEADCHNL_RV_SENSING_CATHODE_LOCATION_1: NORMAL
MDC_IDC_SET_LEADCHNL_RV_SENSING_POLARITY: NORMAL
MDC_IDC_SET_LEADCHNL_RV_SENSING_SENSITIVITY: 0.3 MV
MDC_IDC_SET_ZONE_DETECTION_BEATS_DENOMINATOR: 40 {BEATS}
MDC_IDC_SET_ZONE_DETECTION_BEATS_NUMERATOR: 30 {BEATS}
MDC_IDC_SET_ZONE_DETECTION_INTERVAL: 270 MS
MDC_IDC_SET_ZONE_DETECTION_INTERVAL: 360 MS
MDC_IDC_SET_ZONE_DETECTION_INTERVAL: 370 MS
MDC_IDC_SET_ZONE_DETECTION_INTERVAL: NORMAL
MDC_IDC_SET_ZONE_TYPE: NORMAL
MDC_IDC_STAT_BRADY_DTM_END: NORMAL
MDC_IDC_STAT_BRADY_DTM_START: NORMAL
MDC_IDC_STAT_BRADY_RV_PERCENT_PACED: 0.07 %
MDC_IDC_STAT_EPISODE_RECENT_COUNT: 0
MDC_IDC_STAT_EPISODE_RECENT_COUNT: 1
MDC_IDC_STAT_EPISODE_RECENT_COUNT: 24
MDC_IDC_STAT_EPISODE_RECENT_COUNT: 25
MDC_IDC_STAT_EPISODE_RECENT_COUNT_DTM_END: NORMAL
MDC_IDC_STAT_EPISODE_RECENT_COUNT_DTM_START: NORMAL
MDC_IDC_STAT_EPISODE_TOTAL_COUNT: 0
MDC_IDC_STAT_EPISODE_TOTAL_COUNT: 1
MDC_IDC_STAT_EPISODE_TOTAL_COUNT: 15
MDC_IDC_STAT_EPISODE_TOTAL_COUNT: 200
MDC_IDC_STAT_EPISODE_TOTAL_COUNT_DTM_END: NORMAL
MDC_IDC_STAT_EPISODE_TOTAL_COUNT_DTM_START: NORMAL
MDC_IDC_STAT_EPISODE_TYPE: NORMAL
MDC_IDC_STAT_TACHYTHERAPY_ATP_DELIVERED_RECENT: 0
MDC_IDC_STAT_TACHYTHERAPY_ATP_DELIVERED_TOTAL: 0
MDC_IDC_STAT_TACHYTHERAPY_RECENT_DTM_END: NORMAL
MDC_IDC_STAT_TACHYTHERAPY_RECENT_DTM_START: NORMAL
MDC_IDC_STAT_TACHYTHERAPY_SHOCKS_ABORTED_RECENT: 0
MDC_IDC_STAT_TACHYTHERAPY_SHOCKS_ABORTED_TOTAL: 0
MDC_IDC_STAT_TACHYTHERAPY_SHOCKS_DELIVERED_RECENT: 0
MDC_IDC_STAT_TACHYTHERAPY_SHOCKS_DELIVERED_TOTAL: 1
MDC_IDC_STAT_TACHYTHERAPY_TOTAL_DTM_END: NORMAL
MDC_IDC_STAT_TACHYTHERAPY_TOTAL_DTM_START: NORMAL

## 2020-10-12 DIAGNOSIS — Z29.89 SBE (SUBACUTE BACTERIAL ENDOCARDITIS) PROPHYLAXIS CANDIDATE: ICD-10-CM

## 2020-10-12 DIAGNOSIS — Q20.3 D-TGA (DEXTRO-TRANSPOSITION OF GREAT ARTERIES): ICD-10-CM

## 2020-10-12 DIAGNOSIS — Z86.79 HX OF VENTRICULAR TACHYCARDIA: ICD-10-CM

## 2020-10-12 RX ORDER — AMOXICILLIN 500 MG/1
CAPSULE ORAL
Qty: 4 CAPSULE | Refills: 3 | Status: SHIPPED | OUTPATIENT
Start: 2020-10-12 | End: 2022-03-11

## 2020-10-12 NOTE — TELEPHONE ENCOUNTER
Patient calling with concern on recall of Metformin.  Advised to contact pharmacy to see if hers is part of recall.  Patient agrees with plan.  Also wants refill on antibiotic for dental work.  Please advise.  Angeli Simmons RN

## 2020-10-29 ENCOUNTER — TELEPHONE (OUTPATIENT)
Dept: FAMILY MEDICINE | Facility: CLINIC | Age: 40
End: 2020-10-29

## 2020-10-29 NOTE — TELEPHONE ENCOUNTER
Per chart had normal exam with negative for diabetic retinopathy on 10/147/2019 at [facility], will send to [facility]    Marysol Burton/KOSTA  [employer]

## 2020-11-05 DIAGNOSIS — Z13.6 CARDIOVASCULAR SCREENING; LDL GOAL LESS THAN 70: ICD-10-CM

## 2020-11-05 RX ORDER — ATORVASTATIN CALCIUM 40 MG/1
40 TABLET, FILM COATED ORAL DAILY
Qty: 90 TABLET | Refills: 2 | OUTPATIENT
Start: 2020-11-05

## 2020-11-05 NOTE — TELEPHONE ENCOUNTER
Should have refills on file  E-Prescribing Status: Receipt confirmed by pharmacy (8/7/2020  2:53 PM CDT)

## 2020-11-13 DIAGNOSIS — Z13.6 CARDIOVASCULAR SCREENING; LDL GOAL LESS THAN 70: ICD-10-CM

## 2020-11-13 RX ORDER — ATORVASTATIN CALCIUM 40 MG/1
40 TABLET, FILM COATED ORAL DAILY
Qty: 90 TABLET | Refills: 2 | OUTPATIENT
Start: 2020-11-13

## 2020-12-01 ENCOUNTER — ANCILLARY PROCEDURE (OUTPATIENT)
Dept: CARDIOLOGY | Facility: CLINIC | Age: 40
End: 2020-12-01
Attending: INTERNAL MEDICINE
Payer: MEDICARE

## 2020-12-01 DIAGNOSIS — Q20.3 D-TGA (DEXTRO-TRANSPOSITION OF GREAT ARTERIES): ICD-10-CM

## 2020-12-01 DIAGNOSIS — Z86.79 HX OF VENTRICULAR TACHYCARDIA: ICD-10-CM

## 2020-12-01 PROCEDURE — 93296 REM INTERROG EVL PM/IDS: CPT

## 2020-12-01 PROCEDURE — 93295 DEV INTERROG REMOTE 1/2/MLT: CPT | Performed by: INTERNAL MEDICINE

## 2020-12-03 LAB
MDC_IDC_EPISODE_DTM: NORMAL
MDC_IDC_EPISODE_DURATION: 13 S
MDC_IDC_EPISODE_DURATION: 20 S
MDC_IDC_EPISODE_DURATION: 44 S
MDC_IDC_EPISODE_DURATION: 48 S
MDC_IDC_EPISODE_DURATION: 50 S
MDC_IDC_EPISODE_DURATION: 52 S
MDC_IDC_EPISODE_DURATION: 59 S
MDC_IDC_EPISODE_DURATION: 64 S
MDC_IDC_EPISODE_DURATION: 67 S
MDC_IDC_EPISODE_ID: 466
MDC_IDC_EPISODE_ID: 467
MDC_IDC_EPISODE_ID: 468
MDC_IDC_EPISODE_ID: 469
MDC_IDC_EPISODE_ID: 470
MDC_IDC_EPISODE_ID: 471
MDC_IDC_EPISODE_ID: 472
MDC_IDC_EPISODE_ID: 473
MDC_IDC_EPISODE_ID: 474
MDC_IDC_EPISODE_TYPE: NORMAL
MDC_IDC_LEAD_IMPLANT_DT: NORMAL
MDC_IDC_LEAD_LOCATION: NORMAL
MDC_IDC_LEAD_LOCATION_DETAIL_1: NORMAL
MDC_IDC_LEAD_MFG: NORMAL
MDC_IDC_LEAD_MODEL: NORMAL
MDC_IDC_LEAD_POLARITY_TYPE: NORMAL
MDC_IDC_LEAD_SERIAL: NORMAL
MDC_IDC_MSMT_BATTERY_DTM: NORMAL
MDC_IDC_MSMT_BATTERY_REMAINING_LONGEVITY: 80 MO
MDC_IDC_MSMT_BATTERY_RRT_TRIGGER: 2.73
MDC_IDC_MSMT_BATTERY_STATUS: NORMAL
MDC_IDC_MSMT_BATTERY_VOLTAGE: 3 V
MDC_IDC_MSMT_CAP_CHARGE_DTM: NORMAL
MDC_IDC_MSMT_CAP_CHARGE_ENERGY: 18 J
MDC_IDC_MSMT_CAP_CHARGE_TIME: 3.77
MDC_IDC_MSMT_CAP_CHARGE_TYPE: NORMAL
MDC_IDC_MSMT_LEADCHNL_RV_IMPEDANCE_VALUE: 304 OHM
MDC_IDC_MSMT_LEADCHNL_RV_IMPEDANCE_VALUE: 342 OHM
MDC_IDC_MSMT_LEADCHNL_RV_PACING_THRESHOLD_AMPLITUDE: 0.5 V
MDC_IDC_MSMT_LEADCHNL_RV_PACING_THRESHOLD_PULSEWIDTH: 0.4 MS
MDC_IDC_MSMT_LEADCHNL_RV_SENSING_INTR_AMPL: 9.75 MV
MDC_IDC_MSMT_LEADCHNL_RV_SENSING_INTR_AMPL: 9.75 MV
MDC_IDC_PG_IMPLANT_DTM: NORMAL
MDC_IDC_PG_MFG: NORMAL
MDC_IDC_PG_MODEL: NORMAL
MDC_IDC_PG_SERIAL: NORMAL
MDC_IDC_PG_TYPE: NORMAL
MDC_IDC_SESS_CLINIC_NAME: NORMAL
MDC_IDC_SESS_DTM: NORMAL
MDC_IDC_SESS_TYPE: NORMAL
MDC_IDC_SET_BRADY_HYSTRATE: NORMAL
MDC_IDC_SET_BRADY_LOWRATE: 40 {BEATS}/MIN
MDC_IDC_SET_BRADY_MODE: NORMAL
MDC_IDC_SET_LEADCHNL_RV_PACING_AMPLITUDE: 2 V
MDC_IDC_SET_LEADCHNL_RV_PACING_ANODE_ELECTRODE_1: NORMAL
MDC_IDC_SET_LEADCHNL_RV_PACING_ANODE_LOCATION_1: NORMAL
MDC_IDC_SET_LEADCHNL_RV_PACING_CAPTURE_MODE: NORMAL
MDC_IDC_SET_LEADCHNL_RV_PACING_CATHODE_ELECTRODE_1: NORMAL
MDC_IDC_SET_LEADCHNL_RV_PACING_CATHODE_LOCATION_1: NORMAL
MDC_IDC_SET_LEADCHNL_RV_PACING_POLARITY: NORMAL
MDC_IDC_SET_LEADCHNL_RV_PACING_PULSEWIDTH: 0.4 MS
MDC_IDC_SET_LEADCHNL_RV_SENSING_ANODE_ELECTRODE_1: NORMAL
MDC_IDC_SET_LEADCHNL_RV_SENSING_ANODE_LOCATION_1: NORMAL
MDC_IDC_SET_LEADCHNL_RV_SENSING_CATHODE_ELECTRODE_1: NORMAL
MDC_IDC_SET_LEADCHNL_RV_SENSING_CATHODE_LOCATION_1: NORMAL
MDC_IDC_SET_LEADCHNL_RV_SENSING_POLARITY: NORMAL
MDC_IDC_SET_LEADCHNL_RV_SENSING_SENSITIVITY: 0.3 MV
MDC_IDC_SET_ZONE_DETECTION_BEATS_DENOMINATOR: 40 {BEATS}
MDC_IDC_SET_ZONE_DETECTION_BEATS_NUMERATOR: 30 {BEATS}
MDC_IDC_SET_ZONE_DETECTION_INTERVAL: 270 MS
MDC_IDC_SET_ZONE_DETECTION_INTERVAL: 360 MS
MDC_IDC_SET_ZONE_DETECTION_INTERVAL: 370 MS
MDC_IDC_SET_ZONE_DETECTION_INTERVAL: NORMAL
MDC_IDC_SET_ZONE_TYPE: NORMAL
MDC_IDC_STAT_BRADY_DTM_END: NORMAL
MDC_IDC_STAT_BRADY_DTM_START: NORMAL
MDC_IDC_STAT_BRADY_RV_PERCENT_PACED: 0.02 %
MDC_IDC_STAT_EPISODE_RECENT_COUNT: 0
MDC_IDC_STAT_EPISODE_RECENT_COUNT: 4
MDC_IDC_STAT_EPISODE_RECENT_COUNT_DTM_END: NORMAL
MDC_IDC_STAT_EPISODE_RECENT_COUNT_DTM_START: NORMAL
MDC_IDC_STAT_EPISODE_TOTAL_COUNT: 0
MDC_IDC_STAT_EPISODE_TOTAL_COUNT: 1
MDC_IDC_STAT_EPISODE_TOTAL_COUNT: 15
MDC_IDC_STAT_EPISODE_TOTAL_COUNT: 204
MDC_IDC_STAT_EPISODE_TOTAL_COUNT_DTM_END: NORMAL
MDC_IDC_STAT_EPISODE_TOTAL_COUNT_DTM_START: NORMAL
MDC_IDC_STAT_EPISODE_TYPE: NORMAL
MDC_IDC_STAT_TACHYTHERAPY_ATP_DELIVERED_RECENT: 0
MDC_IDC_STAT_TACHYTHERAPY_ATP_DELIVERED_TOTAL: 0
MDC_IDC_STAT_TACHYTHERAPY_RECENT_DTM_END: NORMAL
MDC_IDC_STAT_TACHYTHERAPY_RECENT_DTM_START: NORMAL
MDC_IDC_STAT_TACHYTHERAPY_SHOCKS_ABORTED_RECENT: 0
MDC_IDC_STAT_TACHYTHERAPY_SHOCKS_ABORTED_TOTAL: 0
MDC_IDC_STAT_TACHYTHERAPY_SHOCKS_DELIVERED_RECENT: 0
MDC_IDC_STAT_TACHYTHERAPY_SHOCKS_DELIVERED_TOTAL: 1
MDC_IDC_STAT_TACHYTHERAPY_TOTAL_DTM_END: NORMAL
MDC_IDC_STAT_TACHYTHERAPY_TOTAL_DTM_START: NORMAL

## 2020-12-09 DIAGNOSIS — Q20.5 CONGENITALLY CORRECTED TRANSPOSITION OF GREAT ARTERIES: Primary | ICD-10-CM

## 2021-01-19 ENCOUNTER — ANCILLARY PROCEDURE (OUTPATIENT)
Dept: CARDIOLOGY | Facility: CLINIC | Age: 41
End: 2021-01-19
Attending: INTERNAL MEDICINE
Payer: MEDICARE

## 2021-01-19 DIAGNOSIS — Z86.79 HX OF VENTRICULAR TACHYCARDIA: Primary | ICD-10-CM

## 2021-01-19 DIAGNOSIS — Z95.810 AUTOMATIC IMPLANTABLE CARDIOVERTER-DEFIBRILLATOR IN SITU: ICD-10-CM

## 2021-01-19 DIAGNOSIS — Z86.79 HX OF VENTRICULAR TACHYCARDIA: ICD-10-CM

## 2021-01-19 PROCEDURE — 99207 CARDIAC DEVICE CHECK - REMOTE: CPT | Performed by: INTERNAL MEDICINE

## 2021-01-19 PROCEDURE — 93296 REM INTERROG EVL PM/IDS: CPT

## 2021-01-22 LAB
MDC_IDC_EPISODE_DTM: NORMAL
MDC_IDC_EPISODE_DURATION: 10 S
MDC_IDC_EPISODE_DURATION: 23 S
MDC_IDC_EPISODE_DURATION: 26 S
MDC_IDC_EPISODE_DURATION: 42 S
MDC_IDC_EPISODE_DURATION: 65 S
MDC_IDC_EPISODE_DURATION: 88 S
MDC_IDC_EPISODE_ID: 475
MDC_IDC_EPISODE_ID: 476
MDC_IDC_EPISODE_ID: 477
MDC_IDC_EPISODE_ID: 478
MDC_IDC_EPISODE_ID: 479
MDC_IDC_EPISODE_ID: 480
MDC_IDC_EPISODE_TYPE: NORMAL
MDC_IDC_LEAD_IMPLANT_DT: NORMAL
MDC_IDC_LEAD_LOCATION: NORMAL
MDC_IDC_LEAD_LOCATION_DETAIL_1: NORMAL
MDC_IDC_LEAD_MFG: NORMAL
MDC_IDC_LEAD_MODEL: NORMAL
MDC_IDC_LEAD_POLARITY_TYPE: NORMAL
MDC_IDC_LEAD_SERIAL: NORMAL
MDC_IDC_MSMT_BATTERY_DTM: NORMAL
MDC_IDC_MSMT_BATTERY_REMAINING_LONGEVITY: 78 MO
MDC_IDC_MSMT_BATTERY_RRT_TRIGGER: 2.73
MDC_IDC_MSMT_BATTERY_STATUS: NORMAL
MDC_IDC_MSMT_BATTERY_VOLTAGE: 2.99 V
MDC_IDC_MSMT_CAP_CHARGE_DTM: NORMAL
MDC_IDC_MSMT_CAP_CHARGE_ENERGY: 18 J
MDC_IDC_MSMT_CAP_CHARGE_TIME: 3.77
MDC_IDC_MSMT_CAP_CHARGE_TYPE: NORMAL
MDC_IDC_MSMT_LEADCHNL_RV_IMPEDANCE_VALUE: 304 OHM
MDC_IDC_MSMT_LEADCHNL_RV_IMPEDANCE_VALUE: 380 OHM
MDC_IDC_MSMT_LEADCHNL_RV_PACING_THRESHOLD_AMPLITUDE: 0.62 V
MDC_IDC_MSMT_LEADCHNL_RV_PACING_THRESHOLD_PULSEWIDTH: 0.4 MS
MDC_IDC_MSMT_LEADCHNL_RV_SENSING_INTR_AMPL: 9.75 MV
MDC_IDC_MSMT_LEADCHNL_RV_SENSING_INTR_AMPL: 9.75 MV
MDC_IDC_PG_IMPLANT_DTM: NORMAL
MDC_IDC_PG_MFG: NORMAL
MDC_IDC_PG_MODEL: NORMAL
MDC_IDC_PG_SERIAL: NORMAL
MDC_IDC_PG_TYPE: NORMAL
MDC_IDC_SESS_CLINIC_NAME: NORMAL
MDC_IDC_SESS_DTM: NORMAL
MDC_IDC_SESS_TYPE: NORMAL
MDC_IDC_SET_BRADY_HYSTRATE: NORMAL
MDC_IDC_SET_BRADY_LOWRATE: 40 {BEATS}/MIN
MDC_IDC_SET_BRADY_MODE: NORMAL
MDC_IDC_SET_LEADCHNL_RV_PACING_AMPLITUDE: 2 V
MDC_IDC_SET_LEADCHNL_RV_PACING_ANODE_ELECTRODE_1: NORMAL
MDC_IDC_SET_LEADCHNL_RV_PACING_ANODE_LOCATION_1: NORMAL
MDC_IDC_SET_LEADCHNL_RV_PACING_CAPTURE_MODE: NORMAL
MDC_IDC_SET_LEADCHNL_RV_PACING_CATHODE_ELECTRODE_1: NORMAL
MDC_IDC_SET_LEADCHNL_RV_PACING_CATHODE_LOCATION_1: NORMAL
MDC_IDC_SET_LEADCHNL_RV_PACING_POLARITY: NORMAL
MDC_IDC_SET_LEADCHNL_RV_PACING_PULSEWIDTH: 0.4 MS
MDC_IDC_SET_LEADCHNL_RV_SENSING_ANODE_ELECTRODE_1: NORMAL
MDC_IDC_SET_LEADCHNL_RV_SENSING_ANODE_LOCATION_1: NORMAL
MDC_IDC_SET_LEADCHNL_RV_SENSING_CATHODE_ELECTRODE_1: NORMAL
MDC_IDC_SET_LEADCHNL_RV_SENSING_CATHODE_LOCATION_1: NORMAL
MDC_IDC_SET_LEADCHNL_RV_SENSING_POLARITY: NORMAL
MDC_IDC_SET_LEADCHNL_RV_SENSING_SENSITIVITY: 0.3 MV
MDC_IDC_SET_ZONE_DETECTION_BEATS_DENOMINATOR: 40 {BEATS}
MDC_IDC_SET_ZONE_DETECTION_BEATS_NUMERATOR: 30 {BEATS}
MDC_IDC_SET_ZONE_DETECTION_INTERVAL: 270 MS
MDC_IDC_SET_ZONE_DETECTION_INTERVAL: 360 MS
MDC_IDC_SET_ZONE_DETECTION_INTERVAL: 370 MS
MDC_IDC_SET_ZONE_DETECTION_INTERVAL: NORMAL
MDC_IDC_SET_ZONE_TYPE: NORMAL
MDC_IDC_STAT_BRADY_DTM_END: NORMAL
MDC_IDC_STAT_BRADY_DTM_START: NORMAL
MDC_IDC_STAT_BRADY_RV_PERCENT_PACED: 0.06 %
MDC_IDC_STAT_EPISODE_RECENT_COUNT: 0
MDC_IDC_STAT_EPISODE_RECENT_COUNT: 2
MDC_IDC_STAT_EPISODE_RECENT_COUNT_DTM_END: NORMAL
MDC_IDC_STAT_EPISODE_RECENT_COUNT_DTM_START: NORMAL
MDC_IDC_STAT_EPISODE_TOTAL_COUNT: 0
MDC_IDC_STAT_EPISODE_TOTAL_COUNT: 1
MDC_IDC_STAT_EPISODE_TOTAL_COUNT: 15
MDC_IDC_STAT_EPISODE_TOTAL_COUNT: 206
MDC_IDC_STAT_EPISODE_TOTAL_COUNT_DTM_END: NORMAL
MDC_IDC_STAT_EPISODE_TOTAL_COUNT_DTM_START: NORMAL
MDC_IDC_STAT_EPISODE_TYPE: NORMAL
MDC_IDC_STAT_TACHYTHERAPY_ATP_DELIVERED_RECENT: 0
MDC_IDC_STAT_TACHYTHERAPY_ATP_DELIVERED_TOTAL: 0
MDC_IDC_STAT_TACHYTHERAPY_RECENT_DTM_END: NORMAL
MDC_IDC_STAT_TACHYTHERAPY_RECENT_DTM_START: NORMAL
MDC_IDC_STAT_TACHYTHERAPY_SHOCKS_ABORTED_RECENT: 0
MDC_IDC_STAT_TACHYTHERAPY_SHOCKS_ABORTED_TOTAL: 0
MDC_IDC_STAT_TACHYTHERAPY_SHOCKS_DELIVERED_RECENT: 0
MDC_IDC_STAT_TACHYTHERAPY_SHOCKS_DELIVERED_TOTAL: 1
MDC_IDC_STAT_TACHYTHERAPY_TOTAL_DTM_END: NORMAL
MDC_IDC_STAT_TACHYTHERAPY_TOTAL_DTM_START: NORMAL

## 2021-01-28 ENCOUNTER — VIRTUAL VISIT (OUTPATIENT)
Dept: FAMILY MEDICINE | Facility: CLINIC | Age: 41
End: 2021-01-28
Payer: MEDICARE

## 2021-01-28 DIAGNOSIS — M79.2 NEUROPATHIC PAIN: ICD-10-CM

## 2021-01-28 DIAGNOSIS — F51.02 ADJUSTMENT INSOMNIA: Primary | ICD-10-CM

## 2021-01-28 DIAGNOSIS — E11.59 TYPE 2 DIABETES MELLITUS WITH OTHER CIRCULATORY COMPLICATION, WITHOUT LONG-TERM CURRENT USE OF INSULIN (H): ICD-10-CM

## 2021-01-28 DIAGNOSIS — F41.9 ANXIETY: ICD-10-CM

## 2021-01-28 DIAGNOSIS — F33.1 MODERATE EPISODE OF RECURRENT MAJOR DEPRESSIVE DISORDER (H): ICD-10-CM

## 2021-01-28 PROCEDURE — 99214 OFFICE O/P EST MOD 30 MIN: CPT | Mod: 95 | Performed by: FAMILY MEDICINE

## 2021-01-28 RX ORDER — SERTRALINE HYDROCHLORIDE 25 MG/1
25 TABLET, FILM COATED ORAL DAILY
Qty: 90 TABLET | Refills: 0 | Status: SHIPPED | OUTPATIENT
Start: 2021-01-28 | End: 2021-02-11

## 2021-01-28 RX ORDER — TRAZODONE HYDROCHLORIDE 50 MG/1
50 TABLET, FILM COATED ORAL AT BEDTIME
Qty: 30 TABLET | Refills: 0 | Status: SHIPPED | OUTPATIENT
Start: 2021-01-28 | End: 2021-02-11

## 2021-01-28 RX ORDER — GABAPENTIN 300 MG/1
CAPSULE ORAL
Qty: 90 CAPSULE | Refills: 0 | Status: SHIPPED | OUTPATIENT
Start: 2021-01-28 | End: 2021-02-11

## 2021-01-28 ASSESSMENT — ANXIETY QUESTIONNAIRES
5. BEING SO RESTLESS THAT IT IS HARD TO SIT STILL: NOT AT ALL
3. WORRYING TOO MUCH ABOUT DIFFERENT THINGS: SEVERAL DAYS
6. BECOMING EASILY ANNOYED OR IRRITABLE: SEVERAL DAYS
7. FEELING AFRAID AS IF SOMETHING AWFUL MIGHT HAPPEN: NOT AT ALL
GAD7 TOTAL SCORE: 5
2. NOT BEING ABLE TO STOP OR CONTROL WORRYING: SEVERAL DAYS
IF YOU CHECKED OFF ANY PROBLEMS ON THIS QUESTIONNAIRE, HOW DIFFICULT HAVE THESE PROBLEMS MADE IT FOR YOU TO DO YOUR WORK, TAKE CARE OF THINGS AT HOME, OR GET ALONG WITH OTHER PEOPLE: SOMEWHAT DIFFICULT
1. FEELING NERVOUS, ANXIOUS, OR ON EDGE: SEVERAL DAYS

## 2021-01-28 ASSESSMENT — PATIENT HEALTH QUESTIONNAIRE - PHQ9
5. POOR APPETITE OR OVEREATING: SEVERAL DAYS
SUM OF ALL RESPONSES TO PHQ QUESTIONS 1-9: 17

## 2021-01-28 NOTE — PROGRESS NOTES
Chikis is a 40 year old who is being evaluated via a billable video visit.      How would you like to obtain your AVS? Mail a copy  If the video visit is dropped, the invitation should be resent by: Text to cell phone: [phone]  Will anyone else be joining your video visit? No    Video Start Time: 8:14 am   Assessment & Plan     Adjustment insomnia  - che maryt on trial of trazodone. Aware of potential interaction with zoloft. Will monitor.   - traZODone (DESYREL) 50 MG tablet; Take 1 tablet (50 mg) by mouth At Bedtime  - SLEEP PSYCHOLOGY REFERRAL    Moderate episode of recurrent major depressive disorder (H)  - needs improvement. Mostly situational issues where her family no longer wants her to work. Chikis enjoys working and is having a difficult time with this. Discussed with care giver about finding another job for her.   - sertraline (ZOLOFT) 25 MG tablet; Take 1 tablet (25 mg) by mouth daily    Anxiety  - as above   - sertraline (ZOLOFT) 25 MG tablet; Take 1 tablet (25 mg) by mouth daily    Type 2 diabetes mellitus with other circulatory complication, without long-term current use of insulin (H)  - due for labs  - metFORMIN (GLUCOPHAGE) 500 MG tablet; TAKE 1 TABLET(500 MG) BY MOUTH DAILY WITH DINNER    Neuropathic pain  - stable   - gabapentin (NEURONTIN) 300 MG capsule; TAKE 1 CAPSULE(300 MG) BY MOUTH EVERY NIGHT                 See Patient Instructions    Return in about 2 weeks (around 2/11/2021) for diabetes, medication recheck, in person, .    Tammy Benedict MD  [facility]     Chikis is a 40 year old who presents to clinic today for the following health issues     HPI  Depression Followup    How are you doing with your depression since your last visit? Worsened     Are you having other symptoms that might be associated with depression? Yes:  When patient has panic attacks happened twice in the last week     Have you had a significant  life event?  Job Concerns     Are you feeling anxious or having panic attacks?   Yes:  Panic attacks    Do you have any concerns with your use of alcohol or other drugs? No    Social History     Tobacco Use     Smoking status: Never Smoker     Smokeless tobacco: Never Used   Substance Use Topics     Alcohol use: No     Alcohol/week: 0.0 standard drinks     Drug use: No     PHQ 10/8/2019 11/14/2019 8/5/2020   PHQ-9 Total Score 10 2 8   Q9: Thoughts of better off dead/self-harm past 2 weeks Not at all Not at all Not at all     FREDIS-7 SCORE 10/8/2019 11/14/2019 8/5/2020   Total Score - - -   Total Score - 5 (mild anxiety) -   Total Score 14 5 5     Last PHQ-9 1/28/2021   1.  Little interest or pleasure in doing things 3   2.  Feeling down, depressed, or hopeless 2   3.  Trouble falling or staying asleep, or sleeping too much 3   4.  Feeling tired or having little energy 3   5.  Poor appetite or overeating 3   6.  Feeling bad about yourself 2   7.  Trouble concentrating 1   8.  Moving slowly or restless 0   Q9: Thoughts of better off dead/self-harm past 2 weeks 0   PHQ-9 Total Score 17   Difficulty at work, home, or with people Very difficult     FREDIS-7  1/28/2021   1. Feeling nervous, anxious, or on edge 1   2. Not being able to stop or control worrying 1   3. Worrying too much about different things 1   4. Trouble relaxing 1   5. Being so restless that it is hard to sit still 0   6. Becoming easily annoyed or irritable 1   7. Feeling afraid, as if something awful might happen 0   FREDIS-7 Total Score 5   If you checked any problems, how difficult have they made it for you to do your work, take care of things at home, or get along with other people? Somewhat difficult     In the past two weeks have you had thoughts of suicide or self-harm?  No.    Do you have concerns about your personal safety or the safety of others?   No    Suicide Assessment Five-step Evaluation and Treatment (SAFE-T)      How many servings of fruits  and vegetables do you eat daily?  2-3    On average, how many sweetened beverages do you drink each day (Examples: soda, juice, sweet tea, etc.  Do NOT count diet or artificially sweetened beverages)?   2    How many days per week do you exercise enough to make your heart beat faster? 3 or less    How many minutes a day do you exercise enough to make your heart beat faster? 9 or less    How many days per week do you miss taking your medication? 0    Concern - Sleep   Onset: X2 months  Description: Patient has problems falling asleep and is only 5 to 6 hrs a night   Therapies tried and outcome: Patient has been taking tylenol    Reports she has been under a lot of stress because she has been out of work for the last few months.  Per patient, her family does not want her working because of her heart history and the current pandemic.     [facility].            Review of Systems   Constitutional, HEENT, cardiovascular, pulmonary, GI, , musculoskeletal, neuro, skin, endocrine and psych systems are negative, except as otherwise noted.      Objective           Vitals:  No vitals were obtained today due to virtual visit.    Physical Exam   GENERAL: Healthy, alert and no distress  EYES: Eyes grossly normal to inspection.  No discharge or erythema, or obvious scleral/conjunctival abnormalities.  RESP: No audible wheeze, cough, or visible cyanosis.  No visible retractions or increased work of breathing.    PSYCH: Mentation appears normal, affect normal/bright, judgement and insight intact, normal speech and appearance well-groomed.                Video-Visit Details    Type of service:  Video Visit    Video End Time:8:29 AM    Originating Location (pt. Location): Home    Distant Location (provider location):  [facility] [place]     Platform used for Video Visit: Deanna

## 2021-01-29 DIAGNOSIS — F41.9 ANXIETY: ICD-10-CM

## 2021-01-29 DIAGNOSIS — F33.1 MODERATE EPISODE OF RECURRENT MAJOR DEPRESSIVE DISORDER (H): ICD-10-CM

## 2021-01-29 ASSESSMENT — ANXIETY QUESTIONNAIRES: GAD7 TOTAL SCORE: 5

## 2021-02-02 RX ORDER — SERTRALINE HYDROCHLORIDE 25 MG/1
TABLET, FILM COATED ORAL
Qty: 90 TABLET | Refills: 0 | OUTPATIENT
Start: 2021-02-02

## 2021-02-11 ENCOUNTER — OFFICE VISIT (OUTPATIENT)
Dept: FAMILY MEDICINE | Facility: CLINIC | Age: 41
End: 2021-02-11
Payer: MEDICARE

## 2021-02-11 VITALS
TEMPERATURE: 97.9 F | BODY MASS INDEX: 37.13 KG/M2 | HEART RATE: 102 BPM | WEIGHT: 203 LBS | SYSTOLIC BLOOD PRESSURE: 104 MMHG | DIASTOLIC BLOOD PRESSURE: 79 MMHG | OXYGEN SATURATION: 96 %

## 2021-02-11 DIAGNOSIS — F33.1 MODERATE EPISODE OF RECURRENT MAJOR DEPRESSIVE DISORDER (H): ICD-10-CM

## 2021-02-11 DIAGNOSIS — E11.59 TYPE 2 DIABETES MELLITUS WITH OTHER CIRCULATORY COMPLICATION, WITHOUT LONG-TERM CURRENT USE OF INSULIN (H): Primary | ICD-10-CM

## 2021-02-11 DIAGNOSIS — Z13.6 CARDIOVASCULAR SCREENING; LDL GOAL LESS THAN 70: ICD-10-CM

## 2021-02-11 DIAGNOSIS — F51.02 ADJUSTMENT INSOMNIA: ICD-10-CM

## 2021-02-11 DIAGNOSIS — Z11.4 SCREENING FOR HIV (HUMAN IMMUNODEFICIENCY VIRUS): ICD-10-CM

## 2021-02-11 DIAGNOSIS — E66.01 MORBID OBESITY (H): ICD-10-CM

## 2021-02-11 DIAGNOSIS — M79.2 NEUROPATHIC PAIN: ICD-10-CM

## 2021-02-11 DIAGNOSIS — Z11.59 NEED FOR HEPATITIS C SCREENING TEST: ICD-10-CM

## 2021-02-11 DIAGNOSIS — F41.9 ANXIETY: ICD-10-CM

## 2021-02-11 LAB
HBA1C MFR BLD: 7.9 % (ref 0–5.6)
HCV AB SERPL QL IA: NONREACTIVE
HIV 1+2 AB+HIV1 P24 AG SERPL QL IA: NONREACTIVE

## 2021-02-11 PROCEDURE — 83036 HEMOGLOBIN GLYCOSYLATED A1C: CPT | Performed by: FAMILY MEDICINE

## 2021-02-11 PROCEDURE — 82043 UR ALBUMIN QUANTITATIVE: CPT | Performed by: FAMILY MEDICINE

## 2021-02-11 PROCEDURE — 86803 HEPATITIS C AB TEST: CPT | Performed by: FAMILY MEDICINE

## 2021-02-11 PROCEDURE — 84443 ASSAY THYROID STIM HORMONE: CPT | Performed by: FAMILY MEDICINE

## 2021-02-11 PROCEDURE — 87389 HIV-1 AG W/HIV-1&-2 AB AG IA: CPT | Performed by: FAMILY MEDICINE

## 2021-02-11 PROCEDURE — 99214 OFFICE O/P EST MOD 30 MIN: CPT | Performed by: FAMILY MEDICINE

## 2021-02-11 PROCEDURE — 36415 COLL VENOUS BLD VENIPUNCTURE: CPT | Performed by: FAMILY MEDICINE

## 2021-02-11 RX ORDER — ATORVASTATIN CALCIUM 40 MG/1
40 TABLET, FILM COATED ORAL DAILY
Qty: 90 TABLET | Refills: 3 | Status: SHIPPED | OUTPATIENT
Start: 2021-02-11 | End: 2022-03-11

## 2021-02-11 RX ORDER — TRAZODONE HYDROCHLORIDE 50 MG/1
50 TABLET, FILM COATED ORAL AT BEDTIME
Qty: 90 TABLET | Refills: 0 | Status: SHIPPED | OUTPATIENT
Start: 2021-02-11 | End: 2021-04-19

## 2021-02-11 RX ORDER — GABAPENTIN 300 MG/1
CAPSULE ORAL
Qty: 90 CAPSULE | Refills: 1 | Status: SHIPPED | OUTPATIENT
Start: 2021-02-11 | End: 2021-11-05

## 2021-02-11 RX ORDER — SERTRALINE HYDROCHLORIDE 25 MG/1
25 TABLET, FILM COATED ORAL DAILY
Qty: 90 TABLET | Refills: 1 | Status: SHIPPED | OUTPATIENT
Start: 2021-02-11 | End: 2021-11-22

## 2021-02-11 RX ORDER — BLOOD SUGAR DIAGNOSTIC
STRIP MISCELLANEOUS
Qty: 100 STRIP | Refills: 1 | Status: SHIPPED | OUTPATIENT
Start: 2021-02-11 | End: 2023-08-22 | Stop reason: ALTCHOICE

## 2021-02-11 ASSESSMENT — ANXIETY QUESTIONNAIRES
2. NOT BEING ABLE TO STOP OR CONTROL WORRYING: SEVERAL DAYS
6. BECOMING EASILY ANNOYED OR IRRITABLE: SEVERAL DAYS
5. BEING SO RESTLESS THAT IT IS HARD TO SIT STILL: SEVERAL DAYS
3. WORRYING TOO MUCH ABOUT DIFFERENT THINGS: SEVERAL DAYS
1. FEELING NERVOUS, ANXIOUS, OR ON EDGE: SEVERAL DAYS
7. FEELING AFRAID AS IF SOMETHING AWFUL MIGHT HAPPEN: NOT AT ALL
4. TROUBLE RELAXING: SEVERAL DAYS
GAD7 TOTAL SCORE: 6
GAD7 TOTAL SCORE: 6
7. FEELING AFRAID AS IF SOMETHING AWFUL MIGHT HAPPEN: NOT AT ALL
GAD7 TOTAL SCORE: 6

## 2021-02-11 ASSESSMENT — PATIENT HEALTH QUESTIONNAIRE - PHQ9
10. IF YOU CHECKED OFF ANY PROBLEMS, HOW DIFFICULT HAVE THESE PROBLEMS MADE IT FOR YOU TO DO YOUR WORK, TAKE CARE OF THINGS AT HOME, OR GET ALONG WITH OTHER PEOPLE: NOT DIFFICULT AT ALL
SUM OF ALL RESPONSES TO PHQ QUESTIONS 1-9: 5
SUM OF ALL RESPONSES TO PHQ QUESTIONS 1-9: 5

## 2021-02-11 NOTE — LETTER
February 15, 2021      Chikis Hannon  [id number]   [place] [id number]        Dear ,    We are writing to inform you of your test results.    Your results are normal.    Resulted Orders   HIV Antigen Antibody Combo   Result Value Ref Range    HIV Antigen Antibody Combo Nonreactive NR^Nonreactive          Comment:      HIV-1 p24 Ag & HIV-1/HIV-2 Ab Not Detected   Hepatitis C Screen Reflex to HCV RNA Quant and Genotype   Result Value Ref Range    Hepatitis C Antibody Nonreactive NR^Nonreactive      Comment:      Assay performance characteristics have not been established for newborns,   infants, and children     Albumin Random Urine Quantitative with Creat Ratio   Result Value Ref Range    Creatinine Urine 68 mg/dL    Albumin Urine mg/L 14 mg/L    Albumin Urine mg/g Cr 20.29 0 - 25 mg/g Cr   TSH WITH FREE T4 REFLEX   Result Value Ref Range    TSH 2.88 0.40 - 4.00 mU/L   HEMOGLOBIN A1C   Result Value Ref Range    Hemoglobin A1C 7.9 (H) 0 - 5.6 %      Comment:      Normal <5.7% Prediabetes 5.7-6.4%  Diabetes 6.5% or higher - adopted from ADA   consensus guidelines.         If you have any questions or concerns, please call the clinic at the number listed above.       Sincerely,      Tammy Benedict MD/adriel

## 2021-02-11 NOTE — PROGRESS NOTES
"    Assessment & Plan     Type 2 diabetes mellitus with other circulatory complication, without long-term current use of insulin (H)  - A1c up from prior. Will increase metformin dose. Patient also advised to cut out pop and make other dietary changes.   - Albumin Random Urine Quantitative with Creat Ratio  - HEMOGLOBIN A1C  - metFORMIN (GLUCOPHAGE) 500 MG tablet; Take 2 tablets (1,000 mg) by mouth daily (with dinner) TAKE 1 TABLET(500 MG) BY MOUTH DAILY WITH DINNER    Morbid obesity (H)  - diet and lifestyle changes discussed     Screening for HIV (human immunodeficiency virus)  - HIV Antigen Antibody Combo    CARDIOVASCULAR SCREENING; LDL GOAL LESS THAN 70  - atorvastatin (LIPITOR) 40 MG tablet; Take 1 tablet (40 mg) by mouth daily    Need for hepatitis C screening test  - Hepatitis C Screen Reflex to HCV RNA Quant and Genotype    Neuropathic pain  -stable   - gabapentin (NEURONTIN) 300 MG capsule; TAKE 1 CAPSULE(300 MG) BY MOUTH EVERY NIGHT    Moderate episode of recurrent major depressive disorder (H)  - improving.The current medical regimen is effective;  continue present plan and medications.   - sertraline (ZOLOFT) 25 MG tablet; Take 1 tablet (25 mg) by mouth daily    Anxiety  -stable   - sertraline (ZOLOFT) 25 MG tablet; Take 1 tablet (25 mg) by mouth daily    Adjustment insomnia  - improving   - traZODone (DESYREL) 50 MG tablet; Take 1 tablet (50 mg) by mouth At Bedtime    56}     BMI:   Estimated body mass index is 37.13 kg/m  as calculated from the following:    Height as of 2/28/20: 1.575 m (5' 2\").    Weight as of this encounter: 92.1 kg (203 lb).   Weight management plan: Discussed healthy diet and exercise guidelines  Blood sugar testing frequency justification:  Patient modifying lifestyle changes (diet, exercise) with blood sugars  See Patient Instructions    Return in about 6 months (around 8/11/2021) for in person, , medication recheck.    Tammy Benedict MD  [facility] " [facility] JOHN Diop is a 40 year old who presents for the following health issues  accompanied by her caregiver:    History of Present Illness       Diabetes:   She presents for follow up of diabetes.  She is checking home blood glucose three times daily. She checks blood glucose before meals and at bedtime.  Blood glucose is sometimes over 200 and never under 70. She is aware of hypoglycemia symptoms including shakiness, dizziness, lethargy and confusion. She is concerned about blood sugar frequently over 200. She is not experiencing numbness or burning in feet, excessive thirst, blurry vision, weight changes or redness, sores or blisters on feet. The patient has not had a diabetic eye exam in the last 12 months.         She eats 2-3 servings of fruits and vegetables daily.She consumes 2 sweetened beverage(s) daily.She exercises with enough effort to increase her heart rate 9 or less minutes per day.  She exercises with enough effort to increase her heart rate 3 or less days per week. She is missing 2 dose(s) of medications per week.  She is not taking prescribed medications regularly due to other.         Wt Readings from Last 4 Encounters:   02/11/21 92.1 kg (203 lb)   02/28/20 81.3 kg (179 lb 3.2 oz)   02/28/20 81.5 kg (179 lb 11.2 oz)   11/14/19 82.7 kg (182 lb 4.8 oz)         Depression and Anxiety Follow-Up    How are you doing with your depression since your last visit? Improved     How are you doing with your anxiety since your last visit?  Improved     Are you having other symptoms that might be associated with depression or anxiety? No    Have you had a significant life event? Relationship Concerns and Job Concerns     Do you have any concerns with your use of alcohol or other drugs? No    Social History     Tobacco Use     Smoking status: Never Smoker     Smokeless tobacco: Never Used   Substance Use Topics     Alcohol use: No     Alcohol/week: 0.0 standard drinks     Drug  use: No     PHQ 8/5/2020 1/28/2021 2/11/2021   PHQ-9 Total Score 8 17 5   Q9: Thoughts of better off dead/self-harm past 2 weeks Not at all Not at all Not at all     FREDIS-7 SCORE 8/5/2020 1/28/2021 2/11/2021   Total Score - - -   Total Score - - 6 (mild anxiety)   Total Score 5 5 6         Suicide Assessment Five-step Evaluation and Treatment (SAFE-T)    At last visit she was having significant insomnia due to family disagreements. Since that visit she was able to communicate to her family how important work was to her and she will now be allowed     Review of Systems   Constitutional, HEENT, cardiovascular, pulmonary, GI, , musculoskeletal, neuro, skin, endocrine and psych systems are negative, except as otherwise noted.      Objective    /79   Pulse 102   Temp 97.9  F (36.6  C) (Oral)   Wt 92.1 kg (203 lb)   SpO2 96%   BMI 37.13 kg/m    Body mass index is 37.13 kg/m .  Physical Exam   GENERAL: healthy, alert and no distress  NECK: no adenopathy, no asymmetry, masses, or scars and thyroid normal to palpation  RESP: lungs clear to auscultation - no rales, rhonchi or wheezes  CV: regular rhythm, Systolic murmur 3/6.  no peripheral edema and peripheral pulses strong  ABDOMEN: soft, nontender, no hepatosplenomegaly, no masses and bowel sounds normal  MS: no gross musculoskeletal defects noted, no edema  PSYCH: mentation appears normal, affect normal/bright  Diabetic foot exam: normal DP and PT pulses, no trophic changes or ulcerative lesions and normal sensory exam    Results for orders placed or performed in visit on 02/11/21   HEMOGLOBIN A1C     Status: Abnormal   Result Value Ref Range    Hemoglobin A1C 7.9 (H) 0 - 5.6 %

## 2021-02-12 LAB
CREAT UR-MCNC: 68 MG/DL
MICROALBUMIN UR-MCNC: 14 MG/L
MICROALBUMIN/CREAT UR: 20.29 MG/G CR (ref 0–25)
TSH SERPL DL<=0.005 MIU/L-ACNC: 2.88 MU/L (ref 0.4–4)

## 2021-02-12 ASSESSMENT — PATIENT HEALTH QUESTIONNAIRE - PHQ9: SUM OF ALL RESPONSES TO PHQ QUESTIONS 1-9: 5

## 2021-02-12 ASSESSMENT — ANXIETY QUESTIONNAIRES: GAD7 TOTAL SCORE: 6

## 2021-02-15 NOTE — RESULT ENCOUNTER NOTE
Please send patient a letter to let them know results are normal.    The rest of your labs look good.  For further questions or concerns please let us know.

## 2021-02-24 ENCOUNTER — TELEPHONE (OUTPATIENT)
Dept: FAMILY MEDICINE | Facility: CLINIC | Age: 41
End: 2021-02-24

## 2021-02-24 NOTE — TELEPHONE ENCOUNTER
"Fax from [facility], \"second request\", wants PCP signature for DM supplies for medicare, form in your bin, give to TC for faxing  Charlene Pete RN, BSN  Message handled by CLINIC NURSE.    "

## 2021-02-26 ENCOUNTER — TRANSFERRED RECORDS (OUTPATIENT)
Dept: HEALTH INFORMATION MANAGEMENT | Facility: CLINIC | Age: 41
End: 2021-02-26

## 2021-02-26 LAB — RETINOPATHY: NEGATIVE

## 2021-03-10 NOTE — TELEPHONE ENCOUNTER
Another fax from [employer], wants signed again and faxed, informs #7 not filled out, in your bin, give to TC to refax  Charlene Pete RN, BSN  Message handled by CLINIC NURSE.

## 2021-03-23 ENCOUNTER — ANCILLARY PROCEDURE (OUTPATIENT)
Dept: CARDIOLOGY | Facility: CLINIC | Age: 41
End: 2021-03-23
Attending: INTERNAL MEDICINE
Payer: MEDICARE

## 2021-03-23 VITALS
BODY MASS INDEX: 37.54 KG/M2 | HEART RATE: 95 BPM | WEIGHT: 204 LBS | OXYGEN SATURATION: 97 % | HEIGHT: 62 IN | DIASTOLIC BLOOD PRESSURE: 75 MMHG | SYSTOLIC BLOOD PRESSURE: 131 MMHG

## 2021-03-23 DIAGNOSIS — Z86.79 HX OF VENTRICULAR TACHYCARDIA: ICD-10-CM

## 2021-03-23 DIAGNOSIS — Q20.5 CONGENITALLY CORRECTED TRANSPOSITION OF GREAT ARTERIES: ICD-10-CM

## 2021-03-23 DIAGNOSIS — Q20.3 D-TGA (DEXTRO-TRANSPOSITION OF GREAT ARTERIES): ICD-10-CM

## 2021-03-23 DIAGNOSIS — I47.20 VENTRICULAR TACHYCARDIA (H): ICD-10-CM

## 2021-03-23 LAB
ALBUMIN SERPL-MCNC: 3.5 G/DL (ref 3.4–5)
ALP SERPL-CCNC: 120 U/L (ref 40–150)
ALT SERPL W P-5'-P-CCNC: 32 U/L (ref 0–50)
ANION GAP SERPL CALCULATED.3IONS-SCNC: 6 MMOL/L (ref 3–14)
AST SERPL W P-5'-P-CCNC: 15 U/L (ref 0–45)
BILIRUB SERPL-MCNC: 0.7 MG/DL (ref 0.2–1.3)
BUN SERPL-MCNC: 10 MG/DL (ref 7–30)
CALCIUM SERPL-MCNC: 9.1 MG/DL (ref 8.5–10.1)
CHLORIDE SERPL-SCNC: 108 MMOL/L (ref 94–109)
CO2 SERPL-SCNC: 27 MMOL/L (ref 20–32)
CREAT SERPL-MCNC: 0.61 MG/DL (ref 0.52–1.04)
GFR SERPL CREATININE-BSD FRML MDRD: >90 ML/MIN/{1.73_M2}
GLUCOSE SERPL-MCNC: 170 MG/DL (ref 70–99)
MDC_IDC_EPISODE_DTM: NORMAL
MDC_IDC_EPISODE_DURATION: 104 S
MDC_IDC_EPISODE_DURATION: 12 S
MDC_IDC_EPISODE_DURATION: 13 S
MDC_IDC_EPISODE_DURATION: 18 S
MDC_IDC_EPISODE_DURATION: 20 S
MDC_IDC_EPISODE_DURATION: 20 S
MDC_IDC_EPISODE_DURATION: 31 S
MDC_IDC_EPISODE_DURATION: 32 S
MDC_IDC_EPISODE_DURATION: 34 S
MDC_IDC_EPISODE_DURATION: 37 S
MDC_IDC_EPISODE_DURATION: 38 S
MDC_IDC_EPISODE_DURATION: 4 S
MDC_IDC_EPISODE_DURATION: 40 S
MDC_IDC_EPISODE_DURATION: 41 S
MDC_IDC_EPISODE_DURATION: 43 S
MDC_IDC_EPISODE_DURATION: 5 S
MDC_IDC_EPISODE_DURATION: 51 S
MDC_IDC_EPISODE_DURATION: 6 S
MDC_IDC_EPISODE_DURATION: 72 S
MDC_IDC_EPISODE_DURATION: 8 S
MDC_IDC_EPISODE_DURATION: 8 S
MDC_IDC_EPISODE_DURATION: 85 S
MDC_IDC_EPISODE_ID: 481
MDC_IDC_EPISODE_ID: 482
MDC_IDC_EPISODE_ID: 483
MDC_IDC_EPISODE_ID: 484
MDC_IDC_EPISODE_ID: 485
MDC_IDC_EPISODE_ID: 486
MDC_IDC_EPISODE_ID: 487
MDC_IDC_EPISODE_ID: 488
MDC_IDC_EPISODE_ID: 489
MDC_IDC_EPISODE_ID: 490
MDC_IDC_EPISODE_ID: 491
MDC_IDC_EPISODE_ID: 492
MDC_IDC_EPISODE_ID: 493
MDC_IDC_EPISODE_ID: 494
MDC_IDC_EPISODE_ID: 495
MDC_IDC_EPISODE_ID: 496
MDC_IDC_EPISODE_ID: 497
MDC_IDC_EPISODE_ID: 498
MDC_IDC_EPISODE_ID: 499
MDC_IDC_EPISODE_ID: 500
MDC_IDC_EPISODE_ID: 501
MDC_IDC_EPISODE_ID: 502
MDC_IDC_EPISODE_ID: 503
MDC_IDC_EPISODE_ID: 504
MDC_IDC_EPISODE_TYPE: NORMAL
MDC_IDC_LEAD_IMPLANT_DT: NORMAL
MDC_IDC_LEAD_LOCATION: NORMAL
MDC_IDC_LEAD_LOCATION_DETAIL_1: NORMAL
MDC_IDC_LEAD_MFG: NORMAL
MDC_IDC_LEAD_MODEL: NORMAL
MDC_IDC_LEAD_POLARITY_TYPE: NORMAL
MDC_IDC_LEAD_SERIAL: NORMAL
MDC_IDC_MSMT_BATTERY_DTM: NORMAL
MDC_IDC_MSMT_BATTERY_REMAINING_LONGEVITY: 74 MO
MDC_IDC_MSMT_BATTERY_RRT_TRIGGER: 2.73
MDC_IDC_MSMT_BATTERY_STATUS: NORMAL
MDC_IDC_MSMT_BATTERY_VOLTAGE: 3 V
MDC_IDC_MSMT_CAP_CHARGE_DTM: NORMAL
MDC_IDC_MSMT_CAP_CHARGE_ENERGY: 18 J
MDC_IDC_MSMT_CAP_CHARGE_TIME: 3.8
MDC_IDC_MSMT_CAP_CHARGE_TYPE: NORMAL
MDC_IDC_MSMT_LEADCHNL_RV_IMPEDANCE_VALUE: 304 OHM
MDC_IDC_MSMT_LEADCHNL_RV_IMPEDANCE_VALUE: 342 OHM
MDC_IDC_MSMT_LEADCHNL_RV_PACING_THRESHOLD_AMPLITUDE: 0.75 V
MDC_IDC_MSMT_LEADCHNL_RV_PACING_THRESHOLD_PULSEWIDTH: 0.4 MS
MDC_IDC_MSMT_LEADCHNL_RV_SENSING_INTR_AMPL: 8.38 MV
MDC_IDC_MSMT_LEADCHNL_RV_SENSING_INTR_AMPL: 8.62 MV
MDC_IDC_PG_IMPLANT_DTM: NORMAL
MDC_IDC_PG_MFG: NORMAL
MDC_IDC_PG_MODEL: NORMAL
MDC_IDC_PG_SERIAL: NORMAL
MDC_IDC_PG_TYPE: NORMAL
MDC_IDC_SESS_CLINIC_NAME: NORMAL
MDC_IDC_SESS_DTM: NORMAL
MDC_IDC_SESS_TYPE: NORMAL
MDC_IDC_SET_BRADY_HYSTRATE: NORMAL
MDC_IDC_SET_BRADY_LOWRATE: 40 {BEATS}/MIN
MDC_IDC_SET_BRADY_MODE: NORMAL
MDC_IDC_SET_LEADCHNL_RV_PACING_AMPLITUDE: 2 V
MDC_IDC_SET_LEADCHNL_RV_PACING_ANODE_ELECTRODE_1: NORMAL
MDC_IDC_SET_LEADCHNL_RV_PACING_ANODE_LOCATION_1: NORMAL
MDC_IDC_SET_LEADCHNL_RV_PACING_CAPTURE_MODE: NORMAL
MDC_IDC_SET_LEADCHNL_RV_PACING_CATHODE_ELECTRODE_1: NORMAL
MDC_IDC_SET_LEADCHNL_RV_PACING_CATHODE_LOCATION_1: NORMAL
MDC_IDC_SET_LEADCHNL_RV_PACING_POLARITY: NORMAL
MDC_IDC_SET_LEADCHNL_RV_PACING_PULSEWIDTH: 0.4 MS
MDC_IDC_SET_LEADCHNL_RV_SENSING_ANODE_ELECTRODE_1: NORMAL
MDC_IDC_SET_LEADCHNL_RV_SENSING_ANODE_LOCATION_1: NORMAL
MDC_IDC_SET_LEADCHNL_RV_SENSING_CATHODE_ELECTRODE_1: NORMAL
MDC_IDC_SET_LEADCHNL_RV_SENSING_CATHODE_LOCATION_1: NORMAL
MDC_IDC_SET_LEADCHNL_RV_SENSING_POLARITY: NORMAL
MDC_IDC_SET_LEADCHNL_RV_SENSING_SENSITIVITY: 0.3 MV
MDC_IDC_SET_ZONE_DETECTION_BEATS_DENOMINATOR: 40 {BEATS}
MDC_IDC_SET_ZONE_DETECTION_BEATS_NUMERATOR: 30 {BEATS}
MDC_IDC_SET_ZONE_DETECTION_INTERVAL: 270 MS
MDC_IDC_SET_ZONE_DETECTION_INTERVAL: 360 MS
MDC_IDC_SET_ZONE_DETECTION_INTERVAL: 370 MS
MDC_IDC_SET_ZONE_DETECTION_INTERVAL: NORMAL
MDC_IDC_SET_ZONE_TYPE: NORMAL
MDC_IDC_STAT_BRADY_DTM_END: NORMAL
MDC_IDC_STAT_BRADY_DTM_START: NORMAL
MDC_IDC_STAT_BRADY_RV_PERCENT_PACED: 0.08 %
MDC_IDC_STAT_EPISODE_RECENT_COUNT: 0
MDC_IDC_STAT_EPISODE_RECENT_COUNT: 2
MDC_IDC_STAT_EPISODE_RECENT_COUNT: 48
MDC_IDC_STAT_EPISODE_RECENT_COUNT_DTM_END: NORMAL
MDC_IDC_STAT_EPISODE_RECENT_COUNT_DTM_START: NORMAL
MDC_IDC_STAT_EPISODE_TOTAL_COUNT: 0
MDC_IDC_STAT_EPISODE_TOTAL_COUNT: 1
MDC_IDC_STAT_EPISODE_TOTAL_COUNT: 15
MDC_IDC_STAT_EPISODE_TOTAL_COUNT: 217
MDC_IDC_STAT_EPISODE_TOTAL_COUNT_DTM_END: NORMAL
MDC_IDC_STAT_EPISODE_TOTAL_COUNT_DTM_START: NORMAL
MDC_IDC_STAT_EPISODE_TYPE: NORMAL
MDC_IDC_STAT_TACHYTHERAPY_ATP_DELIVERED_RECENT: 0
MDC_IDC_STAT_TACHYTHERAPY_ATP_DELIVERED_TOTAL: 0
MDC_IDC_STAT_TACHYTHERAPY_RECENT_DTM_END: NORMAL
MDC_IDC_STAT_TACHYTHERAPY_RECENT_DTM_START: NORMAL
MDC_IDC_STAT_TACHYTHERAPY_SHOCKS_ABORTED_RECENT: 0
MDC_IDC_STAT_TACHYTHERAPY_SHOCKS_ABORTED_TOTAL: 0
MDC_IDC_STAT_TACHYTHERAPY_SHOCKS_DELIVERED_RECENT: 0
MDC_IDC_STAT_TACHYTHERAPY_SHOCKS_DELIVERED_TOTAL: 1
MDC_IDC_STAT_TACHYTHERAPY_TOTAL_DTM_END: NORMAL
MDC_IDC_STAT_TACHYTHERAPY_TOTAL_DTM_START: NORMAL
POTASSIUM SERPL-SCNC: 3.9 MMOL/L (ref 3.4–5.3)
PROT SERPL-MCNC: 7.7 G/DL (ref 6.8–8.8)
SODIUM SERPL-SCNC: 141 MMOL/L (ref 133–144)

## 2021-03-23 PROCEDURE — 93320 DOPPLER ECHO COMPLETE: CPT | Performed by: PEDIATRICS

## 2021-03-23 PROCEDURE — G0463 HOSPITAL OUTPT CLINIC VISIT: HCPCS | Mod: 25

## 2021-03-23 PROCEDURE — 93282 PRGRMG EVAL IMPLANTABLE DFB: CPT | Performed by: INTERNAL MEDICINE

## 2021-03-23 PROCEDURE — 93303 ECHO TRANSTHORACIC: CPT | Performed by: PEDIATRICS

## 2021-03-23 PROCEDURE — 93325 DOPPLER ECHO COLOR FLOW MAPG: CPT | Performed by: PEDIATRICS

## 2021-03-23 PROCEDURE — 99214 OFFICE O/P EST MOD 30 MIN: CPT | Mod: 25 | Performed by: INTERNAL MEDICINE

## 2021-03-23 PROCEDURE — 36415 COLL VENOUS BLD VENIPUNCTURE: CPT | Performed by: PATHOLOGY

## 2021-03-23 PROCEDURE — 93005 ELECTROCARDIOGRAM TRACING: CPT

## 2021-03-23 ASSESSMENT — MIFFLIN-ST. JEOR: SCORE: 1538.59

## 2021-03-23 ASSESSMENT — PAIN SCALES - GENERAL: PAINLEVEL: NO PAIN (0)

## 2021-03-23 NOTE — LETTER
3/23/2021      RE: Chiksi Hannon  [address]  [facility] [address]       Dear Colleague,    Thank you for the opportunity to participate in the care of your patient, Chikis Hannon, at the [facility] [place] at [facility]. Please see a copy of my visit note below.    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  Today pt reports that she has been feeling well.  She has received both doses of her COVID vaccination thus she is back to work.   She denies any chest pain or pressure, sob/maher, orthopnea, pnd, palpitations, syncope/presyncope, cathy, change in exercise tolerance or any ICD shocks.        PAST MEDICAL HISTORY:  Past Medical History:   Diagnosis Date     Allergy, unspecified not elsewhere classified      Attention deficit disorder without mention of hyperactivity     Mild MR; difficulty attention span     Congenital heart disease      Corrected transposition of great vessels     still needs SBE prophlaxis     Diabetes mellitus type 2, noninsulin dependent (H)      Impaired fasting glucose     103 7/06     Ventricular tachycardia (H) 3/4/2014    s/p ablation and ICD placement 3/13/14       FAMILY HISTORY:  Family History   Adopted: Yes   Problem Relation Age of Onset     Family History Negative Mother      Family History Negative Father        SOCIAL HISTORY:  History     Social History     Marital Status:      Spouse Name: N/A      Number of Children: N/A     Years of Education: N/A     Occupational History      at [employer]             Disabled     Social History Main Topics     Smoking status: Never Smoker      Smokeless tobacco: Never Used     Alcohol Use: No     Drug Use: No     Sexual Activity:     Partners: Male     Other Topics Concern     Special Diet No     working on dairy. fruits and veggies.     Exercise Yes     Peter Monzon tape     Parent/Sibling W/ Cabg, Mi Or Angioplasty Before 65f 55m? No     Social History Narrative       CURRENT MEDICATIONS:  Current Outpatient Medications   Medication Sig Dispense Refill     amoxicillin (AMOXIL) 500 MG capsule Please take 2,000 mg (4 capsule) 30-60 min prior to all dental procedures 4 capsule 3     atorvastatin (LIPITOR) 40 MG tablet Take 1 tablet (40 mg) by mouth daily 90 tablet 3     blood glucose (ACCU-CHEK SMARTVIEW) test strip Use to test blood sugar 1 times daily or as directed. 100 strip 1     blood glucose monitoring (ACCU-CHEK FASTCLIX) lancets Use to test blood sugar 1 times daily or as directed. 1 Box 3     gabapentin (NEURONTIN) 300 MG capsule TAKE 1 CAPSULE(300 MG) BY MOUTH EVERY NIGHT 90 capsule 1     metFORMIN (GLUCOPHAGE) 500 MG tablet Take 2 tablets (1,000 mg) by mouth daily (with dinner) TAKE 1 TABLET(500 MG) BY MOUTH DAILY WITH DINNER 180 tablet 1     MULTIVITAMINS PO TABS ONE DAILY  3     Naproxen Sodium (ALEVE PO) Take 220 mg by mouth       sertraline (ZOLOFT) 25 MG tablet Take 1 tablet (25 mg) by mouth daily 90 tablet 1     traZODone (DESYREL) 50 MG tablet Take 1 tablet (50 mg) by mouth At Bedtime 90 tablet 0     ACE NOT PRESCRIBED, INTENTIONAL, 1 each as needed for other ACE Inhibitor not prescribed due to Other: BP at goal, pending microalbumin test (Patient not taking: Reported on 3/23/2021) 0 each 0       ROS:   Constitutional: No fever, chills, or sweats.   ENT: No visual disturbance, ear ache, epistaxis, sore throat.   Allergies/Immunologic:  "Negative.   Respiratory: No cough, hemoptysis.   Cardiovascular: As per HPI.   GI: No nausea, vomiting, hematemesis, melena, or hematochezia.   : No urinary frequency, dysuria, or hematuria.   Integument: Negative.   Psychiatric: Negative.   Neuro: Negative.   Endocrinology: Negative.   Musculoskeletal: Negative.    EXAM:  /75 (BP Location: Right arm, Patient Position: Chair, Cuff Size: Adult Regular)   Pulse 95   Ht 1.575 m (5' 2\")   Wt 92.5 kg (204 lb)   SpO2 97%   BMI 37.31 kg/m    General: appears comfortable, alert and articulate  Head: normocephalic, atraumatic  Eyes: anicteric sclera, EOMI  Neck: no adenopathy, 2+ carotids  Orophyarynx: moist mucosa, no lesions, dentition intact  Heart: Nrl S1, prominent S2, widely split (patient has RBBB), has 3/6 KRISTI best heard at the left sternal border at the base., JVP 6-7cm  Lungs: clear, no rales or wheezing  Abdomen: soft, non-tender, bowel sounds present, no hepatomegaly  Extremities: no clubbing, cyanosis or edema  Neurological: normal speech and affect, no gross motor deficits      Labs:  CMP RESULTS:  Lab Results   Component Value Date     03/23/2021    POTASSIUM 3.9 03/23/2021    CHLORIDE 108 03/23/2021    CO2 27 03/23/2021    ANIONGAP 6 03/23/2021     (H) 03/23/2021    BUN 10 03/23/2021    CR 0.61 03/23/2021    GFRESTIMATED >90 03/23/2021    GFRESTBLACK >90 03/23/2021    LITA 9.1 03/23/2021    BILITOTAL 0.7 03/23/2021    ALBUMIN 3.5 03/23/2021    ALKPHOS 120 03/23/2021    ALT 32 03/23/2021    AST 15 03/23/2021        INR RESULTS:  Lab Results   Component Value Date    INR 1.13 06/01/2015       Lab Results   Component Value Date    MAG 2.0 06/02/2015     Lab Results   Component Value Date    NTBNPI 327 12/24/2008     No results found for: NTBNP       CT CORONARY: 3/4/14   IMPRESSION:  1. Normal coronary anatomy with no detectable stenosis or plaque. The coronaries arise from the anteriorly positioned aorta.     ADDITIONAL FINDINGS: The " aortic root has an unusual appearance probably from the reconstructive surgery for transposition. The appearance is nearly of 2 aortic roots one small 1 which ends in a cul-de-sac and the other which leads into the ascending aorta. The coronary arteries arise from that aortic root which feeds into the ascending aorta . There is a conduit from the right ventricle to the pulmonary trunk. Normal pulmonary venous anatomy with all four pulmonary veins draining into the left atrium. There is no left atrial or left ventricular mass or thrombus. Normal pericardial thickness.There is no pericardial effusion. The proximal pulmonary arteries are well opacified. Please review Radiology report for incidental noncardiac findings that will follow separately.      CARDIAC MRI/MRA: 3/4/14   IMPRESSION:   1. Transposition of the great arteries with VSD after Rastelli procedure with RV to PA conduit.   2. Minimal flow acceleration across the RV to PA conduit with moderate pulmonary regurgitation (31-33% regurg fraction, Peak PA conduit gradient 18mm Hg).   3. Normal right and left ventricular size and systolic function with no evidence of fibrosis.    Echo 3/16  Difficult study due to poor acoustic windows. The patient has undergone a Rastelli procedure for d-transposition of the great arteries and replacement of the pulmonary valve. The pulmonary valve is not seen in this study. Shunts were not able to be excluded. Mildly diminished left ventricular systolic function with mild left ventricular enlargement. Normal right ventricular and pulmonary artery pressures.    Echo 11/17  D-TGA status post Rastelli and subsequent placement of a bio-prosthetic valve in the pulmonary position. The calculated single plane left ventricular ejection fraction from the 4 chamber view is 58%. A pacing lead is present in the right atrium and right ventricle. Mild (1+) aortic valve insufficiency. Unable to adequately visualize the RV-PA conduit or the  bio-prosthetic valve. Estimated right ventricular systolic pressure is 20-25 mmHg plus right atrial pressure.    Echo Nov 2018  D-TGA status post Rastelli and subsequent placement of a bio-prosthetic valve in the pulmonary position. There is no obvious residual ventricular level shunt. Mild (1+) aortic valve insufficiency. Unable to adequately visualize the RV-PA conduit or the bio-prosthetic valve, however normal antegrade flow is visualized in the branch pulmonary arteries. The calculated single plane left ventricular ejection fraction from the 4 chamber view is 56%. A pacing lead is present in the right atrium and right ventricle. No pericardial effusion. Technically difficult study due to poor acoustic windows.        CTA ANGIOGRAM CONGENITAL HEART DISEASE 7/25/2019   1.  D-transposition of aorta s/p Rastelli with bioprosthetic pulmonic valve  2.  Mildly reduced left ventricular function with LV EF 52%.  3.  Normal RV size and function with RV EF 55%.  4.  RV-PA valved conduit without visual evidence of stenosis.  5.  Mild pulmonic regurgitation (<30 mL) using biventricular stroke  volumes.  6.  No significant change from CT scan in 2014.    Echo 2/28/20  Technically difficult study due to poor acoustic windows. D-TGA status post Rastelli and subsequent placement of a bio-prosthetic valve in the pulmonary position. There is no obvious residual ventricular level shunt. Mild (1+) aortic valve insufficiency. Unable to adequately visualize the RV-PA conduit or the bio-prosthetic valve; however, normal antegrade flow is visualized in the branch pulmonary arteries. A transvenous pacemaker lead is seen in the right ventricle, with the tip of the lead implanted into the right ventricular apex. Trivial tricuspid valve insufficiency. Estimated right ventricular systolic pressure is 26 mmHg plus right atrial pressure. The left and right ventricles have normal chamber size, wall thickness, and systolic function. The  calculated biplane left ventricular ejection fraction is 57%. No pericardial effusion. No significant change from last echocardiogram.    Echo 3/23/21  Technically difficult study due to poor acoustic windows. D-TGA status-post Rastelli and subsequent placement of a bio-prosthetic valve in the pulmonary position. Trivial tricuspid valve regurgitation; estimated right ventricular systolic pressure 26mmHg plus right atrial pressure. Normal right ventricular size and systolic function. Unable to adequately visualize the RV-PA conduit or the bio-prosthetic valve; normal antegrade flow profile at the valve annulus; no obvious regurgitation. Mildly dilated left ventricle with normal wall thickness and mildly-depressed systolic function; biplane LV EF 47%. A transvenous pacemaker lead is seen in the right ventricle, with the tip of the lead implanted into the right ventricular apex. No effusion.  No significant change from last echocardiogram.    Device check 3/23/21:  Device: [email or web address] Evera XT VR  Normal device function  Mode: VVI 40 bpm  : <0.1%  Intrinsic rhythm: SR 92 bpm  Thoracic Impedance: Suggests possible intrathoracic fluid accumulation.  Short V-V intervals: None  Lead Trends Appear Stable  Estimated battery longevity to RRT = 6.1 years.   Ventricular Arrhythmia: 46 VT and 2 NSVT. The EGMs suggest -170 bpm.  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 complete  TGA and VSD, status post Rastelli procedure with VSD closure and RV to PA conduit, history of RV-PA conduit obstruction requiring replacement during teenage years: Pt continues to feel well, denies any change in exercise tolerance and is euvolemic today by history and exam. Echo today confirms no significant change from prior. BP mildly elevated today.  Pt instructed to monitor BP and if consistently greater than 135/85, would recommend initiating treatment.  Encouraged patient to continue regular aerobic exercise aiming for at least 150 minutes of moderate physical activity or 75 minutes of vigorous physical activity - or an equal combination of both - each week. and follow low-salt, heart healthy diet.  Continue statin for dyslipidemia. Pt aware that she should not become pregnant while on statin therapy.  Pt currently on depo-provera and not interested in pregnancy at this time.  Also reminded patient the importance of maintaining good oral hygiene and routine dental care with SBE prophylaxis.  2.  DM:  Followed by PCP.  Instructed patient to discuss option of SGLT-2 inhibitor like Jardiance or Farxiga given their cardio-protective benefits unless cost prohibitive.  Continues statin therapy, diet and exercise as above.  3. H/o VT s/p VT ablation and ICD placement: Pt scheduled to congential EP later this week.      Follow-up: one year with EKG, echo and labs prior. Will be happy to see sooner if change in clinical status or new questions/concerns arise.    Betsy Valle MD  Section Head - Advanced Heart Failure, Transplantation and Mechanical Circulatory Support  Director - Adult Congenital and Cardiovascular Genetics Center  Associate Professor of Medicine, [facility]    I spent 30 minutes in care of the patient today including reviewing today's echo, EKG and labs, examination and discussion of testing results and care recommendations with patient and documentation        Please do not hesitate  to contact me if you have any questions/concerns.     Sincerely,     Betsy Valle MD

## 2021-03-23 NOTE — PATIENT INSTRUCTIONS
You were seen today in the Adult Congenital and Cardiovascular Genetics Clinic at the [facility].    Cardiology Providers you saw during your visit:  Betsy Valle MD    Diagnosis:  TGA s/p correctional surgery    Results:  Betsy Valle MD reviewed the results of your echo, labs, and EKG testing today in clinic.    Recommendations:    1. Continue to eat a heart healthy, low salt diet.  2. Continue to get 20-30 minutes of aerobic activity, 4-5 days per week.  Examples of aerobic activity include walking, running, swimming, cycling, etc.  3. Continue to observe good oral hygiene, with regular dental visits.  4. If your blood pressure is consistently greater than 135/85, we recommend initiating treatment. If this is the case, you can work with your PCP.  5. Ask your PCP about changing your metformin to a SGLT-2 inhibitor like Jardiance or Farxiga, given their cardio-protective benefits. If cost prohibitive, it is OK to stay on metformin.    SBE prophylaxis:   Yes__X__  No____    Lifelong Bacterial Endocarditis Prophylaxis:  YES____  NO____    If YES is checked, follow the recommendations outlined below:  1. Take antibiotic(s) prior to recommended dental procedures and procedures on the respiratory tract or with infected skin, muscle or bones. SBE prophylaxis is not needed for routine GI and  procedures (ie. Colonoscopy or vaginal delivery)  2. Observe good oral hygiene daily, as advised by your dentist. Get regular professional dental care.  3. Keep cuts clean.  4. Infections should be treated promptly.  5. Symptoms of Infective Endocarditis could include: fever lasting more than 4-5 days or a recurrent fever that initially resolves but returns within 1-2 days)      Exercise restrictions:   Yes__X__  No____         If yes, list restrictions:  Must be allowed to rest if fatigued or SOB      Work restrictions:  Yes____  No_X___         If yes, list restrictions:    FASTING CHOLESTEROL was checked in the  last 5 years YES_X__  NO___ (2019)  Continue to eat a heart healthy, low salt diet.         ____ Fasting lipid panel order today         ____ No changes in medications          ____ I recommend the following changes in your cholesterol medications.:          ____ Please follow up for cholesterol screening at your primary care physician      Follow-up:  Follow up with Dr. Valle in one year with labs, EKG, and echo prior.     If you have questions or concerns please contact us at:    Chasity Wild, MSN, RN, CNL    Nora Collins (Scheduling)  Nurse Care Coordinator     Clinic   Adult Congenital and CV Genetics   Adult Congenital and CV Genetic  [facility]   [facility]  (P) [phone]     (P) [phone]  [email or web address]   (F) [phone]        For after hours urgent needs, call [phone] and ask to speak to the Adult Congenital Physician on call.  Mention Job Code [id number].    For emergencies call 911.    [facility]  [facility]   Clinics and Surgery Center  Mail Code [id number]  [address] [place] [address]  [id number]

## 2021-03-23 NOTE — NURSING NOTE
Chief Complaint   Patient presents with     Follow Up     40 year old female with history of TGA s/p correctional surgery in 1998, a history of VT with ablation and ICD placement as secondary prevention in 03/2014, now s/p recent new single chamber ICD system 6/1/15 presenting for follow up.      Vitals were taken and medications were reconciled. EKG was performed    Bernadette ROSALES  10:19 AM

## 2021-03-23 NOTE — PATIENT INSTRUCTIONS
It was a pleasure to see you in clinic today. Please do not hesitate to call with any questions or concerns. You are scheduled for a remote ICD transmission on 6/23/21. This will happen automatically in the night. We will call in 1-2 business days to discuss the results with you. We look forward to seeing you in clinic at your next device check in 6 months.    Kaylyn Alves RN  Electrophysiology Nurse Clinician  [facility]  During business hours call:  [phone]  Urgent needs after hours- please call: [phone]- select option #4 and ask for job code [id number].

## 2021-03-23 NOTE — PROGRESS NOTES
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  Today pt reports that she has been feeling well.  She has received both doses of her COVID vaccination thus she is back to work.   She denies any chest pain or pressure, sob/maher, orthopnea, pnd, palpitations, syncope/presyncope, cathy, change in exercise tolerance or any ICD shocks.        PAST MEDICAL HISTORY:  Past Medical History:   Diagnosis Date     Allergy, unspecified not elsewhere classified      Attention deficit disorder without mention of hyperactivity     Mild MR; difficulty attention span     Congenital heart disease      Corrected transposition of great vessels     still needs SBE prophlaxis     Diabetes mellitus type 2, noninsulin dependent (H)      Impaired fasting glucose     103 7/06     Ventricular tachycardia (H) 3/4/2014    s/p ablation and ICD placement 3/13/14       FAMILY HISTORY:  Family History   Adopted: Yes   Problem Relation Age of Onset     Family History Negative Mother      Family History Negative Father        SOCIAL HISTORY:  History     Social History     Marital Status:      Spouse Name: N/A     Number of Children: N/A     Years of Education: N/A     Occupational History      at [employer]             Disabled     Social History Main Topics     Smoking status: Never Smoker      Smokeless tobacco: Never Used     Alcohol Use: No     Drug Use: No     Sexual Activity:     Partners: Male     Other Topics Concern     Special Diet No      working on dairy. fruits and veggies.     Exercise Yes     Peter Monzon tape     Parent/Sibling W/ Cabg, Mi Or Angioplasty Before 65f 55m? No     Social History Narrative       CURRENT MEDICATIONS:  Current Outpatient Medications   Medication Sig Dispense Refill     amoxicillin (AMOXIL) 500 MG capsule Please take 2,000 mg (4 capsule) 30-60 min prior to all dental procedures 4 capsule 3     atorvastatin (LIPITOR) 40 MG tablet Take 1 tablet (40 mg) by mouth daily 90 tablet 3     blood glucose (ACCU-CHEK SMARTVIEW) test strip Use to test blood sugar 1 times daily or as directed. 100 strip 1     blood glucose monitoring (ACCU-CHEK FASTCLIX) lancets Use to test blood sugar 1 times daily or as directed. 1 Box 3     gabapentin (NEURONTIN) 300 MG capsule TAKE 1 CAPSULE(300 MG) BY MOUTH EVERY NIGHT 90 capsule 1     metFORMIN (GLUCOPHAGE) 500 MG tablet Take 2 tablets (1,000 mg) by mouth daily (with dinner) TAKE 1 TABLET(500 MG) BY MOUTH DAILY WITH DINNER 180 tablet 1     MULTIVITAMINS PO TABS ONE DAILY  3     Naproxen Sodium (ALEVE PO) Take 220 mg by mouth       sertraline (ZOLOFT) 25 MG tablet Take 1 tablet (25 mg) by mouth daily 90 tablet 1     traZODone (DESYREL) 50 MG tablet Take 1 tablet (50 mg) by mouth At Bedtime 90 tablet 0     ACE NOT PRESCRIBED, INTENTIONAL, 1 each as needed for other ACE Inhibitor not prescribed due to Other: BP at goal, pending microalbumin test (Patient not taking: Reported on 3/23/2021) 0 each 0       ROS:   Constitutional: No fever, chills, or sweats.   ENT: No visual disturbance, ear ache, epistaxis, sore throat.   Allergies/Immunologic: Negative.   Respiratory: No cough, hemoptysis.   Cardiovascular: As per HPI.   GI: No nausea, vomiting, hematemesis, melena, or hematochezia.   : No urinary frequency, dysuria, or hematuria.   Integument: Negative.   Psychiatric: Negative.   Neuro: Negative.   Endocrinology: Negative.   Musculoskeletal: Negative.    EXAM:  /75 (BP Location:  "Right arm, Patient Position: Chair, Cuff Size: Adult Regular)   Pulse 95   Ht 1.575 m (5' 2\")   Wt 92.5 kg (204 lb)   SpO2 97%   BMI 37.31 kg/m    General: appears comfortable, alert and articulate  Head: normocephalic, atraumatic  Eyes: anicteric sclera, EOMI  Neck: no adenopathy, 2+ carotids  Orophyarynx: moist mucosa, no lesions, dentition intact  Heart: Nrl S1, prominent S2, widely split (patient has RBBB), has 3/6 KRISTI best heard at the left sternal border at the base., JVP 6-7cm  Lungs: clear, no rales or wheezing  Abdomen: soft, non-tender, bowel sounds present, no hepatomegaly  Extremities: no clubbing, cyanosis or edema  Neurological: normal speech and affect, no gross motor deficits      Labs:  CMP RESULTS:  Lab Results   Component Value Date     03/23/2021    POTASSIUM 3.9 03/23/2021    CHLORIDE 108 03/23/2021    CO2 27 03/23/2021    ANIONGAP 6 03/23/2021     (H) 03/23/2021    BUN 10 03/23/2021    CR 0.61 03/23/2021    GFRESTIMATED >90 03/23/2021    GFRESTBLACK >90 03/23/2021    LITA 9.1 03/23/2021    BILITOTAL 0.7 03/23/2021    ALBUMIN 3.5 03/23/2021    ALKPHOS 120 03/23/2021    ALT 32 03/23/2021    AST 15 03/23/2021        INR RESULTS:  Lab Results   Component Value Date    INR 1.13 06/01/2015       Lab Results   Component Value Date    MAG 2.0 06/02/2015     Lab Results   Component Value Date    NTBNPI 327 12/24/2008     No results found for: NTBNP       CT CORONARY: 3/4/14   IMPRESSION:  1. Normal coronary anatomy with no detectable stenosis or plaque. The coronaries arise from the anteriorly positioned aorta.     ADDITIONAL FINDINGS: The aortic root has an unusual appearance probably from the reconstructive surgery for transposition. The appearance is nearly of 2 aortic roots one small 1 which ends in a cul-de-sac and the other which leads into the ascending aorta. The coronary arteries arise from that aortic root which feeds into the ascending aorta . There is a conduit from the " right ventricle to the pulmonary trunk. Normal pulmonary venous anatomy with all four pulmonary veins draining into the left atrium. There is no left atrial or left ventricular mass or thrombus. Normal pericardial thickness.There is no pericardial effusion. The proximal pulmonary arteries are well opacified. Please review Radiology report for incidental noncardiac findings that will follow separately.      CARDIAC MRI/MRA: 3/4/14   IMPRESSION:   1. Transposition of the great arteries with VSD after Rastelli procedure with RV to PA conduit.   2. Minimal flow acceleration across the RV to PA conduit with moderate pulmonary regurgitation (31-33% regurg fraction, Peak PA conduit gradient 18mm Hg).   3. Normal right and left ventricular size and systolic function with no evidence of fibrosis.    Echo 3/16  Difficult study due to poor acoustic windows. The patient has undergone a Rastelli procedure for d-transposition of the great arteries and replacement of the pulmonary valve. The pulmonary valve is not seen in this study. Shunts were not able to be excluded. Mildly diminished left ventricular systolic function with mild left ventricular enlargement. Normal right ventricular and pulmonary artery pressures.    Echo 11/17  D-TGA status post Rastelli and subsequent placement of a bio-prosthetic valve in the pulmonary position. The calculated single plane left ventricular ejection fraction from the 4 chamber view is 58%. A pacing lead is present in the right atrium and right ventricle. Mild (1+) aortic valve insufficiency. Unable to adequately visualize the RV-PA conduit or the bio-prosthetic valve. Estimated right ventricular systolic pressure is 20-25 mmHg plus right atrial pressure.    Echo Nov 2018  D-TGA status post Rastelli and subsequent placement of a bio-prosthetic valve in the pulmonary position. There is no obvious residual ventricular level shunt. Mild (1+) aortic valve insufficiency. Unable to adequately  visualize the RV-PA conduit or the bio-prosthetic valve, however normal antegrade flow is visualized in the branch pulmonary arteries. The calculated single plane left ventricular ejection fraction from the 4 chamber view is 56%. A pacing lead is present in the right atrium and right ventricle. No pericardial effusion. Technically difficult study due to poor acoustic windows.        CTA ANGIOGRAM CONGENITAL HEART DISEASE 7/25/2019   1.  D-transposition of aorta s/p Rastelli with bioprosthetic pulmonic valve  2.  Mildly reduced left ventricular function with LV EF 52%.  3.  Normal RV size and function with RV EF 55%.  4.  RV-PA valved conduit without visual evidence of stenosis.  5.  Mild pulmonic regurgitation (<30 mL) using biventricular stroke  volumes.  6.  No significant change from CT scan in 2014.    Echo 2/28/20  Technically difficult study due to poor acoustic windows. D-TGA status post Rastelli and subsequent placement of a bio-prosthetic valve in the pulmonary position. There is no obvious residual ventricular level shunt. Mild (1+) aortic valve insufficiency. Unable to adequately visualize the RV-PA conduit or the bio-prosthetic valve; however, normal antegrade flow is visualized in the branch pulmonary arteries. A transvenous pacemaker lead is seen in the right ventricle, with the tip of the lead implanted into the right ventricular apex. Trivial tricuspid valve insufficiency. Estimated right ventricular systolic pressure is 26 mmHg plus right atrial pressure. The left and right ventricles have normal chamber size, wall thickness, and systolic function. The calculated biplane left ventricular ejection fraction is 57%. No pericardial effusion. No significant change from last echocardiogram.    Echo 3/23/21  Technically difficult study due to poor acoustic windows. D-TGA status-post Rastelli and subsequent placement of a bio-prosthetic valve in the pulmonary position. Trivial tricuspid valve regurgitation;  estimated right ventricular systolic pressure 26mmHg plus right atrial pressure. Normal right ventricular size and systolic function. Unable to adequately visualize the RV-PA conduit or the bio-prosthetic valve; normal antegrade flow profile at the valve annulus; no obvious regurgitation. Mildly dilated left ventricle with normal wall thickness and mildly-depressed systolic function; biplane LV EF 47%. A transvenous pacemaker lead is seen in the right ventricle, with the tip of the lead implanted into the right ventricular apex. No effusion.  No significant change from last echocardiogram.    Device check 3/23/21:  Device: Medtronic Evera XT VR  Normal device function  Mode: VVI 40 bpm  : <0.1%  Intrinsic rhythm: SR 92 bpm  Thoracic Impedance: Suggests possible intrathoracic fluid accumulation.  Short V-V intervals: None  Lead Trends Appear Stable  Estimated battery longevity to RRT = 6.1 years.   Ventricular Arrhythmia: 46 VT and 2 NSVT. The EGMs suggest -170 bpm.  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 complete TGA and VSD, status post Rastelli procedure with VSD closure and RV to PA conduit, history of RV-PA conduit obstruction requiring replacement during teenage years: Pt continues to feel well, denies any change in exercise tolerance and is euvolemic today by history and exam. Echo today confirms no significant change from prior. BP mildly elevated  today.  Pt instructed to monitor BP and if consistently greater than 135/85, would recommend initiating treatment.  Encouraged patient to continue regular aerobic exercise aiming for at least 150 minutes of moderate physical activity or 75 minutes of vigorous physical activity - or an equal combination of both - each week. and follow low-salt, heart healthy diet.  Continue statin for dyslipidemia. Pt aware that she should not become pregnant while on statin therapy.  Pt currently on depo-provera and not interested in pregnancy at this time.  Also reminded patient the importance of maintaining good oral hygiene and routine dental care with SBE prophylaxis.  2.  DM:  Followed by PCP.  Instructed patient to discuss option of SGLT-2 inhibitor like Jardiance or Farxiga given their cardio-protective benefits unless cost prohibitive.  Continues statin therapy, diet and exercise as above.  3. H/o VT s/p VT ablation and ICD placement: Pt scheduled to congential EP later this week.      Follow-up: one year with EKG, echo and labs prior. Will be happy to see sooner if change in clinical status or new questions/concerns arise.    Betsy Valle MD  Section Head - Advanced Heart Failure, Transplantation and Mechanical Circulatory Support  Director - Adult Congenital and Cardiovascular Genetics Center  Associate Professor of Medicine, [facility]    I spent 30 minutes in care of the patient today including reviewing today's echo, EKG and labs, examination and discussion of testing results and care recommendations with patient and documentation

## 2021-03-23 NOTE — NURSING NOTE
Cardiac Testing: Patient given instructions regarding  echocardiogram . Discussed purpose, preparation, procedure and when to expect results reported back to the patient. Patient demonstrated understanding of this information and agreed to call with further questions or concerns.    Diet: Patient instructed regarding a heart healthy diet, including discussion of reduced fat and sodium intake. Patient demonstrated understanding of this information and agreed to call with further questions or concerns.    Labs: Patient was given results of the laboratory testing obtained today. Patient was instructed to return for the next laboratory testing in one year. Patient demonstrated understanding of this information and agreed to call with further questions or concerns.     Med Reconcile: Reviewed and verified all current medications with the patient. The updated medication list was printed and given to the patient.    Return Appointment: Patient given instructions regarding scheduling next clinic visit. Patient demonstrated understanding of this information and agreed to call with further questions or concerns.    Patient stated she understood all health information given and agreed to call with further questions or concerns.    Chasity Wild, MSN, RN, CNL  Cardiology Care Coordinator  [facility]  [phone]

## 2021-03-24 LAB — INTERPRETATION ECG - MUSE: NORMAL

## 2021-03-26 ENCOUNTER — OFFICE VISIT (OUTPATIENT)
Dept: CARDIOLOGY | Facility: CLINIC | Age: 41
End: 2021-03-26
Attending: INTERNAL MEDICINE
Payer: MEDICARE

## 2021-03-26 VITALS
DIASTOLIC BLOOD PRESSURE: 79 MMHG | BODY MASS INDEX: 37.17 KG/M2 | OXYGEN SATURATION: 99 % | HEART RATE: 99 BPM | HEIGHT: 62 IN | SYSTOLIC BLOOD PRESSURE: 129 MMHG | WEIGHT: 202 LBS

## 2021-03-26 DIAGNOSIS — Q20.3 D-TGA (DEXTRO-TRANSPOSITION OF GREAT ARTERIES): ICD-10-CM

## 2021-03-26 PROCEDURE — 99214 OFFICE O/P EST MOD 30 MIN: CPT | Performed by: INTERNAL MEDICINE

## 2021-03-26 PROCEDURE — G0463 HOSPITAL OUTPT CLINIC VISIT: HCPCS

## 2021-03-26 ASSESSMENT — MIFFLIN-ST. JEOR: SCORE: 1532.7

## 2021-03-26 ASSESSMENT — PAIN SCALES - GENERAL: PAINLEVEL: NO PAIN (0)

## 2021-03-26 NOTE — NURSING NOTE
Chief Complaint   Patient presents with     Follow Up     40 year old female with history of TGA s/p correctional surgery in 1998, a history of VT with ablation and ICD      Vitals were taken and medications where reconciled. EKG was performed   PRIMO Sapp  10:04 AM

## 2021-03-26 NOTE — LETTER
3/26/2021      RE: Chikis Hannon  [address]  [facility] [address]       Dear Colleague,    Thank you for the opportunity to participate in the care of your patient, Chikis Hannon, at the [facility] [place] [facility]. Please see a copy of my visit note below.    [facility] Electrophysiology Clinic Visit  HPI:  Ms. 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 The patient underwent VT ablation on 3/13/2014 and a subsequent ICD implant single-chamber single coil on 3/14/2014, later Rv lead malfunction due to isolation break and failure to deliver shock for recurrent fast VT (VT spontaneously terminated) s/p replacement of entire system 6/2/2015. She was discharged on sotalol 120 mg twice a day. She was seen in clinic on 4/22/14 and her device interrogation showed no recurrence of ventricular arrhythmias. She reported some mild fatigue on Sotalol and her dose was decreased to 80 mg BID. At her July 2014 follow up appointment she only had one 4 second NSVT at 160 bpm which was asymptomatic. At her clinic visit in 2/15 she again had no ventricular arrhythmias and her Sotalol was stopped. After recurrence of VT in June 2015, the patient was restarted on sotalol 80 mg twice a day. We re-attempted VT ablation after holding sotalol for one week on 8/24/2015 with no inducible VT.  [facility] She presents  today for follow-up.  Her device interrogation shows no ventricular arrhythmias.  Her device site has healed well. She denies any syncope or presyncope, no chest pain or shortness of breath on exertion at rest, no orthopnea, no lower extremity edema. Her EKG shows normal sinus rhythm with RBBB,  ms.  [facility] EP Visit 9/23/17: She presents today for follow up. She reports feeling well. She denies any chest pain, dizziness, lightheadedness, shortness of breath, palpitations, leg/ankle swelling, PND, orthopnea, or syncopal symptoms. She has started exercise classes and is increasing her activity levels. Device interrogation shows normal device function.  17 episodes recorded. 16 VT zone monitored events when she was exercising EGMS shows ST with rates of 168-172 BPM.  1 NSVT for 4 beats at a rate of 211 for 2 second duration.  Intrinsic rhythm = SR @ 90 bpm. = <0.1%. OptiVol fluid index is at baseline. No short v-v intervals recorded.  RV lead impedance trends appear stable. Presenting 12 lead ECG shows SR with RBBB Vent Rate 84 bpm,  ms,  ms, QTc 505 ms. Current cardiac medications include: Sotalol.   Ep Visit 9/2017: She presents today for follow up. She reports feeling well. She denies any chest pain/pressures, dizziness, lightheadedness, shortness of breath, leg/ankle swelling, PND, orthopnea, palpitations, or syncopal symptoms. She continues to exercise and is without limitations. Device interrogation shows normal device function.  2 NSVT episodes recorded, lasting 1-2 seconds. 8 episodes recorded as VT, these EGMs are consistent with ST.  <0.1%. No short v-v intervals recorded. Lead trends appear stable. Presenting 12 lead ECG shows NSR with RBBB Vent Rate 85 bpm,  ms,  ms, QTc 504 ms. Current cardiac medications include: Sotalol.     Ep Visit 2/20/20: She presents today for follow up. She reports feeling well. She states she has not been taking her Sotalol for the last many  months. She denies any chest pain/pressures, dizziness, lightheadedness, worsening shortness of breath, leg/ankle swelling, PND, orthopnea, palpitations, or syncopal symptoms. Device interrogation shows  normal ICD function.  Since last remote transmission on 10/29/19, there have been 5 SVT-Wavelet, 7 monitored VT and 2 NSVT episodes recorded.  The SVT episodes last 5-30 seconds at 167-176 bpm, occur with activity and appear to be ST.  The monitored VT episodes last 5-56 seconds at rates of 167-171 bpm.  These also occur with activity and appear  to be ST with several occurring on 11/27/19.  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  The NSVT episodes last 1 second in duration @ 231-226 bpm.   Intrinsic rhythm = SR @ 80 bpm.    =<0.1%. No short v-v intervals recorded.  Lead trends appear stable.  Echo today is stable with normal systemic function. No current cardiac medications.     She presents today for follow up. She reports feeling well. She denies any chest pain/pressures, dizziness, lightheadedness, worsening shortness of breath, leg/ankle swelling, PND, orthopnea, palpitations, or syncopal symptoms. Recent echo showed normal RV size/function, mildly dialted LV with  LVEF 47%, with no significant changes from prior echo. Device interrogation shows normal device function, stable lead parameters,  <0.1%, 46 VT and 2 NSVT all EGMs suggest ST and not true arrhythmia. No current cardiac medications.         Current Outpatient Prescriptions   Medication Sig Dispense Refill     sotalol (BETAPACE) 80 MG tablet Take 1 tablet (80 mg) by mouth every 12 hours 60 tablet 3     amoxicillin (AMOXIL) 500 MG capsule Take 4 capsules (2000mg) 30-60 minutes prior to dental work. 12 capsule 3     acetaminophen-codeine (TYLENOL #3) 300-30 MG per tablet Take 1 tablet by mouth every 4 hours as needed for other (mild or moderate pain) 10 tablet 0     Naproxen  Sodium (ALEVE PO) Take 220 mg by mouth       fluticasone (FLONASE) 50 MCG/ACT nasal spray Spray 1-2 sprays into both nostrils daily 1 Package 11     metFORMIN (GLUCOPHAGE) 500 MG tablet Take 1 tablet (500 mg) by mouth daily (with dinner) 90 tablet 3     STATIN NOT PRESCRIBED, INTENTIONAL, Statin not prescribed intentionally due to Not indicated based on age 0 each 0     gabapentin (NEURONTIN) 300 MG capsule Take 1 tablet (300 mg) every night 90 capsule 0     MELATONIN PO Take 3 mg by mouth nightly as needed        VITAMIN D, CHOLECALCIFEROL, PO Take by mouth daily       Ascorbic Acid (VITAMIN C PO)        Omega-3 Fatty Acids (OMEGA-3 FISH OIL PO)        ACE NOT PRESCRIBED, INTENTIONAL, 1 each as needed for other ACE Inhibitor not prescribed due to Other: BP at goal, pending microalbumin test 0 each 0     blood glucose (ACCU-CHEK SMARTVIEW) test strip Use to test blood sugar 1 times daily or as directed. 100 strip prn     blood glucose monitoring (ACCU-CHEK FASTCLIX) lancets Use to test blood sugar 1 times daily or as directed. 1 Box prn     medroxyPROGESTERone (DEPO-PROVERA) 150 MG/ML injection Inject 1 mL (150 mg) into the muscle every 3 months 1 mL 4     MULTIVITAMINS PO TABS ONE DAILY  3       Past Medical History   Diagnosis Date     Allergy, unspecified not elsewhere classified      Attention deficit disorder without mention of hyperactivity      Mild MR; difficulty attention span     Corrected transposition of great vessels      still needs SBE prophlaxis     Impaired fasting glucose      103 7/06     Congenital heart disease      Ventricular tachycardia 3/4/2014     s/p ablation and ICD placement 3/13/14     Diabetes mellitus type 2, noninsulin dependent        Past Surgical History   Procedure Laterality Date     C nonspecific procedure  11/98     open heart surgery x 3, transposition of major vessels     C nonspecific procedure  10/03     Additional heart surgery     Cardiac surgery  Had 4 surgeries      "transposition of the major arteries     Surgical history of -   2014     ablation for v. tach; ICD placed      N/A 6/1/2015     Procedure: ANESTHESIA ICD/PACEMAKER CHANGE ADULT;  Surgeon: GENERIC ANESTHESIA PROVIDER;  Location: [facility] OR       Family History   Problem Relation Age of Onset     Adopted: Yes     Family History Negative Mother      Family History Negative Father        History   Substance Use Topics     Smoking status: Never Smoker      Smokeless tobacco: Never Used     Alcohol Use: No       ROS:   A comprehensive 10 system ROS was negative except for HPI.    Physical Examination:  /79 (BP Location: Left arm, Patient Position: Chair, Cuff Size: Adult Regular)   Pulse 99   Ht 1.58 m (5' 2.2\")   Wt 91.6 kg (202 lb)   SpO2 99%   BMI 36.71 kg/m    GENERAL APPEARANCE: healthy, alert and no distress  HEENT: no icterus, no xanthelasmas, normal pupil size and reaction, normal palate, mucosa moist, no cyanosis.  NECK: no adenopathy, no asymmetry, masses, or scars, thyroid normal to palpation, carotid upstrokes are normal bilaterally, no cervical bruits, JVP is not visible  CHEST: lungs clear to auscultation - no rales, rhonchi or wheezes, no use of accessory muscles, no retractions, respirations are unlabored, normal respiratory rate, no kyphosis, no scoliosis  CARDIOVASCULAR: regular rhythm, S1S2, 3/6 KRISTI best heard at the left sternal border at the base.  ABDOMEN: soft, non tender, without hepatosplenomegaly, no masses palpable, bowel sounds normal, aorta not enlarged by palpation, no abdominal bruits  EXTREMITIES: no clubbing, cyanosis or edema.  NEURO: alert and oriented to person/place/time, normal speech, gait and affect  VASC: Radial, dorsalis pedis and posterior tibialis pulses are normal.  SKIN: no ecchymoses, no rashes    Laboratory: Reviewed.    Procedures:  LAST CARDIAC MRA:  3/6/14  FINDINGS:   SITUS: There is a normal spleen in the left upper quadrant. There is situs solitus in the chest, " as demonstrated by a normal airway pulmonary artery relationship bilaterally.   CAVAE: Single right-sided inferior and superior vena cavae drain normally into the right atrium unobstructed.   PULMONARY VEINS: Two right and two left pulmonary veins drain into the left atrium unobstructed.   ATRIA: There is no interatrial communication demonstrated. The atrial sizes are normal.   ATRIOVENTRICULAR CONNECTION: Concordant. Separate mitral and tricuspid valves without evidence of significant regurgitation or stenosis.   VENTRICLES: D-loop ventricles with levocardia. Ventricles are normal in size and contraction. Right ventricular end-diastolic volume is upper limits of normal for BSA. If utilizing an ideal body weight, this would be mildly enlarged. No residual interventricular communication is demonstrated. No myocardial hyperenhancement identified on delayed post gadolinium imaging, except at the ventricular insertion points.   VENTRICULOARTERIAL CONNECTION: Concordant. Trileaflet aortic valve without regurgitation or stenosis. The oversewn native pulmonary artery is directly posterior to the aorta. There is now an RV to PA conduit. Mild stenosis of the RVOT conduit with moderate regurgitation, 31% by direct flow measurement, and 33% by ventricular stroke volume comparison.   AORTA AND SUPRA-AORTIC VESSELS: A left-sided aortic arch is demonstrated with normal cervical branching pattern. No evidence of patent ductus arteriosus, coarctation, or aortopulmonary collateral arteries. The coronary arteries are not well visualized in this study.   PULMONARY ARTERY: The RV to PA conduit is patent with normal pulmonary artery branching pattern.   LAST ECHO:  2/2020  ------CONCLUSIONS------  Technically difficult study due to poor acoustic windows. D-TGA status post  Rastelli and subsequent placement of a bio-prosthetic valve in the pulmonary  position. There is no obvious residual ventricular level shunt. Mild (1+)  aortic valve  insufficiency. Unable to adequately visualize the RV-PA conduit  or the bio-prosthetic valve; however, normal antegrade flow is visualized in  the branch pulmonary arteries. A transvenous pacemaker lead is seen in the  right ventricle, with the tip of the lead implanted into the right ventricular  apex. Trivial tricuspid valve insufficiency. Estimated right ventricular  systolic pressure is 26 mmHg plus right atrial pressure. The left and right  ventricles have normal chamber size, wall thickness, and systolic function.  The calculated biplane left ventricular ejection fraction is 57%. No  pericardial effusion.  No significant change from last echocardiogram.  7/25/19 CTA:  IMPRESSION:  1.  D-transposition of aorta s/p Rastelli with bioprosthetic pulmonic  valve  2.  Mildly reduced left ventricular function with LV EF 52%.  3.  Normal RV size and function with RV EF 55%.  4.  RV-PA valved conduit without visual evidence of stenosis.  5.  Mild pulmonic regurgitation (<30 mL) using biventricular stroke  volumes.  6.  No significant change from CT scan in 2014.  7.  Please review radiology review for incidental non-cardiovascular  findings to follow separately    3/23/21 ECHO:  ##### CONCLUSIONS #####  Technically difficult study due to poor acoustic windows. D-TGA status-post  Rastelli and subsequent placement of a bio-prosthetic valve in the pulmonary  position.     Trivial tricuspid valve regurgitation; estimated right ventricular systolic  pressure 26mmHg plus right atrial pressure. Normal right ventricular size and  systolic function. Unable to adequately visualize the RV-PA conduit or the  bio-prosthetic valve; normal antegrade flow profile at the valve annulus; no  obvious regurgitation. Mildly dilated left ventricle with normal wall  thickness and mildly-depressed systolic function; biplane LV EF 47%. A  transvenous pacemaker lead is seen in the right ventricle, with the tip of the  lead implanted into the  right ventricular apex. No effusion.     No significant change from last echocardiogram.    Assessment and recommendations:  Ms. 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 She presents today for follow up. She reports feeling well. She denies any chest pain/pressures, dizziness, lightheadedness, worsening shortness of breath, leg/ankle swelling, PND, orthopnea, palpitations, or syncopal symptoms. Recent echo showed normal RV size/function, mildly dialted LV with  LVEF 47%, with no significant changes from prior echo. Device interrogation shows normal device function, stable lead parameters,  <0.1%, 46 VT and 2 NSVT all EGMs suggest ST and not true arrhythmia. No current cardiac medications.     She is doing well from an EP standpoint. She is off AAT and is not having any arrhyhmias. Device has normal function and stable lead parameters. Echo is stable with good systemic function. No further interventions required. She will continue to follow with Dr. Valle and device team per routine. [facility] sooner if need arises.     The patient states understanding and is agreeable with the plan.   This patient was seen and examined with Dr. aRy.  The above note reflects our joint assessment and plan.   KIMMIE Blevins CNP  Electrophysiology Service  Pager: [phone]    EP STAFF NOTE  I have seen and examined the patient as part of a shared visit with LEO Blevins NP.  I agree with the note above. I reviewed today's vital signs and medications. I have reviewed and discussed with the advanced practice provider their physical examination, assessment, and plan   Briefly, no Vt recurrences, off AAT  My key history/exam findings are: RRR.   The key management decisions made by me: f/u in one year.    Vimal Ray MD [facility]  [facility] - Electrophysiology        Please do not hesitate to contact me if you have any questions/concerns.     Sincerely,     Vimal Ray MD

## 2021-03-26 NOTE — PATIENT INSTRUCTIONS
You were seen today in the Adult Congenital and Cardiovascular Genetics Clinic at the [facility].    Cardiology Providers you saw during your visit:  Vimal Ray MD    Diagnosis:  TGA    Results:  Vimal Ray MD reviewed the results of your device testing today in clinic.    Recommendations:    1. Continue to eat a heart healthy, low salt diet.  2. Continue to get 20-30 minutes of aerobic activity, 4-5 days per week.  Examples of aerobic activity include walking, running, swimming, cycling, etc.  3. Continue to observe good oral hygiene, with regular dental visits.  4. No changes today.    SBE prophylaxis:   Yes__X__  No____    Lifelong Bacterial Endocarditis Prophylaxis:  YES____  NO____    If YES is checked, follow the recommendations outlined below:  1. Take antibiotic(s) prior to recommended dental procedures and procedures on the respiratory tract or with infected skin, muscle or bones. SBE prophylaxis is not needed for routine GI and  procedures (ie. Colonoscopy or vaginal delivery)  2. Observe good oral hygiene daily, as advised by your dentist. Get regular professional dental care.  3. Keep cuts clean.  4. Infections should be treated promptly.  5. Symptoms of Infective Endocarditis could include: fever lasting more than 4-5 days or a recurrent fever that initially resolves but returns within 1-2 days)      Exercise restrictions:   Yes__X__  No____         If yes, list restrictions:  Must be allowed to rest if fatigued or SOB      Work restrictions:  Yes____  No_X___         If yes, list restrictions:    FASTING CHOLESTEROL was checked in the last 5 years YES_X__  NO___ (2019)  Continue to eat a heart healthy, low salt diet.         ____ Fasting lipid panel order today         ____ No changes in medications          ____ I recommend the following changes in your cholesterol medications.:          ____ Please follow up for cholesterol screening at your primary care physician      Follow-up:   Follow up with Dr. Ray in one year to be coordinated with Dr. Valle.     If you have questions or concerns please contact us at:    Chasity Wild, MSN, RN, CNL    Nora Collins (Scheduling)  Nurse Care Coordinator     Clinic   Adult Congenital and CV Genetics   Adult Congenital and CV Genetic  [facility]   [facility]  (P) [phone]     (P) [phone]  [email or web address]   (F) [phone]        For after hours urgent needs, call [phone] and ask to speak to the Adult Congenital Physician on call.  Mention Job Code 0401.    For emergencies call 911.    [facility]   Clinics and Surgery Center  Mail Code [id number]  [address] [place]  [address]

## 2021-03-26 NOTE — NURSING NOTE
Diet: Patient instructed regarding a heart healthy diet, including discussion of reduced fat and sodium intake. Patient demonstrated understanding of this information and agreed to call with further questions or concerns.    Med Reconcile: Reviewed and verified all current medications with the patient. The updated medication list was printed and given to the patient.    Return Appointment: Patient given instructions regarding scheduling next clinic visit. Patient demonstrated understanding of this information and agreed to call with further questions or concerns.    Patient stated she understood all health information given and agreed to call with further questions or concerns.    Chasity Wild, MSN, RN, CNL  Cardiology Care Coordinator  [facility]  [phone]

## 2021-03-26 NOTE — PROGRESS NOTES
[facility]  HPI:  Ms. 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 The patient underwent VT ablation on 3/13/2014 and a subsequent ICD implant single-chamber single coil on 3/14/2014, later Rv lead malfunction due to isolation break and failure to deliver shock for recurrent fast VT (VT spontaneously terminated) s/p replacement of entire system 6/2/2015. She was discharged on sotalol 120 mg twice a day. She was seen in clinic on 4/22/14 and her device interrogation showed no recurrence of ventricular arrhythmias. She reported some mild fatigue on Sotalol and her dose was decreased to 80 mg BID. At her July 2014 follow up appointment she only had one 4 second NSVT at 160 bpm which was asymptomatic. At her clinic visit in 2/15 she again had no ventricular arrhythmias and her Sotalol was stopped. After recurrence of VT in June 2015, the patient was restarted on sotalol 80 mg twice a day. We re-attempted VT ablation after holding sotalol for one week on 8/24/2015 with no inducible VT.  [facility] She presents today for follow-up.  Her device interrogation shows no ventricular arrhythmias.  Her device site has healed well. She denies any syncope or presyncope, no chest pain or shortness of breath on exertion at rest, no orthopnea, no lower extremity edema. Her EKG shows normal sinus rhythm with RBBB,  ms.  [facility] EP Visit 9/23/17: She presents today  for follow up. She reports feeling well. She denies any chest pain, dizziness, lightheadedness, shortness of breath, palpitations, leg/ankle swelling, PND, orthopnea, or syncopal symptoms. She has started exercise classes and is increasing her activity levels. Device interrogation shows normal device function.  17 episodes recorded. 16 VT zone monitored events when she was exercising EGMS shows ST with rates of 168-172 BPM.  1 NSVT for 4 beats at a rate of 211 for 2 second duration.  Intrinsic rhythm = SR @ 90 bpm. = <0.1%. OptiVol fluid index is at baseline. No short v-v intervals recorded.  RV lead impedance trends appear stable. Presenting 12 lead ECG shows SR with RBBB Vent Rate 84 bpm,  ms,  ms, QTc 505 ms. Current cardiac medications include: Sotalol.   Ep Visit 9/2017: She presents today for follow up. She reports feeling well. She denies any chest pain/pressures, dizziness, lightheadedness, shortness of breath, leg/ankle swelling, PND, orthopnea, palpitations, or syncopal symptoms. She continues to exercise and is without limitations. Device interrogation shows normal device function.  2 NSVT episodes recorded, lasting 1-2 seconds. 8 episodes recorded as VT, these EGMs are consistent with ST.  <0.1%. No short v-v intervals recorded. Lead trends appear stable. Presenting 12 lead ECG shows NSR with RBBB Vent Rate 85 bpm,  ms,  ms, QTc 504 ms. Current cardiac medications include: Sotalol.     Ep Visit 2/20/20: She presents today for follow up. She reports feeling well. She states she has not been taking her Sotalol for the last many months. She denies any chest pain/pressures, dizziness, lightheadedness, worsening shortness of breath, leg/ankle swelling, PND, orthopnea, palpitations, or syncopal symptoms. Device interrogation shows  normal ICD function.  Since last remote transmission on 10/29/19, there have been 5 SVT-Wavelet, 7 monitored VT and 2 NSVT episodes recorded.  The SVT  episodes last 5-30 seconds at 167-176 bpm, occur with activity and appear to be ST.  The monitored VT episodes last 5-56 seconds at rates of 167-171 bpm.  These also occur with activity and appear  to be ST with several occurring on 11/27/19.  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  The NSVT episodes last 1 second in duration @ 231-226 bpm.   Intrinsic rhythm = SR @ 80 bpm.    =<0.1%. No short v-v intervals recorded.  Lead trends appear stable.  Echo today is stable with normal systemic function. No current cardiac medications.     She presents today for follow up. She reports feeling well. She denies any chest pain/pressures, dizziness, lightheadedness, worsening shortness of breath, leg/ankle swelling, PND, orthopnea, palpitations, or syncopal symptoms. Recent echo showed normal RV size/function, mildly dialted LV with  LVEF 47%, with no significant changes from prior echo. Device interrogation shows normal device function, stable lead parameters,  <0.1%, 46 VT and 2 NSVT all EGMs suggest ST and not true arrhythmia. No current cardiac medications.         Current Outpatient Prescriptions   Medication Sig Dispense Refill     sotalol (BETAPACE) 80 MG tablet Take 1 tablet (80 mg) by mouth every 12 hours 60 tablet 3     amoxicillin (AMOXIL) 500 MG capsule Take 4 capsules (2000mg) 30-60 minutes prior to dental work. 12 capsule 3     acetaminophen-codeine (TYLENOL #3) 300-30 MG per tablet Take 1 tablet by mouth every 4 hours as needed for other (mild or moderate pain) 10 tablet 0     Naproxen Sodium (ALEVE PO) Take 220 mg by mouth       fluticasone (FLONASE) 50 MCG/ACT nasal spray Spray 1-2 sprays into both nostrils daily 1 Package 11     metFORMIN (GLUCOPHAGE) 500 MG tablet Take 1 tablet (500 mg) by mouth daily (with dinner) 90 tablet 3     STATIN NOT PRESCRIBED, INTENTIONAL, Statin not prescribed intentionally due to Not indicated based on  age 0 each 0     gabapentin (NEURONTIN) 300 MG capsule Take 1 tablet (300 mg) every night 90 capsule 0     MELATONIN PO Take 3 mg by mouth nightly as needed        VITAMIN D, CHOLECALCIFEROL, PO Take by mouth daily       Ascorbic Acid (VITAMIN C PO)        Omega-3 Fatty Acids (OMEGA-3 FISH OIL PO)        ACE NOT PRESCRIBED, INTENTIONAL, 1 each as needed for other ACE Inhibitor not prescribed due to Other: BP at goal, pending microalbumin test 0 each 0     blood glucose (ACCU-CHEK SMARTVIEW) test strip Use to test blood sugar 1 times daily or as directed. 100 strip prn     blood glucose monitoring (ACCU-CHEK FASTCLIX) lancets Use to test blood sugar 1 times daily or as directed. 1 Box prn     medroxyPROGESTERone (DEPO-PROVERA) 150 MG/ML injection Inject 1 mL (150 mg) into the muscle every 3 months 1 mL 4     MULTIVITAMINS PO TABS ONE DAILY  3       Past Medical History   Diagnosis Date     Allergy, unspecified not elsewhere classified      Attention deficit disorder without mention of hyperactivity      Mild MR; difficulty attention span     Corrected transposition of great vessels      still needs SBE prophlaxis     Impaired fasting glucose      103 7/06     Congenital heart disease      Ventricular tachycardia 3/4/2014     s/p ablation and ICD placement 3/13/14     Diabetes mellitus type 2, noninsulin dependent        Past Surgical History   Procedure Laterality Date     C nonspecific procedure  11/98     open heart surgery x 3, transposition of major vessels     C nonspecific procedure  10/03     Additional heart surgery     Cardiac surgery  Had 4 surgeries     transposition of the major arteries     Surgical history of -   2014     ablation for v. tach; ICD placed      N/A 6/1/2015     Procedure: ANESTHESIA ICD/PACEMAKER CHANGE ADULT;  Surgeon: GENERIC ANESTHESIA PROVIDER;  Location: [facility] OR       Family History   Problem Relation Age of Onset     Adopted: Yes     Family History Negative Mother      Family History  "Negative Father        History   Substance Use Topics     Smoking status: Never Smoker      Smokeless tobacco: Never Used     Alcohol Use: No       ROS:   A comprehensive 10 system ROS was negative except for HPI.    Physical Examination:  /79 (BP Location: Left arm, Patient Position: Chair, Cuff Size: Adult Regular)   Pulse 99   Ht 1.58 m (5' 2.2\")   Wt 91.6 kg (202 lb)   SpO2 99%   BMI 36.71 kg/m    GENERAL APPEARANCE: healthy, alert and no distress  HEENT: no icterus, no xanthelasmas, normal pupil size and reaction, normal palate, mucosa moist, no cyanosis.  NECK: no adenopathy, no asymmetry, masses, or scars, thyroid normal to palpation, carotid upstrokes are normal bilaterally, no cervical bruits, JVP is not visible  CHEST: lungs clear to auscultation - no rales, rhonchi or wheezes, no use of accessory muscles, no retractions, respirations are unlabored, normal respiratory rate, no kyphosis, no scoliosis  CARDIOVASCULAR: regular rhythm, S1S2, 3/6 KRISTI best heard at the left sternal border at the base.  ABDOMEN: soft, non tender, without hepatosplenomegaly, no masses palpable, bowel sounds normal, aorta not enlarged by palpation, no abdominal bruits  EXTREMITIES: no clubbing, cyanosis or edema.  NEURO: alert and oriented to person/place/time, normal speech, gait and affect  VASC: Radial, dorsalis pedis and posterior tibialis pulses are normal.  SKIN: no ecchymoses, no rashes    Laboratory: Reviewed.    Procedures:  LAST CARDIAC MRA:  3/6/14  FINDINGS:   SITUS: There is a normal spleen in the left upper quadrant. There is situs solitus in the chest, as demonstrated by a normal airway pulmonary artery relationship bilaterally.   CAVAE: Single right-sided inferior and superior vena cavae drain normally into the right atrium unobstructed.   PULMONARY VEINS: Two right and two left pulmonary veins drain into the left atrium unobstructed.   ATRIA: There is no interatrial communication demonstrated. The atrial " sizes are normal.   ATRIOVENTRICULAR CONNECTION: Concordant. Separate mitral and tricuspid valves without evidence of significant regurgitation or stenosis.   VENTRICLES: D-loop ventricles with levocardia. Ventricles are normal in size and contraction. Right ventricular end-diastolic volume is upper limits of normal for BSA. If utilizing an ideal body weight, this would be mildly enlarged. No residual interventricular communication is demonstrated. No myocardial hyperenhancement identified on delayed post gadolinium imaging, except at the ventricular insertion points.   VENTRICULOARTERIAL CONNECTION: Concordant. Trileaflet aortic valve without regurgitation or stenosis. The oversewn native pulmonary artery is directly posterior to the aorta. There is now an RV to PA conduit. Mild stenosis of the RVOT conduit with moderate regurgitation, 31% by direct flow measurement, and 33% by ventricular stroke volume comparison.   AORTA AND SUPRA-AORTIC VESSELS: A left-sided aortic arch is demonstrated with normal cervical branching pattern. No evidence of patent ductus arteriosus, coarctation, or aortopulmonary collateral arteries. The coronary arteries are not well visualized in this study.   PULMONARY ARTERY: The RV to PA conduit is patent with normal pulmonary artery branching pattern.   LAST ECHO:  2/2020  ------CONCLUSIONS------  Technically difficult study due to poor acoustic windows. D-TGA status post  Rastelli and subsequent placement of a bio-prosthetic valve in the pulmonary  position. There is no obvious residual ventricular level shunt. Mild (1+)  aortic valve insufficiency. Unable to adequately visualize the RV-PA conduit  or the bio-prosthetic valve; however, normal antegrade flow is visualized in  the branch pulmonary arteries. A transvenous pacemaker lead is seen in the  right ventricle, with the tip of the lead implanted into the right ventricular  apex. Trivial tricuspid valve insufficiency. Estimated right  ventricular  systolic pressure is 26 mmHg plus right atrial pressure. The left and right  ventricles have normal chamber size, wall thickness, and systolic function.  The calculated biplane left ventricular ejection fraction is 57%. No  pericardial effusion.  No significant change from last echocardiogram.  7/25/19 CTA:  IMPRESSION:  1.  D-transposition of aorta s/p Rastelli with bioprosthetic pulmonic  valve  2.  Mildly reduced left ventricular function with LV EF 52%.  3.  Normal RV size and function with RV EF 55%.  4.  RV-PA valved conduit without visual evidence of stenosis.  5.  Mild pulmonic regurgitation (<30 mL) using biventricular stroke  volumes.  6.  No significant change from CT scan in 2014.  7.  Please review radiology review for incidental non-cardiovascular  findings to follow separately    3/23/21 ECHO:  ##### CONCLUSIONS #####  Technically difficult study due to poor acoustic windows. D-TGA status-post  Rastelli and subsequent placement of a bio-prosthetic valve in the pulmonary  position.     Trivial tricuspid valve regurgitation; estimated right ventricular systolic  pressure 26mmHg plus right atrial pressure. Normal right ventricular size and  systolic function. Unable to adequately visualize the RV-PA conduit or the  bio-prosthetic valve; normal antegrade flow profile at the valve annulus; no  obvious regurgitation. Mildly dilated left ventricle with normal wall  thickness and mildly-depressed systolic function; biplane LV EF 47%. A  transvenous pacemaker lead is seen in the right ventricle, with the tip of the  lead implanted into the right ventricular apex. No effusion.     No significant change from last echocardiogram.    Assessment and recommendations:  Ms. 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 She presents today for follow up. She reports feeling well. She denies any chest pain/pressures, dizziness, lightheadedness, worsening shortness of breath, leg/ankle swelling, PND, orthopnea, palpitations, or syncopal symptoms. Recent echo showed normal RV size/function, mildly dialted LV with  LVEF 47%, with no significant changes from prior echo. Device interrogation shows normal device function, stable lead parameters,  <0.1%, 46 VT and 2 NSVT all EGMs suggest ST and not true arrhythmia. No current cardiac medications.     She is doing well from an EP standpoint. She is off AAT and is not having any arrhyhmias. Device has normal function and stable lead parameters. Echo is stable with good systemic function. No further interventions required. She will continue to follow with Dr. Valle and device team per routine. [facility] sooner if need arises.     The patient states understanding and is agreeable with the plan.   This patient was seen and examined with Dr. Rya. The above note reflects our joint assessment and plan.   KIMMIE Blevins CNP  Electrophysiology Service  Pager: [phone]    EP STAFF NOTE  I have seen and examined the patient as part of a shared visit with LEO Blevins NP.  I agree with the note above. I reviewed today's vital signs and medications. I have reviewed and discussed with the advanced practice  provider their physical examination, assessment, and plan   Briefly, no Vt recurrences, off AAT  My key history/exam findings are: RRR.   The key management decisions made by me: f/u in one year.    Vimal Ray MD [facility]  Cardiology - Electrophysiology

## 2021-03-26 NOTE — PROGRESS NOTES
"Chikis is a 40 year old who is being evaluated via a billable video visit.      How would you like to obtain your AVS? {AVS Preference:304245}  If the video visit is dropped, the invitation should be resent by: {video visit invitation:633052}  Will anyone else be joining your video visit? {:[id number]}  {If patient encounters technical issues they should call [phone] :[id number]}    Video Start Time: {video visit start/end time for provider to select:592213}  Video-Visit Details    Type of service:  Video Visit    Video End Time:{video visit start/end time for provider to select:360731}    Originating Location (pt. Location): {video visit patient location:618123::\"Home\"}    Distant Location (provider location):  [facility]     Platform used for Video Visit: {Virtual Visit Platforms:395885::\""[employer],"\"}    "

## 2021-04-19 ENCOUNTER — OFFICE VISIT (OUTPATIENT)
Dept: FAMILY MEDICINE | Facility: CLINIC | Age: 41
End: 2021-04-19
Payer: MEDICARE

## 2021-04-19 VITALS
SYSTOLIC BLOOD PRESSURE: 124 MMHG | BODY MASS INDEX: 36.78 KG/M2 | TEMPERATURE: 97.5 F | WEIGHT: 199.9 LBS | HEIGHT: 62 IN | DIASTOLIC BLOOD PRESSURE: 77 MMHG | HEART RATE: 98 BPM | OXYGEN SATURATION: 99 %

## 2021-04-19 DIAGNOSIS — E11.59 TYPE 2 DIABETES MELLITUS WITH OTHER CIRCULATORY COMPLICATION, WITHOUT LONG-TERM CURRENT USE OF INSULIN (H): ICD-10-CM

## 2021-04-19 DIAGNOSIS — G56.03 BILATERAL CARPAL TUNNEL SYNDROME: Primary | ICD-10-CM

## 2021-04-19 PROCEDURE — 99214 OFFICE O/P EST MOD 30 MIN: CPT | Performed by: FAMILY MEDICINE

## 2021-04-19 ASSESSMENT — ANXIETY QUESTIONNAIRES
7. FEELING AFRAID AS IF SOMETHING AWFUL MIGHT HAPPEN: NOT AT ALL
4. TROUBLE RELAXING: NOT AT ALL
2. NOT BEING ABLE TO STOP OR CONTROL WORRYING: SEVERAL DAYS
GAD7 TOTAL SCORE: 3
6. BECOMING EASILY ANNOYED OR IRRITABLE: SEVERAL DAYS
3. WORRYING TOO MUCH ABOUT DIFFERENT THINGS: NOT AT ALL
5. BEING SO RESTLESS THAT IT IS HARD TO SIT STILL: NOT AT ALL
7. FEELING AFRAID AS IF SOMETHING AWFUL MIGHT HAPPEN: NOT AT ALL
GAD7 TOTAL SCORE: 3
GAD7 TOTAL SCORE: 3
1. FEELING NERVOUS, ANXIOUS, OR ON EDGE: SEVERAL DAYS

## 2021-04-19 ASSESSMENT — PATIENT HEALTH QUESTIONNAIRE - PHQ9
SUM OF ALL RESPONSES TO PHQ QUESTIONS 1-9: 1
SUM OF ALL RESPONSES TO PHQ QUESTIONS 1-9: 1
10. IF YOU CHECKED OFF ANY PROBLEMS, HOW DIFFICULT HAVE THESE PROBLEMS MADE IT FOR YOU TO DO YOUR WORK, TAKE CARE OF THINGS AT HOME, OR GET ALONG WITH OTHER PEOPLE: NOT DIFFICULT AT ALL

## 2021-04-19 ASSESSMENT — MIFFLIN-ST. JEOR: SCORE: 1519.99

## 2021-04-19 NOTE — PROGRESS NOTES
Assessment & Plan     Bilateral carpal tunnel syndrome  - very mild. Will start ont rial of wrist brace. Follow up if worsening symptoms.     Type 2 diabetes mellitus with other circulatory complication, without long-term current use of insulin (H)  - will refer for diabetes education   - metFORMIN (GLUCOPHAGE) 500 MG tablet; Take 2 tablets (1,000 mg) by mouth daily (with dinner)  - AMBULATORY ADULT DIABETES EDUCATOR REFERRAL; Future         See Patient Instructions    Return in about 4 months (around 8/19/2021) for Physical Exam, diabetes, in person, .    Tammy Benedict MD  [facility]    Inés Diop is a 40 year old who presents for the following health issues  accompanied by her sister:    History of Present Illness       Diabetes:   She presents for follow up of diabetes.  She is checking home blood glucose a few times a month. She checks blood glucose at bedtime.  Blood glucose is sometimes over 200 and never under 70. She is aware of hypoglycemia symptoms including weakness, lethargy and confusion. She is concerned about other. She is not experiencing numbness or burning in feet, excessive thirst, blurry vision, weight changes or redness, sores or blisters on feet.         She eats 2-3 servings of fruits and vegetables daily.She consumes 1 sweetened beverage(s) daily.She exercises with enough effort to increase her heart rate 30 to 60 minutes per day.  She exercises with enough effort to increase her heart rate 3 or less days per week. She is missing 3 dose(s) of medications per week.  She is not taking prescribed medications regularly due to remembering to take.         Concern - Tingling in both hands  Onset: 20 months  Description: on/off tingling in both hands  Intensity: moderate  Progression of Symptoms:  intermittent  Accompanying Signs & Symptoms: none  Previous history of similar problem: none  Precipitating factors:        Worsened  "by: stress  Alleviating factors:        Improved by: goes away on it's own  Therapies tried and outcome: None      Notes tingling in hands worsen when she is stressed out or has had a low carb day.   Usually from the tip of her fingers to her knuckles. Generally is resolved when no longer in stressful situations.   Sister also mentions cardiology wanted her to discuss possibility of patient being on januvia for cardioprotection.       Wt Readings from Last 4 Encounters:   04/19/21 90.7 kg (199 lb 14.4 oz)   03/26/21 91.6 kg (202 lb)   03/23/21 92.5 kg (204 lb)   02/11/21 92.1 kg (203 lb)       Review of Systems   Constitutional, HEENT, cardiovascular, pulmonary, GI, , musculoskeletal, neuro, skin, endocrine and psych systems are negative, except as otherwise noted.      Objective    /77 (BP Location: Right arm, Patient Position: Sitting, Cuff Size: Adult Large)   Pulse 98   Temp 97.5  F (36.4  C) (Oral)   Ht 1.575 m (5' 2\")   Wt 90.7 kg (199 lb 14.4 oz)   SpO2 99%   BMI 36.56 kg/m    Body mass index is 36.56 kg/m .  Physical Exam   GENERAL: healthy, alert and no distress  NECK: no adenopathy, no asymmetry, masses, or scars and thyroid normal to palpation  RESP: lungs clear to auscultation - no rales, rhonchi or wheezes  CV: regular rate and rhythm, normal S1 S2, no S3 or S4, no murmur, click or rub, no peripheral edema and peripheral pulses strong  MS: bilateral wrists- mildly positive phalen and tinels. Strength intact, sensation intact  PSYCH: mentation appears normal, affect normal/bright            Answers for HPI/ROS submitted by the patient on 4/19/2021   Chronic problems general questions HPI Form  If you checked off any problems, how difficult have these problems made it for you to do your work, take care of things at home, or get along with other people?: Not difficult at all  PHQ9 TOTAL SCORE: 1  FREDIS 7 TOTAL SCORE: 3    "

## 2021-04-19 NOTE — PATIENT INSTRUCTIONS
Follow up in august for routine physical with pap and diabetes check       Patient Education     Understanding Carpal Tunnel Syndrome    The carpal tunnel is a narrow space inside the wrist. It is ringed by bone and a band of tough tissue called the transverse carpal ligament. A major nerve called the median nerve runs from the forearm into the hand through the carpal tunnel. Tendons also run through the carpal tunnel.  With carpal tunnel syndrome, the tendons or nearby tissues within the carpal tunnel may swell or thicken. Or the transverse carpal ligament may harden and shorten. This narrows the space in the carpal tunnel and puts pressure on the median nerve. This pressure leads to tingling and numbness of the hand and wrist. In time, the condition can make even simple tasks hard to do.  What causes carpal tunnel syndrome?  Doctors aren t entirely clear why the condition occurs. Certain things may make a person more likely to have it. These include:    Being female    Being pregnant    Being overweight    Having diabetes or rheumatoid arthritis  Symptoms of carpal tunnel syndrome  Symptoms often come and go. At first, symptoms may occur mainly at night. Later, they may be noticed during the day as well. They may get worse with activities such as driving, reading, typing, or holding a phone. Symptoms can include:    Tingling and numbness in the hand or wrist    Sharp pain that shoots up the arm or down to the fingers    Hand stiffness or cramping, especially in the morning    Trouble making a fist    Hand weakness and clumsiness  Treatment for carpal tunnel syndrome  Certain treatments help reduce the pressure on the median nerve and relieve symptoms. Choices for treatment may include one or more of the following:    Wrist splint. This involves wearing a special brace on the wrist and hand. The splint holds the wrist straight, in a neutral position. This helps keep the carpal tunnel as open as  possible.    Cortisone shots. Cortisone is a medicine that helps reduce swelling. It is injected directly into the wrist. It helps shrink tissues inside the carpal tunnel. This relieves symptoms for a time.    Pain medicines. You may take over-the-counter or prescription medicines to help reduce swelling and relieve symptoms.    Surgery. If the condition doesn t respond to other treatments and doesn t go away on its own, you may need surgery. During surgery, the surgeon cuts the transverse carpal ligament to relieve pressure on the median nerve.     When to call your healthcare provider  Call your healthcare provider right away if you have any of these:    Fever of 100.4 F (38 C) or higher, or as directed    Symptoms that don t get better, or get worse    New symptoms   Jany last reviewed this educational content on 3/10/2016    5612-7053 [employer]. All rights reserved. This information is not intended as a substitute for professional medical care. Always follow your healthcare professional's instructions.

## 2021-04-20 ASSESSMENT — ANXIETY QUESTIONNAIRES: GAD7 TOTAL SCORE: 3

## 2021-04-20 ASSESSMENT — PATIENT HEALTH QUESTIONNAIRE - PHQ9: SUM OF ALL RESPONSES TO PHQ QUESTIONS 1-9: 1

## 2021-04-23 ENCOUNTER — TELEPHONE (OUTPATIENT)
Dept: FAMILY MEDICINE | Facility: CLINIC | Age: 41
End: 2021-04-23

## 2021-04-23 NOTE — TELEPHONE ENCOUNTER
Diabetes Education Scheduling Outreach #1:    Call to patient to schedule. Left message with phone number to call to schedule.    Plan for 2nd outreach attempt within 1 week.    Jaymie Patton OnCall  Diabetes and Nutrition Scheduling

## 2021-05-10 NOTE — TELEPHONE ENCOUNTER
Diabetes Education Scheduling Outreach #2:    Call to patient to schedule. Left message with phone number to call to schedule.    Pearl Arana  [place] [facility]  Diabetes and Nutrition Scheduling

## 2021-05-27 DIAGNOSIS — F51.02 ADJUSTMENT INSOMNIA: ICD-10-CM

## 2021-05-27 NOTE — TELEPHONE ENCOUNTER
Routing refill request to provider for review/approval because:  Drug not active on patient's medication list    Please advise.     Roberto HASSAN RN

## 2021-06-01 RX ORDER — TRAZODONE HYDROCHLORIDE 50 MG/1
TABLET, FILM COATED ORAL
Qty: 90 TABLET | Refills: 0 | OUTPATIENT
Start: 2021-06-01

## 2021-06-23 ENCOUNTER — ANCILLARY PROCEDURE (OUTPATIENT)
Dept: CARDIOLOGY | Facility: CLINIC | Age: 41
End: 2021-06-23
Attending: INTERNAL MEDICINE
Payer: MEDICARE

## 2021-06-23 DIAGNOSIS — Q20.3 D-TGA (DEXTRO-TRANSPOSITION OF GREAT ARTERIES): ICD-10-CM

## 2021-06-23 DIAGNOSIS — Z86.79 HX OF VENTRICULAR TACHYCARDIA: ICD-10-CM

## 2021-06-23 PROCEDURE — 93295 DEV INTERROG REMOTE 1/2/MLT: CPT | Performed by: INTERNAL MEDICINE

## 2021-06-23 PROCEDURE — 93296 REM INTERROG EVL PM/IDS: CPT

## 2021-06-30 LAB
MDC_IDC_EPISODE_DTM: NORMAL
MDC_IDC_EPISODE_DURATION: 0 S
MDC_IDC_EPISODE_DURATION: 0 S
MDC_IDC_EPISODE_DURATION: 109 S
MDC_IDC_EPISODE_DURATION: 16 S
MDC_IDC_EPISODE_DURATION: 18 S
MDC_IDC_EPISODE_DURATION: 22 S
MDC_IDC_EPISODE_DURATION: 25 S
MDC_IDC_EPISODE_DURATION: 3 S
MDC_IDC_EPISODE_DURATION: 4 S
MDC_IDC_EPISODE_DURATION: 4 S
MDC_IDC_EPISODE_DURATION: 44 S
MDC_IDC_EPISODE_DURATION: 6 S
MDC_IDC_EPISODE_DURATION: 61 S
MDC_IDC_EPISODE_DURATION: 7 S
MDC_IDC_EPISODE_DURATION: 76 S
MDC_IDC_EPISODE_DURATION: 86 S
MDC_IDC_EPISODE_ID: 505
MDC_IDC_EPISODE_ID: 506
MDC_IDC_EPISODE_ID: 507
MDC_IDC_EPISODE_ID: 508
MDC_IDC_EPISODE_ID: 509
MDC_IDC_EPISODE_ID: 510
MDC_IDC_EPISODE_ID: 511
MDC_IDC_EPISODE_ID: 512
MDC_IDC_EPISODE_ID: 513
MDC_IDC_EPISODE_ID: 514
MDC_IDC_EPISODE_ID: 515
MDC_IDC_EPISODE_ID: 516
MDC_IDC_EPISODE_ID: 517
MDC_IDC_EPISODE_ID: 518
MDC_IDC_EPISODE_ID: 519
MDC_IDC_EPISODE_ID: 520
MDC_IDC_EPISODE_TYPE: NORMAL
MDC_IDC_LEAD_IMPLANT_DT: NORMAL
MDC_IDC_LEAD_LOCATION: NORMAL
MDC_IDC_LEAD_LOCATION_DETAIL_1: NORMAL
MDC_IDC_LEAD_MFG: NORMAL
MDC_IDC_LEAD_MODEL: NORMAL
MDC_IDC_LEAD_POLARITY_TYPE: NORMAL
MDC_IDC_LEAD_SERIAL: NORMAL
MDC_IDC_MSMT_BATTERY_DTM: NORMAL
MDC_IDC_MSMT_BATTERY_REMAINING_LONGEVITY: 76 MO
MDC_IDC_MSMT_BATTERY_RRT_TRIGGER: 2.73
MDC_IDC_MSMT_BATTERY_STATUS: NORMAL
MDC_IDC_MSMT_BATTERY_VOLTAGE: 2.99 V
MDC_IDC_MSMT_CAP_CHARGE_DTM: NORMAL
MDC_IDC_MSMT_CAP_CHARGE_ENERGY: 18 J
MDC_IDC_MSMT_CAP_CHARGE_TIME: 3.82
MDC_IDC_MSMT_CAP_CHARGE_TYPE: NORMAL
MDC_IDC_MSMT_LEADCHNL_RV_IMPEDANCE_VALUE: 266 OHM
MDC_IDC_MSMT_LEADCHNL_RV_IMPEDANCE_VALUE: 380 OHM
MDC_IDC_MSMT_LEADCHNL_RV_PACING_THRESHOLD_AMPLITUDE: 0.5 V
MDC_IDC_MSMT_LEADCHNL_RV_PACING_THRESHOLD_PULSEWIDTH: 0.4 MS
MDC_IDC_MSMT_LEADCHNL_RV_SENSING_INTR_AMPL: 10.12 MV
MDC_IDC_MSMT_LEADCHNL_RV_SENSING_INTR_AMPL: 10.12 MV
MDC_IDC_PG_IMPLANT_DTM: NORMAL
MDC_IDC_PG_MFG: NORMAL
MDC_IDC_PG_MODEL: NORMAL
MDC_IDC_PG_SERIAL: NORMAL
MDC_IDC_PG_TYPE: NORMAL
MDC_IDC_SESS_CLINIC_NAME: NORMAL
MDC_IDC_SESS_DTM: NORMAL
MDC_IDC_SESS_TYPE: NORMAL
MDC_IDC_SET_BRADY_HYSTRATE: NORMAL
MDC_IDC_SET_BRADY_LOWRATE: 40 {BEATS}/MIN
MDC_IDC_SET_BRADY_MODE: NORMAL
MDC_IDC_SET_LEADCHNL_RV_PACING_AMPLITUDE: 2 V
MDC_IDC_SET_LEADCHNL_RV_PACING_ANODE_ELECTRODE_1: NORMAL
MDC_IDC_SET_LEADCHNL_RV_PACING_ANODE_LOCATION_1: NORMAL
MDC_IDC_SET_LEADCHNL_RV_PACING_CAPTURE_MODE: NORMAL
MDC_IDC_SET_LEADCHNL_RV_PACING_CATHODE_ELECTRODE_1: NORMAL
MDC_IDC_SET_LEADCHNL_RV_PACING_CATHODE_LOCATION_1: NORMAL
MDC_IDC_SET_LEADCHNL_RV_PACING_POLARITY: NORMAL
MDC_IDC_SET_LEADCHNL_RV_PACING_PULSEWIDTH: 0.4 MS
MDC_IDC_SET_LEADCHNL_RV_SENSING_ANODE_ELECTRODE_1: NORMAL
MDC_IDC_SET_LEADCHNL_RV_SENSING_ANODE_LOCATION_1: NORMAL
MDC_IDC_SET_LEADCHNL_RV_SENSING_CATHODE_ELECTRODE_1: NORMAL
MDC_IDC_SET_LEADCHNL_RV_SENSING_CATHODE_LOCATION_1: NORMAL
MDC_IDC_SET_LEADCHNL_RV_SENSING_POLARITY: NORMAL
MDC_IDC_SET_LEADCHNL_RV_SENSING_SENSITIVITY: 0.3 MV
MDC_IDC_SET_ZONE_DETECTION_BEATS_DENOMINATOR: 40 {BEATS}
MDC_IDC_SET_ZONE_DETECTION_BEATS_NUMERATOR: 30 {BEATS}
MDC_IDC_SET_ZONE_DETECTION_INTERVAL: 270 MS
MDC_IDC_SET_ZONE_DETECTION_INTERVAL: 360 MS
MDC_IDC_SET_ZONE_DETECTION_INTERVAL: 370 MS
MDC_IDC_SET_ZONE_DETECTION_INTERVAL: NORMAL
MDC_IDC_SET_ZONE_TYPE: NORMAL
MDC_IDC_STAT_BRADY_DTM_END: NORMAL
MDC_IDC_STAT_BRADY_DTM_START: NORMAL
MDC_IDC_STAT_BRADY_RV_PERCENT_PACED: 0.03 %
MDC_IDC_STAT_EPISODE_RECENT_COUNT: 0
MDC_IDC_STAT_EPISODE_RECENT_COUNT: 2
MDC_IDC_STAT_EPISODE_RECENT_COUNT: 9
MDC_IDC_STAT_EPISODE_RECENT_COUNT_DTM_END: NORMAL
MDC_IDC_STAT_EPISODE_RECENT_COUNT_DTM_START: NORMAL
MDC_IDC_STAT_EPISODE_TOTAL_COUNT: 0
MDC_IDC_STAT_EPISODE_TOTAL_COUNT: 1
MDC_IDC_STAT_EPISODE_TOTAL_COUNT: 17
MDC_IDC_STAT_EPISODE_TOTAL_COUNT: 226
MDC_IDC_STAT_EPISODE_TOTAL_COUNT_DTM_END: NORMAL
MDC_IDC_STAT_EPISODE_TOTAL_COUNT_DTM_START: NORMAL
MDC_IDC_STAT_EPISODE_TYPE: NORMAL
MDC_IDC_STAT_TACHYTHERAPY_ATP_DELIVERED_RECENT: 0
MDC_IDC_STAT_TACHYTHERAPY_ATP_DELIVERED_TOTAL: 0
MDC_IDC_STAT_TACHYTHERAPY_RECENT_DTM_END: NORMAL
MDC_IDC_STAT_TACHYTHERAPY_RECENT_DTM_START: NORMAL
MDC_IDC_STAT_TACHYTHERAPY_SHOCKS_ABORTED_RECENT: 0
MDC_IDC_STAT_TACHYTHERAPY_SHOCKS_ABORTED_TOTAL: 0
MDC_IDC_STAT_TACHYTHERAPY_SHOCKS_DELIVERED_RECENT: 0
MDC_IDC_STAT_TACHYTHERAPY_SHOCKS_DELIVERED_TOTAL: 1
MDC_IDC_STAT_TACHYTHERAPY_TOTAL_DTM_END: NORMAL
MDC_IDC_STAT_TACHYTHERAPY_TOTAL_DTM_START: NORMAL

## 2021-10-10 ENCOUNTER — HOSPITAL ENCOUNTER (OUTPATIENT)
Age: 41
End: 2021-10-10
Attending: INTERNAL MEDICINE | Admitting: INTERNAL MEDICINE
Payer: MEDICARE

## 2021-10-10 ENCOUNTER — HOSPITAL ENCOUNTER (EMERGENCY)
Facility: CLINIC | Age: 41
Discharge: HOME | End: 2021-10-11
Attending: EMERGENCY MEDICINE | Admitting: EMERGENCY MEDICINE
Payer: MEDICARE

## 2021-10-10 ENCOUNTER — APPOINTMENT (OUTPATIENT)
Dept: GENERAL RADIOLOGY | Facility: CLINIC | Age: 41
End: 2021-10-10
Attending: EMERGENCY MEDICINE
Payer: MEDICARE

## 2021-10-10 ENCOUNTER — ANCILLARY PROCEDURE (OUTPATIENT)
Dept: CARDIOLOGY | Facility: CLINIC | Age: 41
End: 2021-10-10
Attending: INTERNAL MEDICINE
Payer: MEDICARE

## 2021-10-10 DIAGNOSIS — T82.110A FAILURE OF IMPLANTABLE CARDIOVERTER-DEFIBRILLATOR (ICD) LEAD, INITIAL ENCOUNTER: ICD-10-CM

## 2021-10-10 DIAGNOSIS — Z86.79 HX OF VENTRICULAR TACHYCARDIA: ICD-10-CM

## 2021-10-10 DIAGNOSIS — Q20.3 D-TGA (DEXTRO-TRANSPOSITION OF GREAT ARTERIES): ICD-10-CM

## 2021-10-10 LAB
ANION GAP SERPL CALCULATED.3IONS-SCNC: 6 MMOL/L (ref 3–14)
BASOPHILS # BLD AUTO: 0.1 10E3/UL (ref 0–0.2)
BASOPHILS NFR BLD AUTO: 0 %
BUN SERPL-MCNC: 10 MG/DL (ref 7–30)
CALCIUM SERPL-MCNC: 8.8 MG/DL (ref 8.5–10.1)
CHLORIDE BLD-SCNC: 107 MMOL/L (ref 94–109)
CO2 SERPL-SCNC: 25 MMOL/L (ref 20–32)
CREAT SERPL-MCNC: 0.6 MG/DL (ref 0.52–1.04)
EOSINOPHIL # BLD AUTO: 0.2 10E3/UL (ref 0–0.7)
EOSINOPHIL NFR BLD AUTO: 1 %
ERYTHROCYTE [DISTWIDTH] IN BLOOD BY AUTOMATED COUNT: 12.3 % (ref 10–15)
GFR SERPL CREATININE-BSD FRML MDRD: >90 ML/MIN/1.73M2
GLUCOSE BLD-MCNC: 216 MG/DL (ref 70–99)
HCT VFR BLD AUTO: 41.6 % (ref 35–47)
HGB BLD-MCNC: 13.4 G/DL (ref 11.7–15.7)
IMM GRANULOCYTES # BLD: 0 10E3/UL
IMM GRANULOCYTES NFR BLD: 0 %
LYMPHOCYTES # BLD AUTO: 1.4 10E3/UL (ref 0.8–5.3)
LYMPHOCYTES NFR BLD AUTO: 13 %
MAGNESIUM SERPL-MCNC: 1.7 MG/DL (ref 1.6–2.3)
MCH RBC QN AUTO: 28.3 PG (ref 26.5–33)
MCHC RBC AUTO-ENTMCNC: 32.2 G/DL (ref 31.5–36.5)
MCV RBC AUTO: 88 FL (ref 78–100)
MONOCYTES # BLD AUTO: 0.8 10E3/UL (ref 0–1.3)
MONOCYTES NFR BLD AUTO: 7 %
NEUTROPHILS # BLD AUTO: 8.9 10E3/UL (ref 1.6–8.3)
NEUTROPHILS NFR BLD AUTO: 79 %
NRBC # BLD AUTO: 0 10E3/UL
NRBC BLD AUTO-RTO: 0 /100
PLATELET # BLD AUTO: 164 10E3/UL (ref 150–450)
POTASSIUM BLD-SCNC: 3.8 MMOL/L (ref 3.4–5.3)
RBC # BLD AUTO: 4.73 10E6/UL (ref 3.8–5.2)
SARS-COV-2 RNA RESP QL NAA+PROBE: NEGATIVE
SODIUM SERPL-SCNC: 138 MMOL/L (ref 133–144)
TROPONIN I SERPL-MCNC: <0.015 UG/L (ref 0–0.04)
WBC # BLD AUTO: 11.3 10E3/UL (ref 4–11)

## 2021-10-10 PROCEDURE — 80048 BASIC METABOLIC PNL TOTAL CA: CPT | Performed by: EMERGENCY MEDICINE

## 2021-10-10 PROCEDURE — 84484 ASSAY OF TROPONIN QUANT: CPT | Performed by: EMERGENCY MEDICINE

## 2021-10-10 PROCEDURE — 71045 X-RAY EXAM CHEST 1 VIEW: CPT

## 2021-10-10 PROCEDURE — 99285 EMERGENCY DEPT VISIT HI MDM: CPT | Mod: 25

## 2021-10-10 PROCEDURE — 93295 DEV INTERROG REMOTE 1/2/MLT: CPT | Performed by: INTERNAL MEDICINE

## 2021-10-10 PROCEDURE — 93005 ELECTROCARDIOGRAM TRACING: CPT

## 2021-10-10 PROCEDURE — 250N000013 HC RX MED GY IP 250 OP 250 PS 637: Performed by: EMERGENCY MEDICINE

## 2021-10-10 PROCEDURE — 85025 COMPLETE CBC W/AUTO DIFF WBC: CPT | Performed by: EMERGENCY MEDICINE

## 2021-10-10 PROCEDURE — 36415 COLL VENOUS BLD VENIPUNCTURE: CPT | Performed by: EMERGENCY MEDICINE

## 2021-10-10 PROCEDURE — C9803 HOPD COVID-19 SPEC COLLECT: HCPCS

## 2021-10-10 PROCEDURE — 83735 ASSAY OF MAGNESIUM: CPT | Performed by: EMERGENCY MEDICINE

## 2021-10-10 PROCEDURE — 87635 SARS-COV-2 COVID-19 AMP PRB: CPT | Performed by: EMERGENCY MEDICINE

## 2021-10-10 PROCEDURE — 93296 REM INTERROG EVL PM/IDS: CPT

## 2021-10-10 RX ORDER — ONDANSETRON 2 MG/ML
4 INJECTION INTRAMUSCULAR; INTRAVENOUS EVERY 30 MIN PRN
Status: DISCONTINUED | OUTPATIENT
Start: 2021-10-10 | End: 2021-10-11 | Stop reason: HOSPADM

## 2021-10-10 RX ORDER — ATORVASTATIN CALCIUM 40 MG/1
40 TABLET, FILM COATED ORAL AT BEDTIME
Status: DISCONTINUED | OUTPATIENT
Start: 2021-10-10 | End: 2021-10-11 | Stop reason: HOSPADM

## 2021-10-10 RX ORDER — SERTRALINE HYDROCHLORIDE 25 MG/1
25 TABLET, FILM COATED ORAL AT BEDTIME
Status: DISCONTINUED | OUTPATIENT
Start: 2021-10-10 | End: 2021-10-11 | Stop reason: HOSPADM

## 2021-10-10 RX ORDER — GABAPENTIN 300 MG/1
300 CAPSULE ORAL AT BEDTIME
Status: DISCONTINUED | OUTPATIENT
Start: 2021-10-10 | End: 2021-10-11 | Stop reason: HOSPADM

## 2021-10-10 RX ORDER — BISMUTH SUBSALICYLATE 262 MG/1
524 TABLET, CHEWABLE ORAL EVERY 6 HOURS PRN
Status: DISCONTINUED | OUTPATIENT
Start: 2021-10-10 | End: 2021-10-11 | Stop reason: HOSPADM

## 2021-10-10 RX ORDER — BISMUTH SUBSALICYLATE 262 MG/1
524 TABLET, CHEWABLE ORAL EVERY 6 HOURS PRN
Status: DISCONTINUED | OUTPATIENT
Start: 2021-10-10 | End: 2021-10-10

## 2021-10-10 RX ORDER — ACETAMINOPHEN 325 MG/1
650 TABLET ORAL ONCE
Status: COMPLETED | OUTPATIENT
Start: 2021-10-10 | End: 2021-10-10

## 2021-10-10 RX ADMIN — ATORVASTATIN CALCIUM 40 MG: 40 TABLET, FILM COATED ORAL at 22:37

## 2021-10-10 RX ADMIN — ACETAMINOPHEN 650 MG: 325 TABLET, FILM COATED ORAL at 09:14

## 2021-10-10 RX ADMIN — GABAPENTIN 300 MG: 300 CAPSULE ORAL at 22:37

## 2021-10-10 RX ADMIN — BISMUTH SUBSALICYLATE 524 MG: 262 TABLET, CHEWABLE ORAL at 09:16

## 2021-10-10 RX ADMIN — METFORMIN HYDROCHLORIDE 1000 MG: 500 TABLET, FILM COATED ORAL at 22:39

## 2021-10-10 RX ADMIN — SERTRALINE HYDROCHLORIDE 25 MG: 25 TABLET ORAL at 22:39

## 2021-10-10 ASSESSMENT — ENCOUNTER SYMPTOMS: SHORTNESS OF BREATH: 0

## 2021-10-10 NOTE — ED PROVIDER NOTES
Patient is a 41-year-old female with a history of recurrent ventricular tachycardia, congenital heart disease, diabetes who presents with dysfunctional ICD.  [employer] representative evaluated the patient in the emergency department and felt patient would likely be having a impending lead fracture.  She had lead impedance changes and lead noises.  We spoke with electrophysiology regards the patient and they recommended transfer to [facility] for definitive management of this.  Patient remains to have cardiac pads placed anteriorly and posteriorly and remains on his own monitor.  Patient will require more emergent intervention at this time and does need to be hospitalized for this.  I spoke with Dr. Wells who agreed to transfer and admission at this time.  Patient will be transferred by EMS.  Patient care signed out to my partner  awaiting transfer.      ICD-10-CM    1. Failure of implantable cardioverter-defibrillator (ICD) lead, initial encounter  T82.110A           Bryant Salguero MD  10/10/21 3221

## 2021-10-10 NOTE — ED NOTES
ICD making noise, now maid noise hasn't shocked her. Pt hasn't taken her med's on sat but did on Friday.pt walked in with paramedics pt got Zofran in rig.

## 2021-10-10 NOTE — ED PROVIDER NOTES
"  History     Chief Complaint:  AICD Problem     The history is provided by the patient and the EMS personnel.      Chikis Hannon is a 41 year old female with history of recurent ventricular tachycardia, congenital heart disease, type II diabetes mellitus who presents with concerns regarding her [employer] AICD. She reports that overnight, on three separate occasions, she heard her ICD make a noise and felt a subsequent \"jolt.\" She denies experiencing any chest pain or shortness of breath with this. Chikis was feeling well until these episodes began.    Review of Systems   Respiratory: Negative for shortness of breath.    Cardiovascular: Negative for chest pain.        ICD problem +   All other systems reviewed and are negative.    Allergies:  Adhesive Tape    Medications:  Medroxyprogesterone  Lipitor  Gabapentin  Metformin  Zoloft    Past Medical History:     ADD  Congenital heart disease  Type II diabetes mellitus  Ventricular tachycardia  Obesity  Anxiety  GERD  Transaminase or LDH elevation  Mild cognitive disability    Past Surgical History:    Transposition of major vessels  Cardiac ablation  ICD placement  Heart surgery x3    Social History:  Presents alone.  Presents via EMS.    Physical Exam     Patient Vitals for the past 24 hrs:   BP Temp Temp src Pulse Resp SpO2   10/10/21 0500 -- -- -- 107 10 94 %   10/10/21 0445 (!) 120/99 -- -- 110 12 96 %   10/10/21 0436 132/80 97.9  F (36.6  C) Temporal (!) 123 20 98 %       Physical Exam  General: Sitting up in bed  Eyes:  The pupils are equal and round    Conjunctivae and sclerae are normal  ENT:    Wearing a mask  Neck:  Normal range of motion  CV:  Tachycardic rate, regular rhythm    Skin warm and well perfused     Midline well healed surgical scar  Resp:  Non labored breathing on room air    No tachypnea    No cough heard    Lungs clear bilaterally  MS:  Normal muscular tone  Skin:  No rash or acute skin lesions noted  Neuro:   Awake, alert.      Speech is normal " and fluent.    Face is symmetric.     Moves all extremities equally  Psych:  Appears anxious    Emergency Department Course     ECG  ECG taken at 0439, ECG read at 0444  Sinus tachycardia  Right bundle branch block   Abnormal ECG  No significant change as compared to prior, dated 3/23/2021.  Rate 119 bpm. TN interval 122 ms. QRS duration 146 ms. QT/QTc 364/512 ms. P-R-T axes 59 9 68.     Imaging:  XR Chest Port 1 View   Final Result   IMPRESSION: No acute abnormality.        Report per radiology    Laboratory:  Labs Ordered and Resulted from Time of ED Arrival Up to the Time of Departure from the ED   BASIC METABOLIC PANEL - Abnormal; Notable for the following components:       Result Value    Glucose 216 (*)     All other components within normal limits   CBC WITH PLATELETS AND DIFFERENTIAL - Abnormal; Notable for the following components:    WBC Count 11.3 (*)     Absolute Neutrophils 8.9 (*)     All other components within normal limits   TROPONIN I - Normal   MAGNESIUM - Normal   PERIPHERAL IV CATHETER   CBC WITH PLATELETS & DIFFERENTIAL    Narrative:     The following orders were created for panel order CBC with platelets differential.  Procedure                               Abnormality         Status                     ---------                               -----------         ------                     CBC with platelets and d...[[id number]]  Abnormal            Final result                 Please view results for these tests on the individual orders.      Emergency Department Course:    Reviewed:  I reviewed nursing notes, vitals, past medical history, Care Everywhere and [facility]    Assessments:  0449 I obtained history and examined the patient as noted above.   0627 The [employer] representative arrived and is examining the patient.    Consults:  0511 I spoke with a [employer] employee who stated that the right ventricular lead is oversensing. There have been no episodes of ventricular tachycardia and no  "shocks today. I was told to expect a phone call from a [employer] representative.  3679 I spoke with [employer].  8265 I spoke with the [employer] representative. They are on their way here to the ED from [place].  0657 I spoke with the [employer] representative after she examined the patient. It appears that this is a lead failure which likely needs extraction. I will page out to EP at the [facility].    Disposition:  Care of the patient was transferred to my colleague Dr. Salguero pending a decision regarding her disposition.     Impression & Plan     Medical Decision Making:  Chikis Hannon is a 41 year old female who presented to the ED with AICD problem. Patient with a \"noise\" from AICD. Device interrogated. Device oversensing at RV lead but no shocks or VT episodes today/yesterday. Patient has been well and labs unremarkable. [employer] rep came to ED to evaluate the patient. Noted lead impedance change and lead noise suggestive of impending lead failure/fracture. Device turned off by rep and patient placed on pads/zoll for precautions. Rep notes no new VT episodes since device was placed several years ago. Will need lead extraction likely. Paged EP at [facility] -Spoke to DR. Randhawa at  who recommended admission at [facility] though [facility] [facility] could also do this procedure. Unfortunately there has been no beds at any of these places for >36 hours. If no bed, then could go home with life vest and follow-up with her EP Dr. Ray as outpatient. Maria A contacted to get life vest and waiting on this information. Placed in line for admission at  and waiting on whether or not there will be a bed.     Diagnosis:    ICD-10-CM    1. Failure of implantable cardioverter-defibrillator (ICD) lead, initial encounter  T82.110A      Scribe Disclosure:  PAYTON, Sherlyn Wade, am serving as a scribe at 4:41 AM on 10/10/2021 to document services personally performed by Savannah Deng MD based on my observations and the " provider's statements to me.      Savannah Deng MD  10/10/21 0730       Savannah Deng MD  10/10/21 0888

## 2021-10-10 NOTE — PHARMACY-ADMISSION MEDICATION HISTORY
Admission medication history interview status for this patient is complete. See [facility] admission navigator for allergy information, prior to admission medications and immunization status.     Medication history interview done, indicate source(s): Patient  Medication history resources (including written lists, pill bottles, clinic record): None  Pharmacy: Not ID'd    Changes made to PTA medication list:  Added: None  Changed: Lipitor (40 mg PO at bedtime); Gabapentin (300 mg PO at bedtime); Naproxen (220 mg PO BID PRN Headache Pain); Sertraline (25 mg PO at bedtime)  Reported as Not Taking: Multivitamin  Removed: Multivitamin    Actions taken by pharmacist (provider contacted, etc):None     Additional medication history information:None    Medication reconciliation/reorder completed by provider prior to medication history?  n   (Y/N)     Prior to Admission medications    Medication Sig Last Dose Taking? Auth Provider   amoxicillin (AMOXIL) 500 MG capsule Please take 2,000 mg (4 capsule) 30-60 min prior to all dental procedures Unknown at nothing recent Yes Tammy Benedict MD   atorvastatin (LIPITOR) 40 MG tablet Take 1 tablet (40 mg) by mouth daily  Patient taking differently: Take 40 mg by mouth At Bedtime  10/8/2021 at HS Yes Tammy Benedict MD   gabapentin (NEURONTIN) 300 MG capsule TAKE 1 CAPSULE(300 MG) BY MOUTH EVERY NIGHT 10/8/2021 at HS Yes Tammy Benedict MD   metFORMIN (GLUCOPHAGE) 500 MG tablet Take 2 tablets (1,000 mg) by mouth daily (with dinner) 10/8/2021 at DINNER Yes Tammy Benedict MD   Naproxen Sodium (ALEVE PO) Take 220 mg by mouth 2 times daily as needed for headaches  10/8/2021 at Unknown time Yes Reported, Patient   sertraline (ZOLOFT) 25 MG tablet Take 1 tablet (25 mg) by mouth daily  Patient taking differently: Take 25 mg by mouth At Bedtime  10/8/2021 at HS Yes Tammy Benedict MD   ACE NOT PRESCRIBED, INTENTIONAL, 1 each as needed  for other ACE Inhibitor not prescribed due to Other: BP at goal, pending microalbumin test   Belkis Denis MD   blood glucose (ACCU-CHEK SMARTVIEW) test strip Use to test blood sugar 1 times daily or as directed.   Tammy Benedict MD   blood glucose monitoring (ACCU-CHEK FASTCLIX) lancets Use to test blood sugar 1 times daily or as directed.   Brenda Slaughter APRN CNP

## 2021-10-10 NOTE — ED PROVIDER NOTES
41 yr old F with history of recurrent VT, congenital heart disease who is found to have dysfunctional ICD.  [employer] has evaluated patient and concern for impending lead fracture.  Prior providers have discussed with EP at [facility] who have recommended transfer.  Dr. Wells has accepted patient in transfer. Patient re-evaluated.  She is stable, and has no concerns.  Medication reconciliation has been completed and home meds have been ordered.  Patient is understanding of the plan.  Questions answered and she will be signed out to my partner of the overnight shift.     Dominik Garzon MD  10/10/21 0489

## 2021-10-11 ENCOUNTER — ANCILLARY PROCEDURE (OUTPATIENT)
Dept: CARDIOLOGY | Facility: CLINIC | Age: 41
End: 2021-10-11
Attending: INTERNAL MEDICINE
Payer: MEDICARE

## 2021-10-11 VITALS
DIASTOLIC BLOOD PRESSURE: 58 MMHG | RESPIRATION RATE: 12 BRPM | HEART RATE: 99 BPM | SYSTOLIC BLOOD PRESSURE: 137 MMHG | TEMPERATURE: 97.9 F | OXYGEN SATURATION: 97 %

## 2021-10-11 DIAGNOSIS — Q20.3 D-TGA (DEXTRO-TRANSPOSITION OF GREAT ARTERIES): ICD-10-CM

## 2021-10-11 DIAGNOSIS — Z86.79 HX OF VENTRICULAR TACHYCARDIA: ICD-10-CM

## 2021-10-11 LAB
ATRIAL RATE - MUSE: 119 BPM
DIASTOLIC BLOOD PRESSURE - MUSE: NORMAL MMHG
INTERPRETATION ECG - MUSE: NORMAL
MDC_IDC_LEAD_IMPLANT_DT: NORMAL
MDC_IDC_LEAD_LOCATION: NORMAL
MDC_IDC_LEAD_LOCATION_DETAIL_1: NORMAL
MDC_IDC_LEAD_MFG: NORMAL
MDC_IDC_LEAD_MODEL: NORMAL
MDC_IDC_LEAD_POLARITY_TYPE: NORMAL
MDC_IDC_LEAD_SERIAL: NORMAL
MDC_IDC_MSMT_BATTERY_DTM: NORMAL
MDC_IDC_MSMT_BATTERY_REMAINING_LONGEVITY: 68 MO
MDC_IDC_MSMT_BATTERY_RRT_TRIGGER: 2.73
MDC_IDC_MSMT_BATTERY_STATUS: NORMAL
MDC_IDC_MSMT_BATTERY_VOLTAGE: 2.94 V
MDC_IDC_MSMT_CAP_CHARGE_DTM: NORMAL
MDC_IDC_MSMT_CAP_CHARGE_ENERGY: 18 J
MDC_IDC_MSMT_CAP_CHARGE_TIME: 3.82
MDC_IDC_MSMT_CAP_CHARGE_TYPE: NORMAL
MDC_IDC_MSMT_LEADCHNL_RV_IMPEDANCE_VALUE: 304 OHM
MDC_IDC_MSMT_LEADCHNL_RV_IMPEDANCE_VALUE: 380 OHM
MDC_IDC_MSMT_LEADCHNL_RV_PACING_THRESHOLD_AMPLITUDE: 0.75 V
MDC_IDC_MSMT_LEADCHNL_RV_PACING_THRESHOLD_PULSEWIDTH: 0.4 MS
MDC_IDC_MSMT_LEADCHNL_RV_SENSING_INTR_AMPL: 9.4 MV
MDC_IDC_PG_IMPLANT_DTM: NORMAL
MDC_IDC_PG_MFG: NORMAL
MDC_IDC_PG_MODEL: NORMAL
MDC_IDC_PG_SERIAL: NORMAL
MDC_IDC_PG_TYPE: NORMAL
MDC_IDC_SESS_CLINIC_NAME: NORMAL
MDC_IDC_SESS_DTM: NORMAL
MDC_IDC_SESS_TYPE: NORMAL
MDC_IDC_SET_BRADY_HYSTRATE: NORMAL
MDC_IDC_SET_BRADY_LOWRATE: 40 {BEATS}/MIN
MDC_IDC_SET_BRADY_MODE: NORMAL
MDC_IDC_SET_LEADCHNL_RV_PACING_AMPLITUDE: 2 V
MDC_IDC_SET_LEADCHNL_RV_PACING_ANODE_ELECTRODE_1: NORMAL
MDC_IDC_SET_LEADCHNL_RV_PACING_ANODE_LOCATION_1: NORMAL
MDC_IDC_SET_LEADCHNL_RV_PACING_CAPTURE_MODE: NORMAL
MDC_IDC_SET_LEADCHNL_RV_PACING_CATHODE_ELECTRODE_1: NORMAL
MDC_IDC_SET_LEADCHNL_RV_PACING_CATHODE_LOCATION_1: NORMAL
MDC_IDC_SET_LEADCHNL_RV_PACING_POLARITY: NORMAL
MDC_IDC_SET_LEADCHNL_RV_PACING_PULSEWIDTH: 0.4 MS
MDC_IDC_SET_LEADCHNL_RV_SENSING_ANODE_ELECTRODE_1: NORMAL
MDC_IDC_SET_LEADCHNL_RV_SENSING_ANODE_LOCATION_1: NORMAL
MDC_IDC_SET_LEADCHNL_RV_SENSING_CATHODE_ELECTRODE_1: NORMAL
MDC_IDC_SET_LEADCHNL_RV_SENSING_CATHODE_LOCATION_1: NORMAL
MDC_IDC_SET_LEADCHNL_RV_SENSING_POLARITY: NORMAL
MDC_IDC_SET_LEADCHNL_RV_SENSING_SENSITIVITY: 0.3 MV
MDC_IDC_SET_ZONE_DETECTION_BEATS_DENOMINATOR: 40 {BEATS}
MDC_IDC_SET_ZONE_DETECTION_BEATS_NUMERATOR: 30 {BEATS}
MDC_IDC_SET_ZONE_DETECTION_INTERVAL: 270 MS
MDC_IDC_SET_ZONE_DETECTION_INTERVAL: 360 MS
MDC_IDC_SET_ZONE_DETECTION_INTERVAL: 370 MS
MDC_IDC_SET_ZONE_DETECTION_INTERVAL: NORMAL
MDC_IDC_SET_ZONE_TYPE: NORMAL
MDC_IDC_STAT_BRADY_DTM_END: NORMAL
MDC_IDC_STAT_BRADY_DTM_START: NORMAL
MDC_IDC_STAT_BRADY_RV_PERCENT_PACED: 0.01 %
MDC_IDC_STAT_EPISODE_RECENT_COUNT: 0
MDC_IDC_STAT_EPISODE_RECENT_COUNT_DTM_END: NORMAL
MDC_IDC_STAT_EPISODE_RECENT_COUNT_DTM_START: NORMAL
MDC_IDC_STAT_EPISODE_TOTAL_COUNT: 0
MDC_IDC_STAT_EPISODE_TOTAL_COUNT: 1
MDC_IDC_STAT_EPISODE_TOTAL_COUNT: 17
MDC_IDC_STAT_EPISODE_TOTAL_COUNT: 234
MDC_IDC_STAT_EPISODE_TOTAL_COUNT_DTM_END: NORMAL
MDC_IDC_STAT_EPISODE_TOTAL_COUNT_DTM_START: NORMAL
MDC_IDC_STAT_EPISODE_TYPE: NORMAL
MDC_IDC_STAT_TACHYTHERAPY_ATP_DELIVERED_RECENT: 0
MDC_IDC_STAT_TACHYTHERAPY_ATP_DELIVERED_TOTAL: 0
MDC_IDC_STAT_TACHYTHERAPY_RECENT_DTM_END: NORMAL
MDC_IDC_STAT_TACHYTHERAPY_RECENT_DTM_START: NORMAL
MDC_IDC_STAT_TACHYTHERAPY_SHOCKS_ABORTED_RECENT: 0
MDC_IDC_STAT_TACHYTHERAPY_SHOCKS_ABORTED_TOTAL: 0
MDC_IDC_STAT_TACHYTHERAPY_SHOCKS_DELIVERED_RECENT: 0
MDC_IDC_STAT_TACHYTHERAPY_SHOCKS_DELIVERED_TOTAL: 1
MDC_IDC_STAT_TACHYTHERAPY_TOTAL_DTM_END: NORMAL
MDC_IDC_STAT_TACHYTHERAPY_TOTAL_DTM_START: NORMAL
P AXIS - MUSE: 59 DEGREES
PR INTERVAL - MUSE: 122 MS
QRS DURATION - MUSE: 146 MS
QT - MUSE: 364 MS
QTC - MUSE: 512 MS
R AXIS - MUSE: 9 DEGREES
SYSTOLIC BLOOD PRESSURE - MUSE: NORMAL MMHG
T AXIS - MUSE: 68 DEGREES
VENTRICULAR RATE- MUSE: 119 BPM

## 2021-10-11 PROCEDURE — 93282 PRGRMG EVAL IMPLANTABLE DFB: CPT | Performed by: INTERNAL MEDICINE

## 2021-10-11 PROCEDURE — 250N000013 HC RX MED GY IP 250 OP 250 PS 637: Performed by: EMERGENCY MEDICINE

## 2021-10-11 RX ORDER — ACETAMINOPHEN 325 MG/1
650 TABLET ORAL EVERY 4 HOURS PRN
Status: DISCONTINUED | OUTPATIENT
Start: 2021-10-11 | End: 2021-10-11 | Stop reason: HOSPADM

## 2021-10-11 RX ADMIN — ACETAMINOPHEN 650 MG: 325 TABLET, FILM COATED ORAL at 04:29

## 2021-10-11 NOTE — ED PROVIDER NOTES
Patient signed out to me pending transfer to [facility]    No beds overnight         Savannah Deng MD  10/11/21 0718

## 2021-10-11 NOTE — PATIENT INSTRUCTIONS
It was a pleasure to see you in clinic today. Please do not hesitate to call with any questions or concerns. You are scheduled for a remote ICD transmission on 1/12/22. This will happen automatically in the night. We look forward to seeing you in clinic at your next device check in 6 months.    KAIDEN PettyN, RN, PHN  Electrophysiology Nurse Specialist   [facility]  During business hours call:  [phone]  After business hours please call: [phone]- select option #4 and ask for job code [phone].

## 2021-10-11 NOTE — ED PROVIDER NOTES
Patient signed out to me by Dr. Deng.  Please see initial provider note for full details.  In brief, patient presents with ICD concern.  [employer] evaluated patient and has concern for impending lead fracture.  She is stable, awaiting transfer to the [facility] ER.  ED has spoken with inpatient team at the [facility] for transfer.  No acute events occurred overnight.    1:33 PM - I spoke with EP physician, Dr. Ray.  He reviewed the interrogated device and believes problem can be solved by device reprogramming which can be done in clinic today.  Hx of VT that was ablated.  Low risk for arrhythmia.  Recommends clinic follow up for device reprogramming today and can be discharged from ER.  He will see her this week, possibly Wednesday.  Does not recommend Life Vest or turning on device in ER which will be completed at the clinic today.    I discussed this plan with the patient who is in agreement.  She is already received phone call from the clinic and will had there from the ER.  She otherwise has felt well during her ER stay with no concerns or complaints.  All questions answered prior to discharge.    Jose F Ojeda MD  Emergency Medicine     Lynette, Jose F Swan MD  10/11/21 4311

## 2021-10-11 NOTE — ED PROVIDER NOTES
History   Chief Complaint:  ICD problem       HPI   Chikis Hannon is a 41 year old female with history of *** who presents with ***.    Review of Systems  ***    Allergies:  Adhesive Tape    Medications:  Lipitor  Gabapentin  Metformin  Aleve  Zoloft  Glucose    Past Medical History:     ADD  Congenital heart disease  Type 2 diabetes  Ventricular tachycardia  Corrected transposition of great vessels  Obesity  Anxiety  Depression  Mildly mentally retarded    Past Surgical History:    Cardiac surgery  Ablation for ventricular tachycardia, ICD placed  Open heart surgery x 3, transposition of major vessels  Additional heart surgery    Family History:    The patient denies past family history.     Social History:  The patient presents ***. She is a never smoker.    Physical Exam     Patient Vitals for the past 24 hrs:   BP Pulse Resp SpO2   10/11/21 0330 -- 91 15 95 %   10/11/21 0315 -- 92 16 94 %   10/11/21 0300 -- 92 15 95 %   10/11/21 0245 -- 88 15 95 %   10/11/21 0230 -- 97 14 95 %   10/11/21 0215 -- 93 16 93 %   10/10/21 2300 -- 96 21 96 %   10/10/21 2253 -- 99 25 95 %   10/10/21 2251 110/60 98 19 96 %   10/10/21 2215 -- 96 16 95 %   10/10/21 2200 112/64 96 23 94 %   10/10/21 2145 -- 93 18 94 %   10/10/21 2130 114/64 91 20 94 %   10/10/21 2100 115/59 92 20 95 %   10/10/21 2000 119/69 95 25 96 %   10/10/21 1800 116/67 99 -- 95 %   10/10/21 1700 124/72 96 17 98 %   10/10/21 1600 114/71 96 22 96 %   10/10/21 1500 106/78 93 18 95 %   10/10/21 1400 118/56 93 18 94 %   10/10/21 1300 119/65 95 24 95 %   10/10/21 1200 111/63 96 17 95 %   10/10/21 1100 109/65 98 19 93 %   10/10/21 1000 126/72 101 25 95 %       Physical Exam  ***    Emergency Department Course   ECG  ECG obtained at ***, ECG read at ***  ***   *** as compared to prior, dated ***/***/***.  Rate *** bpm. NV interval *** ms. QRS duration *** ms. QT/QTc *** ms. P-R-T axes *** *** ***.     Imaging:  XR Chest Port 1 View   Final Result   IMPRESSION: No acute  abnormality.        Report per {read:058929}    Laboratory:  Labs Ordered and Resulted from Time of ED Arrival Up to the Time of Departure from the ED   BASIC METABOLIC PANEL - Abnormal; Notable for the following components:       Result Value    Glucose 216 (*)     All other components within normal limits   CBC WITH PLATELETS AND DIFFERENTIAL - Abnormal; Notable for the following components:    WBC Count 11.3 (*)     Absolute Neutrophils 8.9 (*)     All other components within normal limits   TROPONIN I - Normal   MAGNESIUM - Normal   COVID-19 VIRUS (CORONAVIRUS) BY PCR - Normal    Narrative:     Testing was performed using the sophia  SARS-CoV-2 & Influenza A/B Assay on the sophia  Jennifer  System.  This test should be ordered for the detection of SARS-COV-2 in individuals who meet SARS-CoV-2 clinical and/or epidemiological criteria. Test performance is unknown in asymptomatic patients.  This test is for in vitro diagnostic use under the FDA EUA for laboratories certified under CLIA to perform moderate and/or high complexity testing. This test has not been FDA cleared or approved.  A negative test does not rule out the presence of PCR inhibitors in the specimen or target RNA in concentration below the limit of detection for the assay. The possibility of a false negative should be considered if the patient's recent exposure or clinical presentation suggests COVID-19.  [facility] are certified under the Clinical Laboratory Improvement Amendments of 1988 (CLIA-88) as qualified to perform moderate and/or high complexity laboratory testing.   PERIPHERAL IV CATHETER   VITAL SIGNS   PATIENT CARE ORDER   NOTIFY PROVIDER   MEASURE WEIGHT   CBC WITH PLATELETS & DIFFERENTIAL    Narrative:     The following orders were created for panel order CBC with platelets differential.                  Procedure                               Abnormality         Status                                     ---------          "                      -----------         ------                                     CBC with platelets and d...[[id number]]  Abnormal            Final result                                                 Please view results for these tests on the individual orders.        Procedures  ***    Emergency Department Course:  Reviewed:  I reviewed {EDreview:269997}    Assessments:  *** I obtained history and examined the patient as noted above.   *** I rechecked the patient*** and explained findings***.   ***    Consults:  ***    Interventions:  ***    Disposition:  {EPPAFV Dispo:813124}    Impression & Plan     CMS Diagnoses: {Sepsis/Septic Shock/Stemi/Stroke:678403::\"None\"}  {FVTrauma:613693}    Medical Decision Making:  ***     Critical Care Time: was *** minutes for this patient excluding procedures    Diagnosis:    ICD-10-CM    1. Failure of implantable cardioverter-defibrillator (ICD) lead, initial encounter  T82.110A        Discharge Medications:  New Prescriptions    No medications on file       Scribe Disclosure:  Jolly CAIN, am serving as a scribe at 7:52 AM on 10/11/2021 to document services personally performed by Jose F Ojeda MD*** based on my observations and the provider's statements to me.     ***      "

## 2021-10-13 ENCOUNTER — OFFICE VISIT (OUTPATIENT)
Dept: CARDIOLOGY | Facility: CLINIC | Age: 41
End: 2021-10-13
Attending: INTERNAL MEDICINE
Payer: MEDICARE

## 2021-10-13 VITALS
DIASTOLIC BLOOD PRESSURE: 76 MMHG | HEART RATE: 107 BPM | WEIGHT: 193.9 LBS | BODY MASS INDEX: 35.68 KG/M2 | OXYGEN SATURATION: 94 % | SYSTOLIC BLOOD PRESSURE: 121 MMHG | HEIGHT: 62 IN

## 2021-10-13 DIAGNOSIS — Z95.810 AUTOMATIC IMPLANTABLE CARDIOVERTER-DEFIBRILLATOR IN SITU: ICD-10-CM

## 2021-10-13 DIAGNOSIS — Q20.3 D-TGA (DEXTRO-TRANSPOSITION OF GREAT ARTERIES): Primary | ICD-10-CM

## 2021-10-13 DIAGNOSIS — I47.20 VENTRICULAR TACHYCARDIA (H): ICD-10-CM

## 2021-10-13 PROCEDURE — 99215 OFFICE O/P EST HI 40 MIN: CPT | Performed by: INTERNAL MEDICINE

## 2021-10-13 PROCEDURE — G0463 HOSPITAL OUTPT CLINIC VISIT: HCPCS

## 2021-10-13 ASSESSMENT — MIFFLIN-ST. JEOR: SCORE: 1491.02

## 2021-10-13 ASSESSMENT — PAIN SCALES - GENERAL: PAINLEVEL: NO PAIN (0)

## 2021-10-13 NOTE — PATIENT INSTRUCTIONS
You were seen today in the Adult Congenital and Cardiovascular Genetics Clinic at the [facility].     Cardiology Providers you saw during your visit:  Vimal Ray MD     Diagnosis:  TGA     Results:  Vimal Ray MD reviewed the results of your device testing today in clinic.     Recommendations:    1. Continue to eat a heart healthy, low salt diet.  2. Continue to get 20-30 minutes of aerobic activity, 4-5 days per week.  Examples of aerobic activity include walking, running, swimming, cycling, etc.  3. Continue to observe good oral hygiene, with regular dental visits.  4. No changes today.     SBE prophylaxis:      Yes__X__  No____     Lifelong Bacterial Endocarditis Prophylaxis:  YES____  NO____     If YES is checked, follow the recommendations outlined below:  1. Take antibiotic(s) prior to recommended dental procedures and procedures on the respiratory tract or with infected skin, muscle or bones. SBE prophylaxis is not needed for routine GI and  procedures (ie. Colonoscopy or vaginal delivery)  2. Observe good oral hygiene daily, as advised by your dentist. Get regular professional dental care.  3. Keep cuts clean.  4. Infections should be treated promptly.  5. Symptoms of Infective Endocarditis could include: fever lasting more than 4-5 days or a recurrent fever that initially resolves but returns within 1-2 days)        Exercise restrictions:           Yes__X__  No____         If yes, list restrictions:  Must be allowed to rest if fatigued or SOB        Work restrictions:     Yes____  No_X___         If yes, list restrictions:     FASTING CHOLESTEROL was checked in the last 5 years YES_X__  NO___ (2019)  Continue to eat a heart healthy, low salt diet.         ____ Fasting lipid panel order today         ____ No changes in medications          ____ I recommend the following changes in your cholesterol medications.         ____ Please follow up for cholesterol screening at your primary care  physician         Follow-up:  as previously ordered: follow-up with Dr Ray & Dr Valle in 6 months.     If you have questions or concerns please contact us at:     Chasity Wild, MSN, RN, CNL                                  Nora Collins (Scheduling)  Nurse Care Coordinator                                             Clinic   Adult Congenital and CV Genetics                             Adult Congenital and CV Genetic  [facility]                           [facility]  (P) [phone]                                                        (P) [phone]  [email or web address]                           (F) [phone]                                                For after hours urgent needs, call [phone] and ask to speak to the Adult Congenital Physician on call.  Mention Job Code 0401.     For emergencies call [phone].     [facility]   Clinics and Surgery Center  Mail Code [id number]  [address] [place]  [address]

## 2021-10-13 NOTE — NURSING NOTE
Chief Complaint   Patient presents with     Follow Up     ump return ep genetics     Vitals were taken and medications reconciled.    Harrison Mclaughlin, EMT  11:58 AM

## 2021-10-13 NOTE — LETTER
10/13/2021      RE: Chikis Hannon  [address]  [facility] [id number]       Dear Colleague,    Thank you for the opportunity to participate in the care of your patient, Chikis Hannon, at the [facility] [place] [facility]. Please see a copy of my visit note below.        [facility] ELECTROPHYSIOLOGY CLINIC VISIT    Assessment/Recommendations   Assessment/Plan:    Ms. 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 She presents today for follow up. She recently had a ICD alarm that prompted her to go in for evaluation. Investigation showed this was a lead integrity alert. Red Alert for Lead integrity was triggered secondary to increase in RV impedance >75% and multiple short R-R intervals. Max impedance was approximately 1000 ohms.  She reports feeling well. She denies chest discomfort, palpitations, abdominal fullness/bloating or peripheral edema, shortness of breath, paroxysmal nocturnal dyspnea, orthopnea, lightheadedness, dizziness, pre-syncope, or syncope. No current cardiac medications.     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 Normal systemic ventricular function  2. No further VT episodes for years, off AAT  3. Review of impedance trend shows stable RV tip to RV coil impedance. Programming changes: RV pace polarity and RV sense polarity to Tip-Coil. Pacing impedance alert turned off and lead integrity alert left on.  Lead integrity alert looks at programmed vector and pacing impedance alert looks at all vectors.  This will provide an alert if there is change in impedance tip-coil. We discussed that if further lead deterioration or integrity alerts despite this programming, then we would need to replace lead. She will continue to follow with device clinic per routine and has home monitoring active.     Follow up 6 months.        History of Present Illness/Subjective    Ms. Chikis Hannon is a 41 year old female who comes in today for EP follow-up of ICD lead integrity alert.    Ms. 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     She presented in 3/2014 after having sustained symptomatic VT She was discharged on sotalol 120 mg twice a day. She was seen in clinic on 4/22/14 and her device interrogation showed no recurrence of  ventricular arrhythmias. She reported some mild fatigue on Sotalol and her dose was decreased to 80 mg BID. At her July 2014 follow up appointment she only had one 4 second NSVT at 160 bpm which was asymptomatic. At her clinic visit in 2/15 she again had no ventricular arrhythmias and her Sotalol was stopped. After recurrence of VT in June 2015, the patient was restarted on sotalol 80 mg twice a day. We re-attempted VT ablation after holding sotalol for one week on 8/24/2015 with no inducible VT.  [facility] She presents today for follow-up.  Her device interrogation shows no ventricular arrhythmias.  Her device site has healed well. She denies any syncope or presyncope, no chest pain or shortness of breath on exertion at rest, no orthopnea, no lower extremity edema. Her EKG shows normal sinus rhythm with RBBB,  ms.  [facility] EP Visit 9/23/17: She presents today for follow up. She reports feeling well. She denies any chest pain, dizziness, lightheadedness, shortness of breath, palpitations, leg/ankle swelling, PND, orthopnea, or syncopal symptoms. She has started exercise classes and is increasing her activity levels. Device interrogation shows normal device function.  17 episodes recorded. 16 VT zone monitored events when she was exercising EGMS shows ST with rates of 168-172 BPM.  1 NSVT for 4 beats at a rate of 211 for 2 second duration.  Intrinsic rhythm = SR @ 90 bpm. = <0.1%. OptiVol fluid index is at baseline. No short v-v intervals recorded.  RV lead impedance trends appear stable. Presenting 12 lead ECG shows SR with RBBB Vent Rate 84 bpm,  ms,  ms, QTc 505 ms. Current cardiac medications include: Sotalol.   Ep Visit 9/2017: She presents today for follow up. She reports feeling well. She denies any chest pain/pressures, dizziness, lightheadedness, shortness of breath, leg/ankle swelling, PND, orthopnea, palpitations, or syncopal symptoms. She continues to exercise and is  without limitations. Device interrogation shows normal device function.  2 NSVT episodes recorded, lasting 1-2 seconds. 8 episodes recorded as VT, these EGMs are consistent with ST.  <0.1%. No short v-v intervals recorded. Lead trends appear stable. Presenting 12 lead ECG shows NSR with RBBB Vent Rate 85 bpm,  ms,  ms, QTc 504 ms. Current cardiac medications include: Sotalol.      Ep Visit 2/20/20: She presents today for follow up. She reports feeling well. She states she has not been taking her Sotalol for the last many months. She denies any chest pain/pressures, dizziness, lightheadedness, worsening shortness of breath, leg/ankle swelling, PND, orthopnea, palpitations, or syncopal symptoms. Device interrogation shows  normal ICD function.  Since last remote transmission on 10/29/19, there have been 5 SVT-Wavelet, 7 monitored VT and 2 NSVT episodes recorded.  The SVT episodes last 5-30 seconds at 167-176 bpm, occur with activity and appear to be ST.  The monitored VT episodes last 5-56 seconds at rates of 167-171 bpm.  These also occur with activity and appear  to be ST with several occurring on 11/27/19.  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  The NSVT episodes last 1 second in duration @ 231-226 bpm.   Intrinsic rhythm = SR @ 80 bpm.    =<0.1%. No short v-v intervals recorded.  Lead trends appear stable.  Echo today is stable with normal systemic function. No current cardiac medications.      EP Visit 3/26/21: She presents today for follow up. She reports feeling well. She denies any chest pain/pressures, dizziness, lightheadedness, worsening shortness of breath, leg/ankle swelling, PND, orthopnea, palpitations, or syncopal symptoms. Recent echo showed normal RV size/function, mildly dialted LV with  LVEF 47%, with no significant changes from prior echo. Device interrogation shows normal device function, stable lead  parameters,  <0.1%, 46 VT and 2 NSVT all EGMs suggest ST and not true arrhythmia. No current cardiac medications.     She presents today for follow up. She recently had a ICD alarm that prompted her to go in for evaluation. Investigation showed this was a lead integrity alert. Red Alert for Lead integrity was triggered secondary to increase in RV impedance >75% and multiple short R-R intervals. Max impedance was approximately 1000 ohms.  Review of impedance trend shows stable RV tip to RV coil impedance. Tech services recommends programming RV pace polarity and RV sense polarity to Tip-Coil.  They recommend turning off pacing impedance alert and leaving lead integrity alert on.  Lead integrity alert looks at programmed vector and pacing impedance alert looks at all vectors.  This will provide an alert if there is change in impedance tip-coil. She was brought into clinic and these changes were made. She reports feeling well. She denies chest discomfort, palpitations, abdominal fullness/bloating or peripheral edema, shortness of breath, paroxysmal nocturnal dyspnea, orthopnea, lightheadedness, dizziness, pre-syncope, or syncope. No current cardiac medications.       I have reviewed and updated the patient's Past Medical History, Social History, Family History and Medication List.     Cardiographics (Personally Reviewed) :   LAST CARDIAC MRA:  3/6/14  FINDINGS:   SITUS: There is a normal spleen in the left upper quadrant. There is situs solitus in the chest, as demonstrated by a normal airway pulmonary artery relationship bilaterally.   CAVAE: Single right-sided inferior and superior vena cavae drain normally into the right atrium unobstructed.   PULMONARY VEINS: Two right and two left pulmonary veins drain into the left atrium unobstructed.   ATRIA: There is no interatrial communication demonstrated. The atrial sizes are normal.   ATRIOVENTRICULAR CONNECTION: Concordant. Separate mitral and tricuspid valves without  evidence of significant regurgitation or stenosis.   VENTRICLES: D-loop ventricles with levocardia. Ventricles are normal in size and contraction. Right ventricular end-diastolic volume is upper limits of normal for BSA. If utilizing an ideal body weight, this would be mildly enlarged. No residual interventricular communication is demonstrated. No myocardial hyperenhancement identified on delayed post gadolinium imaging, except at the ventricular insertion points.   VENTRICULOARTERIAL CONNECTION: Concordant. Trileaflet aortic valve without regurgitation or stenosis. The oversewn native pulmonary artery is directly posterior to the aorta. There is now an RV to PA conduit. Mild stenosis of the RVOT conduit with moderate regurgitation, 31% by direct flow measurement, and 33% by ventricular stroke volume comparison.   AORTA AND SUPRA-AORTIC VESSELS: A left-sided aortic arch is demonstrated with normal cervical branching pattern. No evidence of patent ductus arteriosus, coarctation, or aortopulmonary collateral arteries. The coronary arteries are not well visualized in this study.   PULMONARY ARTERY: The RV to PA conduit is patent with normal pulmonary artery branching pattern.   LAST ECHO:  2/2020  ------CONCLUSIONS------  Technically difficult study due to poor acoustic windows. D-TGA status post  Rastelli and subsequent placement of a bio-prosthetic valve in the pulmonary  position. There is no obvious residual ventricular level shunt. Mild (1+)  aortic valve insufficiency. Unable to adequately visualize the RV-PA conduit  or the bio-prosthetic valve; however, normal antegrade flow is visualized in  the branch pulmonary arteries. A transvenous pacemaker lead is seen in the  right ventricle, with the tip of the lead implanted into the right ventricular  apex. Trivial tricuspid valve insufficiency. Estimated right ventricular  systolic pressure is 26 mmHg plus right atrial pressure. The left and right  ventricles have  "normal chamber size, wall thickness, and systolic function.  The calculated biplane left ventricular ejection fraction is 57%. No  pericardial effusion.  No significant change from last echocardiogram.  7/25/19 CTA:  IMPRESSION:  1.  D-transposition of aorta s/p Rastelli with bioprosthetic pulmonic  valve  2.  Mildly reduced left ventricular function with LV EF 52%.  3.  Normal RV size and function with RV EF 55%.  4.  RV-PA valved conduit without visual evidence of stenosis.  5.  Mild pulmonic regurgitation (<30 mL) using biventricular stroke  volumes.  6.  No significant change from CT scan in 2014.  7.  Please review radiology review for incidental non-cardiovascular  findings to follow separately     3/23/21 ECHO:  ##### CONCLUSIONS #####  Technically difficult study due to poor acoustic windows. D-TGA status-post  Rastelli and subsequent placement of a bio-prosthetic valve in the pulmonary  position.     Trivial tricuspid valve regurgitation; estimated right ventricular systolic  pressure 26mmHg plus right atrial pressure. Normal right ventricular size and  systolic function. Unable to adequately visualize the RV-PA conduit or the  bio-prosthetic valve; normal antegrade flow profile at the valve annulus; no  obvious regurgitation. Mildly dilated left ventricle with normal wall  thickness and mildly-depressed systolic function; biplane LV EF 47%. A  transvenous pacemaker lead is seen in the right ventricle, with the tip of the  lead implanted into the right ventricular apex. No effusion.     No significant change from last echocardiogram.       Physical Examination   /76 (BP Location: Left arm, Patient Position: Chair, Cuff Size: Adult Large)   Pulse 107   Ht 1.58 m (5' 2.21\")   Wt 88 kg (193 lb 14.4 oz)   SpO2 94%   BMI 35.23 kg/m    Wt Readings from Last 3 Encounters:   04/19/21 90.7 kg (199 lb 14.4 oz)   03/26/21 91.6 kg (202 lb)   03/23/21 92.5 kg (204 lb)     General Appearance:   Alert, " well-appearing and in no acute distress.   HEENT: Atraumatic, normocephalic. PERRL.  MMM.   Chest/Lungs:   Respirations unlabored.  Lungs are clear to auscultation.   Cardiovascular:   Regular rate and rhythm. III/VI KRISTI   Abdomen:  Soft, nontender, nondistended.   Extremities: No cyanosis or clubbing. No edema.    Musculoskeletal: Moves all extremities.  Gait normal.   Skin: Warm, dry, intact.    Neurologic: Mood and affect are appropriate.  Alert and oriented to person, place, time, and situation.          Medications  Allergies   Current Outpatient Medications   Medication Sig Dispense Refill     ACE NOT PRESCRIBED, INTENTIONAL, 1 each as needed for other ACE Inhibitor not prescribed due to Other: BP at goal, pending microalbumin test 0 each 0     amoxicillin (AMOXIL) 500 MG capsule Please take 2,000 mg (4 capsule) 30-60 min prior to all dental procedures 4 capsule 3     atorvastatin (LIPITOR) 40 MG tablet Take 1 tablet (40 mg) by mouth daily (Patient taking differently: Take 40 mg by mouth At Bedtime ) 90 tablet 3     blood glucose (ACCU-CHEK SMARTVIEW) test strip Use to test blood sugar 1 times daily or as directed. 100 strip 1     blood glucose monitoring (ACCU-CHEK FASTCLIX) lancets Use to test blood sugar 1 times daily or as directed. 1 Box 3     gabapentin (NEURONTIN) 300 MG capsule TAKE 1 CAPSULE(300 MG) BY MOUTH EVERY NIGHT 90 capsule 1     metFORMIN (GLUCOPHAGE) 500 MG tablet Take 2 tablets (1,000 mg) by mouth daily (with dinner) 180 tablet 1     Naproxen Sodium (ALEVE PO) Take 220 mg by mouth 2 times daily as needed for headaches        sertraline (ZOLOFT) 25 MG tablet Take 1 tablet (25 mg) by mouth daily (Patient taking differently: Take 25 mg by mouth At Bedtime ) 90 tablet 1    Allergies   Allergen Reactions     Adhesive Tape Rash         Lab Results (Personally Reviewed)    Chemistry/lipid CBC Cardiac Enzymes/BNP/TSH/INR   Lab Results   Component Value Date    BUN 10 10/10/2021     10/10/2021     CO2 25 10/10/2021     Creatinine   Date Value Ref Range Status   10/10/2021 0.60 0.52 - 1.04 mg/dL Final   03/23/2021 0.61 0.52 - 1.04 mg/dL Final       Lab Results   Component Value Date    CHOL 205 (H) 08/06/2020    HDL 44 (L) 08/06/2020     (H) 08/06/2020      Lab Results   Component Value Date    WBC 11.3 (H) 10/10/2021    HGB 13.4 10/10/2021    HCT 41.6 10/10/2021    MCV 88 10/10/2021     10/10/2021    Lab Results   Component Value Date    TSH 2.88 02/11/2021    INR 1.13 06/01/2015        The patient states understanding and is agreeable with the plan.   This patient was seen and examined with Dr. Ray. The above note reflects our joint assessment and plan.   KIMMIE Blevins CNP  Electrophysiology Consult Service  Pager: [phone]      EP STAFF NOTE  I have seen and examined the patient as part of a shared visit with LEO Blevins NP.  I agree with the note above. I reviewed today's vital signs and medications. I have reviewed and discussed with the advanced practice provider their physical examination, assessment, and plan   Briefly, lead integrity alert, high voltage circuit ok, ring of lead shows increase impedance and noise  My key history/exam findings are: RRR.   The key management decisions made by me: reprogrammed the device to exclude the ring, discussed in detail the pros and cons of extracting now with replacement of the lead versus learning the lead in and replacement. After detailed discussion, we decided to keep the current programming and do close follow-up to replace it in a timely manner if it breaks, knowing the risks too..    Vimal Ray MD [facility]  Cardiology - Electrophysiology

## 2021-10-13 NOTE — PROGRESS NOTES
[facility]    Assessment/Recommendations   Assessment/Plan:    Ms. 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 She presents today for follow up. She recently had a ICD alarm that prompted her to go in for evaluation. Investigation showed this was a lead integrity alert. Red Alert for Lead integrity was triggered secondary to increase in RV impedance >75% and multiple short R-R intervals. Max impedance was approximately 1000 ohms.  She reports feeling well. She denies chest discomfort, palpitations, abdominal fullness/bloating or peripheral edema, shortness of breath, paroxysmal nocturnal dyspnea, orthopnea, lightheadedness, dizziness, pre-syncope, or syncope. No current cardiac medications.     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 Normal systemic ventricular function  2. No further VT episodes for years, off AAT  3. Review of impedance trend shows stable RV tip to RV coil impedance.  Programming changes: RV pace polarity and RV sense polarity to Tip-Coil. Pacing impedance alert turned off and lead integrity alert left on.  Lead integrity alert looks at programmed vector and pacing impedance alert looks at all vectors.  This will provide an alert if there is change in impedance tip-coil. We discussed that if further lead deterioration or integrity alerts despite this programming, then we would need to replace lead. She will continue to follow with device clinic per routine and has home monitoring active.     Follow up 6 months.        History of Present Illness/Subjective    Ms. Chikis Hannon is a 41 year old female who comes in today for EP follow-up of ICD lead integrity alert.    Ms. 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     She presented in 3/2014 after having sustained symptomatic VT She was discharged on sotalol 120 mg twice a day. She was seen in clinic on 4/22/14 and her device interrogation showed no recurrence of ventricular arrhythmias. She reported some mild fatigue on Sotalol and her dose was decreased to 80 mg BID. At her July 2014 follow up appointment she only had one 4 second NSVT at 160 bpm which was asymptomatic. At her clinic visit in 2/15 she again had no ventricular arrhythmias and her Sotalol was stopped. After recurrence of VT in June 2015, the  patient was restarted on sotalol 80 mg twice a day. We re-attempted VT ablation after holding sotalol for one week on 8/24/2015 with no inducible VT.  [facility] EP Follow up 9/22/15: She presents today for follow-up.  Her device interrogation shows no ventricular arrhythmias.  Her device site has healed well. She denies any syncope or presyncope, no chest pain or shortness of breath on exertion at rest, no orthopnea, no lower extremity edema. Her EKG shows normal sinus rhythm with RBBB,  ms.  [facility] EP Visit 9/23/17: She presents today for follow up. She reports feeling well. She denies any chest pain, dizziness, lightheadedness, shortness of breath, palpitations, leg/ankle swelling, PND, orthopnea, or syncopal symptoms. She has started exercise classes and is increasing her activity levels. Device interrogation shows normal device function.  17 episodes recorded. 16 VT zone monitored events when she was exercising EGMS shows ST with rates of 168-172 BPM.  1 NSVT for 4 beats at a rate of 211 for 2 second duration.  Intrinsic rhythm = SR @ 90 bpm. = <0.1%. OptiVol fluid index is at baseline. No short v-v intervals recorded.  RV lead impedance trends appear stable. Presenting 12 lead ECG shows SR with RBBB Vent Rate 84 bpm,  ms,  ms, QTc 505 ms. Current cardiac medications include: Sotalol.   Ep Visit 9/2017: She presents today for follow up. She reports feeling well. She denies any chest pain/pressures, dizziness, lightheadedness, shortness of breath, leg/ankle swelling, PND, orthopnea, palpitations, or syncopal symptoms. She continues to exercise and is without limitations. Device interrogation shows normal device function.  2 NSVT episodes recorded, lasting 1-2 seconds. 8 episodes recorded as VT, these EGMs are consistent with ST.  <0.1%. No short v-v intervals recorded. Lead trends appear stable. Presenting 12 lead ECG shows NSR with RBBB Vent Rate 85 bpm,  ms,  ms, QTc 504 ms.  Current cardiac medications include: Sotalol.      Ep Visit 2/20/20: She presents today for follow up. She reports feeling well. She states she has not been taking her Sotalol for the last many months. She denies any chest pain/pressures, dizziness, lightheadedness, worsening shortness of breath, leg/ankle swelling, PND, orthopnea, palpitations, or syncopal symptoms. Device interrogation shows  normal ICD function.  Since last remote transmission on 10/29/19, there have been 5 SVT-Wavelet, 7 monitored VT and 2 NSVT episodes recorded.  The SVT episodes last 5-30 seconds at 167-176 bpm, occur with activity and appear to be ST.  The monitored VT episodes last 5-56 seconds at rates of 167-171 bpm.  These also occur with activity and appear  to be ST with several occurring on 11/27/19.  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  The NSVT episodes last 1 second in duration @ 231-226 bpm.   Intrinsic rhythm = SR @ 80 bpm.    =<0.1%. No short v-v intervals recorded.  Lead trends appear stable.  Echo today is stable with normal systemic function. No current cardiac medications.      EP Visit 3/26/21: She presents today for follow up. She reports feeling well. She denies any chest pain/pressures, dizziness, lightheadedness, worsening shortness of breath, leg/ankle swelling, PND, orthopnea, palpitations, or syncopal symptoms. Recent echo showed normal RV size/function, mildly dialted LV with  LVEF 47%, with no significant changes from prior echo. Device interrogation shows normal device function, stable lead parameters,  <0.1%, 46 VT and 2 NSVT all EGMs suggest ST and not true arrhythmia. No current cardiac medications.     She presents today for follow up. She recently had a ICD alarm that prompted her to go in for evaluation. Investigation showed this was a lead integrity alert. Red Alert for Lead integrity was triggered secondary to increase in RV  impedance >75% and multiple short R-R intervals. Max impedance was approximately 1000 ohms.  Review of impedance trend shows stable RV tip to RV coil impedance. Tech services recommends programming RV pace polarity and RV sense polarity to Tip-Coil.  They recommend turning off pacing impedance alert and leaving lead integrity alert on.  Lead integrity alert looks at programmed vector and pacing impedance alert looks at all vectors.  This will provide an alert if there is change in impedance tip-coil. She was brought into clinic and these changes were made. She reports feeling well. She denies chest discomfort, palpitations, abdominal fullness/bloating or peripheral edema, shortness of breath, paroxysmal nocturnal dyspnea, orthopnea, lightheadedness, dizziness, pre-syncope, or syncope. No current cardiac medications.       I have reviewed and updated the patient's Past Medical History, Social History, Family History and Medication List.     Cardiographics (Personally Reviewed) :   LAST CARDIAC MRA:  3/6/14  FINDINGS:   SITUS: There is a normal spleen in the left upper quadrant. There is situs solitus in the chest, as demonstrated by a normal airway pulmonary artery relationship bilaterally.   CAVAE: Single right-sided inferior and superior vena cavae drain normally into the right atrium unobstructed.   PULMONARY VEINS: Two right and two left pulmonary veins drain into the left atrium unobstructed.   ATRIA: There is no interatrial communication demonstrated. The atrial sizes are normal.   ATRIOVENTRICULAR CONNECTION: Concordant. Separate mitral and tricuspid valves without evidence of significant regurgitation or stenosis.   VENTRICLES: D-loop ventricles with levocardia. Ventricles are normal in size and contraction. Right ventricular end-diastolic volume is upper limits of normal for BSA. If utilizing an ideal body weight, this would be mildly enlarged. No residual interventricular communication is demonstrated. No  myocardial hyperenhancement identified on delayed post gadolinium imaging, except at the ventricular insertion points.   VENTRICULOARTERIAL CONNECTION: Concordant. Trileaflet aortic valve without regurgitation or stenosis. The oversewn native pulmonary artery is directly posterior to the aorta. There is now an RV to PA conduit. Mild stenosis of the RVOT conduit with moderate regurgitation, 31% by direct flow measurement, and 33% by ventricular stroke volume comparison.   AORTA AND SUPRA-AORTIC VESSELS: A left-sided aortic arch is demonstrated with normal cervical branching pattern. No evidence of patent ductus arteriosus, coarctation, or aortopulmonary collateral arteries. The coronary arteries are not well visualized in this study.   PULMONARY ARTERY: The RV to PA conduit is patent with normal pulmonary artery branching pattern.   LAST ECHO:  2/2020  ------CONCLUSIONS------  Technically difficult study due to poor acoustic windows. D-TGA status post  Rastelli and subsequent placement of a bio-prosthetic valve in the pulmonary  position. There is no obvious residual ventricular level shunt. Mild (1+)  aortic valve insufficiency. Unable to adequately visualize the RV-PA conduit  or the bio-prosthetic valve; however, normal antegrade flow is visualized in  the branch pulmonary arteries. A transvenous pacemaker lead is seen in the  right ventricle, with the tip of the lead implanted into the right ventricular  apex. Trivial tricuspid valve insufficiency. Estimated right ventricular  systolic pressure is 26 mmHg plus right atrial pressure. The left and right  ventricles have normal chamber size, wall thickness, and systolic function.  The calculated biplane left ventricular ejection fraction is 57%. No  pericardial effusion.  No significant change from last echocardiogram.  7/25/19 CTA:  IMPRESSION:  1.  D-transposition of aorta s/p Rastelli with bioprosthetic pulmonic  valve  2.  Mildly reduced left ventricular  "function with LV EF 52%.  3.  Normal RV size and function with RV EF 55%.  4.  RV-PA valved conduit without visual evidence of stenosis.  5.  Mild pulmonic regurgitation (<30 mL) using biventricular stroke  volumes.  6.  No significant change from CT scan in 2014.  7.  Please review radiology review for incidental non-cardiovascular  findings to follow separately     3/23/21 ECHO:  ##### CONCLUSIONS #####  Technically difficult study due to poor acoustic windows. D-TGA status-post  Rastelli and subsequent placement of a bio-prosthetic valve in the pulmonary  position.     Trivial tricuspid valve regurgitation; estimated right ventricular systolic  pressure 26mmHg plus right atrial pressure. Normal right ventricular size and  systolic function. Unable to adequately visualize the RV-PA conduit or the  bio-prosthetic valve; normal antegrade flow profile at the valve annulus; no  obvious regurgitation. Mildly dilated left ventricle with normal wall  thickness and mildly-depressed systolic function; biplane LV EF 47%. A  transvenous pacemaker lead is seen in the right ventricle, with the tip of the  lead implanted into the right ventricular apex. No effusion.     No significant change from last echocardiogram.       Physical Examination   /76 (BP Location: Left arm, Patient Position: Chair, Cuff Size: Adult Large)   Pulse 107   Ht 1.58 m (5' 2.21\")   Wt 88 kg (193 lb 14.4 oz)   SpO2 94%   BMI 35.23 kg/m    Wt Readings from Last 3 Encounters:   04/19/21 90.7 kg (199 lb 14.4 oz)   03/26/21 91.6 kg (202 lb)   03/23/21 92.5 kg (204 lb)     General Appearance:   Alert, well-appearing and in no acute distress.   HEENT: Atraumatic, normocephalic. PERRL.  MMM.   Chest/Lungs:   Respirations unlabored.  Lungs are clear to auscultation.   Cardiovascular:   Regular rate and rhythm. III/VI KRISTI   Abdomen:  Soft, nontender, nondistended.   Extremities: No cyanosis or clubbing. No edema.    Musculoskeletal: Moves all " extremities.  Gait normal.   Skin: Warm, dry, intact.    Neurologic: Mood and affect are appropriate.  Alert and oriented to person, place, time, and situation.          Medications  Allergies   Current Outpatient Medications   Medication Sig Dispense Refill     ACE NOT PRESCRIBED, INTENTIONAL, 1 each as needed for other ACE Inhibitor not prescribed due to Other: BP at goal, pending microalbumin test 0 each 0     amoxicillin (AMOXIL) 500 MG capsule Please take 2,000 mg (4 capsule) 30-60 min prior to all dental procedures 4 capsule 3     atorvastatin (LIPITOR) 40 MG tablet Take 1 tablet (40 mg) by mouth daily (Patient taking differently: Take 40 mg by mouth At Bedtime ) 90 tablet 3     blood glucose (ACCU-CHEK SMARTVIEW) test strip Use to test blood sugar 1 times daily or as directed. 100 strip 1     blood glucose monitoring (ACCU-CHEK FASTCLIX) lancets Use to test blood sugar 1 times daily or as directed. 1 Box 3     gabapentin (NEURONTIN) 300 MG capsule TAKE 1 CAPSULE(300 MG) BY MOUTH EVERY NIGHT 90 capsule 1     metFORMIN (GLUCOPHAGE) 500 MG tablet Take 2 tablets (1,000 mg) by mouth daily (with dinner) 180 tablet 1     Naproxen Sodium (ALEVE PO) Take 220 mg by mouth 2 times daily as needed for headaches        sertraline (ZOLOFT) 25 MG tablet Take 1 tablet (25 mg) by mouth daily (Patient taking differently: Take 25 mg by mouth At Bedtime ) 90 tablet 1    Allergies   Allergen Reactions     Adhesive Tape Rash         Lab Results (Personally Reviewed)    Chemistry/lipid CBC Cardiac Enzymes/BNP/TSH/INR   Lab Results   Component Value Date    BUN 10 10/10/2021     10/10/2021    CO2 25 10/10/2021     Creatinine   Date Value Ref Range Status   10/10/2021 0.60 0.52 - 1.04 mg/dL Final   03/23/2021 0.61 0.52 - 1.04 mg/dL Final       Lab Results   Component Value Date    CHOL 205 (H) 08/06/2020    HDL 44 (L) 08/06/2020     (H) 08/06/2020      Lab Results   Component Value Date    WBC 11.3 (H) 10/10/2021    HGB  13.4 10/10/2021    HCT 41.6 10/10/2021    MCV 88 10/10/2021     10/10/2021    Lab Results   Component Value Date    TSH 2.88 02/11/2021    INR 1.13 06/01/2015        The patient states understanding and is agreeable with the plan.   This patient was seen and examined with Dr. Ray. The above note reflects our joint assessment and plan.   KIMMIE Bleivns CNP  Electrophysiology Consult Service  Pager: [phone]      EP STAFF NOTE  I have seen and examined the patient as part of a shared visit with Jaonne Carias, LEO NP.  I agree with the note above. I reviewed today's vital signs and medications. I have reviewed and discussed with the advanced practice provider their physical examination, assessment, and plan   Briefly, lead integrity alert, high voltage circuit ok, ring of lead shows increase impedance and noise  My key history/exam findings are: RRR.   The key management decisions made by me: reprogrammed the device to exclude the ring, discussed in detail the pros and cons of extracting now with replacement of the lead versus learning the lead in and replacement. After detailed discussion, we decided to keep the current programming and do close follow-up to replace it in a timely manner if it breaks, knowing the risks too..    Vimal Ray MD [facility]  Cardiology - Electrophysiology

## 2021-10-15 LAB
MDC_IDC_LEAD_IMPLANT_DT: NORMAL
MDC_IDC_LEAD_LOCATION: NORMAL
MDC_IDC_LEAD_LOCATION_DETAIL_1: NORMAL
MDC_IDC_LEAD_MFG: NORMAL
MDC_IDC_LEAD_MODEL: NORMAL
MDC_IDC_LEAD_POLARITY_TYPE: NORMAL
MDC_IDC_LEAD_SERIAL: NORMAL
MDC_IDC_MSMT_CAP_CHARGE_TYPE: NORMAL
MDC_IDC_PG_IMPLANT_DTM: NORMAL
MDC_IDC_PG_MFG: NORMAL
MDC_IDC_PG_MODEL: NORMAL
MDC_IDC_PG_SERIAL: NORMAL
MDC_IDC_PG_TYPE: NORMAL
MDC_IDC_SESS_CLINIC_NAME: NORMAL
MDC_IDC_SESS_DTM: NORMAL
MDC_IDC_SESS_TYPE: NORMAL
MDC_IDC_SET_BRADY_HYSTRATE: NORMAL
MDC_IDC_SET_BRADY_LOWRATE: 40 {BEATS}/MIN
MDC_IDC_SET_BRADY_MODE: NORMAL
MDC_IDC_SET_LEADCHNL_RV_PACING_AMPLITUDE: 2 V
MDC_IDC_SET_LEADCHNL_RV_PACING_ANODE_ELECTRODE_1: NORMAL
MDC_IDC_SET_LEADCHNL_RV_PACING_ANODE_LOCATION_1: NORMAL
MDC_IDC_SET_LEADCHNL_RV_PACING_CAPTURE_MODE: NORMAL
MDC_IDC_SET_LEADCHNL_RV_PACING_CATHODE_ELECTRODE_1: NORMAL
MDC_IDC_SET_LEADCHNL_RV_PACING_CATHODE_LOCATION_1: NORMAL
MDC_IDC_SET_LEADCHNL_RV_PACING_POLARITY: NORMAL
MDC_IDC_SET_LEADCHNL_RV_PACING_PULSEWIDTH: 0.4 MS
MDC_IDC_SET_LEADCHNL_RV_SENSING_ANODE_ELECTRODE_1: NORMAL
MDC_IDC_SET_LEADCHNL_RV_SENSING_ANODE_LOCATION_1: NORMAL
MDC_IDC_SET_LEADCHNL_RV_SENSING_CATHODE_ELECTRODE_1: NORMAL
MDC_IDC_SET_LEADCHNL_RV_SENSING_CATHODE_LOCATION_1: NORMAL
MDC_IDC_SET_LEADCHNL_RV_SENSING_POLARITY: NORMAL
MDC_IDC_SET_LEADCHNL_RV_SENSING_SENSITIVITY: 0.3 MV
MDC_IDC_SET_ZONE_DETECTION_BEATS_DENOMINATOR: 40 {BEATS}
MDC_IDC_SET_ZONE_DETECTION_BEATS_NUMERATOR: 30 {BEATS}
MDC_IDC_SET_ZONE_DETECTION_INTERVAL: 270 MS
MDC_IDC_SET_ZONE_DETECTION_INTERVAL: 360 MS
MDC_IDC_SET_ZONE_DETECTION_INTERVAL: 370 MS
MDC_IDC_SET_ZONE_DETECTION_INTERVAL: NORMAL
MDC_IDC_SET_ZONE_TYPE: NORMAL

## 2021-11-12 NOTE — PROGRESS NOTES
Looks like they did not forward this to you as well, so just an FYI...    [place]/EMT-B  [facility] / [place]

## 2021-11-22 ENCOUNTER — OFFICE VISIT (OUTPATIENT)
Dept: FAMILY MEDICINE | Facility: CLINIC | Age: 41
End: 2021-11-22
Payer: MEDICARE

## 2021-11-22 VITALS
HEART RATE: 105 BPM | TEMPERATURE: 98.2 F | WEIGHT: 200.2 LBS | SYSTOLIC BLOOD PRESSURE: 116 MMHG | OXYGEN SATURATION: 98 % | BODY MASS INDEX: 36.84 KG/M2 | DIASTOLIC BLOOD PRESSURE: 70 MMHG | HEIGHT: 62 IN

## 2021-11-22 DIAGNOSIS — F41.9 ANXIETY: ICD-10-CM

## 2021-11-22 DIAGNOSIS — F33.1 MODERATE EPISODE OF RECURRENT MAJOR DEPRESSIVE DISORDER (H): ICD-10-CM

## 2021-11-22 DIAGNOSIS — E66.01 MORBID OBESITY (H): ICD-10-CM

## 2021-11-22 DIAGNOSIS — Z00.00 ROUTINE GENERAL MEDICAL EXAMINATION AT A HEALTH CARE FACILITY: Primary | ICD-10-CM

## 2021-11-22 DIAGNOSIS — E11.59 TYPE 2 DIABETES MELLITUS WITH OTHER CIRCULATORY COMPLICATION, WITHOUT LONG-TERM CURRENT USE OF INSULIN (H): ICD-10-CM

## 2021-11-22 PROCEDURE — G0439 PPPS, SUBSEQ VISIT: HCPCS | Performed by: FAMILY MEDICINE

## 2021-11-22 RX ORDER — SERTRALINE HYDROCHLORIDE 25 MG/1
25 TABLET, FILM COATED ORAL AT BEDTIME
Qty: 90 TABLET | Refills: 1 | Status: SHIPPED | OUTPATIENT
Start: 2021-11-22 | End: 2022-03-11

## 2021-11-22 SDOH — HEALTH STABILITY: PHYSICAL HEALTH: ON AVERAGE, HOW MANY MINUTES DO YOU ENGAGE IN EXERCISE AT THIS LEVEL?: 30 MIN

## 2021-11-22 SDOH — ECONOMIC STABILITY: INCOME INSECURITY: IN THE LAST 12 MONTHS, WAS THERE A TIME WHEN YOU WERE NOT ABLE TO PAY THE MORTGAGE OR RENT ON TIME?: NO

## 2021-11-22 SDOH — ECONOMIC STABILITY: TRANSPORTATION INSECURITY
IN THE PAST 12 MONTHS, HAS THE LACK OF TRANSPORTATION KEPT YOU FROM MEDICAL APPOINTMENTS OR FROM GETTING MEDICATIONS?: NO

## 2021-11-22 SDOH — ECONOMIC STABILITY: FOOD INSECURITY: WITHIN THE PAST 12 MONTHS, THE FOOD YOU BOUGHT JUST DIDN'T LAST AND YOU DIDN'T HAVE MONEY TO GET MORE.: NEVER TRUE

## 2021-11-22 SDOH — ECONOMIC STABILITY: INCOME INSECURITY: HOW HARD IS IT FOR YOU TO PAY FOR THE VERY BASICS LIKE FOOD, HOUSING, MEDICAL CARE, AND HEATING?: NOT HARD AT ALL

## 2021-11-22 SDOH — ECONOMIC STABILITY: FOOD INSECURITY: WITHIN THE PAST 12 MONTHS, YOU WORRIED THAT YOUR FOOD WOULD RUN OUT BEFORE YOU GOT MONEY TO BUY MORE.: NEVER TRUE

## 2021-11-22 SDOH — HEALTH STABILITY: PHYSICAL HEALTH: ON AVERAGE, HOW MANY DAYS PER WEEK DO YOU ENGAGE IN MODERATE TO STRENUOUS EXERCISE (LIKE A BRISK WALK)?: 2 DAYS

## 2021-11-22 SDOH — ECONOMIC STABILITY: TRANSPORTATION INSECURITY
IN THE PAST 12 MONTHS, HAS LACK OF TRANSPORTATION KEPT YOU FROM MEETINGS, WORK, OR FROM GETTING THINGS NEEDED FOR DAILY LIVING?: NO

## 2021-11-22 ASSESSMENT — SOCIAL DETERMINANTS OF HEALTH (SDOH)
DO YOU BELONG TO ANY CLUBS OR ORGANIZATIONS SUCH AS CHURCH GROUPS UNIONS, FRATERNAL OR ATHLETIC GROUPS, OR SCHOOL GROUPS?: NO
ARE YOU MARRIED, WIDOWED, DIVORCED, SEPARATED, NEVER MARRIED, OR LIVING WITH A PARTNER?: PATIENT DECLINED
HOW OFTEN DO YOU GET TOGETHER WITH FRIENDS OR RELATIVES?: TWICE A WEEK
HOW OFTEN DO YOU ATTEND CHURCH OR RELIGIOUS SERVICES?: NEVER
IN A TYPICAL WEEK, HOW MANY TIMES DO YOU TALK ON THE PHONE WITH FAMILY, FRIENDS, OR NEIGHBORS?: TWICE A WEEK

## 2021-11-22 ASSESSMENT — ENCOUNTER SYMPTOMS
ARTHRALGIAS: 0
COUGH: 0
SORE THROAT: 0
HEARTBURN: 0
DIARRHEA: 0
JOINT SWELLING: 0
DIZZINESS: 0
HEMATURIA: 0
WEAKNESS: 0
PALPITATIONS: 0
CHILLS: 0
BREAST MASS: 0
NERVOUS/ANXIOUS: 0
HEMATOCHEZIA: 0
EYE PAIN: 0
FREQUENCY: 0
NAUSEA: 0
PARESTHESIAS: 0
DYSURIA: 0
HEADACHES: 0
ABDOMINAL PAIN: 0
MYALGIAS: 0
SHORTNESS OF BREATH: 0
CONSTIPATION: 0
FEVER: 0

## 2021-11-22 ASSESSMENT — ANXIETY QUESTIONNAIRES
GAD7 TOTAL SCORE: 3
7. FEELING AFRAID AS IF SOMETHING AWFUL MIGHT HAPPEN: NOT AT ALL
6. BECOMING EASILY ANNOYED OR IRRITABLE: SEVERAL DAYS
2. NOT BEING ABLE TO STOP OR CONTROL WORRYING: SEVERAL DAYS
GAD7 TOTAL SCORE: 3
5. BEING SO RESTLESS THAT IT IS HARD TO SIT STILL: NOT AT ALL
7. FEELING AFRAID AS IF SOMETHING AWFUL MIGHT HAPPEN: NOT AT ALL
3. WORRYING TOO MUCH ABOUT DIFFERENT THINGS: SEVERAL DAYS
8. IF YOU CHECKED OFF ANY PROBLEMS, HOW DIFFICULT HAVE THESE MADE IT FOR YOU TO DO YOUR WORK, TAKE CARE OF THINGS AT HOME, OR GET ALONG WITH OTHER PEOPLE?: NOT DIFFICULT AT ALL
GAD7 TOTAL SCORE: 3
4. TROUBLE RELAXING: NOT AT ALL
1. FEELING NERVOUS, ANXIOUS, OR ON EDGE: NOT AT ALL

## 2021-11-22 ASSESSMENT — PATIENT HEALTH QUESTIONNAIRE - PHQ9
SUM OF ALL RESPONSES TO PHQ QUESTIONS 1-9: 0
SUM OF ALL RESPONSES TO PHQ QUESTIONS 1-9: 0
10. IF YOU CHECKED OFF ANY PROBLEMS, HOW DIFFICULT HAVE THESE PROBLEMS MADE IT FOR YOU TO DO YOUR WORK, TAKE CARE OF THINGS AT HOME, OR GET ALONG WITH OTHER PEOPLE: NOT DIFFICULT AT ALL

## 2021-11-22 ASSESSMENT — LIFESTYLE VARIABLES
HOW OFTEN DO YOU HAVE SIX OR MORE DRINKS ON ONE OCCASION: NEVER
HOW MANY STANDARD DRINKS CONTAINING ALCOHOL DO YOU HAVE ON A TYPICAL DAY: 1 OR 2
HOW OFTEN DO YOU HAVE A DRINK CONTAINING ALCOHOL: MONTHLY OR LESS

## 2021-11-22 ASSESSMENT — MIFFLIN-ST. JEOR: SCORE: 1516.35

## 2021-11-22 ASSESSMENT — ACTIVITIES OF DAILY LIVING (ADL): CURRENT_FUNCTION: NO ASSISTANCE NEEDED

## 2021-11-22 NOTE — PROGRESS NOTES
"     SUBJECTIVE:   CC: Chikis Hannon is an 41 year old woman who presents for preventive health visit.        Patient has been advised of split billing requirements and indicates understanding: Yes     Healthy Habits:     In general, how would you rate your overall health?  Fair    Frequency of exercise:  1 day/week    Duration of exercise:  15-30 minutes    Do you usually eat at least 4 servings of fruit and vegetables a day, include whole grains    & fiber and avoid regularly eating high fat or \"junk\" foods?  No    Taking medications regularly:  Yes    Medication side effects:  None    Ability to successfully perform activities of daily living:  No assistance needed    Home Safety:  No safety concerns identified    Hearing Impairment:  No hearing concerns    In the past 6 months, have you been bothered by leaking of urine?  No    In general, how would you rate your overall mental or emotional health?  Good      PHQ-2 Total Score: 0    Additional concerns today:  No    Weight at home- 194 lbs(sunday)     Started weight watchers on Sunday- online.     Diabetes has not been under the best control but of Sunday she has stated making good changes.     Wt Readings from Last 4 Encounters:   11/22/21 90.8 kg (200 lb 3.2 oz)   10/13/21 88 kg (193 lb 14.4 oz)   04/19/21 90.7 kg (199 lb 14.4 oz)   03/26/21 91.6 kg (202 lb)             Today's PHQ-2 Score:   PHQ-2 ( 1999 [employer]) 11/22/2021   Q1: Little interest or pleasure in doing things 0   Q2: Feeling down, depressed or hopeless 0   PHQ-2 Score 0   PHQ-2 Total Score (12-17 Years)- Positive if 3 or more points; Administer PHQ-A if positive -   Q1: Little interest or pleasure in doing things Not at all   Q2: Feeling down, depressed or hopeless Not at all   PHQ-2 Score 0       Abuse: Current or Past (Physical, Sexual or Emotional) - No  Do you feel safe in your environment? Yes    Have you ever done Advance Care Planning? (For example, a Health Directive, POLST, or a " discussion with a medical provider or your loved ones about your wishes): No, advance care planning information given to patient to review.  Patient declined advance care planning discussion at this time.    Social History     Tobacco Use     Smoking status: Never Smoker     Smokeless tobacco: Never Used   Substance Use Topics     Alcohol use: No     Alcohol/week: 0.0 standard drinks     If you drink alcohol do you typically have >3 drinks per day or >7 drinks per week? No    Alcohol Use 11/22/2021   Prescreen: >3 drinks/day or >7 drinks/week? No   Prescreen: >3 drinks/day or >7 drinks/week? -   No flowsheet data found.    Reviewed orders with patient.  Reviewed health maintenance and updated orders accordingly - Yes  Labs reviewed in EPIC    Breast Cancer Screening:    Breast CA Risk Assessment (FHS-7) 11/22/2021   Do you have a family history of breast, colon, or ovarian cancer? No / Unknown       click delete button to remove this line now  Mammogram Screening - Offered annual screening and updated Health Maintenance for mutual plan based on risk factor consideration    Pertinent mammograms are reviewed under the imaging tab.    History of abnormal Pap smear: NO - age 30-65 PAP every 5 years with negative HPV co-testing recommended  PAP / HPV Latest Ref Rng & Units 8/16/2018 4/2/2015 1/10/2012   PAP (Historical) - NIL NIL NIL   HPV16 NEG:Negative Negative - -   HPV18 NEG:Negative Negative - -   HRHPV NEG:Negative Negative - -     Reviewed and updated as needed this visit by clinical staff  Tobacco  Allergies    Med Hx  Surg Hx  Fam Hx  Soc Hx       Reviewed and updated as needed this visit by Provider                   Review of Systems   Constitutional: Negative for chills and fever.   HENT: Negative for congestion, ear pain, hearing loss and sore throat.    Eyes: Negative for pain and visual disturbance.   Respiratory: Negative for cough and shortness of breath.    Cardiovascular: Negative for chest pain,  "palpitations and peripheral edema.   Gastrointestinal: Negative for abdominal pain, constipation, diarrhea, heartburn, hematochezia and nausea.   Breasts:  Negative for tenderness, breast mass and discharge.   Genitourinary: Negative for dysuria, frequency, genital sores, hematuria, pelvic pain, urgency, vaginal bleeding and vaginal discharge.   Musculoskeletal: Negative for arthralgias, joint swelling and myalgias.   Skin: Negative for rash.   Neurological: Negative for dizziness, weakness, headaches and paresthesias.   Psychiatric/Behavioral: Negative for mood changes. The patient is not nervous/anxious.           OBJECTIVE:   /70 (BP Location: Left arm, Patient Position: Sitting, Cuff Size: Adult Large)   Pulse 105   Temp 98.2  F (36.8  C) (Oral)   Ht 1.575 m (5' 2\")   Wt 90.8 kg (200 lb 3.2 oz)   SpO2 98%   BMI 36.62 kg/m    Physical Exam  GENERAL: healthy, alert and no distress  EYES: Eyes grossly normal to inspection, PERRL and conjunctivae and sclerae normal  HENT: ear canals and TM's normal, nose and mouth without ulcers or lesions  NECK: no adenopathy, no asymmetry, masses, or scars and thyroid normal to palpation  RESP: lungs clear to auscultation - no rales, rhonchi or wheezes  BREAST: normal without masses, tenderness or nipple discharge and no palpable axillary masses or adenopathy  CV: regular rate and rhythm, normal S1 S2, no S3 or S4, no murmur, click or rub, no peripheral edema and peripheral pulses strong  ABDOMEN: soft, nontender, no hepatosplenomegaly, no masses and bowel sounds normal  MS: no gross musculoskeletal defects noted, no edema  SKIN: no suspicious lesions or rashes  NEURO: Normal strength and tone, mentation intact and speech normal  PSYCH: mentation appears normal, affect normal/bright    Diagnostic Test Results:  Labs reviewed in Epic  none     ASSESSMENT/PLAN:   (Z00.00) Routine general medical examination at a health care facility  (primary encounter " "diagnosis)  Comment: prefers q5 years pap. HM updated. Recheck in 2023.   Plan: Lipid panel reflex to direct LDL Fasting,         HEMOGLOBIN A1C, Comprehensive metabolic panel         (BMP + Alb, Alk Phos, ALT, AST, Total. Bili,         TP)      (E11.59) Type 2 diabetes mellitus with other circulatory complication, without long-term current use of insulin (H)  Comment: will check A1c and adjust regimen accordingly.   Plan: HEMOGLOBIN A1C, metFORMIN (GLUCOPHAGE) 500 MG         tablet      (F33.1) Moderate episode of recurrent major depressive disorder (H)  Comment: stable   Plan: sertraline (ZOLOFT) 25 MG tablet      (F41.9) Anxiety  Comment: stable  Plan: sertraline (ZOLOFT) 25 MG tablet      (E66.01) Morbid obesity (H)  Comment: diet and lifestyle changes discussed         Patient has been advised of split billing requirements and indicates understanding: Yes  COUNSELING:  Reviewed preventive health counseling, as reflected in patient instructions       Regular exercise       Healthy diet/nutrition    Estimated body mass index is 36.62 kg/m  as calculated from the following:    Height as of this encounter: 1.575 m (5' 2\").    Weight as of this encounter: 90.8 kg (200 lb 3.2 oz).    Weight management plan: Discussed healthy diet and exercise guidelines    She reports that she has never smoked. She has never used smokeless tobacco.      Counseling Resources:  ATP IV Guidelines  Pooled Cohorts Equation Calculator  Breast Cancer Risk Calculator  BRCA-Related Cancer Risk Assessment: FHS-7 Tool  FRAX Risk Assessment  ICSI Preventive Guidelines  Dietary Guidelines for Americans, 2010  USDA's MyPlate  ASA Prophylaxis  Lung CA Screening    Tammy Benedict MD  [facility]  Answers for HPI/ROS submitted by the patient on 11/22/2021  If you checked off any problems, how difficult have these problems made it for you to do your work, take care of things at home, or get along with other " people?: Not difficult at all  PHQ9 TOTAL SCORE: 0  FREDIS 7 TOTAL SCORE: 3

## 2021-11-22 NOTE — PATIENT INSTRUCTIONS
Take gabapentin every other day for a week and then hold gabapentin- if pain resumes restart          http://www.checkyourhealth.org/physical-activity/wow.php    Preventive Health Recommendations  Female Ages 40 to 49    Yearly exam:     See your health care provider every year in order to  1. Review health changes.   2. Discuss preventive care.    3. Review your medicines if your doctor prescribed any.      Get a Pap test every three years (unless you have an abnormal result and your provider advises testing more often).      If you get Pap tests with HPV test, you only need to test every 5 years, unless you have an abnormal result. You do not need a Pap test if your uterus was removed (hysterectomy) and you have not had cancer.      You should be tested each year for STDs (sexually transmitted diseases), if you're at risk.     Ask your doctor if you should have a mammogram.      Have a colonoscopy (test for colon cancer) if someone in your family has had colon cancer or polyps before age 50.       Have a cholesterol test every 5 years.       Have a diabetes test (fasting glucose) after age 45. If you are at risk for diabetes, you should have this test every 3 years.    Shots: Get a flu shot each year. Get a tetanus shot every 10 years.     Nutrition:     Eat at least 5 servings of fruits and vegetables each day.    Eat whole-grain bread, whole-wheat pasta and brown rice instead of white grains and rice.    Get adequate Calcium and Vitamin D.      Lifestyle    Exercise at least 150 minutes a week (an average of 30 minutes a day, 5 days a week). This will help you control your weight and prevent disease.    Limit alcohol to one drink per day.    No smoking.     Wear sunscreen to prevent skin cancer.    See your dentist every six months for an exam and cleaning.

## 2021-11-23 ASSESSMENT — ANXIETY QUESTIONNAIRES: GAD7 TOTAL SCORE: 3

## 2021-11-23 ASSESSMENT — PATIENT HEALTH QUESTIONNAIRE - PHQ9: SUM OF ALL RESPONSES TO PHQ QUESTIONS 1-9: 0

## 2021-11-29 ENCOUNTER — LAB (OUTPATIENT)
Dept: LAB | Facility: CLINIC | Age: 41
End: 2021-11-29
Payer: MEDICARE

## 2021-11-29 DIAGNOSIS — Z00.00 ROUTINE GENERAL MEDICAL EXAMINATION AT A HEALTH CARE FACILITY: ICD-10-CM

## 2021-11-29 DIAGNOSIS — E11.59 TYPE 2 DIABETES MELLITUS WITH OTHER CIRCULATORY COMPLICATION, WITHOUT LONG-TERM CURRENT USE OF INSULIN (H): ICD-10-CM

## 2021-11-29 LAB — HBA1C MFR BLD: 8.6 % (ref 0–5.6)

## 2021-11-29 PROCEDURE — 83036 HEMOGLOBIN GLYCOSYLATED A1C: CPT

## 2021-11-29 PROCEDURE — 80061 LIPID PANEL: CPT

## 2021-11-29 PROCEDURE — 36415 COLL VENOUS BLD VENIPUNCTURE: CPT

## 2021-11-29 PROCEDURE — 80053 COMPREHEN METABOLIC PANEL: CPT

## 2021-11-30 LAB
ALBUMIN SERPL-MCNC: 3.4 G/DL (ref 3.4–5)
ALP SERPL-CCNC: 125 U/L (ref 40–150)
ALT SERPL W P-5'-P-CCNC: 32 U/L (ref 0–50)
ANION GAP SERPL CALCULATED.3IONS-SCNC: 6 MMOL/L (ref 3–14)
AST SERPL W P-5'-P-CCNC: 17 U/L (ref 0–45)
BILIRUB SERPL-MCNC: 0.8 MG/DL (ref 0.2–1.3)
BUN SERPL-MCNC: 8 MG/DL (ref 7–30)
CALCIUM SERPL-MCNC: 8.7 MG/DL (ref 8.5–10.1)
CHLORIDE BLD-SCNC: 105 MMOL/L (ref 94–109)
CHOLEST SERPL-MCNC: 179 MG/DL
CO2 SERPL-SCNC: 25 MMOL/L (ref 20–32)
CREAT SERPL-MCNC: 0.49 MG/DL (ref 0.52–1.04)
FASTING STATUS PATIENT QL REPORTED: YES
GFR SERPL CREATININE-BSD FRML MDRD: >90 ML/MIN/1.73M2
GLUCOSE BLD-MCNC: 149 MG/DL (ref 70–99)
HDLC SERPL-MCNC: 42 MG/DL
LDLC SERPL CALC-MCNC: 119 MG/DL
NONHDLC SERPL-MCNC: 137 MG/DL
POTASSIUM BLD-SCNC: 3.7 MMOL/L (ref 3.4–5.3)
PROT SERPL-MCNC: 7.8 G/DL (ref 6.8–8.8)
SODIUM SERPL-SCNC: 136 MMOL/L (ref 133–144)
TRIGL SERPL-MCNC: 90 MG/DL

## 2021-12-02 DIAGNOSIS — E11.59 TYPE 2 DIABETES MELLITUS WITH OTHER CIRCULATORY COMPLICATION, WITHOUT LONG-TERM CURRENT USE OF INSULIN (H): ICD-10-CM

## 2021-12-02 DIAGNOSIS — M79.2 NEUROPATHIC PAIN: ICD-10-CM

## 2021-12-03 ENCOUNTER — TELEPHONE (OUTPATIENT)
Dept: FAMILY MEDICINE | Facility: CLINIC | Age: 41
End: 2021-12-03
Payer: MEDICARE

## 2021-12-03 DIAGNOSIS — E11.59 TYPE 2 DIABETES MELLITUS WITH OTHER CIRCULATORY COMPLICATION, WITHOUT LONG-TERM CURRENT USE OF INSULIN (H): ICD-10-CM

## 2021-12-03 NOTE — TELEPHONE ENCOUNTER
Attempt #1 to reach patient regarding message below. Left voicemail to return phone call to clinic.       KAIDEN JcN, RN  [facility]

## 2021-12-03 NOTE — TELEPHONE ENCOUNTER
----- Message from Tammy Benedict MD sent at 12/2/2021  4:50 PM CST -----  Please notify patient A1c is elevated- to take 2 metformin twice daily. Recheck in 3 months.       GUERITA

## 2021-12-07 ENCOUNTER — TELEPHONE (OUTPATIENT)
Dept: CARDIOLOGY | Facility: CLINIC | Age: 41
End: 2021-12-07
Payer: MEDICARE

## 2021-12-07 ENCOUNTER — TELEPHONE (OUTPATIENT)
Dept: FAMILY MEDICINE | Facility: CLINIC | Age: 41
End: 2021-12-07
Payer: MEDICARE

## 2021-12-07 DIAGNOSIS — Z95.810 AUTOMATIC IMPLANTABLE CARDIOVERTER-DEFIBRILLATOR IN SITU: Primary | ICD-10-CM

## 2021-12-07 NOTE — TELEPHONE ENCOUNTER
Reason for Call:  Other Orders      Detailed comments: Chikis has an appointment for diabetes check on 02/24 but has a lab appointment already scheduled on 02/22 at [facility]  asking if you can place orders for her Diabetes appointment and she can get those labs drawn at the same time.     Phone Number Patient can be reached at: Home number on file [phone] (home)    Best Time: Any     Can we leave a detailed message on this number? YES    Call taken on 12/7/2021 at 11:24 AM by Omi Norris

## 2021-12-07 NOTE — TELEPHONE ENCOUNTER
Patient sister Wilver left RHEA wanting to schedule follow up for patient. Attempted to reach back out to wilver, no answer, left RHEA.

## 2021-12-09 NOTE — TELEPHONE ENCOUNTER
I have to check her A1c 3 months from her recent visit which would be end of February beginning of March. She can do whatever labs she needs with cardiology and push her visit to beginning March and I can only check A1c.     GUERITA

## 2021-12-14 ENCOUNTER — TELEPHONE (OUTPATIENT)
Dept: FAMILY MEDICINE | Facility: CLINIC | Age: 41
End: 2021-12-14
Payer: MEDICARE

## 2021-12-14 NOTE — TELEPHONE ENCOUNTER
Left message for Bethany BRICE to call back re [facility] [employer] form. GUERITA  Requests RN to assist with completing Section B. We needs Bethany's input as GUERITA has not previously completed form.   Form is on Theron's desk in trish RN office.   Theron Coffey RN

## 2021-12-15 NOTE — TELEPHONE ENCOUNTER
Form completed with Bethany's telephone assistance. Copy forwarded to abstracting. Form mailed to the [facility] stamped envelope supplied by patient/Bethany.   Theron Coffey RN - Patient Advocate and Liaison (PAL)  [facility]   [phone]

## 2021-12-19 ENCOUNTER — APPOINTMENT (OUTPATIENT)
Dept: CT IMAGING | Facility: CLINIC | Age: 41
End: 2021-12-19
Attending: EMERGENCY MEDICINE
Payer: MEDICARE

## 2021-12-19 ENCOUNTER — ANCILLARY PROCEDURE (OUTPATIENT)
Dept: CARDIOLOGY | Facility: CLINIC | Age: 41
End: 2021-12-19
Attending: INTERNAL MEDICINE
Payer: MEDICARE

## 2021-12-19 ENCOUNTER — TRANSFERRED RECORDS (OUTPATIENT)
Dept: HEALTH INFORMATION MANAGEMENT | Facility: CLINIC | Age: 41
End: 2021-12-19

## 2021-12-19 ENCOUNTER — APPOINTMENT (OUTPATIENT)
Dept: GENERAL RADIOLOGY | Facility: CLINIC | Age: 41
End: 2021-12-19
Attending: EMERGENCY MEDICINE
Payer: MEDICARE

## 2021-12-19 ENCOUNTER — HOSPITAL ENCOUNTER (EMERGENCY)
Facility: CLINIC | Age: 41
Discharge: HOME | End: 2021-12-19
Attending: EMERGENCY MEDICINE | Admitting: EMERGENCY MEDICINE
Payer: MEDICARE

## 2021-12-19 VITALS
HEART RATE: 96 BPM | OXYGEN SATURATION: 98 % | DIASTOLIC BLOOD PRESSURE: 90 MMHG | TEMPERATURE: 99 F | RESPIRATION RATE: 18 BRPM | SYSTOLIC BLOOD PRESSURE: 136 MMHG

## 2021-12-19 DIAGNOSIS — R07.9 CHEST PAIN, UNSPECIFIED TYPE: ICD-10-CM

## 2021-12-19 LAB
ANION GAP SERPL CALCULATED.3IONS-SCNC: 7 MMOL/L (ref 3–14)
BASOPHILS # BLD AUTO: 0 10E3/UL (ref 0–0.2)
BASOPHILS NFR BLD AUTO: 0 %
BUN SERPL-MCNC: 11 MG/DL (ref 7–30)
CALCIUM SERPL-MCNC: 8.8 MG/DL (ref 8.5–10.1)
CHLORIDE BLD-SCNC: 108 MMOL/L (ref 94–109)
CO2 SERPL-SCNC: 26 MMOL/L (ref 20–32)
CREAT SERPL-MCNC: 0.51 MG/DL (ref 0.52–1.04)
D DIMER PPP FEU-MCNC: 0.61 UG/ML FEU (ref 0–0.5)
EOSINOPHIL # BLD AUTO: 0.1 10E3/UL (ref 0–0.7)
EOSINOPHIL NFR BLD AUTO: 1 %
ERYTHROCYTE [DISTWIDTH] IN BLOOD BY AUTOMATED COUNT: 12.5 % (ref 10–15)
GFR SERPL CREATININE-BSD FRML MDRD: >90 ML/MIN/1.73M2
GLUCOSE BLD-MCNC: 140 MG/DL (ref 70–99)
HCT VFR BLD AUTO: 42.5 % (ref 35–47)
HGB BLD-MCNC: 13.4 G/DL (ref 11.7–15.7)
IMM GRANULOCYTES # BLD: 0 10E3/UL
IMM GRANULOCYTES NFR BLD: 0 %
LYMPHOCYTES # BLD AUTO: 1.6 10E3/UL (ref 0.8–5.3)
LYMPHOCYTES NFR BLD AUTO: 17 %
MCH RBC QN AUTO: 28.6 PG (ref 26.5–33)
MCHC RBC AUTO-ENTMCNC: 31.5 G/DL (ref 31.5–36.5)
MCV RBC AUTO: 91 FL (ref 78–100)
MONOCYTES # BLD AUTO: 0.6 10E3/UL (ref 0–1.3)
MONOCYTES NFR BLD AUTO: 6 %
NEUTROPHILS # BLD AUTO: 6.8 10E3/UL (ref 1.6–8.3)
NEUTROPHILS NFR BLD AUTO: 76 %
NRBC # BLD AUTO: 0 10E3/UL
NRBC BLD AUTO-RTO: 0 /100
PLATELET # BLD AUTO: 165 10E3/UL (ref 150–450)
POTASSIUM BLD-SCNC: 3.5 MMOL/L (ref 3.4–5.3)
RBC # BLD AUTO: 4.69 10E6/UL (ref 3.8–5.2)
SODIUM SERPL-SCNC: 141 MMOL/L (ref 133–144)
TROPONIN I SERPL HS-MCNC: 5 NG/L
TROPONIN I SERPL HS-MCNC: 7 NG/L
WBC # BLD AUTO: 9.1 10E3/UL (ref 4–11)

## 2021-12-19 PROCEDURE — 71045 X-RAY EXAM CHEST 1 VIEW: CPT

## 2021-12-19 PROCEDURE — 99285 EMERGENCY DEPT VISIT HI MDM: CPT | Mod: 25

## 2021-12-19 PROCEDURE — 84484 ASSAY OF TROPONIN QUANT: CPT | Performed by: EMERGENCY MEDICINE

## 2021-12-19 PROCEDURE — 85379 FIBRIN DEGRADATION QUANT: CPT | Performed by: EMERGENCY MEDICINE

## 2021-12-19 PROCEDURE — 250N000011 HC RX IP 250 OP 636: Performed by: EMERGENCY MEDICINE

## 2021-12-19 PROCEDURE — 93005 ELECTROCARDIOGRAM TRACING: CPT

## 2021-12-19 PROCEDURE — 36415 COLL VENOUS BLD VENIPUNCTURE: CPT | Performed by: EMERGENCY MEDICINE

## 2021-12-19 PROCEDURE — 85025 COMPLETE CBC W/AUTO DIFF WBC: CPT | Performed by: EMERGENCY MEDICINE

## 2021-12-19 PROCEDURE — 80048 BASIC METABOLIC PNL TOTAL CA: CPT | Performed by: EMERGENCY MEDICINE

## 2021-12-19 PROCEDURE — 71275 CT ANGIOGRAPHY CHEST: CPT | Mod: ME

## 2021-12-19 PROCEDURE — 250N000009 HC RX 250: Performed by: EMERGENCY MEDICINE

## 2021-12-19 PROCEDURE — 84484 ASSAY OF TROPONIN QUANT: CPT | Mod: 91 | Performed by: EMERGENCY MEDICINE

## 2021-12-19 RX ORDER — IOPAMIDOL 755 MG/ML
500 INJECTION, SOLUTION INTRAVASCULAR ONCE
Status: COMPLETED | OUTPATIENT
Start: 2021-12-19 | End: 2021-12-19

## 2021-12-19 RX ADMIN — IOPAMIDOL 70 ML: 755 INJECTION, SOLUTION INTRAVENOUS at 19:38

## 2021-12-19 RX ADMIN — SODIUM CHLORIDE 80 ML: 9 INJECTION, SOLUTION INTRAVENOUS at 19:38

## 2021-12-19 NOTE — ED TRIAGE NOTES
Pt arrives with c/o ICD shocking for the past hour, last shock about 10 minutes PTA while in the car. Pt endorses a poke sensation to the left anterior chest. Pt alert and oriented x4, ABCs intact.

## 2021-12-20 ENCOUNTER — TELEPHONE (OUTPATIENT)
Dept: FAMILY MEDICINE | Facility: CLINIC | Age: 41
End: 2021-12-20
Payer: MEDICARE

## 2021-12-20 ENCOUNTER — TELEPHONE (OUTPATIENT)
Dept: CARDIOLOGY | Facility: CLINIC | Age: 41
End: 2021-12-20
Payer: MEDICARE

## 2021-12-20 LAB
ATRIAL RATE - MUSE: 105 BPM
DIASTOLIC BLOOD PRESSURE - MUSE: NORMAL MMHG
INTERPRETATION ECG - MUSE: NORMAL
P AXIS - MUSE: 60 DEGREES
PR INTERVAL - MUSE: 172 MS
QRS DURATION - MUSE: 112 MS
QT - MUSE: 398 MS
QTC - MUSE: 526 MS
R AXIS - MUSE: -12 DEGREES
SYSTOLIC BLOOD PRESSURE - MUSE: NORMAL MMHG
T AXIS - MUSE: 63 DEGREES
VENTRICULAR RATE- MUSE: 105 BPM

## 2021-12-20 NOTE — TELEPHONE ENCOUNTER
Pt's sister (Bethany) called in to advise PCP of recent ER visit and inquiring if a follow up visit is required.    She also set up DM follow up in March, and was wondering of Provider could order labs and they could have them drawn at the cardiologist visit earlier the same day.    Routed to Provider   [place]/EMT-B  [facility] / [place]

## 2021-12-20 NOTE — TELEPHONE ENCOUNTER
[facility] Call Center    Phone Message    May a detailed message be left on voicemail: no     Reason for Call: Other: Bethany was calling to advise Dr Ray that her sister Chikis was in ER at [facility] on 12/19/2021.      Action Taken: Message routed to:  [facility] Cardiology    Travel Screening: Not Applicable

## 2021-12-23 ENCOUNTER — ANCILLARY PROCEDURE (OUTPATIENT)
Dept: CARDIOLOGY | Facility: CLINIC | Age: 41
End: 2021-12-23
Attending: INTERNAL MEDICINE
Payer: MEDICARE

## 2021-12-23 DIAGNOSIS — Q20.3 D-TGA (DEXTRO-TRANSPOSITION OF GREAT ARTERIES): ICD-10-CM

## 2021-12-23 DIAGNOSIS — Z86.79 HX OF VENTRICULAR TACHYCARDIA: ICD-10-CM

## 2021-12-23 PROCEDURE — 99207 CARDIAC DEVICE CHECK - REMOTE: CPT | Performed by: INTERNAL MEDICINE

## 2021-12-23 PROCEDURE — 93296 REM INTERROG EVL PM/IDS: CPT

## 2021-12-24 NOTE — TELEPHONE ENCOUNTER
Called and discussed recent remote transmission with Chikis's sister, Bethany. Sister states that the pain has resolved for Chikis and she has had no more complaints. Discussed that the pain may have been musculoskeletal in nature. Instructed patient to call back if symptoms return. Provided direct [facility] and device clinic and after-hours numbers for future use. Patient's sister verbalized understanding and is in agreement to the plan.

## 2021-12-29 ENCOUNTER — TELEPHONE (OUTPATIENT)
Dept: CARDIOLOGY | Facility: CLINIC | Age: 41
End: 2021-12-29
Payer: MEDICARE

## 2021-12-29 NOTE — LETTER
2021      TO: Chikis Hannon  [id number]  [place] [address]       To whom it may concern,    Chikis Hannon (: 1980) is under my care at the cardiology clinic with [facility]. Please allow Chikis to remain as a  on light work while she recovers from her recent ER visit. These accommodations should remain in place until 2022 when I see Chikis for a follow up appointment.     Please do not hesitate to call me at [phone] if you have any questions or concerns.    Sincerely,    Vimal Ray MD

## 2021-12-29 NOTE — TELEPHONE ENCOUNTER
Bethany calling on behalf of verenice and requesting a callback to discuss getting an accomodation letter for work.    [phone]

## 2021-12-29 NOTE — TELEPHONE ENCOUNTER
Called and left a  for Chikis's sister, Bethany. Provided callback number.  Pili Maloney RN on 12/29/2021 at 3:30 PM

## 2021-12-29 NOTE — TELEPHONE ENCOUNTER
[facility] Call Center    Phone Message    May a detailed message be left on voicemail: no     Reason for Call: Other: Bethany is returning a phone call to Pili Maloney RN, please reach back out to her at [phone].     Action Taken: Message routed to:  [facility] Cardiology    Travel Screening: Not Applicable

## 2021-12-30 NOTE — TELEPHONE ENCOUNTER
Returned Bethany's call on behalf of her sister Chikis. Chikis was recently in the ER and it appears she may have had some musculoskeletal chest pain. 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 Recently she was moved to being a  and is feeling better as a . Her employer would like her to return to cart duty but Chikis does not feel ready, she is requesting a letter to her employer. I informed Bethany I would reach out to Dr. Ray and ask. Will continue to follow.  Pili Maloney RN on 12/30/2021 at 11:33 AM

## 2021-12-31 NOTE — TELEPHONE ENCOUNTER
Work accommodation letter is in chart. Called Bethany, she is going to work on activation Chikis's MyChart this weekend and connect with us next week if unable to get letter. Provided callback number.  Pili Maloney RN on 12/31/2021 at 10:54 AM

## 2022-01-01 ENCOUNTER — TRANSFERRED RECORDS (OUTPATIENT)
Dept: MULTI SPECIALTY CLINIC | Facility: CLINIC | Age: 42
End: 2022-01-01

## 2022-01-01 LAB — RETINOPATHY: NORMAL

## 2022-01-03 LAB
MDC_IDC_LEAD_IMPLANT_DT: NORMAL
MDC_IDC_LEAD_LOCATION: NORMAL
MDC_IDC_LEAD_LOCATION_DETAIL_1: NORMAL
MDC_IDC_LEAD_MFG: NORMAL
MDC_IDC_LEAD_MODEL: NORMAL
MDC_IDC_LEAD_POLARITY_TYPE: NORMAL
MDC_IDC_LEAD_SERIAL: NORMAL
MDC_IDC_MSMT_BATTERY_DTM: NORMAL
MDC_IDC_MSMT_BATTERY_REMAINING_LONGEVITY: 57 MO
MDC_IDC_MSMT_BATTERY_RRT_TRIGGER: 2.73
MDC_IDC_MSMT_BATTERY_STATUS: NORMAL
MDC_IDC_MSMT_BATTERY_VOLTAGE: 2.98 V
MDC_IDC_MSMT_CAP_CHARGE_DTM: NORMAL
MDC_IDC_MSMT_CAP_CHARGE_ENERGY: 18 J
MDC_IDC_MSMT_CAP_CHARGE_TIME: 3.86
MDC_IDC_MSMT_CAP_CHARGE_TYPE: NORMAL
MDC_IDC_MSMT_LEADCHNL_RV_IMPEDANCE_VALUE: 304 OHM
MDC_IDC_MSMT_LEADCHNL_RV_IMPEDANCE_VALUE: 4047 OHM
MDC_IDC_MSMT_LEADCHNL_RV_PACING_THRESHOLD_AMPLITUDE: 0.62 V
MDC_IDC_MSMT_LEADCHNL_RV_PACING_THRESHOLD_PULSEWIDTH: 0.4 MS
MDC_IDC_MSMT_LEADCHNL_RV_SENSING_INTR_AMPL: 6 MV
MDC_IDC_MSMT_LEADCHNL_RV_SENSING_INTR_AMPL: 6 MV
MDC_IDC_PG_IMPLANT_DTM: NORMAL
MDC_IDC_PG_MFG: NORMAL
MDC_IDC_PG_MODEL: NORMAL
MDC_IDC_PG_SERIAL: NORMAL
MDC_IDC_PG_TYPE: NORMAL
MDC_IDC_SESS_CLINIC_NAME: NORMAL
MDC_IDC_SESS_DTM: NORMAL
MDC_IDC_SESS_TYPE: NORMAL
MDC_IDC_SET_BRADY_HYSTRATE: NORMAL
MDC_IDC_SET_BRADY_LOWRATE: 40 {BEATS}/MIN
MDC_IDC_SET_BRADY_MODE: NORMAL
MDC_IDC_SET_LEADCHNL_RV_PACING_AMPLITUDE: 2 V
MDC_IDC_SET_LEADCHNL_RV_PACING_ANODE_ELECTRODE_1: NORMAL
MDC_IDC_SET_LEADCHNL_RV_PACING_ANODE_LOCATION_1: NORMAL
MDC_IDC_SET_LEADCHNL_RV_PACING_CAPTURE_MODE: NORMAL
MDC_IDC_SET_LEADCHNL_RV_PACING_CATHODE_ELECTRODE_1: NORMAL
MDC_IDC_SET_LEADCHNL_RV_PACING_CATHODE_LOCATION_1: NORMAL
MDC_IDC_SET_LEADCHNL_RV_PACING_POLARITY: NORMAL
MDC_IDC_SET_LEADCHNL_RV_PACING_PULSEWIDTH: 0.4 MS
MDC_IDC_SET_LEADCHNL_RV_SENSING_ANODE_ELECTRODE_1: NORMAL
MDC_IDC_SET_LEADCHNL_RV_SENSING_ANODE_LOCATION_1: NORMAL
MDC_IDC_SET_LEADCHNL_RV_SENSING_CATHODE_ELECTRODE_1: NORMAL
MDC_IDC_SET_LEADCHNL_RV_SENSING_CATHODE_LOCATION_1: NORMAL
MDC_IDC_SET_LEADCHNL_RV_SENSING_POLARITY: NORMAL
MDC_IDC_SET_LEADCHNL_RV_SENSING_SENSITIVITY: 0.3 MV
MDC_IDC_SET_ZONE_DETECTION_BEATS_DENOMINATOR: 40 {BEATS}
MDC_IDC_SET_ZONE_DETECTION_BEATS_NUMERATOR: 30 {BEATS}
MDC_IDC_SET_ZONE_DETECTION_INTERVAL: 270 MS
MDC_IDC_SET_ZONE_DETECTION_INTERVAL: 360 MS
MDC_IDC_SET_ZONE_DETECTION_INTERVAL: 370 MS
MDC_IDC_SET_ZONE_DETECTION_INTERVAL: NORMAL
MDC_IDC_SET_ZONE_TYPE: NORMAL
MDC_IDC_STAT_BRADY_DTM_END: NORMAL
MDC_IDC_STAT_BRADY_DTM_START: NORMAL
MDC_IDC_STAT_BRADY_RV_PERCENT_PACED: 0.01 %
MDC_IDC_STAT_EPISODE_RECENT_COUNT: 0
MDC_IDC_STAT_EPISODE_RECENT_COUNT_DTM_END: NORMAL
MDC_IDC_STAT_EPISODE_RECENT_COUNT_DTM_START: NORMAL
MDC_IDC_STAT_EPISODE_TOTAL_COUNT: 0
MDC_IDC_STAT_EPISODE_TOTAL_COUNT: 1
MDC_IDC_STAT_EPISODE_TOTAL_COUNT: 17
MDC_IDC_STAT_EPISODE_TOTAL_COUNT: 235
MDC_IDC_STAT_EPISODE_TOTAL_COUNT_DTM_END: NORMAL
MDC_IDC_STAT_EPISODE_TOTAL_COUNT_DTM_START: NORMAL
MDC_IDC_STAT_EPISODE_TYPE: NORMAL
MDC_IDC_STAT_TACHYTHERAPY_ATP_DELIVERED_RECENT: 0
MDC_IDC_STAT_TACHYTHERAPY_ATP_DELIVERED_TOTAL: 0
MDC_IDC_STAT_TACHYTHERAPY_RECENT_DTM_END: NORMAL
MDC_IDC_STAT_TACHYTHERAPY_RECENT_DTM_START: NORMAL
MDC_IDC_STAT_TACHYTHERAPY_SHOCKS_ABORTED_RECENT: 0
MDC_IDC_STAT_TACHYTHERAPY_SHOCKS_ABORTED_TOTAL: 0
MDC_IDC_STAT_TACHYTHERAPY_SHOCKS_DELIVERED_RECENT: 0
MDC_IDC_STAT_TACHYTHERAPY_SHOCKS_DELIVERED_TOTAL: 1
MDC_IDC_STAT_TACHYTHERAPY_TOTAL_DTM_END: NORMAL
MDC_IDC_STAT_TACHYTHERAPY_TOTAL_DTM_START: NORMAL

## 2022-01-31 ENCOUNTER — TELEPHONE (OUTPATIENT)
Dept: FAMILY MEDICINE | Facility: CLINIC | Age: 42
End: 2022-01-31
Payer: MEDICARE

## 2022-01-31 NOTE — TELEPHONE ENCOUNTER
Received forms, placed in provider in box. Complete and call patient for .    Isabela BARRIENTOS  [facility]   [facility]

## 2022-01-31 NOTE — TELEPHONE ENCOUNTER
Forms/Letter Request    Name of form/letter: [employer]    Have you been seen for this request: Yes     Do we have the form/letter: Yes    When is form/letter needed by: 2/4/2022    How would you like the form/letter returned:     Patient Notified form requests are processed in 3-5 business days:Yes    Okay to leave a detailed message? Yes Cell number on file:    Telephone Information:   Mobile [phone]

## 2022-02-06 DIAGNOSIS — M79.2 NEUROPATHIC PAIN: ICD-10-CM

## 2022-02-07 RX ORDER — GABAPENTIN 300 MG/1
CAPSULE ORAL
Qty: 90 CAPSULE | Refills: 0 | Status: SHIPPED | OUTPATIENT
Start: 2022-02-07 | End: 2022-03-11

## 2022-02-11 DIAGNOSIS — M79.2 NEUROPATHIC PAIN: ICD-10-CM

## 2022-02-11 RX ORDER — GABAPENTIN 300 MG/1
CAPSULE ORAL
Qty: 90 CAPSULE | Refills: 0 | OUTPATIENT
Start: 2022-02-11

## 2022-03-11 ENCOUNTER — OFFICE VISIT (OUTPATIENT)
Dept: CARDIOLOGY | Facility: CLINIC | Age: 42
End: 2022-03-11
Attending: INTERNAL MEDICINE
Payer: MEDICARE

## 2022-03-11 ENCOUNTER — LAB (OUTPATIENT)
Dept: LAB | Facility: CLINIC | Age: 42
End: 2022-03-11
Attending: INTERNAL MEDICINE
Payer: MEDICARE

## 2022-03-11 ENCOUNTER — TELEPHONE (OUTPATIENT)
Dept: FAMILY MEDICINE | Facility: CLINIC | Age: 42
End: 2022-03-11

## 2022-03-11 ENCOUNTER — OFFICE VISIT (OUTPATIENT)
Dept: FAMILY MEDICINE | Facility: CLINIC | Age: 42
End: 2022-03-11
Payer: MEDICARE

## 2022-03-11 VITALS
DIASTOLIC BLOOD PRESSURE: 64 MMHG | HEART RATE: 96 BPM | RESPIRATION RATE: 18 BRPM | HEIGHT: 63 IN | WEIGHT: 195.9 LBS | BODY MASS INDEX: 34.71 KG/M2 | TEMPERATURE: 98 F | SYSTOLIC BLOOD PRESSURE: 118 MMHG | OXYGEN SATURATION: 100 %

## 2022-03-11 VITALS
WEIGHT: 193.9 LBS | HEART RATE: 88 BPM | SYSTOLIC BLOOD PRESSURE: 119 MMHG | HEIGHT: 63 IN | DIASTOLIC BLOOD PRESSURE: 76 MMHG | OXYGEN SATURATION: 98 % | BODY MASS INDEX: 34.36 KG/M2

## 2022-03-11 DIAGNOSIS — E11.59 TYPE 2 DIABETES MELLITUS WITH OTHER CIRCULATORY COMPLICATION, WITHOUT LONG-TERM CURRENT USE OF INSULIN (H): Primary | ICD-10-CM

## 2022-03-11 DIAGNOSIS — Z86.79 HX OF VENTRICULAR TACHYCARDIA: ICD-10-CM

## 2022-03-11 DIAGNOSIS — E11.59 TYPE 2 DIABETES MELLITUS WITH OTHER CIRCULATORY COMPLICATION, WITHOUT LONG-TERM CURRENT USE OF INSULIN (H): ICD-10-CM

## 2022-03-11 DIAGNOSIS — M79.2 NEUROPATHIC PAIN: ICD-10-CM

## 2022-03-11 DIAGNOSIS — E66.01 MORBID OBESITY (H): ICD-10-CM

## 2022-03-11 DIAGNOSIS — Z29.89 SBE (SUBACUTE BACTERIAL ENDOCARDITIS) PROPHYLAXIS CANDIDATE: ICD-10-CM

## 2022-03-11 DIAGNOSIS — Q20.3 D-TGA (DEXTRO-TRANSPOSITION OF GREAT ARTERIES): Primary | ICD-10-CM

## 2022-03-11 DIAGNOSIS — Z95.810 AUTOMATIC IMPLANTABLE CARDIOVERTER-DEFIBRILLATOR IN SITU: ICD-10-CM

## 2022-03-11 DIAGNOSIS — Q20.3 D-TGA (DEXTRO-TRANSPOSITION OF GREAT ARTERIES): ICD-10-CM

## 2022-03-11 DIAGNOSIS — Z13.6 CARDIOVASCULAR SCREENING; LDL GOAL LESS THAN 70: ICD-10-CM

## 2022-03-11 LAB
ALBUMIN SERPL-MCNC: 3.6 G/DL (ref 3.4–5)
ALP SERPL-CCNC: 113 U/L (ref 40–150)
ALT SERPL W P-5'-P-CCNC: 36 U/L (ref 0–50)
ANION GAP SERPL CALCULATED.3IONS-SCNC: 9 MMOL/L (ref 3–14)
AST SERPL W P-5'-P-CCNC: 24 U/L (ref 0–45)
BASOPHILS # BLD AUTO: 0 10E3/UL (ref 0–0.2)
BASOPHILS NFR BLD AUTO: 1 %
BILIRUB SERPL-MCNC: 0.7 MG/DL (ref 0.2–1.3)
BUN SERPL-MCNC: 10 MG/DL (ref 7–30)
CALCIUM SERPL-MCNC: 9.2 MG/DL (ref 8.5–10.1)
CHLORIDE BLD-SCNC: 104 MMOL/L (ref 94–109)
CO2 SERPL-SCNC: 25 MMOL/L (ref 20–32)
CREAT SERPL-MCNC: 0.57 MG/DL (ref 0.52–1.04)
EOSINOPHIL # BLD AUTO: 0.1 10E3/UL (ref 0–0.7)
EOSINOPHIL NFR BLD AUTO: 1 %
ERYTHROCYTE [DISTWIDTH] IN BLOOD BY AUTOMATED COUNT: 12.5 % (ref 10–15)
GFR SERPL CREATININE-BSD FRML MDRD: >90 ML/MIN/1.73M2
GLUCOSE BLD-MCNC: 212 MG/DL (ref 70–99)
HBA1C MFR BLD: 9 % (ref 0–5.6)
HCT VFR BLD AUTO: 41.5 % (ref 35–47)
HGB BLD-MCNC: 13.5 G/DL (ref 11.7–15.7)
IMM GRANULOCYTES # BLD: 0 10E3/UL
IMM GRANULOCYTES NFR BLD: 0 %
LYMPHOCYTES # BLD AUTO: 1.3 10E3/UL (ref 0.8–5.3)
LYMPHOCYTES NFR BLD AUTO: 14 %
MCH RBC QN AUTO: 27.8 PG (ref 26.5–33)
MCHC RBC AUTO-ENTMCNC: 32.5 G/DL (ref 31.5–36.5)
MCV RBC AUTO: 85 FL (ref 78–100)
MONOCYTES # BLD AUTO: 0.5 10E3/UL (ref 0–1.3)
MONOCYTES NFR BLD AUTO: 6 %
NEUTROPHILS # BLD AUTO: 6.8 10E3/UL (ref 1.6–8.3)
NEUTROPHILS NFR BLD AUTO: 78 %
NRBC # BLD AUTO: 0 10E3/UL
NRBC BLD AUTO-RTO: 0 /100
PLATELET # BLD AUTO: 157 10E3/UL (ref 150–450)
POTASSIUM BLD-SCNC: 3.7 MMOL/L (ref 3.4–5.3)
PROT SERPL-MCNC: 7.8 G/DL (ref 6.8–8.8)
RBC # BLD AUTO: 4.86 10E6/UL (ref 3.8–5.2)
SODIUM SERPL-SCNC: 138 MMOL/L (ref 133–144)
TSH SERPL DL<=0.005 MIU/L-ACNC: 2.13 MU/L (ref 0.4–4)
WBC # BLD AUTO: 8.7 10E3/UL (ref 4–11)

## 2022-03-11 PROCEDURE — 93282 PRGRMG EVAL IMPLANTABLE DFB: CPT | Performed by: INTERNAL MEDICINE

## 2022-03-11 PROCEDURE — 93303 ECHO TRANSTHORACIC: CPT | Performed by: PEDIATRICS

## 2022-03-11 PROCEDURE — G0463 HOSPITAL OUTPT CLINIC VISIT: HCPCS | Mod: 25

## 2022-03-11 PROCEDURE — 93320 DOPPLER ECHO COMPLETE: CPT | Performed by: PEDIATRICS

## 2022-03-11 PROCEDURE — 36415 COLL VENOUS BLD VENIPUNCTURE: CPT | Performed by: PATHOLOGY

## 2022-03-11 PROCEDURE — 80053 COMPREHEN METABOLIC PANEL: CPT | Performed by: PATHOLOGY

## 2022-03-11 PROCEDURE — 83036 HEMOGLOBIN GLYCOSYLATED A1C: CPT | Performed by: PATHOLOGY

## 2022-03-11 PROCEDURE — 99215 OFFICE O/P EST HI 40 MIN: CPT | Performed by: FAMILY MEDICINE

## 2022-03-11 PROCEDURE — 93005 ELECTROCARDIOGRAM TRACING: CPT

## 2022-03-11 PROCEDURE — 93325 DOPPLER ECHO COLOR FLOW MAPG: CPT | Performed by: PEDIATRICS

## 2022-03-11 PROCEDURE — 99215 OFFICE O/P EST HI 40 MIN: CPT | Mod: 25 | Performed by: INTERNAL MEDICINE

## 2022-03-11 PROCEDURE — 84443 ASSAY THYROID STIM HORMONE: CPT | Performed by: PATHOLOGY

## 2022-03-11 PROCEDURE — 85025 COMPLETE CBC W/AUTO DIFF WBC: CPT | Performed by: PATHOLOGY

## 2022-03-11 RX ORDER — AMOXICILLIN 500 MG/1
CAPSULE ORAL
Qty: 4 CAPSULE | Refills: 3 | Status: SHIPPED | OUTPATIENT
Start: 2022-03-11 | End: 2022-12-13

## 2022-03-11 RX ORDER — GABAPENTIN 300 MG/1
CAPSULE ORAL
Qty: 90 CAPSULE | Refills: 1 | Status: SHIPPED | OUTPATIENT
Start: 2022-03-11 | End: 2022-04-01

## 2022-03-11 RX ORDER — ATORVASTATIN CALCIUM 40 MG/1
40 TABLET, FILM COATED ORAL AT BEDTIME
Qty: 90 TABLET | Refills: 3 | Status: SHIPPED | OUTPATIENT
Start: 2022-03-11 | End: 2022-12-13

## 2022-03-11 ASSESSMENT — ANXIETY QUESTIONNAIRES
2. NOT BEING ABLE TO STOP OR CONTROL WORRYING: SEVERAL DAYS
6. BECOMING EASILY ANNOYED OR IRRITABLE: NOT AT ALL
5. BEING SO RESTLESS THAT IT IS HARD TO SIT STILL: NOT AT ALL
GAD7 TOTAL SCORE: 3
3. WORRYING TOO MUCH ABOUT DIFFERENT THINGS: SEVERAL DAYS
7. FEELING AFRAID AS IF SOMETHING AWFUL MIGHT HAPPEN: NOT AT ALL
4. TROUBLE RELAXING: NOT AT ALL
GAD7 TOTAL SCORE: 3
GAD7 TOTAL SCORE: 3
7. FEELING AFRAID AS IF SOMETHING AWFUL MIGHT HAPPEN: NOT AT ALL
1. FEELING NERVOUS, ANXIOUS, OR ON EDGE: SEVERAL DAYS

## 2022-03-11 ASSESSMENT — PAIN SCALES - GENERAL: PAINLEVEL: NO PAIN (0)

## 2022-03-11 NOTE — NURSING NOTE
Med Reconcile: Reviewed and verified all current medications with the patient. The updated medication list was printed and given to the patient.    Return Appointment: Patient given instructions regarding scheduling next clinic visit. Patient demonstrated understanding of this information and agreed to call with further questions or concerns.    Patient stated she understood all health information given and agreed to call with further questions or concerns.

## 2022-03-11 NOTE — PATIENT INSTRUCTIONS
No late night eating   Snack- lightly salted almonds    Иван's killer bread (good seed)         [email or web address]://[email or web address]-activity/[email or web address]

## 2022-03-11 NOTE — PROGRESS NOTES
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  Today pt reports that from a cardiac standpoint she has been feeling well.   She denies any chest pain or pressure, sob/maher, orthopnea, pnd, palpitations, syncope/presyncope, cathy, change in exercise tolerance or any ICD shocks.  She admits that her diabetes has not been well controlled.  She is meeting with her diabetic MD later today.          PAST MEDICAL HISTORY:  Past Medical History:   Diagnosis Date     Allergy, unspecified not elsewhere classified      Attention deficit disorder without mention of hyperactivity     Mild MR; difficulty attention span     Congenital heart disease      Corrected transposition of great vessels     still needs SBE prophlaxis     Diabetes mellitus type 2, noninsulin dependent (H)      Impaired fasting glucose     103 7/06     Ventricular tachycardia (H) 3/4/2014    s/p ablation and ICD placement 3/13/14       FAMILY HISTORY:  Family History   Adopted: Yes   Problem Relation Age of Onset     Family History Negative Mother      Family History Negative Father        SOCIAL HISTORY:  History     Social History     Marital Status:      Spouse Name: N/A     Number of Children: N/A     Years of Education: N/A     Occupational History      at [employer]             Disabled     Social History Main Topics     Smoking status: Never Smoker      Smokeless tobacco: Never Used     Alcohol Use: No     Drug Use: No     Sexual Activity:      "Partners: Male     Other Topics Concern     Special Diet No     working on dairy. fruits and veggies.     Exercise Yes     Peter Monzon tape     Parent/Sibling W/ Cabg, Mi Or Angioplasty Before 65f 55m? No     Social History Narrative       CURRENT MEDICATIONS:  Current Outpatient Medications   Medication Sig Dispense Refill     amoxicillin (AMOXIL) 500 MG capsule Please take 2,000 mg (4 capsule) 30-60 min prior to all dental procedures 4 capsule 3     atorvastatin (LIPITOR) 40 MG tablet Take 1 tablet (40 mg) by mouth daily (Patient taking differently: Take 40 mg by mouth At Bedtime ) 90 tablet 3     blood glucose (ACCU-CHEK SMARTVIEW) test strip Use to test blood sugar 1 times daily or as directed. 100 strip 1     blood glucose monitoring (ACCU-CHEK FASTCLIX) lancets Use to test blood sugar 1 times daily or as directed. 1 Box 3     gabapentin (NEURONTIN) 300 MG capsule TAKE 1 CAPSULE(300 MG) BY MOUTH EVERY NIGHT 90 capsule 0     metFORMIN (GLUCOPHAGE) 500 MG tablet Take 2 tablets (1,000 mg) by mouth 2 times daily (with meals) 360 tablet 0     ACE NOT PRESCRIBED, INTENTIONAL, 1 each as needed for other ACE Inhibitor not prescribed due to Other: BP at goal, pending microalbumin test (Patient not taking: Reported on 10/13/2021) 0 each 0     sertraline (ZOLOFT) 25 MG tablet Take 1 tablet (25 mg) by mouth At Bedtime (Patient not taking: Reported on 3/11/2022) 90 tablet 1   .    EXAM:  /76 (BP Location: Left arm, Patient Position: Sitting, Cuff Size: Adult Large)   Pulse 88   Ht 1.588 m (5' 2.5\")   Wt 88 kg (193 lb 14.4 oz)   SpO2 98%   BMI 34.90 kg/m    General: appears comfortable, alert and articulate  Head: normocephalic, atraumatic  Eyes: anicteric sclera, EOMI  Neck: no adenopathy, 2+ carotids  Orophyarynx: moist mucosa, no lesions, dentition intact  Heart: Nrl S1, prominent S2, widely split (patient has RBBB), has 3/6 KRISTI best heard at the left sternal border at the base., JVP 6-7cm  Lungs: clear, no " rales or wheezing  Abdomen: soft, non-tender, bowel sounds present, no hepatomegaly  Extremities: no clubbing, cyanosis or edema  Neurological: normal speech and affect, no gross motor deficits      Labs:   Ref. Range 3/11/2022 09:34   Sodium Latest Ref Range: 133 - 144 mmol/L 138   Potassium Latest Ref Range: 3.4 - 5.3 mmol/L 3.7   Chloride Latest Ref Range: 94 - 109 mmol/L 104   Carbon Dioxide Latest Ref Range: 20 - 32 mmol/L 25   Urea Nitrogen Latest Ref Range: 7 - 30 mg/dL 10   Creatinine Latest Ref Range: 0.52 - 1.04 mg/dL 0.57   GFR Estimate Latest Ref Range: >60 mL/min/1.73m2 >90   Calcium Latest Ref Range: 8.5 - 10.1 mg/dL 9.2   Anion Gap Latest Ref Range: 3 - 14 mmol/L 9   Albumin Latest Ref Range: 3.4 - 5.0 g/dL 3.6   Protein Total Latest Ref Range: 6.8 - 8.8 g/dL 7.8   Bilirubin Total Latest Ref Range: 0.2 - 1.3 mg/dL 0.7   Alkaline Phosphatase Latest Ref Range: 40 - 150 U/L 113   ALT Latest Ref Range: 0 - 50 U/L 36   AST Latest Ref Range: 0 - 45 U/L 24   Hemoglobin A1C Latest Ref Range: 0.0 - 5.6 % 9.0 (H)   Glucose Latest Ref Range: 70 - 99 mg/dL 212 (H)   WBC Latest Ref Range: 4.0 - 11.0 10e3/uL 8.7   Hemoglobin Latest Ref Range: 11.7 - 15.7 g/dL 13.5   Hematocrit Latest Ref Range: 35.0 - 47.0 % 41.5   Platelet Count Latest Ref Range: 150 - 450 10e3/uL 157   RBC Count Latest Ref Range: 3.80 - 5.20 10e6/uL 4.86   MCV Latest Ref Range: 78 - 100 fL 85   MCH Latest Ref Range: 26.5 - 33.0 pg 27.8   MCHC Latest Ref Range: 31.5 - 36.5 g/dL 32.5   RDW Latest Ref Range: 10.0 - 15.0 % 12.5   % Neutrophils Latest Units: % 78   % Lymphocytes Latest Units: % 14   % Monocytes Latest Units: % 6   % Eosinophils Latest Units: % 1   % Basophils Latest Units: % 1   Absolute Basophils Latest Ref Range: 0.0 - 0.2 10e3/uL 0.0   Absolute Eosinophils Latest Ref Range: 0.0 - 0.7 10e3/uL 0.1   Absolute Immature Granulocytes Latest Ref Range: <=0.4 10e3/uL 0.0   Absolute Lymphocytes Latest Ref Range: 0.8 - 5.3 10e3/uL 1.3    Absolute Monocytes Latest Ref Range: 0.0 - 1.3 10e3/uL 0.5   % Immature Granulocytes Latest Units: % 0   Absolute Neutrophils Latest Ref Range: 1.6 - 8.3 10e3/uL 6.8   Absolute NRBCs Latest Units: 10e3/uL 0.0   NRBCs per 100 WBC Latest Ref Range: <1 /100 0       CT CORONARY: 3/4/14   IMPRESSION:  1. Normal coronary anatomy with no detectable stenosis or plaque. The coronaries arise from the anteriorly positioned aorta.   ADDITIONAL FINDINGS: The aortic root has an unusual appearance probably from the reconstructive surgery for transposition. The appearance is nearly of 2 aortic roots one small 1 which ends in a cul-de-sac and the other which leads into the ascending aorta. The coronary arteries arise from that aortic root which feeds into the ascending aorta . There is a conduit from the right ventricle to the pulmonary trunk. Normal pulmonary venous anatomy with all four pulmonary veins draining into the left atrium. There is no left atrial or left ventricular mass or thrombus. Normal pericardial thickness.There is no pericardial effusion. The proximal pulmonary arteries are well opacified. Please review Radiology report for incidental noncardiac findings that will follow separately.    CARDIAC MRI/MRA: 3/4/14   IMPRESSION:   1. Transposition of the great arteries with VSD after Rastelli procedure with RV to PA conduit.   2. Minimal flow acceleration across the RV to PA conduit with moderate pulmonary regurgitation (31-33% regurg fraction, Peak PA conduit gradient 18mm Hg).   3. Normal right and left ventricular size and systolic function with no evidence of fibrosis.    Echo 3/16  Difficult study due to poor acoustic windows. The patient has undergone a Rastelli procedure for d-transposition of the great arteries and replacement of the pulmonary valve. The pulmonary valve is not seen in this study. Shunts were not able to be excluded. Mildly diminished left ventricular systolic function with mild left  ventricular enlargement. Normal right ventricular and pulmonary artery pressures.    Echo 11/17  D-TGA status post Rastelli and subsequent placement of a bio-prosthetic valve in the pulmonary position. The calculated single plane left ventricular ejection fraction from the 4 chamber view is 58%. A pacing lead is present in the right atrium and right ventricle. Mild (1+) aortic valve insufficiency. Unable to adequately visualize the RV-PA conduit or the bio-prosthetic valve. Estimated right ventricular systolic pressure is 20-25 mmHg plus right atrial pressure.    Echo Nov 2018  D-TGA status post Rastelli and subsequent placement of a bio-prosthetic valve in the pulmonary position. There is no obvious residual ventricular level shunt. Mild (1+) aortic valve insufficiency. Unable to adequately visualize the RV-PA conduit or the bio-prosthetic valve, however normal antegrade flow is visualized in the branch pulmonary arteries. The calculated single plane left ventricular ejection fraction from the 4 chamber view is 56%. A pacing lead is present in the right atrium and right ventricle. No pericardial effusion. Technically difficult study due to poor acoustic windows.    CTA ANGIOGRAM CONGENITAL HEART DISEASE 7/25/2019   1.  D-transposition of aorta s/p Rastelli with bioprosthetic pulmonic valve  2.  Mildly reduced left ventricular function with LV EF 52%.  3.  Normal RV size and function with RV EF 55%.  4.  RV-PA valved conduit without visual evidence of stenosis.  5.  Mild pulmonic regurgitation (<30 mL) using biventricular stroke  volumes.  6.  No significant change from CT scan in 2014.    Echo 2/28/20  Technically difficult study due to poor acoustic windows. D-TGA status post Rastelli and subsequent placement of a bio-prosthetic valve in the pulmonary position. There is no obvious residual ventricular level shunt. Mild (1+) aortic valve insufficiency. Unable to adequately visualize the RV-PA conduit or the  bio-prosthetic valve; however, normal antegrade flow is visualized in the branch pulmonary arteries. A transvenous pacemaker lead is seen in the right ventricle, with the tip of the lead implanted into the right ventricular apex. Trivial tricuspid valve insufficiency. Estimated right ventricular systolic pressure is 26 mmHg plus right atrial pressure. The left and right ventricles have normal chamber size, wall thickness, and systolic function. The calculated biplane left ventricular ejection fraction is 57%. No pericardial effusion. No significant change from last echocardiogram.     Echo 3/23/21  Technically difficult study due to poor acoustic windows. D-TGA status-post Rastelli and subsequent placement of a bio-prosthetic valve in the pulmonary position. Trivial tricuspid valve regurgitation; estimated right ventricular systolic pressure 26mmHg plus right atrial pressure. Normal right ventricular size and systolic function. Unable to adequately visualize the RV-PA conduit or the bio-prosthetic valve; normal antegrade flow profile at the valve annulus; no obvious regurgitation. Mildly dilated left ventricle with normal wall thickness and mildly-depressed systolic function; biplane LV EF 47%. A transvenous pacemaker lead is seen in the right ventricle, with the tip of the lead implanted into the right ventricular apex. No effusion.  No significant change from last echocardiogram.    Echo 3/11/22  Technically difficult study due to poor acoustic windows. D-TGA status-post Rastelli and RV-PA conduit and subsequent placement of a bio-prosthetic valve in the pulmonary position.  Trivial tricuspid valve regurgitation? estimated right ventricular systolic pressure 27 mmHg plus right atrial pressure. Normal right ventricular size and systolic function. Unable to adequately visualize the RV-PA conduit or the bio-prosthetic valve? no obvious regurgitation. Normal left ventricular size with normal wall thickness and  mildly-depressed systolic function? biplane LV EF 49%. A transvenous pacemaker lead is seen in the right ventricle, with the tip of the lead implanted into the right ventricular apex. No effusion.    Device interrogation 3/11/22  Device: Staff Ranker [id number] Evera XT VR  Normal Device Function.  Mode: VVI 40 bpm  : <0.1%  Intrinsic rhythm: Regular VS @ 94 bpm  Short V-V intervals: 0  Thoracic Impedance: Near reference line.   Lead Trends Appear Stable: Yes. RV lead impedance warning >3000 ohms - not new. RV lead impedance toady 304 ohms.  Estimated battery longevity to RRT = 5.2 years. Battery voltage = 2.98 V.   Arrhythmias: 1 NSVT episode recorded, lasting 2 minutes, at 171 bpm. 2 SVT episodes recorded, lasting 6-31 seconds, at 167 bpm - classified as SVT per Wavelet algorithm. EGM reveals similar morphologies.  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 complete TGA and VSD, status post Rastelli procedure with VSD closure and RV to PA conduit, history of RV-PA conduit obstruction requiring replacement during teenage years: Pt continues to feel well, denies any change in exercise tolerance and is euvolemic today by history and exam. Echo today confirms no significant change from prior. BP - normotensive today.   Encouraged patient to continue regular aerobic exercise aiming for at least 150 minutes of moderate physical activity or 75 minutes of vigorous  physical activity - or an equal combination of both - each week. and follow low-salt, heart healthy diet.  Continue statin for dyslipidemia given DM. Pt aware that she should not become pregnant while on statin therapy.  Also reminded patient the importance of maintaining good oral hygiene and routine dental care with SBE prophylaxis.  2.  DM:  Followed by PCP.  Instructed patient to discuss option of SGLT-2 inhibitor like dapagliflozin or empagliflozin given their cardio-protective benefits unless cost prohibitive.  Continues statin therapy, diet and exercise as above.  3. H/o VT s/p VT ablation and ICD placement: Known increased RV lead impedance.  Pt scheduled to congential EP in next few weeks.  Continue exercise restrictions (limitation in repetitive arm push/pull movements) until upcoming EP visit.      Follow-up: one year with EKG, echo. Device check and labs prior. Will be happy to see sooner if change in clinical status or new questions/concerns arise.    Betsy Valle MD  Section Head - Advanced Heart Failure, Transplantation and Mechanical Circulatory Support  Director - Adult Congenital and Cardiovascular Genetics Center  Associate Professor of Medicine, [facility]    I spent a total of 40 minutes today with the patient personally reviewing recent cardiac testing and/or laboratory results, today's history and examination, and discussion and counseling with the patient and documentation

## 2022-03-11 NOTE — LETTER
2022      TO: Chikis Hannon  [id number]  [facility] [id number]         To whom it may concern,       I follow Chikis MILIAN 76 in the cardiology clinic. Please continue her work restrictions until her appointment with our EP cardiologist Dr. Ray on 2022. Thank you      Please do not hesitate to call me if you have any questions or concerns.    Sincerely,      Betsy Valle MD  [phone]

## 2022-03-11 NOTE — PATIENT INSTRUCTIONS
You were seen today in the Adult Congenital and Cardiovascular Genetics Clinic at the [facility].    Cardiology Providers you saw during your visit:  Betsy Valle MD    Diagnosis:  D-TGA    Results:  Betsy Valle MD reviewed the results of your labs, device check, echo testing today in clinic.    Recommendations:    1. Continue to eat a heart healthy, low salt diet.  2. Continue to get 20-30 minutes of aerobic activity, 4-5 days per week.  Examples of aerobic activity include walking, running, swimming, cycling, etc.  3. Continue to observe good oral hygiene, with regular dental visits.  4. We recommend dapagliflozin or empagliflozin for diabetes medications that also are heart beneficial  5. Will write a letter for work restrictions and send it via my chart      SBE prophylaxis:   Yes__x__  No____    Lifelong Bacterial Endocarditis Prophylaxis:  YES____  NO____    If YES is checked, follow the recommendations outlined below:  1. Take antibiotic(s) prior to recommended dental procedures and procedures on the respiratory tract or with infected skin, muscle or bones. SBE prophylaxis is not needed for routine GI and  procedures (ie. Colonoscopy or vaginal delivery)  2. Observe good oral hygiene daily, as advised by your dentist. Get regular professional dental care.  3. Keep cuts clean.  4. Infections should be treated promptly.  5. Symptoms of Infective Endocarditis could include: fever lasting more than 4-5 days or a recurrent fever that initially resolves but returns within 1-2 days)      Exercise restrictions:   Yes__X__  No____         If yes, list restrictions:  Must be allowed to rest if fatigued or SOB      Work restrictions:  Yes____  No_X___         If yes, list restrictions:    FASTING CHOLESTEROL was checked in the last 5 years YES_x__  NO___ (2021)  Continue to eat a heart healthy, low salt diet.         ____ Fasting lipid panel order today         ____ No changes in medications           ____ I recommend the following changes in your cholesterol medications.:          ____ Please follow up for cholesterol screening at your primary care physician      Follow-up:  Follow up with Dr. Valle in 1 year with labs, EKG, echo and device check prior    If you have questions or concerns please contact us at:    KAIDEN ArteagaN, RN    Nora Collins (Scheduling)  Nurse Care Coordinator     Clinic   Adult Congenital and CV Genetics   Adult Congenital and CV Genetic  [facility]   [facility]  (P) [phone]     (P) [phone]         (F) [phone]        For after hours urgent needs, call [phone] and ask to speak to the Adult Congenital Physician on call.  Mention Job Code 0401.    For emergencies call 911.    [facility]  [facility]  Mail Code [id number]   [place] [phone]

## 2022-03-11 NOTE — LETTER
3/11/2022      RE: Chikis Hannon  [id number]  [facility] [address]       Dear Colleague,    Thank you for the opportunity to participate in the care of your patient, Chikis Hannon, at the [facility] [place] [facility]. Please see a copy of my visit note below.    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  Today pt reports that from a cardiac standpoint she has been feeling well.   She denies any chest pain or pressure, sob/maher, orthopnea, pnd, palpitations, syncope/presyncope, cathy, change in exercise tolerance or any ICD shocks.  She admits that her diabetes has not been well controlled.  She is meeting with her diabetic MD later today.          PAST MEDICAL HISTORY:  Past Medical History:   Diagnosis Date     Allergy, unspecified not elsewhere classified      Attention deficit disorder without mention of hyperactivity     Mild MR; difficulty attention span     Congenital heart disease      Corrected transposition of great vessels     still needs SBE prophlaxis     Diabetes mellitus type 2, noninsulin dependent (H)      Impaired fasting glucose     103 7/06     Ventricular tachycardia (H) 3/4/2014    s/p ablation and ICD placement 3/13/14       FAMILY HISTORY:  Family History   Adopted: Yes   Problem Relation Age of Onset     Family History Negative Mother      Family History Negative Father        SOCIAL HISTORY:  History     Social  "History     Marital Status:      Spouse Name: N/A     Number of Children: N/A     Years of Education: N/A     Occupational History      at [employer]             Disabled     Social History Main Topics     Smoking status: Never Smoker      Smokeless tobacco: Never Used     Alcohol Use: No     Drug Use: No     Sexual Activity:     Partners: Male     Other Topics Concern     Special Diet No     working on dairy. fruits and veggies.     Exercise Yes     Peter Larkinmons tape     Parent/Sibling W/ Cabg, Mi Or Angioplasty Before 65f 55m? No     Social History Narrative       CURRENT MEDICATIONS:  Current Outpatient Medications   Medication Sig Dispense Refill     amoxicillin (AMOXIL) 500 MG capsule Please take 2,000 mg (4 capsule) 30-60 min prior to all dental procedures 4 capsule 3     atorvastatin (LIPITOR) 40 MG tablet Take 1 tablet (40 mg) by mouth daily (Patient taking differently: Take 40 mg by mouth At Bedtime ) 90 tablet 3     blood glucose (ACCU-CHEK SMARTVIEW) test strip Use to test blood sugar 1 times daily or as directed. 100 strip 1     blood glucose monitoring (ACCU-CHEK FASTCLIX) lancets Use to test blood sugar 1 times daily or as directed. 1 Box 3     gabapentin (NEURONTIN) 300 MG capsule TAKE 1 CAPSULE(300 MG) BY MOUTH EVERY NIGHT 90 capsule 0     metFORMIN (GLUCOPHAGE) 500 MG tablet Take 2 tablets (1,000 mg) by mouth 2 times daily (with meals) 360 tablet 0     ACE NOT PRESCRIBED, INTENTIONAL, 1 each as needed for other ACE Inhibitor not prescribed due to Other: BP at goal, pending microalbumin test (Patient not taking: Reported on 10/13/2021) 0 each 0     sertraline (ZOLOFT) 25 MG tablet Take 1 tablet (25 mg) by mouth At Bedtime (Patient not taking: Reported on 3/11/2022) 90 tablet 1   .    EXAM:  /76 (BP Location: Left arm, Patient Position: Sitting, Cuff Size: Adult Large)   Pulse 88   Ht 1.588 m (5' 2.5\")   Wt 88 kg (193 lb 14.4 oz)   SpO2 98%   BMI 34.90 kg/m    General: " appears comfortable, alert and articulate  Head: normocephalic, atraumatic  Eyes: anicteric sclera, EOMI  Neck: no adenopathy, 2+ carotids  Orophyarynx: moist mucosa, no lesions, dentition intact  Heart: Nrl S1, prominent S2, widely split (patient has RBBB), has 3/6 KRISTI best heard at the left sternal border at the base., JVP 6-7cm  Lungs: clear, no rales or wheezing  Abdomen: soft, non-tender, bowel sounds present, no hepatomegaly  Extremities: no clubbing, cyanosis or edema  Neurological: normal speech and affect, no gross motor deficits      Labs:   Ref. Range 3/11/2022 09:34   Sodium Latest Ref Range: 133 - 144 mmol/L 138   Potassium Latest Ref Range: 3.4 - 5.3 mmol/L 3.7   Chloride Latest Ref Range: 94 - 109 mmol/L 104   Carbon Dioxide Latest Ref Range: 20 - 32 mmol/L 25   Urea Nitrogen Latest Ref Range: 7 - 30 mg/dL 10   Creatinine Latest Ref Range: 0.52 - 1.04 mg/dL 0.57   GFR Estimate Latest Ref Range: >60 mL/min/1.73m2 >90   Calcium Latest Ref Range: 8.5 - 10.1 mg/dL 9.2   Anion Gap Latest Ref Range: 3 - 14 mmol/L 9   Albumin Latest Ref Range: 3.4 - 5.0 g/dL 3.6   Protein Total Latest Ref Range: 6.8 - 8.8 g/dL 7.8   Bilirubin Total Latest Ref Range: 0.2 - 1.3 mg/dL 0.7   Alkaline Phosphatase Latest Ref Range: 40 - 150 U/L 113   ALT Latest Ref Range: 0 - 50 U/L 36   AST Latest Ref Range: 0 - 45 U/L 24   Hemoglobin A1C Latest Ref Range: 0.0 - 5.6 % 9.0 (H)   Glucose Latest Ref Range: 70 - 99 mg/dL 212 (H)   WBC Latest Ref Range: 4.0 - 11.0 10e3/uL 8.7   Hemoglobin Latest Ref Range: 11.7 - 15.7 g/dL 13.5   Hematocrit Latest Ref Range: 35.0 - 47.0 % 41.5   Platelet Count Latest Ref Range: 150 - 450 10e3/uL 157   RBC Count Latest Ref Range: 3.80 - 5.20 10e6/uL 4.86   MCV Latest Ref Range: 78 - 100 fL 85   MCH Latest Ref Range: 26.5 - 33.0 pg 27.8   MCHC Latest Ref Range: 31.5 - 36.5 g/dL 32.5   RDW Latest Ref Range: 10.0 - 15.0 % 12.5   % Neutrophils Latest Units: % 78   % Lymphocytes Latest Units: % 14   %  Monocytes Latest Units: % 6   % Eosinophils Latest Units: % 1   % Basophils Latest Units: % 1   Absolute Basophils Latest Ref Range: 0.0 - 0.2 10e3/uL 0.0   Absolute Eosinophils Latest Ref Range: 0.0 - 0.7 10e3/uL 0.1   Absolute Immature Granulocytes Latest Ref Range: <=0.4 10e3/uL 0.0   Absolute Lymphocytes Latest Ref Range: 0.8 - 5.3 10e3/uL 1.3   Absolute Monocytes Latest Ref Range: 0.0 - 1.3 10e3/uL 0.5   % Immature Granulocytes Latest Units: % 0   Absolute Neutrophils Latest Ref Range: 1.6 - 8.3 10e3/uL 6.8   Absolute NRBCs Latest Units: 10e3/uL 0.0   NRBCs per 100 WBC Latest Ref Range: <1 /100 0       CT CORONARY: 3/4/14   IMPRESSION:  1. Normal coronary anatomy with no detectable stenosis or plaque. The coronaries arise from the anteriorly positioned aorta.   ADDITIONAL FINDINGS: The aortic root has an unusual appearance probably from the reconstructive surgery for transposition. The appearance is nearly of 2 aortic roots one small 1 which ends in a cul-de-sac and the other which leads into the ascending aorta. The coronary arteries arise from that aortic root which feeds into the ascending aorta . There is a conduit from the right ventricle to the pulmonary trunk. Normal pulmonary venous anatomy with all four pulmonary veins draining into the left atrium. There is no left atrial or left ventricular mass or thrombus. Normal pericardial thickness.There is no pericardial effusion. The proximal pulmonary arteries are well opacified. Please review Radiology report for incidental noncardiac findings that will follow separately.    CARDIAC MRI/MRA: 3/4/14   IMPRESSION:   1. Transposition of the great arteries with VSD after Rastelli procedure with RV to PA conduit.   2. Minimal flow acceleration across the RV to PA conduit with moderate pulmonary regurgitation (31-33% regurg fraction, Peak PA conduit gradient 18mm Hg).   3. Normal right and left ventricular size and systolic function with no evidence of  fibrosis.    Echo 3/16  Difficult study due to poor acoustic windows. The patient has undergone a Rastelli procedure for d-transposition of the great arteries and replacement of the pulmonary valve. The pulmonary valve is not seen in this study. Shunts were not able to be excluded. Mildly diminished left ventricular systolic function with mild left ventricular enlargement. Normal right ventricular and pulmonary artery pressures.    Echo 11/17  D-TGA status post Rastelli and subsequent placement of a bio-prosthetic valve in the pulmonary position. The calculated single plane left ventricular ejection fraction from the 4 chamber view is 58%. A pacing lead is present in the right atrium and right ventricle. Mild (1+) aortic valve insufficiency. Unable to adequately visualize the RV-PA conduit or the bio-prosthetic valve. Estimated right ventricular systolic pressure is 20-25 mmHg plus right atrial pressure.    Echo Nov 2018  D-TGA status post Rastelli and subsequent placement of a bio-prosthetic valve in the pulmonary position. There is no obvious residual ventricular level shunt. Mild (1+) aortic valve insufficiency. Unable to adequately visualize the RV-PA conduit or the bio-prosthetic valve, however normal antegrade flow is visualized in the branch pulmonary arteries. The calculated single plane left ventricular ejection fraction from the 4 chamber view is 56%. A pacing lead is present in the right atrium and right ventricle. No pericardial effusion. Technically difficult study due to poor acoustic windows.    CTA ANGIOGRAM CONGENITAL HEART DISEASE 7/25/2019   1.  D-transposition of aorta s/p Rastelli with bioprosthetic pulmonic valve  2.  Mildly reduced left ventricular function with LV EF 52%.  3.  Normal RV size and function with RV EF 55%.  4.  RV-PA valved conduit without visual evidence of stenosis.  5.  Mild pulmonic regurgitation (<30 mL) using biventricular stroke  volumes.  6.  No significant change from  CT scan in 2014.    Echo 2/28/20  Technically difficult study due to poor acoustic windows. D-TGA status post Rastelli and subsequent placement of a bio-prosthetic valve in the pulmonary position. There is no obvious residual ventricular level shunt. Mild (1+) aortic valve insufficiency. Unable to adequately visualize the RV-PA conduit or the bio-prosthetic valve; however, normal antegrade flow is visualized in the branch pulmonary arteries. A transvenous pacemaker lead is seen in the right ventricle, with the tip of the lead implanted into the right ventricular apex. Trivial tricuspid valve insufficiency. Estimated right ventricular systolic pressure is 26 mmHg plus right atrial pressure. The left and right ventricles have normal chamber size, wall thickness, and systolic function. The calculated biplane left ventricular ejection fraction is 57%. No pericardial effusion. No significant change from last echocardiogram.     Echo 3/23/21  Technically difficult study due to poor acoustic windows. D-TGA status-post Rastelli and subsequent placement of a bio-prosthetic valve in the pulmonary position. Trivial tricuspid valve regurgitation; estimated right ventricular systolic pressure 26mmHg plus right atrial pressure. Normal right ventricular size and systolic function. Unable to adequately visualize the RV-PA conduit or the bio-prosthetic valve; normal antegrade flow profile at the valve annulus; no obvious regurgitation. Mildly dilated left ventricle with normal wall thickness and mildly-depressed systolic function; biplane LV EF 47%. A transvenous pacemaker lead is seen in the right ventricle, with the tip of the lead implanted into the right ventricular apex. No effusion.  No significant change from last echocardiogram.    Echo 3/11/22  Technically difficult study due to poor acoustic windows. D-TGA status-post Rastelli and RV-PA conduit and subsequent placement of a bio-prosthetic valve in the pulmonary position.   Trivial tricuspid valve regurgitation? estimated right ventricular systolic pressure 27 mmHg plus right atrial pressure. Normal right ventricular size and systolic function. Unable to adequately visualize the RV-PA conduit or the bio-prosthetic valve? no obvious regurgitation. Normal left ventricular size with normal wall thickness and mildly-depressed systolic function? biplane LV EF 49%. A transvenous pacemaker lead is seen in the right ventricle, with the tip of the lead implanted into the right ventricular apex. No effusion.    Device interrogation 3/11/22  Device: Findery [id number] Evera XT VR  Normal Device Function.  Mode: VVI 40 bpm  : <0.1%  Intrinsic rhythm: Regular VS @ 94 bpm  Short V-V intervals: 0  Thoracic Impedance: Near reference line.   Lead Trends Appear Stable: Yes. RV lead impedance warning >3000 ohms - not new. RV lead impedance toady 304 ohms.  Estimated battery longevity to RRT = 5.2 years. Battery voltage = 2.98 V.   Arrhythmias: 1 NSVT episode recorded, lasting 2 minutes, at 171 bpm. 2 SVT episodes recorded, lasting 6-31 seconds, at 167 bpm - classified as SVT per Wavelet algorithm. EGM reveals similar morphologies.  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 complete TGA and VSD, status post Rastelli procedure with VSD closure and RV to PA conduit, history of RV-PA conduit obstruction requiring replacement during teenage years: Pt  continues to feel well, denies any change in exercise tolerance and is euvolemic today by history and exam. Echo today confirms no significant change from prior. BP - normotensive today.   Encouraged patient to continue regular aerobic exercise aiming for at least 150 minutes of moderate physical activity or 75 minutes of vigorous physical activity - or an equal combination of both - each week. and follow low-salt, heart healthy diet.  Continue statin for dyslipidemia given DM. Pt aware that she should not become pregnant while on statin therapy.  Also reminded patient the importance of maintaining good oral hygiene and routine dental care with SBE prophylaxis.  2.  DM:  Followed by PCP.  Instructed patient to discuss option of SGLT-2 inhibitor like dapagliflozin or empagliflozin given their cardio-protective benefits unless cost prohibitive.  Continues statin therapy, diet and exercise as above.  3. H/o VT s/p VT ablation and ICD placement: Known increased RV lead impedance.  Pt scheduled to congential EP in next few weeks.  Continue exercise restrictions (limitation in repetitive arm push/pull movements) until upcoming EP visit.      Follow-up: one year with EKG, echo. Device check and labs prior. Will be happy to see sooner if change in clinical status or new questions/concerns arise.    I spent a total of 40 minutes today with the patient personally reviewing recent cardiac testing and/or laboratory results, today's history and examination, and discussion and counseling with the patient and documentation        Please do not hesitate to contact me if you have any questions/concerns.     Sincerely,     Betsy Valle MD

## 2022-03-11 NOTE — PROGRESS NOTES
Assessment & Plan     Type 2 diabetes mellitus with other circulatory complication, without long-term current use of insulin (H)  - A1c at 9.0 today a significant increase from prior. Will start on low dose jardiance and recheck in 3 months or sooner if needed   - empagliflozin (JARDIANCE) 10 MG TABS tablet; Take 1 tablet (10 mg) by mouth daily  - Albumin Random Urine Quantitative with Creat Ratio; Future  - TSH with free T4 reflex; Future  - metFORMIN (GLUCOPHAGE) 500 MG tablet; Take 2 tablets (1,000 mg) by mouth 2 times daily (with meals)    Morbid obesity (H)  - diet and lifestyle changes discussed     CARDIOVASCULAR SCREENING; LDL GOAL LESS THAN 70  - atorvastatin (LIPITOR) 40 MG tablet; Take 1 tablet (40 mg) by mouth At Bedtime    Neuropathic pain  -stable   - gabapentin (NEURONTIN) 300 MG capsule; TAKE 1 CAPSULE(300 MG) BY MOUTH EVERY NIGHT      40 minutes spent on the date of the encounter doing chart review, history and exam, documentation and further activities per the note       See Patient Instructions    Return in about 3 months (around 6/11/2022) for diabetes, medication recheck, in person, .    Tammy Benedict MD  [facility]    Inés Diop is a 41 year old who presents for the following health issues     History of Present Illness       Mental Health Follow-up:                    Today's PHQ-9         PHQ-9 Total Score: 0  PHQ-9 Q9 Thoughts of better off dead/self-harm past 2 weeks :   (P) Not at all    How difficult have these problems made it for you to do your work, take care of things at home, or get along with other people: Not difficult at all    Today's FREDIS-7 Score: 3    Diabetes:   She presents for follow up of diabetes.  She is checking home blood glucose a few times a week. She checks blood glucose at bedtime.  Blood glucose is sometimes over 200 and never under 70. When her blood glucose is low, the patient is asymptomatic for  "confusion, blurred vision, lethargy and reports not feeling dizzy, shaky, or weak.  She is concerned about blood sugar frequently over 200. She is having excessive thirst. The patient has not had a diabetic eye exam in the last 12 months.           Atkins protein shake (M-f)  Eggs breakfast- weekends  Lunch- broccoli cheddar soup panera  Dinner- mac and cheese; or chicken or key/eggs/pancakes  Diet cranberry juice  Snacks- candy , ice cream       Wt Readings from Last 4 Encounters:   03/11/22 88.9 kg (195 lb 14.4 oz)   03/11/22 88 kg (193 lb 14.4 oz)   11/22/21 90.8 kg (200 lb 3.2 oz)   10/13/21 88 kg (193 lb 14.4 oz)           Review of Systems   Constitutional, HEENT, cardiovascular, pulmonary, GI, , musculoskeletal, neuro, skin, endocrine and psych systems are negative, except as otherwise noted.      Objective    BP (!) 73/41 (BP Location: Left arm, Patient Position: Sitting, Cuff Size: Adult Large)   Pulse 98   Temp 98  F (36.7  C) (Oral)   Resp 18   Ht 1.588 m (5' 2.5\")   Wt 88.9 kg (195 lb 14.4 oz)   SpO2 100%   BMI 35.26 kg/m    Body mass index is 35.26 kg/m .  Physical Exam   GENERAL: healthy, alert and no distress  NECK: no adenopathy, no asymmetry, masses, or scars and thyroid normal to palpation  RESP: lungs clear to auscultation - no rales, rhonchi or wheezes  CV: regular rate and rhythm, normal S1 S2, no S3 or S4, no murmur, click or rub, no peripheral edema and peripheral pulses strong  ABDOMEN: soft, nontender, no hepatosplenomegaly, no masses and bowel sounds normal  MS: no gross musculoskeletal defects noted, no edema  PSYCH: mentation appears normal, affect normal/bright  Diabetic foot exam: normal DP and PT pulses, no trophic changes or ulcerative lesions and normal sensory exam    Lab Results   Component Value Date    A1C 9.0 03/11/2022    A1C 8.6 11/29/2021    A1C 7.9 02/11/2021    A1C 7.5 08/06/2020    A1C 6.6 10/07/2019    A1C 6.1 04/25/2019    A1C 6.1 08/16/2018             "

## 2022-03-11 NOTE — PATIENT INSTRUCTIONS
It was a pleasure to see you in clinic today. Please do not hesitate to call with any questions or concerns. You are scheduled for a remote transmission on 7/12/22. We look forward to seeing you in clinic at your next device check in 6-12 months.     Caridad Fernandez, RN  Electrophysiology Nurse Clinician  [facility]    During business hours please call: [phone]  After hours please call: [phone] - select option #4 and ask for job code [phone]

## 2022-03-11 NOTE — NURSING NOTE
Chief Complaint   Patient presents with     Follow Up     41 year old female with history of TGA s/p correctional surgery in 1998, a history of VT with ablation and ICD placement as secondary prevention in 03/2014, now s/p recent new single chamber ICD system 6/1/15 presenting for follow up     Vitals were taken and medications were reconciled. EKG was performed   PRIMO Sapp  11:52 AM

## 2022-03-11 NOTE — TELEPHONE ENCOUNTER
[facility] are calling [phone] and patient is requesting to have labs drawn for diabetes.    hgba1c ordered    Marysol Burton/KOSTA  [place]

## 2022-03-12 ASSESSMENT — PATIENT HEALTH QUESTIONNAIRE - PHQ9: SUM OF ALL RESPONSES TO PHQ QUESTIONS 1-9: 0

## 2022-03-12 ASSESSMENT — ANXIETY QUESTIONNAIRES: GAD7 TOTAL SCORE: 3

## 2022-03-13 LAB
MDC_IDC_EPISODE_DTM: NORMAL
MDC_IDC_EPISODE_DURATION: 121 S
MDC_IDC_EPISODE_DURATION: 31 S
MDC_IDC_EPISODE_DURATION: 6 S
MDC_IDC_EPISODE_ID: 543
MDC_IDC_EPISODE_ID: 544
MDC_IDC_EPISODE_ID: 545
MDC_IDC_EPISODE_TYPE: NORMAL
MDC_IDC_LEAD_IMPLANT_DT: NORMAL
MDC_IDC_LEAD_LOCATION: NORMAL
MDC_IDC_LEAD_LOCATION_DETAIL_1: NORMAL
MDC_IDC_LEAD_MFG: NORMAL
MDC_IDC_LEAD_MODEL: NORMAL
MDC_IDC_LEAD_POLARITY_TYPE: NORMAL
MDC_IDC_LEAD_SERIAL: NORMAL
MDC_IDC_MSMT_BATTERY_DTM: NORMAL
MDC_IDC_MSMT_BATTERY_REMAINING_LONGEVITY: 63 MO
MDC_IDC_MSMT_BATTERY_RRT_TRIGGER: 2.73
MDC_IDC_MSMT_BATTERY_STATUS: NORMAL
MDC_IDC_MSMT_BATTERY_VOLTAGE: 2.98 V
MDC_IDC_MSMT_CAP_CHARGE_DTM: NORMAL
MDC_IDC_MSMT_CAP_CHARGE_ENERGY: 18 J
MDC_IDC_MSMT_CAP_CHARGE_TIME: 3.9
MDC_IDC_MSMT_CAP_CHARGE_TYPE: NORMAL
MDC_IDC_MSMT_LEADCHNL_RV_IMPEDANCE_VALUE: 304 OHM
MDC_IDC_MSMT_LEADCHNL_RV_PACING_THRESHOLD_AMPLITUDE: 0.5 V
MDC_IDC_MSMT_LEADCHNL_RV_PACING_THRESHOLD_PULSEWIDTH: 0.4 MS
MDC_IDC_MSMT_LEADCHNL_RV_SENSING_INTR_AMPL: 6.4 MV
MDC_IDC_PG_IMPLANT_DTM: NORMAL
MDC_IDC_PG_MFG: NORMAL
MDC_IDC_PG_MODEL: NORMAL
MDC_IDC_PG_SERIAL: NORMAL
MDC_IDC_PG_TYPE: NORMAL
MDC_IDC_SESS_CLINIC_NAME: NORMAL
MDC_IDC_SESS_DTM: NORMAL
MDC_IDC_SESS_TYPE: NORMAL
MDC_IDC_SET_BRADY_HYSTRATE: NORMAL
MDC_IDC_SET_BRADY_LOWRATE: 40 {BEATS}/MIN
MDC_IDC_SET_BRADY_MODE: NORMAL
MDC_IDC_SET_LEADCHNL_RV_PACING_AMPLITUDE: 2 V
MDC_IDC_SET_LEADCHNL_RV_PACING_ANODE_ELECTRODE_1: NORMAL
MDC_IDC_SET_LEADCHNL_RV_PACING_ANODE_LOCATION_1: NORMAL
MDC_IDC_SET_LEADCHNL_RV_PACING_CAPTURE_MODE: NORMAL
MDC_IDC_SET_LEADCHNL_RV_PACING_CATHODE_ELECTRODE_1: NORMAL
MDC_IDC_SET_LEADCHNL_RV_PACING_CATHODE_LOCATION_1: NORMAL
MDC_IDC_SET_LEADCHNL_RV_PACING_POLARITY: NORMAL
MDC_IDC_SET_LEADCHNL_RV_PACING_PULSEWIDTH: 0.4 MS
MDC_IDC_SET_LEADCHNL_RV_SENSING_ANODE_ELECTRODE_1: NORMAL
MDC_IDC_SET_LEADCHNL_RV_SENSING_ANODE_LOCATION_1: NORMAL
MDC_IDC_SET_LEADCHNL_RV_SENSING_CATHODE_ELECTRODE_1: NORMAL
MDC_IDC_SET_LEADCHNL_RV_SENSING_CATHODE_LOCATION_1: NORMAL
MDC_IDC_SET_LEADCHNL_RV_SENSING_POLARITY: NORMAL
MDC_IDC_SET_LEADCHNL_RV_SENSING_SENSITIVITY: 0.3 MV
MDC_IDC_SET_ZONE_DETECTION_BEATS_DENOMINATOR: 40 {BEATS}
MDC_IDC_SET_ZONE_DETECTION_BEATS_NUMERATOR: 30 {BEATS}
MDC_IDC_SET_ZONE_DETECTION_INTERVAL: 270 MS
MDC_IDC_SET_ZONE_DETECTION_INTERVAL: 360 MS
MDC_IDC_SET_ZONE_DETECTION_INTERVAL: 370 MS
MDC_IDC_SET_ZONE_DETECTION_INTERVAL: NORMAL
MDC_IDC_SET_ZONE_TYPE: NORMAL
MDC_IDC_STAT_BRADY_DTM_END: NORMAL
MDC_IDC_STAT_BRADY_DTM_START: NORMAL
MDC_IDC_STAT_BRADY_RV_PERCENT_PACED: 0.01 %
MDC_IDC_STAT_EPISODE_RECENT_COUNT: 0
MDC_IDC_STAT_EPISODE_RECENT_COUNT: 1
MDC_IDC_STAT_EPISODE_RECENT_COUNT_DTM_END: NORMAL
MDC_IDC_STAT_EPISODE_RECENT_COUNT_DTM_START: NORMAL
MDC_IDC_STAT_EPISODE_TOTAL_COUNT: 0
MDC_IDC_STAT_EPISODE_TOTAL_COUNT: 1
MDC_IDC_STAT_EPISODE_TOTAL_COUNT: 17
MDC_IDC_STAT_EPISODE_TOTAL_COUNT: 236
MDC_IDC_STAT_EPISODE_TOTAL_COUNT_DTM_END: NORMAL
MDC_IDC_STAT_EPISODE_TOTAL_COUNT_DTM_START: NORMAL
MDC_IDC_STAT_EPISODE_TYPE: NORMAL
MDC_IDC_STAT_TACHYTHERAPY_ATP_DELIVERED_RECENT: 0
MDC_IDC_STAT_TACHYTHERAPY_ATP_DELIVERED_TOTAL: 0
MDC_IDC_STAT_TACHYTHERAPY_RECENT_DTM_END: NORMAL
MDC_IDC_STAT_TACHYTHERAPY_RECENT_DTM_START: NORMAL
MDC_IDC_STAT_TACHYTHERAPY_SHOCKS_ABORTED_RECENT: 0
MDC_IDC_STAT_TACHYTHERAPY_SHOCKS_ABORTED_TOTAL: 0
MDC_IDC_STAT_TACHYTHERAPY_SHOCKS_DELIVERED_RECENT: 0
MDC_IDC_STAT_TACHYTHERAPY_SHOCKS_DELIVERED_TOTAL: 1
MDC_IDC_STAT_TACHYTHERAPY_TOTAL_DTM_END: NORMAL
MDC_IDC_STAT_TACHYTHERAPY_TOTAL_DTM_START: NORMAL

## 2022-03-16 LAB
ATRIAL RATE - MUSE: 89 BPM
DIASTOLIC BLOOD PRESSURE - MUSE: NORMAL MMHG
INTERPRETATION ECG - MUSE: NORMAL
P AXIS - MUSE: 67 DEGREES
PR INTERVAL - MUSE: 182 MS
QRS DURATION - MUSE: 144 MS
QT - MUSE: 420 MS
QTC - MUSE: 511 MS
R AXIS - MUSE: -9 DEGREES
SYSTOLIC BLOOD PRESSURE - MUSE: NORMAL MMHG
T AXIS - MUSE: 62 DEGREES
VENTRICULAR RATE- MUSE: 89 BPM

## 2022-04-01 ENCOUNTER — VIRTUAL VISIT (OUTPATIENT)
Dept: CARDIOLOGY | Facility: CLINIC | Age: 42
End: 2022-04-01
Attending: INTERNAL MEDICINE
Payer: MEDICARE

## 2022-04-01 DIAGNOSIS — Q20.3 D-TGA (DEXTRO-TRANSPOSITION OF GREAT ARTERIES): ICD-10-CM

## 2022-04-01 PROCEDURE — 99214 OFFICE O/P EST MOD 30 MIN: CPT | Mod: 95 | Performed by: INTERNAL MEDICINE

## 2022-04-01 NOTE — LETTER
4/1/2022      RE: Chikis Hannon  [address]  [facility] [address]       Dear Colleague,    Thank you for the opportunity to participate in the care of your patient, Chikis Hannon, at the [facility] [place] at [facility]. Please see a copy of my visit note below.    Chikis is a 41 year old who is being evaluated via a billable video visit.      How would you like to obtain your AVS? MyChart  If the video visit is dropped, the invitation should be resent by: Send to e-mail at: [email or web address]  Will anyone else be joining your video visit?         Endy Sharpe, Visit Facilitator/[facility] VISIT    Assessment/Recommendations   Assessment/Plan:    Ms. 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 She presents today for follow up. She presents today for follow up. She reports feeling well. She denies chest discomfort, palpitations, abdominal fullness/bloating or peripheral edema, shortness of breath, paroxysmal nocturnal dyspnea, orthopnea, lightheadedness, dizziness, pre-syncope, or syncope. Device interrogation shows normal device interrogation, RV lead impedance has had warning in past currently 304 ohms, stable lead parameters, 1 NSVT episode recorded, lasting 2 minutes, at  171 bpm. 2 SVT episodes recorded, lasting 6-31 seconds, at 167 bpm - classified as SVT per Wavelet algorithm. EGM reveals similar morphologies, and  <0.1% . No current cardiac medications.       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 Normal systemic ventricular function  2. No further VT episodes for years, off AAT  3. Review of impedance trend shows stable RV tip to RV coil impedance. Programming changes: RV pace polarity and RV sense polarity to Tip-Coil. Pacing impedance alert turned off and lead integrity alert left on.  Lead integrity alert looks at programmed vector and pacing impedance alert looks at all vectors.  This will provide an alert if there is change in impedance tip-coil. We discussed that if further lead deterioration or integrity alerts despite this programming, then we would need to replace lead. Currently, lead impedance is stable. She will continue to follow with device clinic per routine and has home monitoring active.     Follow up in 1 year or sooner if need arises.        History of Present Illness/Subjective    Ms. Chikis Hannon is a 41 year old female who comes in today for EP follow-up of ICD lead integrity alert.    Ms. Hannon is a 41 year old female with past medical history significant for complete Transposition of the Great Arteries, VSD, and pulmonic stenosis, s/p Rastelli procedure with VSD closure and RV to PA conduit (these procedures were done at [place] and [place] during early life per 's notes, complete details are not available, total of 4 surgeries), with history of RV-PA conduit obstruction  requiring replacement in teenage years, with mild , sustained VT s/p VT ablation 3/13/14, s/p ICD 3/14/14, later Rv lead malfunction due to isolation break and failure to deliver shock for recurrent fast VT (VT spontaneously terminated) s/p replacement of entire system 6/2/2015.     She presented in 3/2014 after having sustained symptomatic VT She was discharged on sotalol 120 mg twice a day. She was seen in clinic on 4/22/14 and her device interrogation showed no recurrence of ventricular arrhythmias. She reported some mild fatigue on Sotalol and her dose was decreased to 80 mg BID. At her July 2014 follow up appointment she only had one 4 second NSVT at 160 bpm which was asymptomatic. At her clinic visit in 2/15 she again had no ventricular arrhythmias and her Sotalol was stopped. After recurrence of VT in June 2015, the patient was restarted on sotalol 80 mg twice a day. We re-attempted VT ablation after holding sotalol for one week on 8/24/2015 with no inducible VT.  [facility] She presents today for follow-up.  Her device interrogation shows no ventricular arrhythmias.  Her device site has healed well. She denies any syncope or presyncope, no chest pain or shortness of breath on exertion at rest, no orthopnea, no lower extremity edema. Her EKG shows normal sinus rhythm with RBBB,  ms.  [facility] EP Visit 9/23/17: She presents today for follow up. She reports feeling well. She denies any chest pain, dizziness, lightheadedness, shortness of breath, palpitations, leg/ankle swelling, PND, orthopnea, or syncopal symptoms. She has started exercise classes and is increasing her activity levels. Device interrogation shows normal device function.  17 episodes recorded. 16 VT zone monitored events when she was exercising EGMS shows ST with rates of 168-172 BPM.  1 NSVT for 4 beats at a rate of 211 for 2 second duration.  Intrinsic rhythm = SR @ 90 bpm. = <0.1%. OptiVol fluid index is at baseline. No  short v-v intervals recorded.  RV lead impedance trends appear stable. Presenting 12 lead ECG shows SR with RBBB Vent Rate 84 bpm,  ms,  ms, QTc 505 ms. Current cardiac medications include: Sotalol.   Ep Visit 9/2017: She presents today for follow up. She reports feeling well. She denies any chest pain/pressures, dizziness, lightheadedness, shortness of breath, leg/ankle swelling, PND, orthopnea, palpitations, or syncopal symptoms. She continues to exercise and is without limitations. Device interrogation shows normal device function.  2 NSVT episodes recorded, lasting 1-2 seconds. 8 episodes recorded as VT, these EGMs are consistent with ST.  <0.1%. No short v-v intervals recorded. Lead trends appear stable. Presenting 12 lead ECG shows NSR with RBBB Vent Rate 85 bpm,  ms,  ms, QTc 504 ms. Current cardiac medications include: Sotalol.      Ep Visit 2/20/20: She presents today for follow up. She reports feeling well. She states she has not been taking her Sotalol for the last many months. She denies any chest pain/pressures, dizziness, lightheadedness, worsening shortness of breath, leg/ankle swelling, PND, orthopnea, palpitations, or syncopal symptoms. Device interrogation shows  normal ICD function.  Since last remote transmission on 10/29/19, there have been 5 SVT-Wavelet, 7 monitored VT and 2 NSVT episodes recorded.  The SVT episodes last 5-30 seconds at 167-176 bpm, occur with activity and appear to be ST.  The monitored VT episodes last 5-56 seconds at rates of 167-171 bpm.  These also occur with activity and appear  to be ST with several occurring on 11/27/19.  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  The NSVT episodes last 1 second in duration @ 231-226 bpm.   Intrinsic rhythm = SR @ 80 bpm.    =<0.1%. No short v-v intervals recorded.  Lead trends appear stable.  Echo today is stable with normal systemic  function. No current cardiac medications.      EP Visit 3/26/21: She presents today for follow up. She reports feeling well. She denies any chest pain/pressures, dizziness, lightheadedness, worsening shortness of breath, leg/ankle swelling, PND, orthopnea, palpitations, or syncopal symptoms. Recent echo showed normal RV size/function, mildly dialted LV with  LVEF 47%, with no significant changes from prior echo. Device interrogation shows normal device function, stable lead parameters,  <0.1%, 46 VT and 2 NSVT all EGMs suggest ST and not true arrhythmia. No current cardiac medications.     EP Visit 10/13/21: She presents today for follow up. She recently had a ICD alarm that prompted her to go in for evaluation. Investigation showed this was a lead integrity alert. Red Alert for Lead integrity was triggered secondary to increase in RV impedance >75% and multiple short R-R intervals. Max impedance was approximately 1000 ohms.  Review of impedance trend shows stable RV tip to RV coil impedance. Tech services recommends programming RV pace polarity and RV sense polarity to Tip-Coil.  They recommend turning off pacing impedance alert and leaving lead integrity alert on.  Lead integrity alert looks at programmed vector and pacing impedance alert looks at all vectors.  This will provide an alert if there is change in impedance tip-coil. She was brought into clinic and these changes were made. She reports feeling well. She denies chest discomfort, palpitations, abdominal fullness/bloating or peripheral edema, shortness of breath, paroxysmal nocturnal dyspnea, orthopnea, lightheadedness, dizziness, pre-syncope, or syncope. No current cardiac medications.     She presents today for follow up. She presents today for follow up. She reports feeling well. She denies chest discomfort, palpitations, abdominal fullness/bloating or peripheral edema, shortness of breath, paroxysmal nocturnal dyspnea, orthopnea, lightheadedness,  dizziness, pre-syncope, or syncope. Device interrogation shows normal device interrogation, RV lead impedance has had warning in past currently 304 ohms, stable lead parameters, 1 NSVT episode recorded, lasting 2 minutes, at 171 bpm. 2 SVT episodes recorded, lasting 6-31 seconds, at 167 bpm - classified as SVT per Wavelet algorithm. EGM reveals similar morphologies, and  <0.1% . No current cardiac medications.       I have reviewed and updated the patient's Past Medical History, Social History, Family History and Medication List.     Cardiographics (Personally Reviewed) :   LAST CARDIAC MRA:  3/6/14  FINDINGS:   SITUS: There is a normal spleen in the left upper quadrant. There is situs solitus in the chest, as demonstrated by a normal airway pulmonary artery relationship bilaterally.   CAVAE: Single right-sided inferior and superior vena cavae drain normally into the right atrium unobstructed.   PULMONARY VEINS: Two right and two left pulmonary veins drain into the left atrium unobstructed.   ATRIA: There is no interatrial communication demonstrated. The atrial sizes are normal.   ATRIOVENTRICULAR CONNECTION: Concordant. Separate mitral and tricuspid valves without evidence of significant regurgitation or stenosis.   VENTRICLES: D-loop ventricles with levocardia. Ventricles are normal in size and contraction. Right ventricular end-diastolic volume is upper limits of normal for BSA. If utilizing an ideal body weight, this would be mildly enlarged. No residual interventricular communication is demonstrated. No myocardial hyperenhancement identified on delayed post gadolinium imaging, except at the ventricular insertion points.   VENTRICULOARTERIAL CONNECTION: Concordant. Trileaflet aortic valve without regurgitation or stenosis. The oversewn native pulmonary artery is directly posterior to the aorta. There is now an RV to PA conduit. Mild stenosis of the RVOT conduit with moderate regurgitation, 31% by direct flow  measurement, and 33% by ventricular stroke volume comparison.   AORTA AND SUPRA-AORTIC VESSELS: A left-sided aortic arch is demonstrated with normal cervical branching pattern. No evidence of patent ductus arteriosus, coarctation, or aortopulmonary collateral arteries. The coronary arteries are not well visualized in this study.   PULMONARY ARTERY: The RV to PA conduit is patent with normal pulmonary artery branching pattern.   LAST ECHO:  2/2020  ------CONCLUSIONS------  Technically difficult study due to poor acoustic windows. D-TGA status post  Rastelli and subsequent placement of a bio-prosthetic valve in the pulmonary  position. There is no obvious residual ventricular level shunt. Mild (1+)  aortic valve insufficiency. Unable to adequately visualize the RV-PA conduit  or the bio-prosthetic valve; however, normal antegrade flow is visualized in  the branch pulmonary arteries. A transvenous pacemaker lead is seen in the  right ventricle, with the tip of the lead implanted into the right ventricular  apex. Trivial tricuspid valve insufficiency. Estimated right ventricular  systolic pressure is 26 mmHg plus right atrial pressure. The left and right  ventricles have normal chamber size, wall thickness, and systolic function.  The calculated biplane left ventricular ejection fraction is 57%. No  pericardial effusion.  No significant change from last echocardiogram.  7/25/19 CTA:  IMPRESSION:  1.  D-transposition of aorta s/p Rastelli with bioprosthetic pulmonic  valve  2.  Mildly reduced left ventricular function with LV EF 52%.  3.  Normal RV size and function with RV EF 55%.  4.  RV-PA valved conduit without visual evidence of stenosis.  5.  Mild pulmonic regurgitation (<30 mL) using biventricular stroke  volumes.  6.  No significant change from CT scan in 2014.  7.  Please review radiology review for incidental non-cardiovascular  findings to follow separately     3/23/21 ECHO:  ##### CONCLUSIONS  #####  Technically difficult study due to poor acoustic windows. D-TGA status-post  Rastelli and subsequent placement of a bio-prosthetic valve in the pulmonary  position.     Trivial tricuspid valve regurgitation; estimated right ventricular systolic  pressure 26mmHg plus right atrial pressure. Normal right ventricular size and  systolic function. Unable to adequately visualize the RV-PA conduit or the  bio-prosthetic valve; normal antegrade flow profile at the valve annulus; no  obvious regurgitation. Mildly dilated left ventricle with normal wall  thickness and mildly-depressed systolic function; biplane LV EF 47%. A  transvenous pacemaker lead is seen in the right ventricle, with the tip of the  lead implanted into the right ventricular apex. No effusion.     No significant change from last echocardiogram.       Physical Examination   There were no vitals taken for this visit.   Wt Readings from Last 3 Encounters:   03/11/22 88.9 kg (195 lb 14.4 oz)   03/11/22 88 kg (193 lb 14.4 oz)   11/22/21 90.8 kg (200 lb 3.2 oz)     General Appearance:   Alert, well-appearing and in no acute distress.   HEENT: Atraumatic, normocephalic. PERRL.  MMM.   Chest/Lungs:   Respirations unlabored.  Lungs are clear to auscultation.   Cardiovascular:   Regular rate and rhythm. III/VI KRISTI   Abdomen:  Soft, nontender, nondistended.   Extremities: No cyanosis or clubbing. No edema.    Musculoskeletal: Moves all extremities.  Gait normal.   Skin: Warm, dry, intact.    Neurologic: Mood and affect are appropriate.  Alert and oriented to person, place, time, and situation.          Medications  Allergies   Current Outpatient Medications   Medication Sig Dispense Refill     ACE NOT PRESCRIBED, INTENTIONAL, 1 each as needed for other ACE Inhibitor not prescribed due to Other: BP at goal, pending microalbumin test (Patient not taking: Reported on 10/13/2021) 0 each 0     amoxicillin (AMOXIL) 500 MG capsule Please take 2,000 mg (4 capsule)  30-60 min prior to all dental procedures 4 capsule 3     atorvastatin (LIPITOR) 40 MG tablet Take 1 tablet (40 mg) by mouth At Bedtime 90 tablet 3     blood glucose (ACCU-CHEK SMARTVIEW) test strip Use to test blood sugar 1 times daily or as directed. 100 strip 1     blood glucose monitoring (ACCU-CHEK FASTCLIX) lancets Use to test blood sugar 1 times daily or as directed. 1 Box 3     empagliflozin (JARDIANCE) 10 MG TABS tablet Take 1 tablet (10 mg) by mouth daily 90 tablet 0     gabapentin (NEURONTIN) 300 MG capsule TAKE 1 CAPSULE(300 MG) BY MOUTH EVERY NIGHT 90 capsule 1     metFORMIN (GLUCOPHAGE) 500 MG tablet Take 2 tablets (1,000 mg) by mouth 2 times daily (with meals) 360 tablet 1    Allergies   Allergen Reactions     Adhesive Tape Rash         Lab Results (Personally Reviewed)    Chemistry/lipid CBC Cardiac Enzymes/BNP/TSH/INR   Lab Results   Component Value Date    BUN 10 03/11/2022     03/11/2022    CO2 25 03/11/2022     Creatinine   Date Value Ref Range Status   03/11/2022 0.57 0.52 - 1.04 mg/dL Final   03/23/2021 0.61 0.52 - 1.04 mg/dL Final       Lab Results   Component Value Date    CHOL 179 11/29/2021    HDL 42 (L) 11/29/2021     (H) 11/29/2021      Lab Results   Component Value Date    WBC 8.7 03/11/2022    HGB 13.5 03/11/2022    HCT 41.5 03/11/2022    MCV 85 03/11/2022     03/11/2022    Lab Results   Component Value Date    TSH 2.13 03/11/2022    INR 1.13 06/01/2015        The patient states understanding and is agreeable with the plan.   Vimal Ray MD [facility]  Cardiology - Electrophysiology    Total time spent on patient visit, reviewing notes, imaging, labs, orders, and completing necessary documentation: 30 minutes.  >50% of visit spent on counseling patient and/or coordination of care.

## 2022-04-01 NOTE — PATIENT INSTRUCTIONS
You were seen today in the Adult Congenital and Cardiovascular Genetics Clinic at the [facility].    Cardiology Providers you saw during your visit:  Vimal Ray MD    Diagnosis:  D-TGA    Results:  Vimal Ray MD reviewed the results of your device testing today in clinic via video.    Recommendations:    1. Continue to eat a heart healthy, low salt diet.  2. Continue to get 20-30 minutes of aerobic activity, 4-5 days per week.  Examples of aerobic activity include walking, running, swimming, cycling, etc.  3. Continue to observe good oral hygiene, with regular dental visits.  4. No changes      SBE prophylaxis:   Yes__x__  No____    Lifelong Bacterial Endocarditis Prophylaxis:  YES____  NO____    If YES is checked, follow the recommendations outlined below:  1. Take antibiotic(s) prior to recommended dental procedures and procedures on the respiratory tract or with infected skin, muscle or bones. SBE prophylaxis is not needed for routine GI and  procedures (ie. Colonoscopy or vaginal delivery)  2. Observe good oral hygiene daily, as advised by your dentist. Get regular professional dental care.  3. Keep cuts clean.  4. Infections should be treated promptly.  5. Symptoms of Infective Endocarditis could include: fever lasting more than 4-5 days or a recurrent fever that initially resolves but returns within 1-2 days)      Exercise restrictions:   Yes__X__  No____         If yes, list restrictions:  Must be allowed to rest if fatigued or SOB      Work restrictions:  Yes____  No_X___         If yes, list restrictions:    FASTING CHOLESTEROL was checked in the last 5 years YES_x__  NO___ (2021)  Continue to eat a heart healthy, low salt diet.         ____ Fasting lipid panel order today         ____ No changes in medications          ____ I recommend the following changes in your cholesterol medications.:          ____ Please follow up for cholesterol screening at your primary care  physician      Follow-up:  Follow up with Dr. Ray in 1 year    If you have questions or concerns please contact us at:    KAIDEN ArteagaN, RN    Nora Collins (Scheduling)  Nurse Care Coordinator     Clinic   Adult Congenital and CV Genetics   Adult Congenital and CV Genetic  [facility]   [facility]  (P) [phone]     (P) [phone]         (F) [phone]        For after hours urgent needs, call [phone] and ask to speak to the Adult Congenital Physician on call.  Mention Job Code 0401.    For emergencies call 911.    [facility]  Mail Code [id number]  [address] [place] [address]  [phone]

## 2022-04-01 NOTE — PROGRESS NOTES
Chikis is a 41 year old who is being evaluated via a billable video visit.      How would you like to obtain your AVS? MyChart  If the video visit is dropped, the invitation should be resent by: Send to e-mail at: [email or web address]  Will anyone else be joining your video visit?         Endy Sharpe, Visit Facilitator/[facility]    Assessment/Recommendations   Assessment/Plan:    Ms. 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 She presents today for follow up. She presents today for follow up. She reports feeling well. She denies chest discomfort, palpitations, abdominal fullness/bloating or peripheral edema, shortness of breath, paroxysmal nocturnal dyspnea, orthopnea, lightheadedness, dizziness, pre-syncope, or syncope. Device interrogation shows normal device interrogation, RV lead impedance has had warning in past currently 304 ohms, stable lead parameters, 1 NSVT episode recorded, lasting 2 minutes, at 171 bpm. 2 SVT episodes recorded, lasting 6-31 seconds, at 167 bpm - classified as SVT per Wavelet algorithm. EGM reveals similar morphologies, and  <0.1% . No current cardiac medications.       TGA, VSD, Pulmonary valve stenosis s/p Rastelli procedure with VSD closure and RV to PA conduit s/p RV-PA conduit obstruction requiring replacement in  teenage years  VT s/p VT ablation 3/13/14, s/p ICD 3/14/14, later RV lead malfunction due to isolation break and failure to deliver shock for recurrent fast VT (VT spontaneously terminated) s/p replacement of entire system 6/2/2015 now with lead integrity alert:  1. Normal systemic ventricular function  2. No further VT episodes for years, off AAT  3. Review of impedance trend shows stable RV tip to RV coil impedance. Programming changes: RV pace polarity and RV sense polarity to Tip-Coil. Pacing impedance alert turned off and lead integrity alert left on.  Lead integrity alert looks at programmed vector and pacing impedance alert looks at all vectors.  This will provide an alert if there is change in impedance tip-coil. We discussed that if further lead deterioration or integrity alerts despite this programming, then we would need to replace lead. Currently, lead impedance is stable. She will continue to follow with device clinic per routine and has home monitoring active.     Follow up in 1 year or sooner if need arises.        History of Present Illness/Subjective    Ms. Chikis Hannon is a 41 year old female who comes in today for EP follow-up of ICD lead integrity alert.    Ms. 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     She presented in 3/2014 after having sustained symptomatic VT She  was discharged on sotalol 120 mg twice a day. She was seen in clinic on 4/22/14 and her device interrogation showed no recurrence of ventricular arrhythmias. She reported some mild fatigue on Sotalol and her dose was decreased to 80 mg BID. At her July 2014 follow up appointment she only had one 4 second NSVT at 160 bpm which was asymptomatic. At her clinic visit in 2/15 she again had no ventricular arrhythmias and her Sotalol was stopped. After recurrence of VT in June 2015, the patient was restarted on sotalol 80 mg twice a day. We re-attempted VT ablation after holding sotalol for one week on 8/24/2015 with no inducible VT.  [facility] EP Follow up 9/22/15: She presents today for follow-up.  Her device interrogation shows no ventricular arrhythmias.  Her device site has healed well. She denies any syncope or presyncope, no chest pain or shortness of breath on exertion at rest, no orthopnea, no lower extremity edema. Her EKG shows normal sinus rhythm with RBBB,  ms.  [facility] EP Visit 9/23/17: She presents today for follow up. She reports feeling well. She denies any chest pain, dizziness, lightheadedness, shortness of breath, palpitations, leg/ankle swelling, PND, orthopnea, or syncopal symptoms. She has started exercise classes and is increasing her activity levels. Device interrogation shows normal device function.  17 episodes recorded. 16 VT zone monitored events when she was exercising EGMS shows ST with rates of 168-172 BPM.  1 NSVT for 4 beats at a rate of 211 for 2 second duration.  Intrinsic rhythm = SR @ 90 bpm. = <0.1%. OptiVol fluid index is at baseline. No short v-v intervals recorded.  RV lead impedance trends appear stable. Presenting 12 lead ECG shows SR with RBBB Vent Rate 84 bpm,  ms,  ms, QTc 505 ms. Current cardiac medications include: Sotalol.   Ep Visit 9/2017: She presents today for follow up. She reports feeling well. She denies any chest pain/pressures, dizziness,  lightheadedness, shortness of breath, leg/ankle swelling, PND, orthopnea, palpitations, or syncopal symptoms. She continues to exercise and is without limitations. Device interrogation shows normal device function.  2 NSVT episodes recorded, lasting 1-2 seconds. 8 episodes recorded as VT, these EGMs are consistent with ST.  <0.1%. No short v-v intervals recorded. Lead trends appear stable. Presenting 12 lead ECG shows NSR with RBBB Vent Rate 85 bpm,  ms,  ms, QTc 504 ms. Current cardiac medications include: Sotalol.      Ep Visit 2/20/20: She presents today for follow up. She reports feeling well. She states she has not been taking her Sotalol for the last many months. She denies any chest pain/pressures, dizziness, lightheadedness, worsening shortness of breath, leg/ankle swelling, PND, orthopnea, palpitations, or syncopal symptoms. Device interrogation shows  normal ICD function.  Since last remote transmission on 10/29/19, there have been 5 SVT-Wavelet, 7 monitored VT and 2 NSVT episodes recorded.  The SVT episodes last 5-30 seconds at 167-176 bpm, occur with activity and appear to be ST.  The monitored VT episodes last 5-56 seconds at rates of 167-171 bpm.  These also occur with activity and appear  to be ST with several occurring on 11/27/19.  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  The NSVT episodes last 1 second in duration @ 231-226 bpm.   Intrinsic rhythm = SR @ 80 bpm.    =<0.1%. No short v-v intervals recorded.  Lead trends appear stable.  Echo today is stable with normal systemic function. No current cardiac medications.      EP Visit 3/26/21: She presents today for follow up. She reports feeling well. She denies any chest pain/pressures, dizziness, lightheadedness, worsening shortness of breath, leg/ankle swelling, PND, orthopnea, palpitations, or syncopal symptoms. Recent echo showed normal RV size/function,  mildly dialted LV with  LVEF 47%, with no significant changes from prior echo. Device interrogation shows normal device function, stable lead parameters,  <0.1%, 46 VT and 2 NSVT all EGMs suggest ST and not true arrhythmia. No current cardiac medications.     EP Visit 10/13/21: She presents today for follow up. She recently had a ICD alarm that prompted her to go in for evaluation. Investigation showed this was a lead integrity alert. Red Alert for Lead integrity was triggered secondary to increase in RV impedance >75% and multiple short R-R intervals. Max impedance was approximately 1000 ohms.  Review of impedance trend shows stable RV tip to RV coil impedance. Tech services recommends programming RV pace polarity and RV sense polarity to Tip-Coil.  They recommend turning off pacing impedance alert and leaving lead integrity alert on.  Lead integrity alert looks at programmed vector and pacing impedance alert looks at all vectors.  This will provide an alert if there is change in impedance tip-coil. She was brought into clinic and these changes were made. She reports feeling well. She denies chest discomfort, palpitations, abdominal fullness/bloating or peripheral edema, shortness of breath, paroxysmal nocturnal dyspnea, orthopnea, lightheadedness, dizziness, pre-syncope, or syncope. No current cardiac medications.     She presents today for follow up. She presents today for follow up. She reports feeling well. She denies chest discomfort, palpitations, abdominal fullness/bloating or peripheral edema, shortness of breath, paroxysmal nocturnal dyspnea, orthopnea, lightheadedness, dizziness, pre-syncope, or syncope. Device interrogation shows normal device interrogation, RV lead impedance has had warning in past currently 304 ohms, stable lead parameters, 1 NSVT episode recorded, lasting 2 minutes, at 171 bpm. 2 SVT episodes recorded, lasting 6-31 seconds, at 167 bpm - classified as SVT per Wavelet algorithm. EGM  reveals similar morphologies, and  <0.1% . No current cardiac medications.       I have reviewed and updated the patient's Past Medical History, Social History, Family History and Medication List.     Cardiographics (Personally Reviewed) :   LAST CARDIAC MRA:  3/6/14  FINDINGS:   SITUS: There is a normal spleen in the left upper quadrant. There is situs solitus in the chest, as demonstrated by a normal airway pulmonary artery relationship bilaterally.   CAVAE: Single right-sided inferior and superior vena cavae drain normally into the right atrium unobstructed.   PULMONARY VEINS: Two right and two left pulmonary veins drain into the left atrium unobstructed.   ATRIA: There is no interatrial communication demonstrated. The atrial sizes are normal.   ATRIOVENTRICULAR CONNECTION: Concordant. Separate mitral and tricuspid valves without evidence of significant regurgitation or stenosis.   VENTRICLES: D-loop ventricles with levocardia. Ventricles are normal in size and contraction. Right ventricular end-diastolic volume is upper limits of normal for BSA. If utilizing an ideal body weight, this would be mildly enlarged. No residual interventricular communication is demonstrated. No myocardial hyperenhancement identified on delayed post gadolinium imaging, except at the ventricular insertion points.   VENTRICULOARTERIAL CONNECTION: Concordant. Trileaflet aortic valve without regurgitation or stenosis. The oversewn native pulmonary artery is directly posterior to the aorta. There is now an RV to PA conduit. Mild stenosis of the RVOT conduit with moderate regurgitation, 31% by direct flow measurement, and 33% by ventricular stroke volume comparison.   AORTA AND SUPRA-AORTIC VESSELS: A left-sided aortic arch is demonstrated with normal cervical branching pattern. No evidence of patent ductus arteriosus, coarctation, or aortopulmonary collateral arteries. The coronary arteries are not well visualized in this study.    PULMONARY ARTERY: The RV to PA conduit is patent with normal pulmonary artery branching pattern.   LAST ECHO:  2/2020  ------CONCLUSIONS------  Technically difficult study due to poor acoustic windows. D-TGA status post  Rastelli and subsequent placement of a bio-prosthetic valve in the pulmonary  position. There is no obvious residual ventricular level shunt. Mild (1+)  aortic valve insufficiency. Unable to adequately visualize the RV-PA conduit  or the bio-prosthetic valve; however, normal antegrade flow is visualized in  the branch pulmonary arteries. A transvenous pacemaker lead is seen in the  right ventricle, with the tip of the lead implanted into the right ventricular  apex. Trivial tricuspid valve insufficiency. Estimated right ventricular  systolic pressure is 26 mmHg plus right atrial pressure. The left and right  ventricles have normal chamber size, wall thickness, and systolic function.  The calculated biplane left ventricular ejection fraction is 57%. No  pericardial effusion.  No significant change from last echocardiogram.  7/25/19 CTA:  IMPRESSION:  1.  D-transposition of aorta s/p Rastelli with bioprosthetic pulmonic  valve  2.  Mildly reduced left ventricular function with LV EF 52%.  3.  Normal RV size and function with RV EF 55%.  4.  RV-PA valved conduit without visual evidence of stenosis.  5.  Mild pulmonic regurgitation (<30 mL) using biventricular stroke  volumes.  6.  No significant change from CT scan in 2014.  7.  Please review radiology review for incidental non-cardiovascular  findings to follow separately     3/23/21 ECHO:  ##### CONCLUSIONS #####  Technically difficult study due to poor acoustic windows. D-TGA status-post  Rastelli and subsequent placement of a bio-prosthetic valve in the pulmonary  position.     Trivial tricuspid valve regurgitation; estimated right ventricular systolic  pressure 26mmHg plus right atrial pressure. Normal right ventricular size and  systolic  function. Unable to adequately visualize the RV-PA conduit or the  bio-prosthetic valve; normal antegrade flow profile at the valve annulus; no  obvious regurgitation. Mildly dilated left ventricle with normal wall  thickness and mildly-depressed systolic function; biplane LV EF 47%. A  transvenous pacemaker lead is seen in the right ventricle, with the tip of the  lead implanted into the right ventricular apex. No effusion.     No significant change from last echocardiogram.       Physical Examination   There were no vitals taken for this visit.   Wt Readings from Last 3 Encounters:   03/11/22 88.9 kg (195 lb 14.4 oz)   03/11/22 88 kg (193 lb 14.4 oz)   11/22/21 90.8 kg (200 lb 3.2 oz)     General Appearance:   Alert, well-appearing and in no acute distress.   HEENT: Atraumatic, normocephalic. PERRL.  MMM.   Chest/Lungs:   Respirations unlabored.  Lungs are clear to auscultation.   Cardiovascular:   Regular rate and rhythm. III/VI KRISTI   Abdomen:  Soft, nontender, nondistended.   Extremities: No cyanosis or clubbing. No edema.    Musculoskeletal: Moves all extremities.  Gait normal.   Skin: Warm, dry, intact.    Neurologic: Mood and affect are appropriate.  Alert and oriented to person, place, time, and situation.          Medications  Allergies   Current Outpatient Medications   Medication Sig Dispense Refill     ACE NOT PRESCRIBED, INTENTIONAL, 1 each as needed for other ACE Inhibitor not prescribed due to Other: BP at goal, pending microalbumin test (Patient not taking: Reported on 10/13/2021) 0 each 0     amoxicillin (AMOXIL) 500 MG capsule Please take 2,000 mg (4 capsule) 30-60 min prior to all dental procedures 4 capsule 3     atorvastatin (LIPITOR) 40 MG tablet Take 1 tablet (40 mg) by mouth At Bedtime 90 tablet 3     blood glucose (ACCU-CHEK SMARTVIEW) test strip Use to test blood sugar 1 times daily or as directed. 100 strip 1     blood glucose monitoring (ACCU-CHEK FASTCLIX) lancets Use to test blood sugar  1 times daily or as directed. 1 Box 3     empagliflozin (JARDIANCE) 10 MG TABS tablet Take 1 tablet (10 mg) by mouth daily 90 tablet 0     gabapentin (NEURONTIN) 300 MG capsule TAKE 1 CAPSULE(300 MG) BY MOUTH EVERY NIGHT 90 capsule 1     metFORMIN (GLUCOPHAGE) 500 MG tablet Take 2 tablets (1,000 mg) by mouth 2 times daily (with meals) 360 tablet 1    Allergies   Allergen Reactions     Adhesive Tape Rash         Lab Results (Personally Reviewed)    Chemistry/lipid CBC Cardiac Enzymes/BNP/TSH/INR   Lab Results   Component Value Date    BUN 10 03/11/2022     03/11/2022    CO2 25 03/11/2022     Creatinine   Date Value Ref Range Status   03/11/2022 0.57 0.52 - 1.04 mg/dL Final   03/23/2021 0.61 0.52 - 1.04 mg/dL Final       Lab Results   Component Value Date    CHOL 179 11/29/2021    HDL 42 (L) 11/29/2021     (H) 11/29/2021      Lab Results   Component Value Date    WBC 8.7 03/11/2022    HGB 13.5 03/11/2022    HCT 41.5 03/11/2022    MCV 85 03/11/2022     03/11/2022    Lab Results   Component Value Date    TSH 2.13 03/11/2022    INR 1.13 06/01/2015        The patient states understanding and is agreeable with the plan.   Vimal Ray MD [facility]  Cardiology - Electrophysiology    Total time spent on patient visit, reviewing notes, imaging, labs, orders, and completing necessary documentation: 30 minutes.  >50% of visit spent on counseling patient and/or coordination of care.  \Video start time: 4:30 PM  Video end time : 4:45 PM

## 2022-04-01 NOTE — NURSING NOTE
Chief Complaint   Patient presents with     Follow Up     6 month f/u, no updates. Pt reviewed medications via [employer], has one medication she is no longer taking Gabapentin flagged for removal, please review.     Endy Sharpe, Visit Facilitator/MA.

## 2022-04-13 ENCOUNTER — HOSPITAL ENCOUNTER (EMERGENCY)
Facility: CLINIC | Age: 42
Discharge: HOME | End: 2022-04-13
Attending: EMERGENCY MEDICINE | Admitting: EMERGENCY MEDICINE
Payer: MEDICARE

## 2022-04-13 ENCOUNTER — APPOINTMENT (OUTPATIENT)
Dept: CT IMAGING | Facility: CLINIC | Age: 42
End: 2022-04-13
Attending: EMERGENCY MEDICINE
Payer: MEDICARE

## 2022-04-13 VITALS
RESPIRATION RATE: 18 BRPM | TEMPERATURE: 98.1 F | WEIGHT: 188 LBS | BODY MASS INDEX: 33.84 KG/M2 | HEART RATE: 99 BPM | SYSTOLIC BLOOD PRESSURE: 148 MMHG | DIASTOLIC BLOOD PRESSURE: 65 MMHG | OXYGEN SATURATION: 98 %

## 2022-04-13 DIAGNOSIS — R23.8 CHANGE OF SKIN COLOR: ICD-10-CM

## 2022-04-13 DIAGNOSIS — U07.1 INFECTION DUE TO 2019 NOVEL CORONAVIRUS: ICD-10-CM

## 2022-04-13 DIAGNOSIS — R74.01 TRANSAMINITIS: ICD-10-CM

## 2022-04-13 LAB
ALBUMIN SERPL-MCNC: 3.7 G/DL (ref 3.4–5)
ALP SERPL-CCNC: 133 U/L (ref 40–150)
ALT SERPL W P-5'-P-CCNC: 77 U/L (ref 0–50)
ANION GAP SERPL CALCULATED.3IONS-SCNC: 6 MMOL/L (ref 3–14)
AST SERPL W P-5'-P-CCNC: 64 U/L (ref 0–45)
ATRIAL RATE - MUSE: 94 BPM
BASOPHILS # BLD AUTO: 0 10E3/UL (ref 0–0.2)
BASOPHILS NFR BLD AUTO: 1 %
BILIRUB SERPL-MCNC: 0.7 MG/DL (ref 0.2–1.3)
BUN SERPL-MCNC: 7 MG/DL (ref 7–30)
CALCIUM SERPL-MCNC: 8.7 MG/DL (ref 8.5–10.1)
CHLORIDE BLD-SCNC: 106 MMOL/L (ref 94–109)
CO2 SERPL-SCNC: 27 MMOL/L (ref 20–32)
CREAT SERPL-MCNC: 0.43 MG/DL (ref 0.52–1.04)
D DIMER PPP FEU-MCNC: 0.73 UG/ML FEU (ref 0–0.5)
DEPRECATED S PYO AG THROAT QL EIA: NEGATIVE
DIASTOLIC BLOOD PRESSURE - MUSE: NORMAL MMHG
EOSINOPHIL # BLD AUTO: 0 10E3/UL (ref 0–0.7)
EOSINOPHIL NFR BLD AUTO: 1 %
ERYTHROCYTE [DISTWIDTH] IN BLOOD BY AUTOMATED COUNT: 12.8 % (ref 10–15)
FLUAV RNA SPEC QL NAA+PROBE: NEGATIVE
FLUBV RNA RESP QL NAA+PROBE: NEGATIVE
GFR SERPL CREATININE-BSD FRML MDRD: >90 ML/MIN/1.73M2
GLUCOSE BLD-MCNC: 118 MG/DL (ref 70–99)
GROUP A STREP BY PCR: NOT DETECTED
HCG SERPL QL: NEGATIVE
HCT VFR BLD AUTO: 41.2 % (ref 35–47)
HGB BLD-MCNC: 13.4 G/DL (ref 11.7–15.7)
IMM GRANULOCYTES # BLD: 0 10E3/UL
IMM GRANULOCYTES NFR BLD: 0 %
INTERPRETATION ECG - MUSE: NORMAL
LYMPHOCYTES # BLD AUTO: 1 10E3/UL (ref 0.8–5.3)
LYMPHOCYTES NFR BLD AUTO: 16 %
MCH RBC QN AUTO: 27.9 PG (ref 26.5–33)
MCHC RBC AUTO-ENTMCNC: 32.5 G/DL (ref 31.5–36.5)
MCV RBC AUTO: 86 FL (ref 78–100)
MONOCYTES # BLD AUTO: 0.5 10E3/UL (ref 0–1.3)
MONOCYTES NFR BLD AUTO: 8 %
NEUTROPHILS # BLD AUTO: 4.7 10E3/UL (ref 1.6–8.3)
NEUTROPHILS NFR BLD AUTO: 74 %
NRBC # BLD AUTO: 0 10E3/UL
NRBC BLD AUTO-RTO: 0 /100
P AXIS - MUSE: 74 DEGREES
PLATELET # BLD AUTO: 162 10E3/UL (ref 150–450)
POTASSIUM BLD-SCNC: 3.7 MMOL/L (ref 3.4–5.3)
PR INTERVAL - MUSE: 174 MS
PROT SERPL-MCNC: 7.8 G/DL (ref 6.8–8.8)
QRS DURATION - MUSE: 148 MS
QT - MUSE: 424 MS
QTC - MUSE: 530 MS
R AXIS - MUSE: 61 DEGREES
RBC # BLD AUTO: 4.8 10E6/UL (ref 3.8–5.2)
RSV RNA SPEC NAA+PROBE: NEGATIVE
SARS-COV-2 RNA RESP QL NAA+PROBE: POSITIVE
SODIUM SERPL-SCNC: 139 MMOL/L (ref 133–144)
SYSTOLIC BLOOD PRESSURE - MUSE: NORMAL MMHG
T AXIS - MUSE: 72 DEGREES
TROPONIN I SERPL HS-MCNC: 5 NG/L
VENTRICULAR RATE- MUSE: 94 BPM
WBC # BLD AUTO: 6.2 10E3/UL (ref 4–11)

## 2022-04-13 PROCEDURE — 250N000011 HC RX IP 250 OP 636: Performed by: EMERGENCY MEDICINE

## 2022-04-13 PROCEDURE — 85025 COMPLETE CBC W/AUTO DIFF WBC: CPT | Performed by: EMERGENCY MEDICINE

## 2022-04-13 PROCEDURE — G1004 CDSM NDSC: HCPCS

## 2022-04-13 PROCEDURE — 84484 ASSAY OF TROPONIN QUANT: CPT | Performed by: EMERGENCY MEDICINE

## 2022-04-13 PROCEDURE — 250N000009 HC RX 250: Performed by: EMERGENCY MEDICINE

## 2022-04-13 PROCEDURE — 82310 ASSAY OF CALCIUM: CPT | Performed by: EMERGENCY MEDICINE

## 2022-04-13 PROCEDURE — 99285 EMERGENCY DEPT VISIT HI MDM: CPT | Mod: 25

## 2022-04-13 PROCEDURE — 93005 ELECTROCARDIOGRAM TRACING: CPT

## 2022-04-13 PROCEDURE — 84703 CHORIONIC GONADOTROPIN ASSAY: CPT | Performed by: EMERGENCY MEDICINE

## 2022-04-13 PROCEDURE — 87651 STREP A DNA AMP PROBE: CPT | Performed by: EMERGENCY MEDICINE

## 2022-04-13 PROCEDURE — 87637 SARSCOV2&INF A&B&RSV AMP PRB: CPT | Performed by: EMERGENCY MEDICINE

## 2022-04-13 PROCEDURE — 36415 COLL VENOUS BLD VENIPUNCTURE: CPT | Performed by: EMERGENCY MEDICINE

## 2022-04-13 PROCEDURE — 85379 FIBRIN DEGRADATION QUANT: CPT | Performed by: EMERGENCY MEDICINE

## 2022-04-13 RX ORDER — IOPAMIDOL 755 MG/ML
500 INJECTION, SOLUTION INTRAVASCULAR ONCE
Status: COMPLETED | OUTPATIENT
Start: 2022-04-13 | End: 2022-04-13

## 2022-04-13 RX ADMIN — SODIUM CHLORIDE 84 ML: 9 INJECTION, SOLUTION INTRAVENOUS at 16:44

## 2022-04-13 RX ADMIN — IOPAMIDOL 64 ML: 755 INJECTION, SOLUTION INTRAVENOUS at 16:44

## 2022-04-13 ASSESSMENT — ENCOUNTER SYMPTOMS
DIARRHEA: 0
SHORTNESS OF BREATH: 1
ABDOMINAL PAIN: 0
COLOR CHANGE: 1
FEVER: 0
FATIGUE: 1
NAUSEA: 0
VOMITING: 0
COUGH: 1

## 2022-04-13 NOTE — ED PROVIDER NOTES
History   Chief Complaint:  Cough and Pharyngitis       HPI   Chikis Hannon is a 41 year old female with history of congenital heart disease, diabetes, ADHD presenting with predominately complaints of cough.  Patient reports for roughly the past week having a cough that has been more productive in nature.  She reports occasional dyspnea though denies any fever, chills, chest pain, nausea, vomiting, abdominal pain, diarrhea.  She does a mid to increased fatigue as well.  Roughly an hour prior to arrival she was at work at [employer] and noticed that her hands turn purple spontaneously.  They are back to baseline now.  She denies any focal weakness, paresthesias, headache or other symptoms.  This is never happened to her before but prompted significant concern given her reported cardiac history.  Patient is COVID-19 vaccinated, denies any known sick contacts.  She denies any history of blood clots though does admit to birth control with Nexplanon.    Review of Systems   Constitutional: Positive for fatigue. Negative for fever.   Respiratory: Positive for cough and shortness of breath.    Cardiovascular: Negative for chest pain.   Gastrointestinal: Negative for abdominal pain, diarrhea, nausea and vomiting.   Skin: Positive for color change (hands).   All other systems reviewed and are negative.        Allergies:  Adhesive Tape    Medications:    ACE NOT PRESCRIBED, INTENTIONAL,  amoxicillin (AMOXIL) 500 MG capsule  atorvastatin (LIPITOR) 40 MG tablet  blood glucose (ACCU-CHEK SMARTVIEW) test strip  blood glucose monitoring (ACCU-CHEK FASTCLIX) lancets  empagliflozin (JARDIANCE) 10 MG TABS tablet  metFORMIN (GLUCOPHAGE) 500 MG tablet        Past Medical History:      Past Medical History:   Diagnosis Date     Allergy, unspecified not elsewhere classified      Attention deficit disorder without mention of hyperactivity      Congenital heart disease      Corrected transposition of great vessels      Diabetes mellitus type  2, noninsulin dependent (H)      Impaired fasting glucose      Ventricular tachycardia (H) 3/4/2014     Patient Active Problem List    Diagnosis Date Noted     Health Care Home 01/13/2013     Priority: High       Date Noted Care Team Member TO DO/Alerts to be completed   1/13/2013  Charlene Mnan  Recheck liver enzymes; see ALT. ?additional eval                           Status:  Accepted  Care Coordinator:  Cindy Mckeon, RN,BSN, PHN,[facility]  Clinic Care Coordinator  [phone]      See Letters for [facility] Care Plan  Date:  September 12, 2014        DX V65.8 REPLACED WITH [facility] HEALTH CARE HOME (04/08/2013)         Morbid obesity (H) 02/11/2021     Priority: Medium     Patient is followed by the [facility] 03/14/2016     Priority: Medium     Type 2 diabetes mellitus with other circulatory complication, without long-term current use of insulin (H) 11/12/2015     Priority: Medium     Type 2 diabetes, HbA1C goal < 8% (H) 09/03/2014     Priority: Medium     Cardiac ICD- Med[employer]onic- single chamber- NOT dependent 03/20/2014     Priority: Medium     Implanted 6/01/2015 by Dr. Ray  Do you wish to do the replacement in the background? yes           Ventricular tachycardia (H) 03/04/2014     Priority: Medium     Congenital heart disease      Priority: Medium     Transaminase or LDH elevation 01/13/2013     Priority: Medium     Mildly mentally retarded 01/10/2012     Priority: Medium     CARDIOVASCULAR SCREENING; LDL GOAL LESS THAN 70 12/31/2010     Priority: Medium     * * * SBE PROPHYLAXIS * * * 05/15/2010     Priority: Medium     Recommended; see Cardiology consult 2010       Major depression in complete remission (H) 01/05/2010     Priority: Medium     GERD (gastroesophageal reflux disease) 06/12/2009     Priority: Medium     Anxiety 05/19/2009     Priority: Medium     Obesity 11/06/2008     Priority: Medium     Impaired fasting glucose      Priority: Medium     103 7/06       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 Provider to review       Attention deficit disorder 02/01/2005     Priority: Medium     Mild MR; difficulty attention span  Problem list name updated by automated process. Provider to review          Past Surgical History:      Past Surgical History:   Procedure Laterality Date     CARDIAC SURGERY  Had 4 surgeries    transposition of the major arteries     SURGICAL HISTORY OF -   2014    ablation for v. tach; ICD placed     [facility] [place] NONSPECIFIC PROCEDURE  11/98    open heart surgery x 3, transposition of major vessels     Z NONSPECIFIC PROCEDURE  10/03    Additional heart surgery        Family History:      Family History   Adopted: Yes   Problem Relation Age of Onset     Family History Negative Mother      Family History Negative Father        Social History:  The patient presents to the ED with friend  Denies ETOH/drug use    Physical Exam     Patient Vitals for the past 24 hrs:   BP Temp Temp src Pulse Resp SpO2 Weight   04/13/22 1324 (!) 148/65 98.1  F (36.7  C) Temporal 99 18 98 % 85.3 kg (188 lb)       Physical Exam  Nursing note and vitals reviewed.  Constitutional: Well nourished.   Eyes: Conjunctiva normal.  Pupils are equal, round, and reactive to light.   ENT: Nose normal. Mucous membranes pink and moist.  No posterior oropharyngeal erythema/exudate  Neck: Normal range of motion.  CVS: Normal rate, regular rhythm.  Normal heart sounds.  Systolic murmur. 2/2 radial pulses.   Pulmonary: Lungs clear to auscultation bilaterally. No wheezes/rales/rhonchi.  GI: Abdomen soft. Nontender, nondistended. No rigidity or guarding.    MSK: No calf tenderness or swelling. Full active ROM all fingers without pain.   Neuro: Alert. Follows simple commands. Sensation intact x 4.   Skin: Skin is warm and dry. No rash noted. Cap  refill <2sec.   Psychiatric: Anxious appearing      Emergency Department Course     ECG:  Indication: Dyspnea  Completed at 1444.  Read at 1451.   Normal sinus rhythm. Right bundle branch block.   Rate 94 bpm. AR interval 174. QRS duration 148. QT/QTc 424/530. P-R-T axes 74 61 72.    Imaging:  CT Chest Pulmonary Embolism w Contrast   Final Result   IMPRESSION:   1.  No pulmonary emboli. No acute findings in the chest.   2.  Stable postoperative changes of corrected transposition of great   arteries.      ELVIS PARADA MD            SYSTEM ID:  [id number]        Report per radiology.     Laboratory:  Labs Ordered and Resulted from Time of ED Arrival to Time of ED Departure   INFLUENZA A/B & SARS-COV2 PCR MULTIPLEX - Abnormal       Result Value    Influenza A PCR Negative      Influenza B PCR Negative      RSV PCR Negative      SARS CoV2 PCR Positive (*)    D DIMER QUANTITATIVE - Abnormal    D-Dimer Quantitative 0.73 (*)    COMPREHENSIVE METABOLIC PANEL - Abnormal    Sodium 139      Potassium 3.7      Chloride 106      Carbon Dioxide (CO2) 27      Anion Gap 6      Urea Nitrogen 7      Creatinine 0.43 (*)     Calcium 8.7      Glucose 118 (*)     Alkaline Phosphatase 133      AST 64 (*)     ALT 77 (*)     Protein Total 7.8      Albumin 3.7      Bilirubin Total 0.7      GFR Estimate >90     TROPONIN I - Normal    Troponin I High Sensitivity 5     HCG QUALITATIVE PREGNANCY - Normal    hCG Serum Qualitative Negative     STREPTOCOCCUS A RAPID SCREEN W REFELX TO PCR - Normal    Group A Strep antigen Negative     CBC WITH PLATELETS AND DIFFERENTIAL    WBC Count 6.2      RBC Count 4.80      Hemoglobin 13.4      Hematocrit 41.2      MCV 86      MCH 27.9      MCHC 32.5      RDW 12.8      Platelet Count 162      % Neutrophils 74      % Lymphocytes 16      % Monocytes 8      % Eosinophils 1      % Basophils 1      % Immature Granulocytes 0      NRBCs per 100 WBC 0      Absolute Neutrophils 4.7      Absolute Lymphocytes 1.0       Absolute Monocytes 0.5      Absolute Eosinophils 0.0      Absolute Basophils 0.0      Absolute Immature Granulocytes 0.0      Absolute NRBCs 0.0     GROUP A STREPTOCOCCUS PCR THROAT SWAB         Emergency Department Course:    Reviewed:  I reviewed nursing notes, vitals, and past medical history    Assessments:   I performed a physical exam of the patient. Findings as above.      Patient rechecked and updated. Plan of care discussed and questions answered.         Interventions:  Medications   iopamidol (ISOVUE-370) solution 500 mL (64 mLs Intravenous Given 4/13/22 1644)   CT Scan Flush (84 mLs Intravenous Given 4/13/22 1644)       Disposition:  The patient was discharged to home.     Impression & Plan       Covid-19  Chikis Hannon was evaluated during a global COVID-19 pandemic, which necessitated consideration that the patient might be at risk for infection with the SARS-CoV-2 virus that causes COVID-19.   Applicable protocols for evaluation were followed during the patient's care.   COVID-19 was considered as part of the patient's evaluation. The plan for testing is: a test was obtained during this visit.       Medical Decision Making:  Chikis Hannon is a 41 year old female who presents with predominant complaints of cough.  She also reports occasional mild dyspnea.  Patient is nontoxic, no significant distress on arrival.  Prior to my evaluation it was noted that she was COVID-19 positive.  Screening EKG without focal ischemia.  High-sensitivity screening troponin negative.  She denies any active chest pain and I have a low clinically lower suspicion for ACS.  Her D-dimer was elevated so she did undergo formal CT which is fortunately without evidence of PE, pneumonia, pneumothorax or worrisome etiology.  Labs overall reassuring without evidence to suggest underlying sepsis.  Mild transaminitis, possibly reactive, she has no significant abdominal tenderness on exam and I doubt intra-abdominal catastrophe.  She  ambulated without hypoxia or significant work of breathing.  We did discuss course of COVID-19 and recommendation for quarantine at this point in time.  She will be provided Covid get well referral loop and to monitor for pulse ox under 90%, increased work of breathing or should symptoms worsen or change to probably previously present to the ED for further evaluation.  Regarding her reported skin color change to her hands, I doubt serious etiology at this point in time.  I did consider possible vascular spasm which was discussed with the patient though no evidence to suggest serious neurologic sequelae at this point in time or cardio pulmonary process.  Patient expressed concerns this was related possibly to her history of congenital heart disease though clinically have a lower suspicion at this point in time.  I did recommend close outpatient follow-up and patient agreeable to plan of care.  Unfortunately given timeline of patient's symptoms she does not qualify for paxlovid at this time.     Diagnosis:    ICD-10-CM    1. Infection due to 2019 novel coronavirus  U07.1 COVID-19 GetWell Loop Referral     Care Coordination Referral   2. Change of skin color  R23.8    3. Transaminitis  R74.01        Discharge Medications:  New Prescriptions    No medications on file       [place] Elva Acevedo,   04/13/22 1749

## 2022-04-13 NOTE — LETTER
May 2, 2022      To Whom It May Concern:      Chikis MATUTE Parvez was seen in our Emergency Department today, 04/13/22.        Sincerely,        Isabela Miranda RN

## 2022-04-13 NOTE — DISCHARGE INSTRUCTIONS

## 2022-04-13 NOTE — ED TRIAGE NOTES
Patient presents to triage with c/o cough and sore throat x 1 week. Patient stated that PTA her fingers all turned blue for about 5 min and then went back to normal color. Patient is concerned d/t previous cardiac hx, denies chest pain, fevers or any other symptoms

## 2022-04-14 ENCOUNTER — PATIENT OUTREACH (OUTPATIENT)
Dept: CARE COORDINATION | Facility: CLINIC | Age: 42
End: 2022-04-14
Payer: MEDICARE

## 2022-04-14 NOTE — RESULT ENCOUNTER NOTE
Group A Streptococcus PCR is NEGATIVE  No treatment or change in treatment [facility] ED lab result Strep Group A protocol.

## 2022-04-14 NOTE — TELEPHONE ENCOUNTER
"Clinic Care Coordination Contact    Referral Type: Virtual Home Monitoring - GetWell Loop Program    Hospital/ED Discharge date: 4/13/22    Reason/Diagnosis for Hospital/ED visit: cough and sore throat    Are you feeling better, the same, or worse since your Hospital/ED visit? Unchanged but \"OK\"    Symptoms:     Cough -  productive clear and white    Shortness of breath:  no shortness of breath     Chest pain:  No    Fever: No-subjective as she doesn't feel feverish    Current temperature:  not checked    Headache:  No    Sore throat:  No    Nasal congestion:  No    Nausea/vomiting/diarrhea:  No    Body aches/joint pains:  No    Fatigue:  No    Home treatment measures used and outcome:  fluids    Pulse Oximeter/Oxygen Questions:    Were you sent home with a pulse oximeter?  Yes    Are you currently utilizing the pulse oximeter?  Yes    Do you understand how to use the home oximeter?  Yes    What are your current oxygen saturation levels?  97% this morning when she woke up    Oxygen saturation levels in ED/IP:  98%    Were you sent home with home oxygen?  No    Medications:    Were you prescribed any new medications?  No.      Follow Up:    Do you have a follow up appointment scheduled with your PCP or specialist?  No-she will consider if she isn't feeling well    Do you have a plan in place in the event of an emergency?  Yes. 24 hour nurse line phone number provided for patient    If patient is established or planning to establish primary care within [facility], Care Coordination was offered. Care Coordination accepted/declined:  Yes-declined    GetWell Loop invitation received?  Yes    GetWell Loop invitation accepted, pending patient activation or declined:  accepted. Sister helped her answer the survey questions this morning    Olimpia Rudolph RN  [facility] - RN Care Coordinator    "

## 2022-06-03 ENCOUNTER — OFFICE VISIT (OUTPATIENT)
Dept: FAMILY MEDICINE | Facility: CLINIC | Age: 42
End: 2022-06-03
Payer: MEDICARE

## 2022-06-03 VITALS
HEART RATE: 97 BPM | BODY MASS INDEX: 35.15 KG/M2 | SYSTOLIC BLOOD PRESSURE: 115 MMHG | HEIGHT: 62 IN | OXYGEN SATURATION: 99 % | DIASTOLIC BLOOD PRESSURE: 68 MMHG | TEMPERATURE: 98.1 F | WEIGHT: 191 LBS

## 2022-06-03 DIAGNOSIS — E11.59 TYPE 2 DIABETES MELLITUS WITH OTHER CIRCULATORY COMPLICATION, WITHOUT LONG-TERM CURRENT USE OF INSULIN (H): ICD-10-CM

## 2022-06-03 DIAGNOSIS — R74.8 ELEVATED LIVER ENZYMES: ICD-10-CM

## 2022-06-03 DIAGNOSIS — E11.59 TYPE 2 DIABETES MELLITUS WITH OTHER CIRCULATORY COMPLICATION, WITHOUT LONG-TERM CURRENT USE OF INSULIN (H): Primary | ICD-10-CM

## 2022-06-03 DIAGNOSIS — Z23 NEED FOR TDAP VACCINATION: ICD-10-CM

## 2022-06-03 DIAGNOSIS — E66.01 MORBID OBESITY (H): ICD-10-CM

## 2022-06-03 LAB — HBA1C MFR BLD: 7.9 % (ref 0–5.6)

## 2022-06-03 PROCEDURE — 83036 HEMOGLOBIN GLYCOSYLATED A1C: CPT | Performed by: FAMILY MEDICINE

## 2022-06-03 PROCEDURE — 80053 COMPREHEN METABOLIC PANEL: CPT | Performed by: FAMILY MEDICINE

## 2022-06-03 PROCEDURE — 82043 UR ALBUMIN QUANTITATIVE: CPT | Performed by: FAMILY MEDICINE

## 2022-06-03 PROCEDURE — 99214 OFFICE O/P EST MOD 30 MIN: CPT | Mod: 25 | Performed by: FAMILY MEDICINE

## 2022-06-03 PROCEDURE — 90715 TDAP VACCINE 7 YRS/> IM: CPT | Performed by: FAMILY MEDICINE

## 2022-06-03 PROCEDURE — 36415 COLL VENOUS BLD VENIPUNCTURE: CPT | Performed by: FAMILY MEDICINE

## 2022-06-03 PROCEDURE — 90471 IMMUNIZATION ADMIN: CPT | Performed by: FAMILY MEDICINE

## 2022-06-03 ASSESSMENT — ANXIETY QUESTIONNAIRES
GAD7 TOTAL SCORE: 0
2. NOT BEING ABLE TO STOP OR CONTROL WORRYING: NOT AT ALL
GAD7 TOTAL SCORE: 0
8. IF YOU CHECKED OFF ANY PROBLEMS, HOW DIFFICULT HAVE THESE MADE IT FOR YOU TO DO YOUR WORK, TAKE CARE OF THINGS AT HOME, OR GET ALONG WITH OTHER PEOPLE?: SOMEWHAT DIFFICULT
7. FEELING AFRAID AS IF SOMETHING AWFUL MIGHT HAPPEN: NOT AT ALL
5. BEING SO RESTLESS THAT IT IS HARD TO SIT STILL: NOT AT ALL
6. BECOMING EASILY ANNOYED OR IRRITABLE: NOT AT ALL
7. FEELING AFRAID AS IF SOMETHING AWFUL MIGHT HAPPEN: NOT AT ALL
4. TROUBLE RELAXING: NOT AT ALL
1. FEELING NERVOUS, ANXIOUS, OR ON EDGE: NOT AT ALL
GAD7 TOTAL SCORE: 0
3. WORRYING TOO MUCH ABOUT DIFFERENT THINGS: NOT AT ALL

## 2022-06-03 ASSESSMENT — PATIENT HEALTH QUESTIONNAIRE - PHQ9
SUM OF ALL RESPONSES TO PHQ QUESTIONS 1-9: 3
10. IF YOU CHECKED OFF ANY PROBLEMS, HOW DIFFICULT HAVE THESE PROBLEMS MADE IT FOR YOU TO DO YOUR WORK, TAKE CARE OF THINGS AT HOME, OR GET ALONG WITH OTHER PEOPLE: SOMEWHAT DIFFICULT
SUM OF ALL RESPONSES TO PHQ QUESTIONS 1-9: 3

## 2022-06-03 NOTE — PROGRESS NOTES
"      Assessment & Plan     {Diag Picklist:078399}    {[facility] 2021 Documentation (Optional):938417}  {2021 E&M time (Optional):019209}  {Provider  Link to [facility] Help Grid :559745}     BMI:   Estimated body mass index is 34.93 kg/m  as calculated from the following:    Height as of this encounter: 1.575 m (5' 2\").    Weight as of this encounter: 86.6 kg (191 lb).   {Weight Management Plan needed for ACO:572555}    {FOLLOW UP PLANS (Optional) Includes COVID19 Treatment Plan:669282}    No follow-ups on file.    Tammy Benedict MD  [facility] MELINDA Diop is a 41 year old who presents for the following health issues {ACCOMPANIED BY STATEMENT (Optional):025214}    History of Present Illness       Diabetes:   She presents for follow up of diabetes.  She is checking home blood glucose a few times a month. She checks blood glucose at bedtime.  Blood glucose is sometimes over 200 and never under 70. When her blood glucose is low, the patient is asymptomatic for confusion, blurred vision, lethargy and reports not feeling dizzy, shaky, or weak.  She has no concerns regarding her diabetes at this time.  She is not experiencing numbness or burning in feet, excessive thirst, blurry vision, weight changes or redness, sores or blisters on feet. The patient has had a diabetic eye exam in the last 12 months.         She eats 2-3 servings of fruits and vegetables daily.She consumes 1 sweetened beverage(s) daily.She exercises with enough effort to increase her heart rate 9 or less minutes per day.  She exercises with enough effort to increase her heart rate 3 or less days per week. She is missing 2 dose(s) of medications per week.  She is not taking prescribed medications regularly due to remembering to take.    Today's PHQ-9         PHQ-9 Total Score: 3    PHQ-9 Q9 Thoughts of better off dead/self-harm past 2 weeks :   Not at all    How difficult have these problems made it for you to do " "your work, take care of things at home, or get along with other people: Somewhat difficult  Today's FREDIS-7 Score: 0       {SUPERLIST (Optional):421177}  {additonal problems for provider to add (Optional):614003}    Review of Systems   {ROS COMP (Optional):976282}      Objective    /68 (BP Location: Right arm, Patient Position: Sitting, Cuff Size: Adult Large)   Pulse 97   Temp 98.1  F (36.7  C) (Oral)   Ht 1.575 m (5' 2\")   Wt 86.6 kg (191 lb)   SpO2 99%   BMI 34.93 kg/m    Body mass index is 34.93 kg/m .  Physical Exam   {Exam List (Optional):945919}    {Diagnostic Test Results (Optional):398548}    {AMBULATORY ATTESTATION (Optional):685616}      Answers for HPI/ROS submitted by the patient on 6/3/2022  If you checked off any problems, how difficult have these problems made it for you to do your work, take care of things at home, or get along with other people?: Somewhat difficult  PHQ9 TOTAL SCORE: 3  FREDIS 7 TOTAL SCORE: 0  "

## 2022-06-03 NOTE — PROGRESS NOTES
"  Assessment & Plan   1. Type 2 diabetes mellitus with other circulatory complication, without long-term current use of insulin (H)  - A1c improving. Continue with current regimen.   - Hemoglobin A1c; Future  - Albumin Random Urine Quantitative with Creat Ratio; Future  - Hemoglobin A1c  - Albumin Random Urine Quantitative with Creat Ratio  - empagliflozin (JARDIANCE) 10 MG TABS tablet; Take 1 tablet (10 mg) by mouth daily  Dispense: 90 tablet; Refill: 1    2. Elevated liver enzymes  - recheck. If remains elevated with consider liver US.   - Comprehensive metabolic panel (BMP + Alb, Alk Phos, ALT, AST, Total. Bili, TP); Future  - Comprehensive metabolic panel (BMP + Alb, Alk Phos, ALT, AST, Total. Bili, TP)    3. Morbid obesity (H)  - continue with diet and lifestyle changes    4. Need for Tdap vaccination  - TDAP VACCINE (Adacel, Boostrix)  [8175767]             BMI:   Estimated body mass index is 34.93 kg/m  as calculated from the following:    Height as of this encounter: 1.575 m (5' 2\").    Weight as of this encounter: 86.6 kg (191 lb).   Weight management plan: Discussed healthy diet and exercise guidelines        Return in about 6 weeks (around 7/15/2022) for nexplanon removal and replacement .    Tammy Benedict MD  [facility]       HPI       Diabetes Follow-up    How often are you checking your blood sugar? A few times a week  What time of day are you checking your blood sugars (select all that apply)?  At bedtime  Have you had any blood sugars above 200?  Yes about once/month \"after I had a bad meal\"  Have you had any blood sugars below 70?  No    What concerns do you have today about your diabetes? None \"I should be, but I'm not.\"     Do you have any of these symptoms? (Select all that apply)  No numbness or tingling in feet.  No redness, sores or blisters on feet.  No complaints of excessive thirst.  No reports of blurry vision.  No significant changes " to weight.    Have you had a diabetic eye exam in the last 12 months? Yes- [facility]     Patient here today for diabetes recheck. At last visit her A1c was 9.0 the highest it had ever been. She was started on jardiance and also educated on diet and lifestyle changes. Feels she has done a good job of making changes including giving up soda completely.   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   On June 10th she will have a meeting with the company to discuss her options.   Overall she is feeling better since the last visit.             BP Readings from Last 2 Encounters:   06/03/22 115/68   04/13/22 (!) 148/65     Hemoglobin A1C POCT (%)   Date Value   02/11/2021 7.9 (H)   08/06/2020 7.5 (H)     Hemoglobin A1C (%)   Date Value   06/03/2022 7.9 (H)   03/11/2022 9.0 (H)     LDL Cholesterol Calculated (mg/dL)   Date Value   11/29/2021 119 (H)   08/06/2020 137 (H)   04/25/2019 142 (H)     BP Readings from Last 2 Encounters:   06/03/22 115/68   04/13/22 (!) 148/65     Hemoglobin A1C POCT (%)   Date Value   02/11/2021 7.9 (H)   08/06/2020 7.5 (H)     Hemoglobin A1C (%)   Date Value   06/03/2022 7.9 (H)   03/11/2022 9.0 (H)     LDL Cholesterol Calculated (mg/dL)   Date Value   11/29/2021 119 (H)   08/06/2020 137 (H)   04/25/2019 142 (H)     Social History     Tobacco Use     Smoking status: Never Smoker     Smokeless tobacco: Never Used   Vaping Use     Vaping Use: Never used   Substance Use Topics     Alcohol use: No     Alcohol/week: 0.0 standard drinks     Drug use: No     PHQ 11/22/2021 3/11/2022 6/3/2022   PHQ-9 Total Score 0 0 3   Q9: Thoughts of better off dead/self-harm past 2 weeks Not at all Not at all Not at all     FREDIS-7 SCORE 11/22/2021 3/11/2022 6/3/2022   Total Score - - -   Total Score 3 (minimal anxiety) 3 (minimal anxiety) 0 (minimal anxiety)   Total Score 3 3 0     Last PHQ-9 6/3/2022   1.  Little interest or pleasure in doing things 1   2.   "Feeling down, depressed, or hopeless 1   3.  Trouble falling or staying asleep, or sleeping too much 0   4.  Feeling tired or having little energy 1   5.  Poor appetite or overeating 0   6.  Feeling bad about yourself 0   7.  Trouble concentrating 0   8.  Moving slowly or restless 0   Q9: Thoughts of better off dead/self-harm past 2 weeks 0   PHQ-9 Total Score 3   Difficulty at work, home, or with people -     FREDIS-7  6/3/2022   1. Feeling nervous, anxious, or on edge 0   2. Not being able to stop or control worrying 0   3. Worrying too much about different things 0   4. Trouble relaxing 0   5. Being so restless that it is hard to sit still 0   6. Becoming easily annoyed or irritable 0   7. Feeling afraid, as if something awful might happen 0   FREDIS-7 Total Score 0   If you checked any problems, how difficult have they made it for you to do your work, take care of things at home, or get along with other people? -     History   Smoking Status     Never Smoker   Smokeless Tobacco     Never Used         Review of Systems   Constitutional, HEENT, cardiovascular, pulmonary, GI, , musculoskeletal, neuro, skin, endocrine and psych systems are negative, except as otherwise noted.      Objective    /68 (BP Location: Right arm, Patient Position: Sitting, Cuff Size: Adult Large)   Pulse 97   Temp 98.1  F (36.7  C) (Oral)   Ht 1.575 m (5' 2\")   Wt 86.6 kg (191 lb)   SpO2 99%   BMI 34.93 kg/m    Body mass index is 34.93 kg/m .  Physical Exam   GENERAL: healthy, alert and no distress  EYES: Eyes grossly normal to inspection, PERRL and conjunctivae and sclerae normal  HENT: ear canals and TM's normal, nose and mouth without ulcers or lesions  RESP: lungs clear to auscultation - no rales, rhonchi or wheezes  CV: normal S1, KRISTI-3/6 best heard on left sternal border  MS: no gross musculoskeletal defects noted, no edema  PSYCH: mentation appears normal, affect normal/bright    Results for orders placed or performed in " visit on 06/03/22   Hemoglobin A1c     Status: Abnormal   Result Value Ref Range    Hemoglobin A1C 7.9 (H) 0.0 - 5.6 %       ----- Services Performed by a MEDICAL STUDENT in Presence of ATTENDING Physician-------      Tammy Case MD, [facility]

## 2022-06-04 LAB
ALBUMIN SERPL-MCNC: 3.7 G/DL (ref 3.4–5)
ALP SERPL-CCNC: 129 U/L (ref 40–150)
ALT SERPL W P-5'-P-CCNC: 32 U/L (ref 0–50)
ANION GAP SERPL CALCULATED.3IONS-SCNC: 7 MMOL/L (ref 3–14)
AST SERPL W P-5'-P-CCNC: 18 U/L (ref 0–45)
BILIRUB SERPL-MCNC: 0.9 MG/DL (ref 0.2–1.3)
BUN SERPL-MCNC: 7 MG/DL (ref 7–30)
CALCIUM SERPL-MCNC: 8.9 MG/DL (ref 8.5–10.1)
CHLORIDE BLD-SCNC: 104 MMOL/L (ref 94–109)
CO2 SERPL-SCNC: 26 MMOL/L (ref 20–32)
CREAT SERPL-MCNC: 0.54 MG/DL (ref 0.52–1.04)
CREAT UR-MCNC: 16 MG/DL
GFR SERPL CREATININE-BSD FRML MDRD: >90 ML/MIN/1.73M2
GLUCOSE BLD-MCNC: 147 MG/DL (ref 70–99)
MICROALBUMIN UR-MCNC: <5 MG/L
MICROALBUMIN/CREAT UR: NORMAL MG/G{CREAT}
POTASSIUM BLD-SCNC: 3.6 MMOL/L (ref 3.4–5.3)
PROT SERPL-MCNC: 7.7 G/DL (ref 6.8–8.8)
SODIUM SERPL-SCNC: 137 MMOL/L (ref 133–144)

## 2022-06-06 RX ORDER — EMPAGLIFLOZIN 10 MG/1
TABLET, FILM COATED ORAL
Qty: 90 TABLET | Refills: 1 | OUTPATIENT
Start: 2022-06-06

## 2022-07-12 ENCOUNTER — ANCILLARY PROCEDURE (OUTPATIENT)
Dept: CARDIOLOGY | Facility: CLINIC | Age: 42
End: 2022-07-12
Attending: INTERNAL MEDICINE
Payer: MEDICARE

## 2022-07-12 ENCOUNTER — OFFICE VISIT (OUTPATIENT)
Dept: FAMILY MEDICINE | Facility: CLINIC | Age: 42
End: 2022-07-12
Payer: MEDICARE

## 2022-07-12 VITALS
WEIGHT: 190.4 LBS | OXYGEN SATURATION: 96 % | HEIGHT: 62 IN | SYSTOLIC BLOOD PRESSURE: 130 MMHG | TEMPERATURE: 98 F | BODY MASS INDEX: 35.04 KG/M2 | HEART RATE: 104 BPM | DIASTOLIC BLOOD PRESSURE: 78 MMHG

## 2022-07-12 DIAGNOSIS — Q20.3 D-TGA (DEXTRO-TRANSPOSITION OF GREAT ARTERIES): ICD-10-CM

## 2022-07-12 DIAGNOSIS — Z86.79 HX OF VENTRICULAR TACHYCARDIA: ICD-10-CM

## 2022-07-12 DIAGNOSIS — Z30.46 ENCOUNTER FOR REMOVAL AND REINSERTION OF NEXPLANON: Primary | ICD-10-CM

## 2022-07-12 DIAGNOSIS — F33.1 MODERATE EPISODE OF RECURRENT MAJOR DEPRESSIVE DISORDER (H): ICD-10-CM

## 2022-07-12 DIAGNOSIS — N89.8 VAGINAL ITCHING: ICD-10-CM

## 2022-07-12 LAB
ALBUMIN UR-MCNC: NEGATIVE MG/DL
APPEARANCE UR: ABNORMAL
BACTERIA #/AREA URNS HPF: ABNORMAL /HPF
BILIRUB UR QL STRIP: NEGATIVE
CLUE CELLS: ABNORMAL
COLOR UR AUTO: YELLOW
GLUCOSE UR STRIP-MCNC: 500 MG/DL
HCG UR QL: NEGATIVE
HGB UR QL STRIP: ABNORMAL
KETONES UR STRIP-MCNC: 15 MG/DL
LEUKOCYTE ESTERASE UR QL STRIP: NEGATIVE
NITRATE UR QL: NEGATIVE
PH UR STRIP: 5.5 [PH] (ref 5–7)
RBC #/AREA URNS AUTO: ABNORMAL /HPF
SP GR UR STRIP: 1.01 (ref 1–1.03)
SQUAMOUS #/AREA URNS AUTO: ABNORMAL /LPF
TRICHOMONAS, WET PREP: ABNORMAL
UROBILINOGEN UR STRIP-ACNC: 0.2 E.U./DL
WBC #/AREA URNS AUTO: ABNORMAL /HPF
WBC'S/HIGH POWER FIELD, WET PREP: ABNORMAL
YEAST, WET PREP: ABNORMAL

## 2022-07-12 PROCEDURE — 81001 URINALYSIS AUTO W/SCOPE: CPT | Performed by: FAMILY MEDICINE

## 2022-07-12 PROCEDURE — 81025 URINE PREGNANCY TEST: CPT | Performed by: FAMILY MEDICINE

## 2022-07-12 PROCEDURE — 93295 DEV INTERROG REMOTE 1/2/MLT: CPT | Performed by: INTERNAL MEDICINE

## 2022-07-12 PROCEDURE — 93296 REM INTERROG EVL PM/IDS: CPT

## 2022-07-12 PROCEDURE — 87210 SMEAR WET MOUNT SALINE/INK: CPT | Performed by: FAMILY MEDICINE

## 2022-07-12 RX ORDER — FLUCONAZOLE 150 MG/1
150 TABLET ORAL ONCE
Qty: 1 TABLET | Refills: 0 | Status: SHIPPED | OUTPATIENT
Start: 2022-07-12 | End: 2022-07-12

## 2022-07-12 ASSESSMENT — ANXIETY QUESTIONNAIRES
GAD7 TOTAL SCORE: 1
8. IF YOU CHECKED OFF ANY PROBLEMS, HOW DIFFICULT HAVE THESE MADE IT FOR YOU TO DO YOUR WORK, TAKE CARE OF THINGS AT HOME, OR GET ALONG WITH OTHER PEOPLE?: NOT DIFFICULT AT ALL
7. FEELING AFRAID AS IF SOMETHING AWFUL MIGHT HAPPEN: NOT AT ALL
4. TROUBLE RELAXING: NOT AT ALL
6. BECOMING EASILY ANNOYED OR IRRITABLE: SEVERAL DAYS
3. WORRYING TOO MUCH ABOUT DIFFERENT THINGS: NOT AT ALL
1. FEELING NERVOUS, ANXIOUS, OR ON EDGE: NOT AT ALL
GAD7 TOTAL SCORE: 1
GAD7 TOTAL SCORE: 1
7. FEELING AFRAID AS IF SOMETHING AWFUL MIGHT HAPPEN: NOT AT ALL
2. NOT BEING ABLE TO STOP OR CONTROL WORRYING: NOT AT ALL
5. BEING SO RESTLESS THAT IT IS HARD TO SIT STILL: NOT AT ALL

## 2022-07-12 ASSESSMENT — PATIENT HEALTH QUESTIONNAIRE - PHQ9
10. IF YOU CHECKED OFF ANY PROBLEMS, HOW DIFFICULT HAVE THESE PROBLEMS MADE IT FOR YOU TO DO YOUR WORK, TAKE CARE OF THINGS AT HOME, OR GET ALONG WITH OTHER PEOPLE: NOT DIFFICULT AT ALL
SUM OF ALL RESPONSES TO PHQ QUESTIONS 1-9: 0
SUM OF ALL RESPONSES TO PHQ QUESTIONS 1-9: 0

## 2022-07-12 NOTE — PATIENT INSTRUCTIONS
NEXPLANON AFTERCARE INSTRUCTIONS   1. You may have some pain at the site of the Nexplanon insertion. You can help relieve the discomfort with Tylenol (acetaminophen), Aspirin or Advil (ibuprofen). If your discomfort worsens or you notice redness spreading on the skin around the insertion site, please call the clinic.   2. Irregular bleeding is common with Nexplanon, especially in the first 6-12 months of use. After one year, approximately 20% of women who use Nexplanon will stop having periods completely. Some women have longer, heavier periods. Some women will have increased spotting between periods. You may find that your periods may be hard to predict.   3. The Nexplanon does not protect against sexually transmitted infections including the AIDS virus (HIV), warts (HPV), gonorrhea, Chlamydia, and herpes. Condoms should be used to decrease the risk sexually transmitted infections. If you think that you have been exposed to a sexually transmitted infection, please call the clinic.   4. If you had Nexplanon placed for birth control, it is effective immediately if it was inserted within five days after the start of your period. If you have Nexplanon inserted at any other time during your menstrual cycle, use another method of birth control, like condoms for at least 7 days.   5. The Nexplanon should be removed and/or replaced by a health care provider after three years.   Warning Signs   Call the clinic if any of the following occurs:    You have bleeding, pus, or increasing redness, or pain at insertion site.    You have fever or chills    The implant comes out or you have concerns about its location.    You have a positive pregnancy test or suspect you might be pregnant.

## 2022-07-12 NOTE — PROGRESS NOTES
Assessment & Plan     Encounter for removal and reinsertion of Nexplanon  - patient tolerated procedure well.   - HCG qualitative; Future  - HCG qualitative urine; Future  - HCG qualitative urine  - etonogestrel (NEXPLANON) subdermal implant 68 mg  - etonogestrel (NEXPLANON) 68 MG IMPL; 1 each (68 mg) by Subdermal route once  - REMOVAL AND REINSERTION NEXPLANON    Moderate episode of recurrent major depressive disorder (H)  - stable without meds. Will continue to monitor     Vaginal itching  - wet prep negative. UA did show some glucosuria- will treat presumptively with diflucan x 1 dose. Patient to follow up if any worsening   - Wet prep - lab collect; Future  - UA Macro with Reflex to Micro and Culture - lab collect; Future  - Wet prep - lab collect  - UA Macro with Reflex to Micro and Culture - lab collect  - Urine Microscopic  - fluconazole (DIFLUCAN) 150 MG tablet; Take 1 tablet (150 mg) by mouth once for 1 dose               Return in about 3 months (around 10/12/2022) for diabetes, medication recheck, in person, .    Tammy Benedict MD  [facility]    Inés Diop is a 42 year old accompanied by her caregiver, presenting for the following health issues:  Contraception (Nexplanon removal/insertion)      HPI     Vaginal Symptoms  Onset/Duration: 2-3 days   Description:  Vaginal Discharge: none   Itching (Pruritis): YES  Burning sensation:  YES  Odor: No  Accompanying Signs & Symptoms:  Urinary symptoms: No  Abdominal pain: No  Fever: No  History:   Sexually active: No  New Partner: N/A  Possibility of Pregnancy:  No  Recent antibiotic use: No  Previous vaginitis issues: No      Review of Systems   Constitutional, HEENT, cardiovascular, pulmonary, GI, , musculoskeletal, neuro, skin, endocrine and psych systems are negative, except as otherwise noted.      Objective    /78 (BP Location: Right arm, Patient Position: Sitting, Cuff Size: Adult  "Large)   Pulse 104   Temp 98  F (36.7  C) (Oral)   Ht 1.575 m (5' 2\")   Wt 86.4 kg (190 lb 6.4 oz)   SpO2 96%   BMI 34.82 kg/m    Body mass index is 34.82 kg/m .  Physical Exam   GENERAL: healthy, alert and no distress  PSYCH: mentation appears normal, affect normal/bright    Results for orders placed or performed in visit on 07/12/22   HCG qualitative urine     Status: Normal   Result Value Ref Range    hCG Urine Qualitative Negative Negative   UA Macro with Reflex to Micro and Culture - lab collect     Status: Abnormal    Specimen: Urine, Clean Catch   Result Value Ref Range    Color Urine Yellow Colorless, Straw, Light Yellow, Yellow    Appearance Urine Slightly Cloudy (A) Clear    Glucose Urine 500  (A) Negative mg/dL    Bilirubin Urine Negative Negative    Ketones Urine 15  (A) Negative mg/dL    Specific Gravity Urine 1.010 1.003 - 1.035    Blood Urine Small (A) Negative    pH Urine 5.5 5.0 - 7.0    Protein Albumin Urine Negative Negative mg/dL    Urobilinogen Urine 0.2 0.2, 1.0 E.U./dL    Nitrite Urine Negative Negative    Leukocyte Esterase Urine Negative Negative   Urine Microscopic     Status: Abnormal   Result Value Ref Range    Bacteria Urine Few (A) None Seen /HPF    RBC Urine 0-2 0-2 /HPF /HPF    WBC Urine 0-5 0-5 /HPF /HPF    Squamous Epithelials Urine Few (A) None Seen /LPF    Narrative    Urine Culture not indicated   Wet prep - lab collect     Status: Abnormal    Specimen: Vagina; Swab   Result Value Ref Range    Trichomonas Absent Absent    Yeast Absent Absent    Clue Cells Absent Absent    WBCs/high power field 2+ (A) None   Results for orders placed or performed in visit on 07/12/22   Cardiac Device Check - Remote (Standing ORD 5 count)     Status: None (In process)   Result Value Ref Range    Date Time Interrogation Session [id number]     Implantable Pulse Generator  [employer]     Implantable Pulse Generator Model [id number] Evera XT VR     Implantable Pulse Generator Serial " Number [id number]     Type Interrogation Session Remote      Clinic Name [facility]     Implantable Pulse Generator Type Defibrillator     Implantable Pulse Generator Implant Date 20150601     Implantable Lead  Medtronic     Implantable Lead Model 6935M Gabbie Driver Secure S     Implantable Lead Serial Number [id number]     Implantable Lead Implant Date 20150601     Implantable Lead Polarity Type Tripolar Lead     Implantable Lead Location Detail 1 SEPTUM     Implantable Lead Location Right Ventricle     Rashaad Setting Mode (NBG Code) VVI     Rashaad Setting Lower Rate Limit 40 [beats]/min    Rashaad Setting Hysterisis Rate DISABLED     Lead Channel Setting Sensing Polarity Bipolar     Lead Channel Setting Sensing Anode Location Right Ventricle     Lead Channel Setting Sensing Anode Terminal Coil     Lead Channel Setting Sensing Cathode Location Right Ventricle     Lead Channel Setting Sensing Cathode Terminal Tip     Lead Channel Setting Sensing Sensitivity 0.3 mV    Lead Channel Setting Pacing Polarity Bipolar     Lead Channel Setting Pacing Anode Location Right Ventricle     Lead Channel Setting Pacing Anode Terminal Coil     Lead Channel Setting Sensing Cathode Location Right Ventricle     Lead Channel Setting Sensing Cathode Terminal Tip     Lead Channel Setting Pacing Pulse Width 0.4 ms    Lead Channel Setting Pacing Amplitude 2 V    Lead Channel Setting Pacing Capture Mode Adaptive     Zone Setting Type Category VF     Zone Setting Detection Interval 270 ms    Zone Setting Detection Beats Numerator 30 [beats]    Zone Setting Detection Beats Denominator 40 [beats]    Zone Setting Type Category VT     Zone Setting Detection Interval Blank     Zone Setting Type Category VT     Zone Setting Detection Interval 360 ms    Zone Setting Type Category VT     Zone Setting Detection Interval 370 ms    Zone Setting Type Category ATRIAL_FIBRILLATION     Zone Setting Type Category AT/AF      Lead Channel Impedance Value 4,047 ohm    Lead Channel Impedance Value 304 ohm    Lead Channel Sensing Intrinsic Amplitude 6.5 mV    Lead Channel Pacing Threshold Amplitude 0.5 V    Lead Channel Pacing Threshold Pulse Width 0.4 ms    Battery Date Time of Measurements 28473201425068     Battery Status OK     Battery RRT Trigger 2.727     Battery Remaining Longevity 58 mo    Battery Voltage 2.98 V    Capacitor Charge Type Reformation     Capacitor Last Charge Date Time [id number]     Capacitor Charge Time 3.893     Capacitor Charge Energy 18 J    Rashaad Statistic Date Time Start 91193860019442     Rashaad Statistic Date Time End 79466745092381     Rashaad Statistic RV Percent Paced 0.01 %    Therapy Statistic Recent Shocks Delivered 0     Therapy Statistic Recent Shocks Aborted 0     Therapy Statistic Recent ATP Delivered 0     Therapy Statistic Recent Date Time Start 93814020166626     Therapy Statistic Recent Date Time End 35020956653238     Therapy Statistic Total Shocks Delivered 1     Therapy Statistic Total Shocks Aborted 0     Therapy Statistic Total ATP Delivered 0     Therapy Statistic Total  Date Time Start 34665904784341     Therapy Statistic Total  Date Time End 26987244140522     Episode Statistic Recent Count 0     Episode Statistic Type Category SVT     Episode Statistic Recent Count 0     Episode Statistic Type Category VT     Episode Statistic Recent Count 0     Episode Statistic Type Category VF     Episode Statistic Recent Count 0     Episode Statistic Type Category VT     Episode Statistic Recent Count 0     Episode Statistic Type Category VT     Episode Statistic Recent Count 5     Episode Statistic Type Category VT     Episode Statistic Recent Date Time Start 67441941684816     Episode Statistic Recent Date Time End 78259473962823     Episode Statistic Recent Date Time Start 00793812495944     Episode Statistic Recent Date Time End 59386226812112     Episode Statistic Recent Date Time Start  21554634759384     Episode Statistic Recent Date Time End 21962202300260     Episode Statistic Recent Date Time Start 06753956474051     Episode Statistic Recent Date Time End 84719713233188     Episode Statistic Recent Date Time Start 21131131049372     Episode Statistic Recent Date Time End 48247412363683     Episode Statistic Recent Date Time Start 68939407091903     Episode Statistic Recent Date Time End 00231715118965     Episode Statistic Total Count 0     Episode Statistic Type Category SVT     Episode Statistic Total Count 17     Episode Statistic Type Category VT     Episode Statistic Total Count 1     Episode Statistic Type Category VF     Episode Statistic Total Count 0     Episode Statistic Type Category VT     Episode Statistic Total Count 0     Episode Statistic Type Category VT     Episode Statistic Total Count 241     Episode Statistic Type Category VT     Episode Statistic Total Date Time Start 63319021671334     Episode Statistic Total Date Time End 23302398920884     Episode Statistic Total Date Time Start 69507357047643     Episode Statistic Total Date Time End 47106709895674     Episode Statistic Total Date Time Start 43262753574110     Episode Statistic Total Date Time End 33736882926233     Episode Statistic Total Date Time Start 70517579990508     Episode Statistic Total Date Time End 63244216809183     Episode Statistic Total Date Time Start 68387553334717     Episode Statistic Total Date Time End 52086470622921     Episode Statistic Total Date Time Start 74361339078900     Episode Statistic Total Date Time End 85862148173036     Episode Identifier 554     Episode Type Category VT1_MONITOR     Episode Date Time [id number]     Episode Duration 29 s    Episode Identifier 553     Episode Type Category VT1_MONITOR     Episode Date Time [id number]     Episode Duration 128 s    Episode Identifier 551     Episode Type Category VT1_MONITOR     Episode Date Time 33075918195566     Episode  Duration 10 s    Episode Identifier 548     Episode Type Category VT1_MONITOR     Episode Date Time 93801424390426     Episode Duration 30 s    Episode Identifier 546     Episode Type Category VT1_MONITOR     Episode Date Time [id number]     Episode Duration 14 s    Episode Identifier 552     Episode Type Category SVT     Episode Date Time [id number]     Episode Duration 12 s    Episode Identifier 550     Episode Type Category SVT     Episode Date Time 85003293936612     Episode Duration 21 s    Episode Identifier 549     Episode Type Category SVT     Episode Date Time 23972929180103     Episode Duration 17 s    Episode Identifier 547     Episode Type Category SVT     Episode Date Time [id number]     Episode Duration 11 s    Narrative    Remote ICD transmission received and reviewed. Device transmission sent per MD orders following ER visit yesterday.     Device: [email or web address] [id number] Evera XT VR  Normal Device Function. (RV bipolar impedance >3000 ohms, this is not a new finding)  Mode: VVI 40 bpm  : <0.1%  Presenting EGM: Regular VS @ 88 bpm  Thoracic Impedance: Near Baseline.  Short V-V intervals: 1  Lead Trends Appear Stable- other than noted above.  Estimated battery longevity to RRT = 4.8 years. Battery Voltage: 2.98 V.   Ventricular Arrhythmias: 5 episodes recorded as VT. EGMs show narrow complex tachycardias lasting 10 sec to 2 minutes 169-180 bpm.  Pt Notified of Transmission Results. MyChart    Plan: Remote in 3 months  OSWALDO Alves RN    Remote ICD Transmission    I have reviewed and interpreted the device interrogation, settings, programming and nurse's summary. The device is functioning within normal device parameters. I agree with the current findings, assessment and plan.                   .  ..Answers for HPI/ROS submitted by the patient on 7/12/2022  If you checked off any problems, how difficult have these problems made it for you to do your work, take care of things at home, or get along  "with other people?: Not difficult at all  PHQ9 TOTAL SCORE: 0  FREDIS 7 TOTAL SCORE: 1  What is the reason for your visit today? : Birth control  How many servings of fruits and vegetables do you eat daily?: 2-3  On average, how many sweetened beverages do you drink each day (Examples: soda, juice, sweet tea, etc.  Do NOT count diet or artificially sweetened beverages)?: 1  How many minutes a day do you exercise enough to make your heart beat faster?: 30 to 60  How many days a week do you exercise enough to make your heart beat faster?: 3 or less  How many days per week do you miss taking your medication?: 2    Wt Readings from Last 4 Encounters:   22 86.4 kg (190 lb 6.4 oz)   22 86.6 kg (191 lb)   22 85.3 kg (188 lb)   22 88.9 kg (195 lb 14.4 oz)     NEXPLANON REMOVAL/REINSERTION:    Is a pregnancy test required: Yes.  Was it positive or negative?  Negative  Was a consent obtained?  Yes    Subjective: Chikis Hannon is a 42 year old  presents for Nexplanon.    Patient has been given the opportunity to ask questions about all forms of birth control, including all options appropriate for Chikis Hannon. Discussed that no method of birth control, except abstinence is 100% effective against pregnancy or sexually transmitted infection.     Chikis Hannon understands planning removal and reinsertion today    The entire removal and insertion procedure was reviewed with the patient, including care after placement.      /78 (BP Location: Right arm, Patient Position: Sitting, Cuff Size: Adult Large)   Pulse 104   Temp 98  F (36.7  C) (Oral)   Ht 1.575 m (5' 2\")   Wt 86.4 kg (190 lb 6.4 oz)   SpO2 96%   BMI 34.82 kg/m      PROCEDURE NOTE: -- Nexplanon Removal/Insertion    Technique: On the right arm  Skin prep Betadine  Anesthesia 1% lidocaine, with epi  Procedure: Patient was placed supine with right arm exposed.  Implant located by palpitation. Mo was made 8-10 cm above medial epicondyle " and a guiding abhi 4 cm above the first.  Arm was prepped with Betadine. Incision point was anesthetized with 3 mL 1% lidocaine.     Small incision (<5mm) was made at distal end of palpable implant, curved hemostat or mosquito forceps was used to isolate the implant and bring it to the incision, the fibrous capsule containing the implant  was incised and the implant was removed intact.    After stretching the skin with thumb and index finger around the insertion site, skin punctured with the tip of the needle inserted at 30 degrees and then lowered to horizontal position. While lifting the skin with the tip of the needle, inserted the needle to its full length. Applicator was then stabilized and the slider was unlocked by pushing it slightly down. Slider moved back completely until it stopped. Applicator was then removed.    Correct placement of the implant was confirmed by palpation in the patient's arm and visualizing the purple top of the obturator.   Bandage and pressure dressing applied to insertion site.    Lot # [id number]  Exp: 6/28/2024    EBL: minimal    Complications: none    ASSESSMENT: No diagnosis found.     PLAN:    Given 's handouts, including when to have Nexplanon removed, list of danger s/sx, side effects and follow up recommended. Encouraged condom use for prevention of STD. Back up contraception advised for 7 days. Advised to call for any fever, for prolonged or severe pain or bleeding, abnormal vaginal dischage. She was advised to use pain medications (ibuprofen) as needed for mild to moderate pain.     Tammy Benedict MD

## 2022-08-08 LAB
MDC_IDC_EPISODE_DTM: NORMAL
MDC_IDC_EPISODE_DURATION: 10 S
MDC_IDC_EPISODE_DURATION: 11 S
MDC_IDC_EPISODE_DURATION: 12 S
MDC_IDC_EPISODE_DURATION: 128 S
MDC_IDC_EPISODE_DURATION: 14 S
MDC_IDC_EPISODE_DURATION: 17 S
MDC_IDC_EPISODE_DURATION: 21 S
MDC_IDC_EPISODE_DURATION: 29 S
MDC_IDC_EPISODE_DURATION: 30 S
MDC_IDC_EPISODE_ID: 546
MDC_IDC_EPISODE_ID: 547
MDC_IDC_EPISODE_ID: 548
MDC_IDC_EPISODE_ID: 549
MDC_IDC_EPISODE_ID: 550
MDC_IDC_EPISODE_ID: 551
MDC_IDC_EPISODE_ID: 552
MDC_IDC_EPISODE_ID: 553
MDC_IDC_EPISODE_ID: 554
MDC_IDC_EPISODE_TYPE: NORMAL
MDC_IDC_LEAD_IMPLANT_DT: NORMAL
MDC_IDC_LEAD_LOCATION: NORMAL
MDC_IDC_LEAD_LOCATION_DETAIL_1: NORMAL
MDC_IDC_LEAD_MFG: NORMAL
MDC_IDC_LEAD_MODEL: NORMAL
MDC_IDC_LEAD_POLARITY_TYPE: NORMAL
MDC_IDC_LEAD_SERIAL: NORMAL
MDC_IDC_MSMT_BATTERY_DTM: NORMAL
MDC_IDC_MSMT_BATTERY_REMAINING_LONGEVITY: 58 MO
MDC_IDC_MSMT_BATTERY_RRT_TRIGGER: 2.73
MDC_IDC_MSMT_BATTERY_STATUS: NORMAL
MDC_IDC_MSMT_BATTERY_VOLTAGE: 2.98 V
MDC_IDC_MSMT_CAP_CHARGE_DTM: NORMAL
MDC_IDC_MSMT_CAP_CHARGE_ENERGY: 18 J
MDC_IDC_MSMT_CAP_CHARGE_TIME: 3.89
MDC_IDC_MSMT_CAP_CHARGE_TYPE: NORMAL
MDC_IDC_MSMT_LEADCHNL_RV_IMPEDANCE_VALUE: 304 OHM
MDC_IDC_MSMT_LEADCHNL_RV_IMPEDANCE_VALUE: 4047 OHM
MDC_IDC_MSMT_LEADCHNL_RV_PACING_THRESHOLD_AMPLITUDE: 0.5 V
MDC_IDC_MSMT_LEADCHNL_RV_PACING_THRESHOLD_PULSEWIDTH: 0.4 MS
MDC_IDC_MSMT_LEADCHNL_RV_SENSING_INTR_AMPL: 6.5 MV
MDC_IDC_PG_IMPLANT_DTM: NORMAL
MDC_IDC_PG_MFG: NORMAL
MDC_IDC_PG_MODEL: NORMAL
MDC_IDC_PG_SERIAL: NORMAL
MDC_IDC_PG_TYPE: NORMAL
MDC_IDC_SESS_CLINIC_NAME: NORMAL
MDC_IDC_SESS_DTM: NORMAL
MDC_IDC_SESS_TYPE: NORMAL
MDC_IDC_SET_BRADY_HYSTRATE: NORMAL
MDC_IDC_SET_BRADY_LOWRATE: 40 {BEATS}/MIN
MDC_IDC_SET_BRADY_MODE: NORMAL
MDC_IDC_SET_LEADCHNL_RV_PACING_AMPLITUDE: 2 V
MDC_IDC_SET_LEADCHNL_RV_PACING_ANODE_ELECTRODE_1: NORMAL
MDC_IDC_SET_LEADCHNL_RV_PACING_ANODE_LOCATION_1: NORMAL
MDC_IDC_SET_LEADCHNL_RV_PACING_CAPTURE_MODE: NORMAL
MDC_IDC_SET_LEADCHNL_RV_PACING_CATHODE_ELECTRODE_1: NORMAL
MDC_IDC_SET_LEADCHNL_RV_PACING_CATHODE_LOCATION_1: NORMAL
MDC_IDC_SET_LEADCHNL_RV_PACING_POLARITY: NORMAL
MDC_IDC_SET_LEADCHNL_RV_PACING_PULSEWIDTH: 0.4 MS
MDC_IDC_SET_LEADCHNL_RV_SENSING_ANODE_ELECTRODE_1: NORMAL
MDC_IDC_SET_LEADCHNL_RV_SENSING_ANODE_LOCATION_1: NORMAL
MDC_IDC_SET_LEADCHNL_RV_SENSING_CATHODE_ELECTRODE_1: NORMAL
MDC_IDC_SET_LEADCHNL_RV_SENSING_CATHODE_LOCATION_1: NORMAL
MDC_IDC_SET_LEADCHNL_RV_SENSING_POLARITY: NORMAL
MDC_IDC_SET_LEADCHNL_RV_SENSING_SENSITIVITY: 0.3 MV
MDC_IDC_SET_ZONE_DETECTION_BEATS_DENOMINATOR: 40 {BEATS}
MDC_IDC_SET_ZONE_DETECTION_BEATS_NUMERATOR: 30 {BEATS}
MDC_IDC_SET_ZONE_DETECTION_INTERVAL: 270 MS
MDC_IDC_SET_ZONE_DETECTION_INTERVAL: 360 MS
MDC_IDC_SET_ZONE_DETECTION_INTERVAL: 370 MS
MDC_IDC_SET_ZONE_DETECTION_INTERVAL: NORMAL
MDC_IDC_SET_ZONE_TYPE: NORMAL
MDC_IDC_STAT_BRADY_DTM_END: NORMAL
MDC_IDC_STAT_BRADY_DTM_START: NORMAL
MDC_IDC_STAT_BRADY_RV_PERCENT_PACED: 0.01 %
MDC_IDC_STAT_EPISODE_RECENT_COUNT: 0
MDC_IDC_STAT_EPISODE_RECENT_COUNT: 5
MDC_IDC_STAT_EPISODE_RECENT_COUNT_DTM_END: NORMAL
MDC_IDC_STAT_EPISODE_RECENT_COUNT_DTM_START: NORMAL
MDC_IDC_STAT_EPISODE_TOTAL_COUNT: 0
MDC_IDC_STAT_EPISODE_TOTAL_COUNT: 1
MDC_IDC_STAT_EPISODE_TOTAL_COUNT: 17
MDC_IDC_STAT_EPISODE_TOTAL_COUNT: 241
MDC_IDC_STAT_EPISODE_TOTAL_COUNT_DTM_END: NORMAL
MDC_IDC_STAT_EPISODE_TOTAL_COUNT_DTM_START: NORMAL
MDC_IDC_STAT_EPISODE_TYPE: NORMAL
MDC_IDC_STAT_TACHYTHERAPY_ATP_DELIVERED_RECENT: 0
MDC_IDC_STAT_TACHYTHERAPY_ATP_DELIVERED_TOTAL: 0
MDC_IDC_STAT_TACHYTHERAPY_RECENT_DTM_END: NORMAL
MDC_IDC_STAT_TACHYTHERAPY_RECENT_DTM_START: NORMAL
MDC_IDC_STAT_TACHYTHERAPY_SHOCKS_ABORTED_RECENT: 0
MDC_IDC_STAT_TACHYTHERAPY_SHOCKS_ABORTED_TOTAL: 0
MDC_IDC_STAT_TACHYTHERAPY_SHOCKS_DELIVERED_RECENT: 0
MDC_IDC_STAT_TACHYTHERAPY_SHOCKS_DELIVERED_TOTAL: 1
MDC_IDC_STAT_TACHYTHERAPY_TOTAL_DTM_END: NORMAL
MDC_IDC_STAT_TACHYTHERAPY_TOTAL_DTM_START: NORMAL

## 2022-08-30 DIAGNOSIS — E11.59 TYPE 2 DIABETES MELLITUS WITH OTHER CIRCULATORY COMPLICATION, WITHOUT LONG-TERM CURRENT USE OF INSULIN (H): ICD-10-CM

## 2022-09-02 DIAGNOSIS — E11.59 TYPE 2 DIABETES MELLITUS WITH OTHER CIRCULATORY COMPLICATION, WITHOUT LONG-TERM CURRENT USE OF INSULIN (H): ICD-10-CM

## 2022-09-02 RX ORDER — EMPAGLIFLOZIN 10 MG/1
TABLET, FILM COATED ORAL
Qty: 90 TABLET | Refills: 1 | OUTPATIENT
Start: 2022-09-02

## 2022-10-09 ENCOUNTER — HEALTH MAINTENANCE LETTER (OUTPATIENT)
Age: 42
End: 2022-10-09

## 2022-10-21 ENCOUNTER — ANCILLARY PROCEDURE (OUTPATIENT)
Dept: CARDIOLOGY | Facility: CLINIC | Age: 42
End: 2022-10-21
Attending: INTERNAL MEDICINE
Payer: MEDICARE

## 2022-10-21 DIAGNOSIS — Z86.79 HX OF VENTRICULAR TACHYCARDIA: ICD-10-CM

## 2022-10-21 DIAGNOSIS — Q20.3 D-TGA (DEXTRO-TRANSPOSITION OF GREAT ARTERIES): ICD-10-CM

## 2022-10-21 LAB
MDC_IDC_EPISODE_DTM: NORMAL
MDC_IDC_EPISODE_DURATION: 10 S
MDC_IDC_EPISODE_DURATION: 14 S
MDC_IDC_EPISODE_DURATION: 21 S
MDC_IDC_EPISODE_DURATION: 25 S
MDC_IDC_EPISODE_DURATION: 27 S
MDC_IDC_EPISODE_DURATION: 30 S
MDC_IDC_EPISODE_DURATION: 43 S
MDC_IDC_EPISODE_DURATION: 44 S
MDC_IDC_EPISODE_DURATION: 46 S
MDC_IDC_EPISODE_DURATION: 50 S
MDC_IDC_EPISODE_DURATION: 65 S
MDC_IDC_EPISODE_DURATION: 7 S
MDC_IDC_EPISODE_DURATION: 7 S
MDC_IDC_EPISODE_DURATION: 70 S
MDC_IDC_EPISODE_ID: 569
MDC_IDC_EPISODE_ID: 570
MDC_IDC_EPISODE_ID: 571
MDC_IDC_EPISODE_ID: 572
MDC_IDC_EPISODE_ID: 573
MDC_IDC_EPISODE_ID: 574
MDC_IDC_EPISODE_ID: 575
MDC_IDC_EPISODE_ID: 576
MDC_IDC_EPISODE_ID: 577
MDC_IDC_EPISODE_ID: 578
MDC_IDC_EPISODE_ID: 579
MDC_IDC_EPISODE_ID: 580
MDC_IDC_EPISODE_ID: 581
MDC_IDC_EPISODE_ID: 582
MDC_IDC_EPISODE_TYPE: NORMAL
MDC_IDC_LEAD_IMPLANT_DT: NORMAL
MDC_IDC_LEAD_LOCATION: NORMAL
MDC_IDC_LEAD_LOCATION_DETAIL_1: NORMAL
MDC_IDC_LEAD_MFG: NORMAL
MDC_IDC_LEAD_MODEL: NORMAL
MDC_IDC_LEAD_POLARITY_TYPE: NORMAL
MDC_IDC_LEAD_SERIAL: NORMAL
MDC_IDC_MSMT_BATTERY_DTM: NORMAL
MDC_IDC_MSMT_BATTERY_REMAINING_LONGEVITY: 53 MO
MDC_IDC_MSMT_BATTERY_RRT_TRIGGER: 2.73
MDC_IDC_MSMT_BATTERY_STATUS: NORMAL
MDC_IDC_MSMT_BATTERY_VOLTAGE: 2.98 V
MDC_IDC_MSMT_CAP_CHARGE_DTM: NORMAL
MDC_IDC_MSMT_CAP_CHARGE_ENERGY: 18 J
MDC_IDC_MSMT_CAP_CHARGE_TIME: 3.94
MDC_IDC_MSMT_CAP_CHARGE_TYPE: NORMAL
MDC_IDC_MSMT_LEADCHNL_RV_IMPEDANCE_VALUE: 266 OHM
MDC_IDC_MSMT_LEADCHNL_RV_IMPEDANCE_VALUE: 4047 OHM
MDC_IDC_MSMT_LEADCHNL_RV_PACING_THRESHOLD_AMPLITUDE: 0.5 V
MDC_IDC_MSMT_LEADCHNL_RV_PACING_THRESHOLD_PULSEWIDTH: 0.4 MS
MDC_IDC_MSMT_LEADCHNL_RV_SENSING_INTR_AMPL: 6.62 MV
MDC_IDC_MSMT_LEADCHNL_RV_SENSING_INTR_AMPL: 6.62 MV
MDC_IDC_PG_IMPLANT_DTM: NORMAL
MDC_IDC_PG_MFG: NORMAL
MDC_IDC_PG_MODEL: NORMAL
MDC_IDC_PG_SERIAL: NORMAL
MDC_IDC_PG_TYPE: NORMAL
MDC_IDC_SESS_CLINIC_NAME: NORMAL
MDC_IDC_SESS_DTM: NORMAL
MDC_IDC_SESS_TYPE: NORMAL
MDC_IDC_SET_BRADY_HYSTRATE: NORMAL
MDC_IDC_SET_BRADY_LOWRATE: 40 {BEATS}/MIN
MDC_IDC_SET_BRADY_MODE: NORMAL
MDC_IDC_SET_LEADCHNL_RV_PACING_AMPLITUDE: 2 V
MDC_IDC_SET_LEADCHNL_RV_PACING_ANODE_ELECTRODE_1: NORMAL
MDC_IDC_SET_LEADCHNL_RV_PACING_ANODE_LOCATION_1: NORMAL
MDC_IDC_SET_LEADCHNL_RV_PACING_CAPTURE_MODE: NORMAL
MDC_IDC_SET_LEADCHNL_RV_PACING_CATHODE_ELECTRODE_1: NORMAL
MDC_IDC_SET_LEADCHNL_RV_PACING_CATHODE_LOCATION_1: NORMAL
MDC_IDC_SET_LEADCHNL_RV_PACING_POLARITY: NORMAL
MDC_IDC_SET_LEADCHNL_RV_PACING_PULSEWIDTH: 0.4 MS
MDC_IDC_SET_LEADCHNL_RV_SENSING_ANODE_ELECTRODE_1: NORMAL
MDC_IDC_SET_LEADCHNL_RV_SENSING_ANODE_LOCATION_1: NORMAL
MDC_IDC_SET_LEADCHNL_RV_SENSING_CATHODE_ELECTRODE_1: NORMAL
MDC_IDC_SET_LEADCHNL_RV_SENSING_CATHODE_LOCATION_1: NORMAL
MDC_IDC_SET_LEADCHNL_RV_SENSING_POLARITY: NORMAL
MDC_IDC_SET_LEADCHNL_RV_SENSING_SENSITIVITY: 0.3 MV
MDC_IDC_SET_ZONE_DETECTION_BEATS_DENOMINATOR: 40 {BEATS}
MDC_IDC_SET_ZONE_DETECTION_BEATS_NUMERATOR: 30 {BEATS}
MDC_IDC_SET_ZONE_DETECTION_INTERVAL: 270 MS
MDC_IDC_SET_ZONE_DETECTION_INTERVAL: 360 MS
MDC_IDC_SET_ZONE_DETECTION_INTERVAL: 370 MS
MDC_IDC_SET_ZONE_DETECTION_INTERVAL: NORMAL
MDC_IDC_SET_ZONE_TYPE: NORMAL
MDC_IDC_STAT_BRADY_DTM_END: NORMAL
MDC_IDC_STAT_BRADY_DTM_START: NORMAL
MDC_IDC_STAT_BRADY_RV_PERCENT_PACED: 0.02 %
MDC_IDC_STAT_EPISODE_RECENT_COUNT: 0
MDC_IDC_STAT_EPISODE_RECENT_COUNT_DTM_END: NORMAL
MDC_IDC_STAT_EPISODE_RECENT_COUNT_DTM_START: NORMAL
MDC_IDC_STAT_EPISODE_TOTAL_COUNT: 0
MDC_IDC_STAT_EPISODE_TOTAL_COUNT: 1
MDC_IDC_STAT_EPISODE_TOTAL_COUNT: 18
MDC_IDC_STAT_EPISODE_TOTAL_COUNT: 242
MDC_IDC_STAT_EPISODE_TOTAL_COUNT_DTM_END: NORMAL
MDC_IDC_STAT_EPISODE_TOTAL_COUNT_DTM_START: NORMAL
MDC_IDC_STAT_EPISODE_TYPE: NORMAL
MDC_IDC_STAT_TACHYTHERAPY_ATP_DELIVERED_RECENT: 0
MDC_IDC_STAT_TACHYTHERAPY_ATP_DELIVERED_TOTAL: 0
MDC_IDC_STAT_TACHYTHERAPY_RECENT_DTM_END: NORMAL
MDC_IDC_STAT_TACHYTHERAPY_RECENT_DTM_START: NORMAL
MDC_IDC_STAT_TACHYTHERAPY_SHOCKS_ABORTED_RECENT: 0
MDC_IDC_STAT_TACHYTHERAPY_SHOCKS_ABORTED_TOTAL: 0
MDC_IDC_STAT_TACHYTHERAPY_SHOCKS_DELIVERED_RECENT: 0
MDC_IDC_STAT_TACHYTHERAPY_SHOCKS_DELIVERED_TOTAL: 1
MDC_IDC_STAT_TACHYTHERAPY_TOTAL_DTM_END: NORMAL
MDC_IDC_STAT_TACHYTHERAPY_TOTAL_DTM_START: NORMAL

## 2022-10-21 PROCEDURE — 93295 DEV INTERROG REMOTE 1/2/MLT: CPT | Performed by: INTERNAL MEDICINE

## 2022-10-21 PROCEDURE — 93296 REM INTERROG EVL PM/IDS: CPT

## 2022-10-25 ENCOUNTER — DOCUMENTATION ONLY (OUTPATIENT)
Dept: LAB | Facility: CLINIC | Age: 42
End: 2022-10-25

## 2022-10-25 NOTE — PROGRESS NOTES
Patient has lab only appt 11/1/11 for A1c, no orders in chart.  Please place FUTURE orders in Epic if appropriate.    Thanks,   [place] [facility]

## 2022-10-27 NOTE — TELEPHONE ENCOUNTER
Patient was to be seen in clinic for diabetes recheck not lab only. Please place her in a same day slot.     GUERITA

## 2022-10-27 NOTE — PROGRESS NOTES
Msg left for Pt to callback to schedule F2F visit.    [place]/EMT-B  [facility] / [place]

## 2022-10-31 NOTE — PROGRESS NOTES
Msg left for Pt to callback to schedule visit.    [place]/EMT-B  [facility] / [place]

## 2022-11-01 ENCOUNTER — IMMUNIZATION (OUTPATIENT)
Dept: FAMILY MEDICINE | Facility: CLINIC | Age: 42
End: 2022-11-01
Payer: MEDICARE

## 2022-11-01 DIAGNOSIS — Z23 NEED FOR PROPHYLACTIC VACCINATION AND INOCULATION AGAINST INFLUENZA: Primary | ICD-10-CM

## 2022-11-01 PROCEDURE — 99207 PR NO CHARGE NURSE ONLY: CPT

## 2022-11-01 PROCEDURE — 90686 IIV4 VACC NO PRSV 0.5 ML IM: CPT

## 2022-11-01 PROCEDURE — G0008 ADMIN INFLUENZA VIRUS VAC: HCPCS

## 2022-11-15 ENCOUNTER — TELEPHONE (OUTPATIENT)
Dept: CARDIOLOGY | Facility: CLINIC | Age: 42
End: 2022-11-15

## 2022-11-15 DIAGNOSIS — Q24.9 CONGENITAL HEART DISEASE: Primary | ICD-10-CM

## 2022-11-15 DIAGNOSIS — Q20.3 D-TGA (DEXTRO-TRANSPOSITION OF GREAT ARTERIES): ICD-10-CM

## 2022-11-15 NOTE — TELEPHONE ENCOUNTER
Health Call Center    Phone Message    May a detailed message be left on voicemail: yes     Reason for Call: Other: Bethany called in and wants to make sure she can get all her sister's future appointments scheduled, she will need an EKG, Echo Complete, Device check (In Clinic) A follow up with Dr Ray and she needs a recommendation for a new Cardio provider since Dr Valle left. Please reach out to coordinate this, as we do not have orders for labs and an Echo in. Follow should be scheduled for End of March 2023. Thank you.     Action Taken: Other: Cardio    Travel Screening: Not Applicable

## 2022-11-22 NOTE — TELEPHONE ENCOUNTER
LVM for patient sister Bethany to callback to schedule labs, echo, device, appointment with Dr. Ray and Dr. Aragon in April.

## 2022-12-04 DIAGNOSIS — E11.59 TYPE 2 DIABETES MELLITUS WITH OTHER CIRCULATORY COMPLICATION, WITHOUT LONG-TERM CURRENT USE OF INSULIN (H): ICD-10-CM

## 2022-12-06 NOTE — TELEPHONE ENCOUNTER
Medication is being filled for 1 time refill only due to:  Patient needs labs A1C. Patient needs to be seen because due to be seen for diabetic recheck.     Leida refill sent. Routing to /[facility] to assist with getting pt scheduled.    Prescription approved per [facility] Refill Protocol.  Eneida Portillo RN

## 2022-12-13 ENCOUNTER — OFFICE VISIT (OUTPATIENT)
Dept: FAMILY MEDICINE | Facility: CLINIC | Age: 42
End: 2022-12-13
Payer: MEDICARE

## 2022-12-13 VITALS
HEIGHT: 62 IN | DIASTOLIC BLOOD PRESSURE: 71 MMHG | BODY MASS INDEX: 34.6 KG/M2 | SYSTOLIC BLOOD PRESSURE: 127 MMHG | OXYGEN SATURATION: 97 % | HEART RATE: 97 BPM | RESPIRATION RATE: 16 BRPM | TEMPERATURE: 98 F | WEIGHT: 188 LBS

## 2022-12-13 DIAGNOSIS — Z29.89 SBE (SUBACUTE BACTERIAL ENDOCARDITIS) PROPHYLAXIS CANDIDATE: ICD-10-CM

## 2022-12-13 DIAGNOSIS — F41.9 ANXIETY: ICD-10-CM

## 2022-12-13 DIAGNOSIS — Z13.6 CARDIOVASCULAR SCREENING; LDL GOAL LESS THAN 70: ICD-10-CM

## 2022-12-13 DIAGNOSIS — Z23 HIGH PRIORITY FOR 2019-NCOV VACCINE: ICD-10-CM

## 2022-12-13 DIAGNOSIS — E11.59 TYPE 2 DIABETES MELLITUS WITH OTHER CIRCULATORY COMPLICATION, WITHOUT LONG-TERM CURRENT USE OF INSULIN (H): Primary | ICD-10-CM

## 2022-12-13 DIAGNOSIS — Q20.3 D-TGA (DEXTRO-TRANSPOSITION OF GREAT ARTERIES): ICD-10-CM

## 2022-12-13 DIAGNOSIS — Z86.79 HX OF VENTRICULAR TACHYCARDIA: ICD-10-CM

## 2022-12-13 DIAGNOSIS — F33.1 MODERATE EPISODE OF RECURRENT MAJOR DEPRESSIVE DISORDER (H): ICD-10-CM

## 2022-12-13 LAB
ANION GAP SERPL CALCULATED.3IONS-SCNC: 15 MMOL/L (ref 7–15)
BUN SERPL-MCNC: 5.9 MG/DL (ref 6–20)
CALCIUM SERPL-MCNC: 9.2 MG/DL (ref 8.6–10)
CHLORIDE SERPL-SCNC: 102 MMOL/L (ref 98–107)
CREAT SERPL-MCNC: 0.54 MG/DL (ref 0.51–0.95)
DEPRECATED HCO3 PLAS-SCNC: 22 MMOL/L (ref 22–29)
GFR SERPL CREATININE-BSD FRML MDRD: >90 ML/MIN/1.73M2
GLUCOSE SERPL-MCNC: 242 MG/DL (ref 70–99)
HBA1C MFR BLD: 9.1 % (ref 0–5.6)
HOLD SPECIMEN: NORMAL
HOLD SPECIMEN: NORMAL
POTASSIUM SERPL-SCNC: 3.9 MMOL/L (ref 3.4–5.3)
SODIUM SERPL-SCNC: 139 MMOL/L (ref 136–145)

## 2022-12-13 PROCEDURE — 91313 COVID-19 VACCINE BIVALENT BOOSTER 18+ (MODERNA): CPT | Performed by: FAMILY MEDICINE

## 2022-12-13 PROCEDURE — 80061 LIPID PANEL: CPT | Performed by: FAMILY MEDICINE

## 2022-12-13 PROCEDURE — 99214 OFFICE O/P EST MOD 30 MIN: CPT | Mod: 25 | Performed by: FAMILY MEDICINE

## 2022-12-13 PROCEDURE — 0134A COVID-19 VACCINE BIVALENT BOOSTER 18+ (MODERNA): CPT | Performed by: FAMILY MEDICINE

## 2022-12-13 PROCEDURE — 80048 BASIC METABOLIC PNL TOTAL CA: CPT | Performed by: FAMILY MEDICINE

## 2022-12-13 PROCEDURE — 36415 COLL VENOUS BLD VENIPUNCTURE: CPT | Performed by: FAMILY MEDICINE

## 2022-12-13 PROCEDURE — 83036 HEMOGLOBIN GLYCOSYLATED A1C: CPT | Performed by: FAMILY MEDICINE

## 2022-12-13 RX ORDER — AMOXICILLIN 500 MG/1
CAPSULE ORAL
Qty: 4 CAPSULE | Refills: 3 | Status: SHIPPED | OUTPATIENT
Start: 2022-12-13 | End: 2023-03-17

## 2022-12-13 RX ORDER — SERTRALINE HYDROCHLORIDE 25 MG/1
25 TABLET, FILM COATED ORAL AT BEDTIME
Qty: 90 TABLET | Refills: 1 | Status: SHIPPED | OUTPATIENT
Start: 2022-12-13 | End: 2023-03-17

## 2022-12-13 RX ORDER — ATORVASTATIN CALCIUM 40 MG/1
40 TABLET, FILM COATED ORAL AT BEDTIME
Qty: 90 TABLET | Refills: 3 | Status: SHIPPED | OUTPATIENT
Start: 2022-12-13 | End: 2023-04-28

## 2022-12-13 ASSESSMENT — ANXIETY QUESTIONNAIRES
6. BECOMING EASILY ANNOYED OR IRRITABLE: SEVERAL DAYS
7. FEELING AFRAID AS IF SOMETHING AWFUL MIGHT HAPPEN: SEVERAL DAYS
IF YOU CHECKED OFF ANY PROBLEMS ON THIS QUESTIONNAIRE, HOW DIFFICULT HAVE THESE PROBLEMS MADE IT FOR YOU TO DO YOUR WORK, TAKE CARE OF THINGS AT HOME, OR GET ALONG WITH OTHER PEOPLE: SOMEWHAT DIFFICULT
2. NOT BEING ABLE TO STOP OR CONTROL WORRYING: MORE THAN HALF THE DAYS
4. TROUBLE RELAXING: SEVERAL DAYS
3. WORRYING TOO MUCH ABOUT DIFFERENT THINGS: MORE THAN HALF THE DAYS
GAD7 TOTAL SCORE: 9
5. BEING SO RESTLESS THAT IT IS HARD TO SIT STILL: NOT AT ALL
GAD7 TOTAL SCORE: 9
8. IF YOU CHECKED OFF ANY PROBLEMS, HOW DIFFICULT HAVE THESE MADE IT FOR YOU TO DO YOUR WORK, TAKE CARE OF THINGS AT HOME, OR GET ALONG WITH OTHER PEOPLE?: SOMEWHAT DIFFICULT
GAD7 TOTAL SCORE: 9
1. FEELING NERVOUS, ANXIOUS, OR ON EDGE: MORE THAN HALF THE DAYS
7. FEELING AFRAID AS IF SOMETHING AWFUL MIGHT HAPPEN: SEVERAL DAYS

## 2022-12-13 ASSESSMENT — PATIENT HEALTH QUESTIONNAIRE - PHQ9
SUM OF ALL RESPONSES TO PHQ QUESTIONS 1-9: 9
SUM OF ALL RESPONSES TO PHQ QUESTIONS 1-9: 9
10. IF YOU CHECKED OFF ANY PROBLEMS, HOW DIFFICULT HAVE THESE PROBLEMS MADE IT FOR YOU TO DO YOUR WORK, TAKE CARE OF THINGS AT HOME, OR GET ALONG WITH OTHER PEOPLE: SOMEWHAT DIFFICULT

## 2022-12-13 NOTE — PROGRESS NOTES
"  Assessment & Plan     Type 2 diabetes mellitus with other circulatory complication, without long-term current use of insulin (H)  - A1c up to 9.1 from 7.9. admits she has not been compliant with her current regimen. Reviewed diet and lifestyle changes- does not want medication adjusted at this time. Will recheck in 3 months.   - Hemoglobin A1c; Future  - Basic metabolic panel  (Ca, Cl, CO2, Creat, Gluc, K, Na, BUN); Future  - Lipid panel reflex to direct LDL Non-fasting; Future  - metFORMIN (GLUCOPHAGE) 500 MG tablet; Take 2 tablets (1,000 mg) by mouth 2 times daily (with meals)  - empagliflozin (JARDIANCE) 10 MG TABS tablet; Take 1 tablet (10 mg) by mouth daily  - Hemoglobin A1c  - PRIMARY CARE FOLLOW-UP SCHEDULING; Future  - Basic metabolic panel  (Ca, Cl, CO2, Creat, Gluc, K, Na, BUN)  - Lipid panel reflex to direct LDL Non-fasting    CARDIOVASCULAR SCREENING; LDL GOAL LESS THAN 70  - atorvastatin (LIPITOR) 40 MG tablet; Take 1 tablet (40 mg) by mouth At Bedtime    Hx of ventricular tachycardia  - amoxicillin (AMOXIL) 500 MG capsule; Please take 2,000 mg (4 capsule) 30-60 min prior to all dental procedures    D-TGA (dextro-transposition of great arteries)  - amoxicillin (AMOXIL) 500 MG capsule; Please take 2,000 mg (4 capsule) 30-60 min prior to all dental procedures    SBE (subacute bacterial endocarditis) prophylaxis candidate  - amoxicillin (AMOXIL) 500 MG capsule; Please take 2,000 mg (4 capsule) 30-60 min prior to all dental procedures    Moderate episode of recurrent major depressive disorder (H)  - worsening as stopped medication. Will restart on sertraline.   - sertraline (ZOLOFT) 25 MG tablet; Take 1 tablet (25 mg) by mouth At Bedtime    Anxiety  - worsening   - sertraline (ZOLOFT) 25 MG tablet; Take 1 tablet (25 mg) by mouth At Bedtime               BMI:   Estimated body mass index is 34.39 kg/m  as calculated from the following:    Height as of this encounter: 1.575 m (5' 2\").    Weight as of this " encounter: 85.3 kg (188 lb).   Weight management plan: Discussed healthy diet and exercise guidelines    See Patient Instructions    Return in about 3 months (around 3/13/2023).   Follow-up Visit   Expected date:  Mar 13, 2023 (Approximate)      Follow Up Appointment Details:     Follow-up with whom?: PCP    Follow-Up for what?: Chronic Disease f/u    Chronic Disease f/u: Diabetes    How?: In Person                    Tammy Benedict MD  [facility]    Inés Diop is a 42 year old accompanied by her caregiver , presenting for the following health issues:  Diabetes      History of Present Illness       Diabetes:   She presents for follow up of diabetes.  She is checking home blood glucose one time daily. She checks blood glucose at bedtime.  Blood glucose is never over 200 and never under 70. She is aware of hypoglycemia symptoms including shakiness, blurred vision and confusion. She has no concerns regarding her diabetes at this time.  She is not experiencing numbness or burning in feet, excessive thirst, blurry vision, weight changes or redness, sores or blisters on feet. The patient has had a diabetic eye exam in the last 12 months.         She eats 0-1 servings of fruits and vegetables daily.She consumes 1 sweetened beverage(s) daily.She exercises with enough effort to increase her heart rate 10 to 19 minutes per day.  She exercises with enough effort to increase her heart rate 3 or less days per week. She is missing 2 dose(s) of medications per week.  She is not taking prescribed medications regularly due to remembering to take.    Today's PHQ-9         PHQ-9 Total Score: 9    PHQ-9 Q9 Thoughts of better off dead/self-harm past 2 weeks :   More than half the days  Thoughts of suicide or self harm: (P) Yes  Self-harm Plan:   (P) Yes  Self-harm Action:     (P) No  Safety concerns for self or others: (P) Yes    How difficult have these problems made it for you to do your  work, take care of things at home, or get along with other people: Somewhat difficult  Today's FREDIS-7 Score: 9         Depression and Anxiety Follow-Up    How are you doing with your depression since your last visit? Worsened     How are you doing with your anxiety since your last visit?  Worsened     Are you having other symptoms that might be associated with depression or anxiety? Yes:  suicidal thoughts. started 1 month ago    Have you had a significant life event? Financial Concerns, Grief or Loss and Health Concerns     Do you have any concerns with your use of alcohol or other drugs? No    Social History     Tobacco Use     Smoking status: Never     Smokeless tobacco: Never   Vaping Use     Vaping Use: Never used   Substance Use Topics     Alcohol use: No     Alcohol/week: 0.0 standard drinks     Drug use: No     PHQ 6/3/2022 7/12/2022 12/13/2022   PHQ-9 Total Score 3 0 9   Q9: Thoughts of better off dead/self-harm past 2 weeks Not at all Not at all More than half the days   F/U: Thoughts of suicide or self-harm - - Yes   F/U: Self harm-plan - - Yes   F/U: Self-harm action - - No   F/U: Safety concerns - - Yes     FREDIS-7 SCORE 6/3/2022 7/12/2022 12/13/2022   Total Score - - -   Total Score 0 (minimal anxiety) 1 (minimal anxiety) 9 (mild anxiety)   Total Score 0 1 9     Last PHQ-9 12/13/2022   1.  Little interest or pleasure in doing things 1   2.  Feeling down, depressed, or hopeless 1   3.  Trouble falling or staying asleep, or sleeping too much 1   4.  Feeling tired or having little energy 1   5.  Poor appetite or overeating 1   6.  Feeling bad about yourself 1   7.  Trouble concentrating 1   8.  Moving slowly or restless 0   Q9: Thoughts of better off dead/self-harm past 2 weeks 2   PHQ-9 Total Score 9   Difficulty at work, home, or with people -   In the past two weeks have you had thoughts of suicide or self harm? Yes   Do you have concerns about your personal safety or the safety of others? Yes   In the  "past 2 weeks have you thought about a plan or had intention to harm yourself? Yes   In the past 2 weeks have you acted on these thoughts in any way? No     FREDIS-7  12/13/2022   1. Feeling nervous, anxious, or on edge 2   2. Not being able to stop or control worrying 2   3. Worrying too much about different things 2   4. Trouble relaxing 1   5. Being so restless that it is hard to sit still 0   6. Becoming easily annoyed or irritable 1   7. Feeling afraid, as if something awful might happen 1   FREDIS-7 Total Score 9   If you checked any problems, how difficult have they made it for you to do your work, take care of things at home, or get along with other people? Somewhat difficult         Follow Up Actions Taken  Patient started on medication      Discussed the following ways the patient can remain in a safe environment:  be around others  Suicide Assessment Five-step Evaluation and Treatment (SAFE-T)        Wt Readings from Last 4 Encounters:   12/13/22 85.3 kg (188 lb)   07/12/22 86.4 kg (190 lb 6.4 oz)   06/03/22 86.6 kg (191 lb)   04/13/22 85.3 kg (188 lb)         Review of Systems   Constitutional, HEENT, cardiovascular, pulmonary, GI, , musculoskeletal, neuro, skin, endocrine and psych systems are negative, except as otherwise noted.      Objective    /71 (BP Location: Right arm, Patient Position: Sitting, Cuff Size: Adult Large)   Pulse 97   Temp 98  F (36.7  C) (Oral)   Resp 16   Ht 1.575 m (5' 2\")   Wt 85.3 kg (188 lb)   SpO2 97%   BMI 34.39 kg/m    Body mass index is 34.39 kg/m .  Physical Exam   GENERAL: healthy, alert and no distress  RESP: lungs clear to auscultation - no rales, rhonchi or wheezes  CV: normal S1 S2, no S3 or S4, grade 3/6 systolic murmur heard best over the left sternal border, peripheral pulses strong and no peripheral edema  ABDOMEN: soft, nontender, no hepatosplenomegaly, no masses and bowel sounds normal  MS: no gross musculoskeletal defects noted, no edema  PSYCH: " mentation appears normal, anxious and judgement and insight intact    Results for orders placed or performed in visit on 12/13/22   Hemoglobin A1c     Status: Abnormal   Result Value Ref Range    Hemoglobin A1C 9.1 (H) 0.0 - 5.6 %    Narrative    RESULT VERIFIED BY REPEAT TESTING    Extra Tube     Status: None (In process)    Narrative    The following orders were created for panel order Extra Tube.  Procedure                               Abnormality         Status                     ---------                               -----------         ------                     Extra Serum Separator Tu...[[id number]]                      In process                 Extra Green Top (Lithium...[[id number]]                      In process                   Please view results for these tests on the individual orders.

## 2022-12-14 LAB
CHOLEST SERPL-MCNC: 193 MG/DL
HDLC SERPL-MCNC: 46 MG/DL
LDLC SERPL CALC-MCNC: 132 MG/DL
NONHDLC SERPL-MCNC: 147 MG/DL
TRIGL SERPL-MCNC: 76 MG/DL

## 2023-01-24 ENCOUNTER — ANCILLARY PROCEDURE (OUTPATIENT)
Dept: CARDIOLOGY | Facility: CLINIC | Age: 43
End: 2023-01-24
Attending: INTERNAL MEDICINE
Payer: MEDICARE

## 2023-01-24 DIAGNOSIS — Q20.3 D-TGA (DEXTRO-TRANSPOSITION OF GREAT ARTERIES): ICD-10-CM

## 2023-01-24 DIAGNOSIS — Z86.79 HX OF VENTRICULAR TACHYCARDIA: ICD-10-CM

## 2023-01-24 PROCEDURE — 93296 REM INTERROG EVL PM/IDS: CPT

## 2023-01-24 PROCEDURE — 93295 DEV INTERROG REMOTE 1/2/MLT: CPT | Performed by: INTERNAL MEDICINE

## 2023-02-01 LAB
MDC_IDC_EPISODE_DTM: NORMAL
MDC_IDC_EPISODE_DURATION: 1 S
MDC_IDC_EPISODE_DURATION: 107 S
MDC_IDC_EPISODE_DURATION: 11 S
MDC_IDC_EPISODE_DURATION: 11 S
MDC_IDC_EPISODE_DURATION: 111 S
MDC_IDC_EPISODE_DURATION: 12 S
MDC_IDC_EPISODE_DURATION: 12 S
MDC_IDC_EPISODE_DURATION: 14 S
MDC_IDC_EPISODE_DURATION: 17 S
MDC_IDC_EPISODE_DURATION: 197 S
MDC_IDC_EPISODE_DURATION: 22 S
MDC_IDC_EPISODE_DURATION: 23 S
MDC_IDC_EPISODE_DURATION: 255 S
MDC_IDC_EPISODE_DURATION: 26 S
MDC_IDC_EPISODE_DURATION: 26 S
MDC_IDC_EPISODE_DURATION: 27 S
MDC_IDC_EPISODE_DURATION: 27 S
MDC_IDC_EPISODE_DURATION: 31 S
MDC_IDC_EPISODE_DURATION: 32 S
MDC_IDC_EPISODE_DURATION: 33 S
MDC_IDC_EPISODE_DURATION: 33 S
MDC_IDC_EPISODE_DURATION: 35 S
MDC_IDC_EPISODE_DURATION: 4 S
MDC_IDC_EPISODE_DURATION: 40 S
MDC_IDC_EPISODE_DURATION: 41 S
MDC_IDC_EPISODE_DURATION: 43 S
MDC_IDC_EPISODE_DURATION: 52 S
MDC_IDC_EPISODE_DURATION: 54 S
MDC_IDC_EPISODE_DURATION: 58 S
MDC_IDC_EPISODE_DURATION: 58 S
MDC_IDC_EPISODE_DURATION: 6 S
MDC_IDC_EPISODE_DURATION: 60 S
MDC_IDC_EPISODE_DURATION: 61 S
MDC_IDC_EPISODE_DURATION: 64 S
MDC_IDC_EPISODE_DURATION: 734 S
MDC_IDC_EPISODE_DURATION: 87 S
MDC_IDC_EPISODE_DURATION: 9 S
MDC_IDC_EPISODE_ID: 607
MDC_IDC_EPISODE_ID: 609
MDC_IDC_EPISODE_ID: 611
MDC_IDC_EPISODE_ID: 614
MDC_IDC_EPISODE_ID: 617
MDC_IDC_EPISODE_ID: 620
MDC_IDC_EPISODE_ID: 631
MDC_IDC_EPISODE_ID: 632
MDC_IDC_EPISODE_ID: 633
MDC_IDC_EPISODE_ID: 634
MDC_IDC_EPISODE_ID: 635
MDC_IDC_EPISODE_ID: 636
MDC_IDC_EPISODE_ID: 637
MDC_IDC_EPISODE_ID: 638
MDC_IDC_EPISODE_ID: 639
MDC_IDC_EPISODE_ID: 640
MDC_IDC_EPISODE_ID: 641
MDC_IDC_EPISODE_ID: 642
MDC_IDC_EPISODE_ID: 643
MDC_IDC_EPISODE_ID: 644
MDC_IDC_EPISODE_ID: 645
MDC_IDC_EPISODE_ID: 646
MDC_IDC_EPISODE_ID: 647
MDC_IDC_EPISODE_ID: 648
MDC_IDC_EPISODE_ID: 649
MDC_IDC_EPISODE_ID: 650
MDC_IDC_EPISODE_ID: 651
MDC_IDC_EPISODE_ID: 652
MDC_IDC_EPISODE_ID: 653
MDC_IDC_EPISODE_ID: 654
MDC_IDC_EPISODE_ID: 655
MDC_IDC_EPISODE_ID: 656
MDC_IDC_EPISODE_ID: 657
MDC_IDC_EPISODE_ID: 658
MDC_IDC_EPISODE_ID: 659
MDC_IDC_EPISODE_ID: 660
MDC_IDC_EPISODE_ID: 661
MDC_IDC_EPISODE_ID: 662
MDC_IDC_EPISODE_ID: 663
MDC_IDC_EPISODE_ID: 664
MDC_IDC_EPISODE_ID: 665
MDC_IDC_EPISODE_TYPE: NORMAL
MDC_IDC_LEAD_IMPLANT_DT: NORMAL
MDC_IDC_LEAD_LOCATION: NORMAL
MDC_IDC_LEAD_LOCATION_DETAIL_1: NORMAL
MDC_IDC_LEAD_MFG: NORMAL
MDC_IDC_LEAD_MODEL: NORMAL
MDC_IDC_LEAD_POLARITY_TYPE: NORMAL
MDC_IDC_LEAD_SERIAL: NORMAL
MDC_IDC_MSMT_BATTERY_DTM: NORMAL
MDC_IDC_MSMT_BATTERY_REMAINING_LONGEVITY: 46 MO
MDC_IDC_MSMT_BATTERY_RRT_TRIGGER: 2.73
MDC_IDC_MSMT_BATTERY_STATUS: NORMAL
MDC_IDC_MSMT_BATTERY_VOLTAGE: 2.98 V
MDC_IDC_MSMT_CAP_CHARGE_DTM: NORMAL
MDC_IDC_MSMT_CAP_CHARGE_ENERGY: 18 J
MDC_IDC_MSMT_CAP_CHARGE_TIME: 3.99
MDC_IDC_MSMT_CAP_CHARGE_TYPE: NORMAL
MDC_IDC_MSMT_LEADCHNL_RV_IMPEDANCE_VALUE: 304 OHM
MDC_IDC_MSMT_LEADCHNL_RV_IMPEDANCE_VALUE: 4047 OHM
MDC_IDC_MSMT_LEADCHNL_RV_PACING_THRESHOLD_AMPLITUDE: 0.5 V
MDC_IDC_MSMT_LEADCHNL_RV_PACING_THRESHOLD_PULSEWIDTH: 0.4 MS
MDC_IDC_MSMT_LEADCHNL_RV_SENSING_INTR_AMPL: 5.9 MV
MDC_IDC_PG_IMPLANT_DTM: NORMAL
MDC_IDC_PG_MFG: NORMAL
MDC_IDC_PG_MODEL: NORMAL
MDC_IDC_PG_SERIAL: NORMAL
MDC_IDC_PG_TYPE: NORMAL
MDC_IDC_SESS_CLINIC_NAME: NORMAL
MDC_IDC_SESS_DTM: NORMAL
MDC_IDC_SESS_TYPE: NORMAL
MDC_IDC_SET_BRADY_HYSTRATE: NORMAL
MDC_IDC_SET_BRADY_LOWRATE: 40 {BEATS}/MIN
MDC_IDC_SET_BRADY_MODE: NORMAL
MDC_IDC_SET_LEADCHNL_RV_PACING_AMPLITUDE: 2 V
MDC_IDC_SET_LEADCHNL_RV_PACING_ANODE_ELECTRODE_1: NORMAL
MDC_IDC_SET_LEADCHNL_RV_PACING_ANODE_LOCATION_1: NORMAL
MDC_IDC_SET_LEADCHNL_RV_PACING_CAPTURE_MODE: NORMAL
MDC_IDC_SET_LEADCHNL_RV_PACING_CATHODE_ELECTRODE_1: NORMAL
MDC_IDC_SET_LEADCHNL_RV_PACING_CATHODE_LOCATION_1: NORMAL
MDC_IDC_SET_LEADCHNL_RV_PACING_POLARITY: NORMAL
MDC_IDC_SET_LEADCHNL_RV_PACING_PULSEWIDTH: 0.4 MS
MDC_IDC_SET_LEADCHNL_RV_SENSING_ANODE_ELECTRODE_1: NORMAL
MDC_IDC_SET_LEADCHNL_RV_SENSING_ANODE_LOCATION_1: NORMAL
MDC_IDC_SET_LEADCHNL_RV_SENSING_CATHODE_ELECTRODE_1: NORMAL
MDC_IDC_SET_LEADCHNL_RV_SENSING_CATHODE_LOCATION_1: NORMAL
MDC_IDC_SET_LEADCHNL_RV_SENSING_POLARITY: NORMAL
MDC_IDC_SET_LEADCHNL_RV_SENSING_SENSITIVITY: 0.3 MV
MDC_IDC_SET_ZONE_DETECTION_BEATS_DENOMINATOR: 40 {BEATS}
MDC_IDC_SET_ZONE_DETECTION_BEATS_NUMERATOR: 30 {BEATS}
MDC_IDC_SET_ZONE_DETECTION_INTERVAL: 270 MS
MDC_IDC_SET_ZONE_DETECTION_INTERVAL: 360 MS
MDC_IDC_SET_ZONE_DETECTION_INTERVAL: 370 MS
MDC_IDC_SET_ZONE_DETECTION_INTERVAL: NORMAL
MDC_IDC_SET_ZONE_TYPE: NORMAL
MDC_IDC_STAT_BRADY_DTM_END: NORMAL
MDC_IDC_STAT_BRADY_DTM_START: NORMAL
MDC_IDC_STAT_BRADY_RV_PERCENT_PACED: 0.02 %
MDC_IDC_STAT_EPISODE_RECENT_COUNT: 0
MDC_IDC_STAT_EPISODE_RECENT_COUNT: 1
MDC_IDC_STAT_EPISODE_RECENT_COUNT: 22
MDC_IDC_STAT_EPISODE_RECENT_COUNT: 25
MDC_IDC_STAT_EPISODE_RECENT_COUNT_DTM_END: NORMAL
MDC_IDC_STAT_EPISODE_RECENT_COUNT_DTM_START: NORMAL
MDC_IDC_STAT_EPISODE_TOTAL_COUNT: 0
MDC_IDC_STAT_EPISODE_TOTAL_COUNT: 0
MDC_IDC_STAT_EPISODE_TOTAL_COUNT: 1
MDC_IDC_STAT_EPISODE_TOTAL_COUNT: 19
MDC_IDC_STAT_EPISODE_TOTAL_COUNT: 264
MDC_IDC_STAT_EPISODE_TOTAL_COUNT_DTM_END: NORMAL
MDC_IDC_STAT_EPISODE_TOTAL_COUNT_DTM_START: NORMAL
MDC_IDC_STAT_EPISODE_TYPE: NORMAL
MDC_IDC_STAT_TACHYTHERAPY_ATP_DELIVERED_RECENT: 0
MDC_IDC_STAT_TACHYTHERAPY_ATP_DELIVERED_TOTAL: 0
MDC_IDC_STAT_TACHYTHERAPY_RECENT_DTM_END: NORMAL
MDC_IDC_STAT_TACHYTHERAPY_RECENT_DTM_START: NORMAL
MDC_IDC_STAT_TACHYTHERAPY_SHOCKS_ABORTED_RECENT: 0
MDC_IDC_STAT_TACHYTHERAPY_SHOCKS_ABORTED_TOTAL: 0
MDC_IDC_STAT_TACHYTHERAPY_SHOCKS_DELIVERED_RECENT: 0
MDC_IDC_STAT_TACHYTHERAPY_SHOCKS_DELIVERED_TOTAL: 1
MDC_IDC_STAT_TACHYTHERAPY_TOTAL_DTM_END: NORMAL
MDC_IDC_STAT_TACHYTHERAPY_TOTAL_DTM_START: NORMAL

## 2023-02-12 ENCOUNTER — HEALTH MAINTENANCE LETTER (OUTPATIENT)
Age: 43
End: 2023-02-12

## 2023-03-11 DIAGNOSIS — F41.9 ANXIETY: ICD-10-CM

## 2023-03-11 DIAGNOSIS — E11.59 TYPE 2 DIABETES MELLITUS WITH OTHER CIRCULATORY COMPLICATION, WITHOUT LONG-TERM CURRENT USE OF INSULIN (H): ICD-10-CM

## 2023-03-11 DIAGNOSIS — F33.1 MODERATE EPISODE OF RECURRENT MAJOR DEPRESSIVE DISORDER (H): ICD-10-CM

## 2023-03-13 RX ORDER — EMPAGLIFLOZIN 10 MG/1
TABLET, FILM COATED ORAL
Qty: 90 TABLET | Refills: 1 | OUTPATIENT
Start: 2023-03-13

## 2023-03-13 RX ORDER — SERTRALINE HYDROCHLORIDE 25 MG/1
TABLET, FILM COATED ORAL
Qty: 90 TABLET | Refills: 1 | OUTPATIENT
Start: 2023-03-13

## 2023-03-13 NOTE — TELEPHONE ENCOUNTER
Already approved empagliflozin (JARDIANCE) 10 MG TABS tablet (90 tablets with 1 refill on 12/13/2022). Should already have refills on file.     Already approved sertraline (ZOLOFT) 25 MG tablet (90 tablets with 1 refill on 12/13/2022). Patient should have refills on file.     Leida PEREZ RN   [facility]

## 2023-03-17 ENCOUNTER — OFFICE VISIT (OUTPATIENT)
Dept: FAMILY MEDICINE | Facility: CLINIC | Age: 43
End: 2023-03-17
Payer: MEDICARE

## 2023-03-17 VITALS
TEMPERATURE: 97.9 F | WEIGHT: 178.1 LBS | HEIGHT: 62 IN | BODY MASS INDEX: 32.77 KG/M2 | DIASTOLIC BLOOD PRESSURE: 60 MMHG | SYSTOLIC BLOOD PRESSURE: 106 MMHG | HEART RATE: 90 BPM | RESPIRATION RATE: 19 BRPM

## 2023-03-17 DIAGNOSIS — Z00.00 ROUTINE GENERAL MEDICAL EXAMINATION AT A HEALTH CARE FACILITY: Primary | ICD-10-CM

## 2023-03-17 DIAGNOSIS — Z29.89 SBE (SUBACUTE BACTERIAL ENDOCARDITIS) PROPHYLAXIS CANDIDATE: ICD-10-CM

## 2023-03-17 DIAGNOSIS — F33.1 MODERATE EPISODE OF RECURRENT MAJOR DEPRESSIVE DISORDER (H): ICD-10-CM

## 2023-03-17 DIAGNOSIS — E66.01 MORBID OBESITY (H): ICD-10-CM

## 2023-03-17 DIAGNOSIS — Q20.3 D-TGA (DEXTRO-TRANSPOSITION OF GREAT ARTERIES): ICD-10-CM

## 2023-03-17 DIAGNOSIS — E11.59 TYPE 2 DIABETES MELLITUS WITH OTHER CIRCULATORY COMPLICATION, WITHOUT LONG-TERM CURRENT USE OF INSULIN (H): ICD-10-CM

## 2023-03-17 DIAGNOSIS — Z12.4 CERVICAL CANCER SCREENING: ICD-10-CM

## 2023-03-17 DIAGNOSIS — Z11.59 ENCOUNTER FOR SCREENING FOR OTHER VIRAL DISEASES: ICD-10-CM

## 2023-03-17 DIAGNOSIS — F41.9 ANXIETY: ICD-10-CM

## 2023-03-17 DIAGNOSIS — Z86.79 HX OF VENTRICULAR TACHYCARDIA: ICD-10-CM

## 2023-03-17 LAB
HBA1C MFR BLD: 7.6 % (ref 0–5.6)
HOLD SPECIMEN: NORMAL
TSH SERPL DL<=0.005 MIU/L-ACNC: 2.01 UIU/ML (ref 0.3–4.2)

## 2023-03-17 PROCEDURE — 99214 OFFICE O/P EST MOD 30 MIN: CPT | Mod: 25 | Performed by: FAMILY MEDICINE

## 2023-03-17 PROCEDURE — G0439 PPPS, SUBSEQ VISIT: HCPCS | Performed by: FAMILY MEDICINE

## 2023-03-17 PROCEDURE — 83036 HEMOGLOBIN GLYCOSYLATED A1C: CPT | Performed by: FAMILY MEDICINE

## 2023-03-17 PROCEDURE — G0145 SCR C/V CYTO,THINLAYER,RESCR: HCPCS | Performed by: FAMILY MEDICINE

## 2023-03-17 PROCEDURE — 86706 HEP B SURFACE ANTIBODY: CPT | Performed by: FAMILY MEDICINE

## 2023-03-17 PROCEDURE — 87624 HPV HI-RISK TYP POOLED RSLT: CPT | Performed by: FAMILY MEDICINE

## 2023-03-17 PROCEDURE — 36415 COLL VENOUS BLD VENIPUNCTURE: CPT | Performed by: FAMILY MEDICINE

## 2023-03-17 PROCEDURE — 84443 ASSAY THYROID STIM HORMONE: CPT | Performed by: FAMILY MEDICINE

## 2023-03-17 RX ORDER — AMOXICILLIN 500 MG/1
CAPSULE ORAL
Qty: 4 CAPSULE | Refills: 3 | Status: SHIPPED | OUTPATIENT
Start: 2023-03-17 | End: 2023-04-28

## 2023-03-17 RX ORDER — SERTRALINE HYDROCHLORIDE 25 MG/1
25 TABLET, FILM COATED ORAL AT BEDTIME
Qty: 90 TABLET | Refills: 1 | Status: SHIPPED | OUTPATIENT
Start: 2023-03-17 | End: 2023-08-22

## 2023-03-17 ASSESSMENT — ANXIETY QUESTIONNAIRES
4. TROUBLE RELAXING: NOT AT ALL
1. FEELING NERVOUS, ANXIOUS, OR ON EDGE: NOT AT ALL
5. BEING SO RESTLESS THAT IT IS HARD TO SIT STILL: NOT AT ALL
3. WORRYING TOO MUCH ABOUT DIFFERENT THINGS: NOT AT ALL
2. NOT BEING ABLE TO STOP OR CONTROL WORRYING: NOT AT ALL
7. FEELING AFRAID AS IF SOMETHING AWFUL MIGHT HAPPEN: NOT AT ALL
8. IF YOU CHECKED OFF ANY PROBLEMS, HOW DIFFICULT HAVE THESE MADE IT FOR YOU TO DO YOUR WORK, TAKE CARE OF THINGS AT HOME, OR GET ALONG WITH OTHER PEOPLE?: NOT DIFFICULT AT ALL
7. FEELING AFRAID AS IF SOMETHING AWFUL MIGHT HAPPEN: NOT AT ALL
IF YOU CHECKED OFF ANY PROBLEMS ON THIS QUESTIONNAIRE, HOW DIFFICULT HAVE THESE PROBLEMS MADE IT FOR YOU TO DO YOUR WORK, TAKE CARE OF THINGS AT HOME, OR GET ALONG WITH OTHER PEOPLE: NOT DIFFICULT AT ALL
GAD7 TOTAL SCORE: 0
6. BECOMING EASILY ANNOYED OR IRRITABLE: NOT AT ALL
GAD7 TOTAL SCORE: 0
GAD7 TOTAL SCORE: 0

## 2023-03-17 ASSESSMENT — PAIN SCALES - GENERAL: PAINLEVEL: NO PAIN (0)

## 2023-03-17 NOTE — PROGRESS NOTES
Assessment & Plan     Routine general medical examination at a health care facility      Type 2 diabetes mellitus with other circulatory complication, without long-term current use of insulin (H)  - a1c down to 7.6 from 9.1. congratulated on efforts. Continue with diet and lifestyle changes   - Hemoglobin A1c; Future  - TSH WITH FREE T4 REFLEX; Future  - Hemoglobin A1c  - TSH WITH FREE T4 REFLEX  - TSH with free T4 reflex; Future  - empagliflozin (JARDIANCE) 10 MG TABS tablet; Take 1 tablet (10 mg) by mouth daily  - metFORMIN (GLUCOPHAGE) 500 MG tablet; Take 2 tablets (1,000 mg) by mouth 2 times daily (with meals)    Cervical cancer screening  - pap done today.  If negative repeat in 5 years   - Pap screen with HPV - recommended age 30 - 65 years    Hx of ventricular tachycardia  - amoxicillin (AMOXIL) 500 MG capsule; Please take 2,000 mg (4 capsule) 30-60 min prior to all dental procedures    D-TGA (dextro-transposition of great arteries)  - amoxicillin (AMOXIL) 500 MG capsule; Please take 2,000 mg (4 capsule) 30-60 min prior to all dental procedures    SBE (subacute bacterial endocarditis) prophylaxis candidate  - amoxicillin (AMOXIL) 500 MG capsule; Please take 2,000 mg (4 capsule) 30-60 min prior to all dental procedures    Moderate episode of recurrent major depressive disorder (H)  - stable mood   - sertraline (ZOLOFT) 25 MG tablet; Take 1 tablet (25 mg) by mouth At Bedtime    Anxiety  - stable   - sertraline (ZOLOFT) 25 MG tablet; Take 1 tablet (25 mg) by mouth At Bedtime    Morbid obesity (H)  - down 10 lbs since last visit. Continue with diet and lifestyle changes     Encounter for screening for other viral diseases  - Hepatitis B Surface Antibody; Future  - Hepatitis B Surface Antibody           See Patient Instructions    No follow-ups on file.   Follow-up Visit   Expected date:  Sep 17, 2023 (Approximate)      Follow Up Appointment Details:     Follow-up with whom?: PCP    Follow-Up for what?:  Chronic Disease f/u    Chronic Disease f/u: Diabetes    How?: In Person                    Tammy Benedict MD  [facility]    Inés Diop is a 42 year old accompanied by her caregiver, presenting for the following health issues:  Diabetes (Follow Up)      History of Present Illness       Diabetes:   She presents for follow up of diabetes.  She is checking home blood glucose a few times a week. She checks blood glucose at bedtime.  Blood glucose is never over 200 and never under 70. She is aware of hypoglycemia symptoms including dizziness, weakness and confusion. She has no concerns regarding her diabetes at this time.  She is not experiencing numbness or burning in feet, excessive thirst, blurry vision, weight changes or redness, sores or blisters on feet. The patient has not had a diabetic eye exam in the last 12 months.         She eats 2-3 servings of fruits and vegetables daily.She consumes 1 sweetened beverage(s) daily.She exercises with enough effort to increase her heart rate 30 to 60 minutes per day.  She exercises with enough effort to increase her heart rate 5 days per week. She is missing 2 dose(s) of medications per week.  She is not taking prescribed medications regularly due to remembering to take.    Today's PHQ-9         PHQ-9 Total Score: 2    PHQ-9 Q9 Thoughts of better off dead/self-harm past 2 weeks :   Not at all    How difficult have these problems made it for you to do your work, take care of things at home, or get along with other people: Not difficult at all  Today's FREDIS-7 Score: 0     Wt Readings from Last 4 Encounters:   03/17/23 80.8 kg (178 lb 1.6 oz)   12/13/22 85.3 kg (188 lb)   07/12/22 86.4 kg (190 lb 6.4 oz)   06/03/22 86.6 kg (191 lb)     Annual Wellness Visit    Patient has been advised of split billing requirements and indicates understanding: Yes     Are you in the first 12 months of your Medicare Part B coverage?  No    Physical  "Health:    In general, how would you rate your overall physical health? good    Outside of work, how many days during the week do you exercise?4-5 days/week    Outside of work, approximately how many minutes a day do you exercise?15-30 minutes    If you drink alcohol do you typically have >3 drinks per day or >7 drinks per week? No    Do you usually eat at least 4 servings of fruit and vegetables a day, include whole grains & fiber and avoid regularly eating high fat or \"junk\" foods? No    Do you have any problems taking medications regularly? No    Do you have any side effects from medications? none    Needs assistance for the following daily activities: no assistance needed    Which of the following safety concerns are present in your home?  none identified     Hearing impairment: No    In the past 6 months, have you been bothered by leaking of urine? no    Mental Health:    In general, how would you rate your overall mental or emotional health? good  PHQ-2 Score:      Do you feel safe in your environment? Yes    Have you ever done Advance Care Planning? (For example, a Health Directive, POLST, or a discussion with a medical provider or your loved ones about your wishes)? No, advance care planning information given to patient to review.  Patient plans to discuss their wishes with loved ones or provider.      Fall risk:     click delete button to remove this line now  Cognitive Screenin) Repeat 3 items (Leader, Season, Table)    2) Clock draw: }NORMAL  3) 3 item recall: Recalls 3 objects  Results: 3 items recalled: COGNITIVE IMPAIRMENT LESS LIKELY    Mini-CogTM Copyright S Jatinder. Licensed by the author for use in [facility]; reprinted with permission (elyse@.[facility]). All rights reserved.      Do you have sleep apnea, excessive snoring or daytime drowsiness?: no    Current providers sharing in care for this patient include:   Patient Care Team:  Tammy Benedict MD as PCP - General " "(Family Practice)  Fady Jameson, RN as Nurse Coordinator (Cardiology)  Betsy Valle MD as MD (Cardiology)  Tammy Benedict MD as Assigned PCP  S[place]aña as Personal Advocate & Liaison (PAL) (Family Medicine)  Blanca Coles, VENU as Specialty Care Coordinator (Cardiology)  Vimal Ray MD as Assigned Heart and Vascular Provider    Patient has been advised of split billing requirements and indicates understanding: Yes    Review of Systems   Constitutional, HEENT, cardiovascular, pulmonary, GI, , musculoskeletal, neuro, skin, endocrine and psych systems are negative, except as otherwise noted.      Objective    /60 (BP Location: Right arm, Patient Position: Sitting, Cuff Size: Adult Large)   Pulse 90   Temp 97.9  F (36.6  C) (Oral)   Resp 19   Ht 1.575 m (5' 2\")   Wt 80.8 kg (178 lb 1.6 oz)   BMI 32.57 kg/m    Body mass index is 32.57 kg/m .  Physical Exam   GENERAL: healthy, alert and no distress  EYES: Eyes grossly normal to inspection, PERRL and conjunctivae and sclerae normal  HENT: ear canals and TM's normal, nose and mouth without ulcers or lesions  NECK: no adenopathy, no asymmetry, masses, or scars and thyroid normal to palpation  RESP: lungs clear to auscultation - no rales, rhonchi or wheezes  BREAST: normal without masses, tenderness or nipple discharge and no palpable axillary masses or adenopathy  CV: regular rate and rhythm, normal S1 S2, no S3 or S4, no murmur, click or rub, no peripheral edema and peripheral pulses strong  ABDOMEN: soft, nontender, no hepatosplenomegaly, no masses and bowel sounds normal   (female): normal female external genitalia, normal urethral meatus, vaginal mucosa pink, moist, well rugated, and normal cervix/adnexa/uterus without masses or discharge, os- dark bloody discharge   MS: no gross musculoskeletal defects noted, no edema  SKIN: no suspicious lesions or rashes  NEURO: Normal strength and tone, mentation intact and " speech normal  PSYCH: mentation appears normal, affect normal/bright  Diabetic foot exam: normal DP and PT pulses, no trophic changes or ulcerative lesions and normal sensory exam    Results for orders placed or performed in visit on 03/17/23   Hemoglobin A1c     Status: Abnormal   Result Value Ref Range    Hemoglobin A1C 7.6 (H) 0.0 - 5.6 %   Extra Tube     Status: None (In process)    Narrative    The following orders were created for panel order Extra Tube.  Procedure                               Abnormality         Status                     ---------                               -----------         ------                     Extra Red Top Tube[[id number]]                               In process                 Extra Green Top (Lithium...[[id number]]                      In process                 Extra Purple Top Tube[[id number]]                            In process                   Please view results for these tests on the individual orders.

## 2023-03-17 NOTE — PATIENT INSTRUCTIONS
Follow up in 6 months with Dr. Panchito Peguero         [email or web address]

## 2023-03-20 LAB
HBV SURFACE AB SERPL IA-ACNC: 0 M[IU]/ML
HBV SURFACE AB SERPL IA-ACNC: NONREACTIVE M[IU]/ML

## 2023-03-23 LAB
BKR LAB AP GYN ADEQUACY: NORMAL
BKR LAB AP GYN INTERPRETATION: NORMAL
BKR LAB AP HPV REFLEX: NORMAL
BKR LAB AP PREVIOUS ABNORMAL: NORMAL
PATH REPORT.COMMENTS IMP SPEC: NORMAL
PATH REPORT.COMMENTS IMP SPEC: NORMAL
PATH REPORT.RELEVANT HX SPEC: NORMAL

## 2023-03-27 LAB
HUMAN PAPILLOMA VIRUS 16 DNA: NEGATIVE
HUMAN PAPILLOMA VIRUS 18 DNA: NEGATIVE
HUMAN PAPILLOMA VIRUS FINAL DIAGNOSIS: NORMAL
HUMAN PAPILLOMA VIRUS OTHER HR: NEGATIVE

## 2023-04-11 ENCOUNTER — ALLIED HEALTH/NURSE VISIT (OUTPATIENT)
Dept: FAMILY MEDICINE | Facility: CLINIC | Age: 43
End: 2023-04-11
Payer: MEDICARE

## 2023-04-11 DIAGNOSIS — Z23 NEED FOR HEPATITIS B VACCINATION: Primary | ICD-10-CM

## 2023-04-11 PROCEDURE — G0010 ADMIN HEPATITIS B VACCINE: HCPCS

## 2023-04-11 PROCEDURE — 99207 PR NO CHARGE NURSE ONLY: CPT

## 2023-04-11 PROCEDURE — 90746 HEPB VACCINE 3 DOSE ADULT IM: CPT

## 2023-04-11 NOTE — PROGRESS NOTES
Prior to immunization administration, verified patients identity using patient s name and date of birth. Please see Immunization Activity for additional information.     Screening Questionnaire for Adult Immunization    Are you sick today?   No   Do you have allergies to medications, food, a vaccine component or latex?   No   Have you ever had a serious reaction after receiving a vaccination?   No   Do you have a long-term health problem with heart, lung, kidney, or metabolic disease (e.g., diabetes), asthma, a blood disorder, no spleen, complement component deficiency, a cochlear implant, or a spinal fluid leak?  Are you on long-term aspirin therapy?   Yes   Do you have cancer, leukemia, HIV/AIDS, or any other immune system problem?   No   Do you have a parent, brother, or sister with an immune system problem?   No   In the past 3 months, have you taken medications that affect  your immune system, such as prednisone, other steroids, or anticancer drugs; drugs for the treatment of rheumatoid arthritis, Crohn s disease, or psoriasis; or have you had radiation treatments?   No   Have you had a seizure, or a brain or other nervous system problem?   No   During the past year, have you received a transfusion of blood or blood    products, or been given immune (gamma) globulin or antiviral drug?   No   For women: Are you pregnant or is there a chance you could become       pregnant during the next month?   No   Have you received any vaccinations in the past 4 weeks?   No     Immunization questionnaire was positive for at least one answer.  Notified.    I have reviewed the following standing orders:   This patient is due and qualifies for the Hepatitis B vaccine.    Click here for Hepatitis B Standing Order    I have reviewed the vaccines inclusion and exclusion criteria; No concerns regarding eligibility.         Injection of Hep B  given by Kathrine Hong CMA. Patient instructed to remain in clinic for 15 minutes  afterwards, and to report any adverse reactions.     Screening performed by Kathrine Hong CMA on 4/11/2023 at 10:37 AM.

## 2023-04-28 ENCOUNTER — OFFICE VISIT (OUTPATIENT)
Dept: CARDIOLOGY | Facility: CLINIC | Age: 43
End: 2023-04-28
Attending: INTERNAL MEDICINE
Payer: MEDICARE

## 2023-04-28 ENCOUNTER — LAB (OUTPATIENT)
Dept: LAB | Facility: CLINIC | Age: 43
End: 2023-04-28
Attending: INTERNAL MEDICINE
Payer: MEDICARE

## 2023-04-28 VITALS — SYSTOLIC BLOOD PRESSURE: 108 MMHG | OXYGEN SATURATION: 94 % | DIASTOLIC BLOOD PRESSURE: 70 MMHG | HEART RATE: 91 BPM

## 2023-04-28 DIAGNOSIS — Z13.6 CARDIOVASCULAR SCREENING; LDL GOAL LESS THAN 70: ICD-10-CM

## 2023-04-28 DIAGNOSIS — Z95.810 AUTOMATIC IMPLANTABLE CARDIOVERTER-DEFIBRILLATOR IN SITU: ICD-10-CM

## 2023-04-28 DIAGNOSIS — Q20.3 D-TGA (DEXTRO-TRANSPOSITION OF GREAT ARTERIES): ICD-10-CM

## 2023-04-28 DIAGNOSIS — Q24.9 CONGENITAL HEART DISEASE: Primary | ICD-10-CM

## 2023-04-28 DIAGNOSIS — Q24.9 CONGENITAL HEART DISEASE: ICD-10-CM

## 2023-04-28 DIAGNOSIS — Z86.79 HX OF VENTRICULAR TACHYCARDIA: ICD-10-CM

## 2023-04-28 DIAGNOSIS — Z29.89 SBE (SUBACUTE BACTERIAL ENDOCARDITIS) PROPHYLAXIS CANDIDATE: ICD-10-CM

## 2023-04-28 LAB
ALBUMIN SERPL BCG-MCNC: 4.1 G/DL (ref 3.5–5.2)
ALP SERPL-CCNC: 149 U/L (ref 35–104)
ALT SERPL W P-5'-P-CCNC: 40 U/L (ref 10–35)
ANION GAP SERPL CALCULATED.3IONS-SCNC: 10 MMOL/L (ref 7–15)
AST SERPL W P-5'-P-CCNC: 29 U/L (ref 10–35)
ATRIAL RATE - MUSE: 92 BPM
BASOPHILS # BLD AUTO: 0 10E3/UL (ref 0–0.2)
BASOPHILS NFR BLD AUTO: 0 %
BILIRUB SERPL-MCNC: 0.7 MG/DL
BUN SERPL-MCNC: 10.3 MG/DL (ref 6–20)
CALCIUM SERPL-MCNC: 9.7 MG/DL (ref 8.6–10)
CHLORIDE SERPL-SCNC: 102 MMOL/L (ref 98–107)
CREAT SERPL-MCNC: 0.57 MG/DL (ref 0.51–0.95)
DEPRECATED HCO3 PLAS-SCNC: 25 MMOL/L (ref 22–29)
DIASTOLIC BLOOD PRESSURE - MUSE: NORMAL MMHG
EOSINOPHIL # BLD AUTO: 0.1 10E3/UL (ref 0–0.7)
EOSINOPHIL NFR BLD AUTO: 2 %
ERYTHROCYTE [DISTWIDTH] IN BLOOD BY AUTOMATED COUNT: 12.8 % (ref 10–15)
GFR SERPL CREATININE-BSD FRML MDRD: >90 ML/MIN/1.73M2
GLUCOSE SERPL-MCNC: 189 MG/DL (ref 70–99)
HCT VFR BLD AUTO: 41.8 % (ref 35–47)
HGB BLD-MCNC: 13.4 G/DL (ref 11.7–15.7)
IMM GRANULOCYTES # BLD: 0 10E3/UL
IMM GRANULOCYTES NFR BLD: 0 %
INTERPRETATION ECG - MUSE: NORMAL
LYMPHOCYTES # BLD AUTO: 1 10E3/UL (ref 0.8–5.3)
LYMPHOCYTES NFR BLD AUTO: 13 %
MCH RBC QN AUTO: 28.3 PG (ref 26.5–33)
MCHC RBC AUTO-ENTMCNC: 32.1 G/DL (ref 31.5–36.5)
MCV RBC AUTO: 88 FL (ref 78–100)
MONOCYTES # BLD AUTO: 0.5 10E3/UL (ref 0–1.3)
MONOCYTES NFR BLD AUTO: 6 %
NEUTROPHILS # BLD AUTO: 6.2 10E3/UL (ref 1.6–8.3)
NEUTROPHILS NFR BLD AUTO: 79 %
NRBC # BLD AUTO: 0 10E3/UL
NRBC BLD AUTO-RTO: 0 /100
P AXIS - MUSE: 69 DEGREES
PLATELET # BLD AUTO: 136 10E3/UL (ref 150–450)
POTASSIUM SERPL-SCNC: 4 MMOL/L (ref 3.4–5.3)
PR INTERVAL - MUSE: 184 MS
PROT SERPL-MCNC: 7.5 G/DL (ref 6.4–8.3)
QRS DURATION - MUSE: 146 MS
QT - MUSE: 410 MS
QTC - MUSE: 507 MS
R AXIS - MUSE: -4 DEGREES
RBC # BLD AUTO: 4.74 10E6/UL (ref 3.8–5.2)
SODIUM SERPL-SCNC: 137 MMOL/L (ref 136–145)
SYSTOLIC BLOOD PRESSURE - MUSE: NORMAL MMHG
T AXIS - MUSE: 63 DEGREES
VENTRICULAR RATE- MUSE: 92 BPM
WBC # BLD AUTO: 7.9 10E3/UL (ref 4–11)

## 2023-04-28 PROCEDURE — 93005 ELECTROCARDIOGRAM TRACING: CPT | Mod: RTG

## 2023-04-28 PROCEDURE — 93320 DOPPLER ECHO COMPLETE: CPT | Performed by: PEDIATRICS

## 2023-04-28 PROCEDURE — 93282 PRGRMG EVAL IMPLANTABLE DFB: CPT | Performed by: INTERNAL MEDICINE

## 2023-04-28 PROCEDURE — G0463 HOSPITAL OUTPT CLINIC VISIT: HCPCS | Performed by: INTERNAL MEDICINE

## 2023-04-28 PROCEDURE — 93303 ECHO TRANSTHORACIC: CPT | Performed by: PEDIATRICS

## 2023-04-28 PROCEDURE — 99215 OFFICE O/P EST HI 40 MIN: CPT | Mod: 25 | Performed by: INTERNAL MEDICINE

## 2023-04-28 PROCEDURE — G0463 HOSPITAL OUTPT CLINIC VISIT: HCPCS | Mod: 27 | Performed by: INTERNAL MEDICINE

## 2023-04-28 PROCEDURE — 93325 DOPPLER ECHO COLOR FLOW MAPG: CPT | Performed by: PEDIATRICS

## 2023-04-28 PROCEDURE — 99214 OFFICE O/P EST MOD 30 MIN: CPT | Mod: 25 | Performed by: INTERNAL MEDICINE

## 2023-04-28 PROCEDURE — 36415 COLL VENOUS BLD VENIPUNCTURE: CPT | Performed by: PATHOLOGY

## 2023-04-28 PROCEDURE — 80053 COMPREHEN METABOLIC PANEL: CPT | Performed by: PATHOLOGY

## 2023-04-28 PROCEDURE — 85025 COMPLETE CBC W/AUTO DIFF WBC: CPT | Performed by: PATHOLOGY

## 2023-04-28 RX ORDER — AMOXICILLIN 500 MG/1
CAPSULE ORAL
Qty: 4 CAPSULE | Refills: 3 | Status: SHIPPED | OUTPATIENT
Start: 2023-04-28 | End: 2023-09-25

## 2023-04-28 RX ORDER — ATORVASTATIN CALCIUM 80 MG/1
80 TABLET, FILM COATED ORAL AT BEDTIME
Qty: 90 TABLET | Refills: 3 | Status: SHIPPED | OUTPATIENT
Start: 2023-04-28 | End: 2023-09-25

## 2023-04-28 ASSESSMENT — PAIN SCALES - GENERAL
PAINLEVEL: NO PAIN (0)
PAINLEVEL: NO PAIN (0)

## 2023-04-28 NOTE — NURSING NOTE
Chief Complaint   Patient presents with     Follow Up     Return Congenital Heart          Vitals were taken and medications reconciled.     Lilo Nation, Visit Facilitator    11:37 AM

## 2023-04-28 NOTE — LETTER
4/28/2023      RE: Chikis Hannon  [id number]  [facility] [address]       Dear Colleague,    Thank you for the opportunity to participate in the care of your patient, Chikis Hannon, at the [facility] [place] [facility]. Please see a copy of my visit note below.    CARDIOLOGY CONSULTATION:  Ms. 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      Today pt reports that from a cardiac standpoint she has been feeling well.   She denies any chest pain or pressure, sob/maher, orthopnea, pnd, palpitations, syncope/presyncope, change in exercise tolerance or any ICD shocks.  She started Jardiance last year and her A1C is now 7.6.  Her LDL is 136 on lipitor 40. She was exercising on a bike 30 minutes regularly till she lost her cat after Christmas. 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 She is on implantable birth control and has a boyfriend. She has been working on eating healthier and has lost 10 lbs since Karri.    PAST MEDICAL HISTORY:  Past Medical History:   Diagnosis Date    Allergy, unspecified not elsewhere classified     Attention deficit disorder without mention of hyperactivity     Mild MR; difficulty attention span    Congenital heart disease     Corrected transposition of great vessels     still needs SBE prophlaxis    Diabetes mellitus type 2,  noninsulin dependent (H)     Impaired fasting glucose     103 7/06    Ventricular tachycardia (H) 3/4/2014    s/p ablation and ICD placement 3/13/14       CURRENT MEDICATIONS:  Current Outpatient Medications   Medication Sig Dispense Refill    ACE NOT PRESCRIBED, INTENTIONAL, 1 each as needed for other ACE Inhibitor not prescribed due to Other: BP at goal, pending microalbumin test (Patient not taking: Reported on 12/13/2022) 0 each 0    amoxicillin (AMOXIL) 500 MG capsule Please take 2,000 mg (4 capsule) 30-60 min prior to all dental procedures 4 capsule 3    atorvastatin (LIPITOR) 40 MG tablet Take 1 tablet (40 mg) by mouth At Bedtime 90 tablet 3    blood glucose (ACCU-CHEK SMARTVIEW) test strip Use to test blood sugar 1 times daily or as directed. 100 strip 1    blood glucose monitoring (ACCU-CHEK FASTCLIX) lancets Use to test blood sugar 1 times daily or as directed. 1 Box 3    empagliflozin (JARDIANCE) 10 MG TABS tablet Take 1 tablet (10 mg) by mouth daily 90 tablet 1    etonogestrel (NEXPLANON) 68 MG IMPL 1 each (68 mg) by Subdermal route once      metFORMIN (GLUCOPHAGE) 500 MG tablet Take 2 tablets (1,000 mg) by mouth 2 times daily (with meals) 360 tablet 1    sertraline (ZOLOFT) 25 MG tablet Take 1 tablet (25 mg) by mouth At Bedtime 90 tablet 1       PAST SURGICAL HISTORY:  Past Surgical History:   Procedure Laterality Date    CARDIAC SURGERY  Had 4 surgeries    transposition of the major arteries    SURGICAL HISTORY OF -   2014    ablation for v. tach; ICD placed    [facility] NONSPECIFIC PROCEDURE  11/98    open heart surgery x 3, transposition of major vessels    Z NONSPECIFIC PROCEDURE  10/03    Additional heart surgery       ALLERGIES  Adhesive tape    FAMILY HX:  Family History   Adopted: Yes   Problem Relation Age of Onset    Family History Negative Mother     Family History Negative Father        SOCIAL HX:  Social History     Socioeconomic History    Marital status:     Number of children: 0    Occupational History    Occupation:  at [employer]     Employer: [employer]     Employer: DISABLED   Tobacco Use    Smoking status: Never    Smokeless tobacco: Never   Vaping Use    Vaping status: Never Used   Substance and Sexual Activity    Alcohol use: No     Alcohol/week: 0.0 standard drinks of alcohol    Drug use: No    Sexual activity: Not Currently     Partners: Male   Other Topics Concern    Special Diet No     Comment: working on dairy. fruits and veggies.    Exercise Yes     Comment: Peter Monzon tape    Parent/sibling w/ CABG, MI or angioplasty before 65F 55M? No     Social Determinants of Health     Financial Resource Strain: Low Risk  (11/22/2021)    Overall Financial Resource Strain (CARDIA)     Difficulty of Paying Living Expenses: Not hard at all   Food Insecurity: No Food Insecurity (11/22/2021)    Hunger Vital Sign     Worried About Running Out of Food in the Last Year: Never true     Ran Out of Food in the Last Year: Never true   Transportation Needs: No Transportation Needs (11/22/2021)    PRAPARE - Transportation     Lack of Transportation (Medical): No     Lack of Transportation (Non-Medical): No   Physical Activity: Insufficiently Active (11/22/2021)    Exercise Vital Sign     Days of Exercise per Week: 2 days     Minutes of Exercise per Session: 30 min   Stress: No Stress Concern Present (11/22/2021)    [place] [employer] - Occupational Stress Questionnaire     Feeling of Stress : Not at all   Social Connections: Unknown (11/22/2021)    Social Connection and Isolation Panel [NHANES]     Frequency of Communication with Friends and Family: Twice a week     Frequency of Social Gatherings with Friends and Family: Twice a week     Attends Anabaptism Services: Never     Active Member of Clubs or Organizations: No     Marital Status: Patient refused   Housing Stability: Low Risk  (11/22/2021)    Housing Stability Vital Sign     Unable to Pay for Housing in the  Last Year: No     Number of Places Lived in the Last Year: 1     Unstable Housing in the Last Year: No       ROS:  Constitutional: No fever, chills, or sweats. No weight gain/loss.   ENT: No visual disturbance, ear ache, epistaxis, sore throat.   Allergies/Immunologic: Negative.   Respiratory: No cough, hemoptysis.   Cardiovascular: As per HPI.   GI: No nausea, vomiting, hematemesis, melena, or hematochezia.   : No urinary frequency, dysuria, or hematuria.   Integument: Negative.   Psychiatric: Negative.   Neuro: Negative.   Endocrinology: Negative.   Musculoskeletal: No myalgia.    VITAL SIGNS:  /70 (BP Location: Right arm, Patient Position: Sitting, Cuff Size: Adult Regular)   Pulse 91   SpO2 94%   There is no height or weight on file to calculate BMI.  Wt Readings from Last 2 Encounters:   03/17/23 80.8 kg (178 lb 1.6 oz)   12/13/22 85.3 kg (188 lb)       PHYSICAL EXAM  Chikis Hannon IS A 42 year old female.in no acute distress.  HEENT: Unremarkable.  Neck: JVP normal.  Lungs: CTA.  Cor: RRR. Normal S1 and S2. Soft diastolic murmur. Abd: Soft, nontender, nondistended.  Extremities: No C/C/E.  Neuro: Grossly intact.    LABS    Lab Results   Component Value Date    WBC 7.9 04/28/2023    WBC 8.4 02/28/2020     Lab Results   Component Value Date    RBC 4.74 04/28/2023    RBC 4.75 02/28/2020     Lab Results   Component Value Date    HGB 13.4 04/28/2023    HGB 13.8 02/28/2020     Lab Results   Component Value Date    HCT 41.8 04/28/2023    HCT 41.9 02/28/2020     No components found for: MCT  Lab Results   Component Value Date    MCV 88 04/28/2023    MCV 88 02/28/2020     Lab Results   Component Value Date    MCH 28.3 04/28/2023    MCH 29.1 02/28/2020     Lab Results   Component Value Date    MCHC 32.1 04/28/2023    MCHC 32.9 02/28/2020     Lab Results   Component Value Date    RDW 12.8 04/28/2023    RDW 12.3 02/28/2020     Lab Results   Component Value Date     04/28/2023     02/28/2020       Recent Labs   Lab Test 04/28/23  0905 12/13/22  1056    139   POTASSIUM 4.0 3.9   CHLORIDE 102 102   CO2 25 22   ANIONGAP 10 15   * 242*   BUN 10.3 5.9*   CR 0.57 0.54   LITA 9.7 9.2     Recent Labs   Lab Test 12/13/22  1056 11/29/21  1324 01/15/16  1017 06/18/15  1002   CHOL 193 179   < > 188   HDL 46* 42*   < > 40*   * 119*   < > 134*   TRIG 76 90   < > 72   CHOLHDLRATIO  --   --   --  4.7   NHDL 147* 137*   < >  --     < > = values in this interval not displayed.        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 Hannon. I discussed her history and symptoms in detail and testing was reviewed personally.  We discussed doing a cardiac MRI/MRA for better evaluation of her systemic ventricular function and outflow insufficiency.  We also discussed doing a CPX.  We increased her Lipitor to 80 mg daily and we will recheck lipids when she returns for testing and I will see her at that time. She would like a prescription for amoxicillin - will send that in.      She will work on diet and exercise.     It was a pleasure to see her. Please do not hesitate to contact me with questions.     SANDRA Aragon MD   45 minutes face-face, documentation and review of records on day of visit       Please do not hesitate to contact me if you have any questions/concerns.     Sincerely,     Hu Aragon MD

## 2023-04-28 NOTE — LETTER
4/28/2023      RE: Chikis Hannon  [address]  [facility] [address]       Dear Colleague,    Thank you for the opportunity to participate in the care of your patient, Chikis Hannon, at the [facility] [place] [facility]. Please see a copy of my visit note below.          [facility] ELECTROPHYSIOLOGY CLINIC VISIT    Assessment/Recommendations   Assessment/Plan:    Ms. 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 She presents today for follow up.       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 Normal systemic ventricular function  2. No further VT episodes for years, off AAT  3. Review of impedance trend shows stable RV tip to RV coil impedance. Lead is programmed: RV pace polarity and RV sense polarity to Tip-Coil. Pacing impedance alert has been turned off and lead integrity alert left on.  Lead integrity alert looks at programmed vector and pacing  impedance alert looks at all vectors.  This will provide an alert if there is change in impedance tip-coil. We discussed that if further lead deterioration or integrity alerts despite this programming, then we would need to replace lead. Currently, lead impedance is stable. She will continue to follow with device clinic per routine and has home monitoring active.     Follow up in 2 years or sooner if need arises.        History of Present Illness/Subjective    Ms. Chikis Hannon is a 42 year old female who comes in today for EP follow-up of ICD lead integrity alert.    Ms. 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     She presented in 3/2014 after having sustained symptomatic VT She was discharged on sotalol 120 mg twice a day. She was seen in clinic on 4/22/14 and her device interrogation showed no recurrence of ventricular arrhythmias. She reported some mild fatigue on Sotalol and her dose was decreased to 80 mg BID. At her July 2014 follow up appointment she only had one 4 second NSVT at 160 bpm which was asymptomatic. At her clinic visit in 2/15 she again had no ventricular arrhythmias and her Sotalol was stopped. After recurrence of VT in June 2015, the patient was restarted on sotalol 80 mg twice a day. We re-attempted VT ablation after holding sotalol for one week on 8/24/2015 with no  inducible VT.  [facility] EP Follow up 9/22/15: She presents today for follow-up.  Her device interrogation shows no ventricular arrhythmias.  Her device site has healed well. She denies any syncope or presyncope, no chest pain or shortness of breath on exertion at rest, no orthopnea, no lower extremity edema. Her EKG shows normal sinus rhythm with RBBB,  ms.  [facility] EP Visit 9/23/17: She presents today for follow up. She reports feeling well. She denies any chest pain, dizziness, lightheadedness, shortness of breath, palpitations, leg/ankle swelling, PND, orthopnea, or syncopal symptoms. She has started exercise classes and is increasing her activity levels. Device interrogation shows normal device function.  17 episodes recorded. 16 VT zone monitored events when she was exercising EGMS shows ST with rates of 168-172 BPM.  1 NSVT for 4 beats at a rate of 211 for 2 second duration.  Intrinsic rhythm = SR @ 90 bpm. = <0.1%. OptiVol fluid index is at baseline. No short v-v intervals recorded.  RV lead impedance trends appear stable. Presenting 12 lead ECG shows SR with RBBB Vent Rate 84 bpm,  ms,  ms, QTc 505 ms. Current cardiac medications include: Sotalol.   Ep Visit 9/2017: She presents today for follow up. She reports feeling well. She denies any chest pain/pressures, dizziness, lightheadedness, shortness of breath, leg/ankle swelling, PND, orthopnea, palpitations, or syncopal symptoms. She continues to exercise and is without limitations. Device interrogation shows normal device function.  2 NSVT episodes recorded, lasting 1-2 seconds. 8 episodes recorded as VT, these EGMs are consistent with ST.  <0.1%. No short v-v intervals recorded. Lead trends appear stable. Presenting 12 lead ECG shows NSR with RBBB Vent Rate 85 bpm,  ms,  ms, QTc 504 ms. Current cardiac medications include: Sotalol.      Ep Visit 2/20/20: She presents today for follow up. She reports feeling well. She states  she has not been taking her Sotalol for the last many months. She denies any chest pain/pressures, dizziness, lightheadedness, worsening shortness of breath, leg/ankle swelling, PND, orthopnea, palpitations, or syncopal symptoms. Device interrogation shows  normal ICD function.  Since last remote transmission on 10/29/19, there have been 5 SVT-Wavelet, 7 monitored VT and 2 NSVT episodes recorded.  The SVT episodes last 5-30 seconds at 167-176 bpm, occur with activity and appear to be ST.  The monitored VT episodes last 5-56 seconds at rates of 167-171 bpm.  These also occur with activity and appear  to be ST with several occurring on 11/27/19.  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  The NSVT episodes last 1 second in duration @ 231-226 bpm.   Intrinsic rhythm = SR @ 80 bpm.    =<0.1%. No short v-v intervals recorded.  Lead trends appear stable.  Echo today is stable with normal systemic function. No current cardiac medications.      EP Visit 3/26/21: She presents today for follow up. She reports feeling well. She denies any chest pain/pressures, dizziness, lightheadedness, worsening shortness of breath, leg/ankle swelling, PND, orthopnea, palpitations, or syncopal symptoms. Recent echo showed normal RV size/function, mildly dialted LV with  LVEF 47%, with no significant changes from prior echo. Device interrogation shows normal device function, stable lead parameters,  <0.1%, 46 VT and 2 NSVT all EGMs suggest ST and not true arrhythmia. No current cardiac medications.     EP Visit 10/13/21: She presents today for follow up. She recently had a ICD alarm that prompted her to go in for evaluation. Investigation showed this was a lead integrity alert. Red Alert for Lead integrity was triggered secondary to increase in RV impedance >75% and multiple short R-R intervals. Max impedance was approximately 1000 ohms.  Review of impedance trend  shows stable RV tip to RV coil impedance. Tech services recommends programming RV pace polarity and RV sense polarity to Tip-Coil.  They recommend turning off pacing impedance alert and leaving lead integrity alert on.  Lead integrity alert looks at programmed vector and pacing impedance alert looks at all vectors.  This will provide an alert if there is change in impedance tip-coil. She was brought into clinic and these changes were made. She reports feeling well. She denies chest discomfort, palpitations, abdominal fullness/bloating or peripheral edema, shortness of breath, paroxysmal nocturnal dyspnea, orthopnea, lightheadedness, dizziness, pre-syncope, or syncope. No current cardiac medications.     EP Visit 4/1/22: She presents today for follow up. She presents today for follow up. She reports feeling well. She denies chest discomfort, palpitations, abdominal fullness/bloating or peripheral edema, shortness of breath, paroxysmal nocturnal dyspnea, orthopnea, lightheadedness, dizziness, pre-syncope, or syncope. Device interrogation shows normal device interrogation, RV lead impedance has had warning in past currently 304 ohms, stable lead parameters, 1 NSVT episode recorded, lasting 2 minutes, at 171 bpm. 2 SVT episodes recorded, lasting 6-31 seconds, at 167 bpm - classified as SVT per Wavelet algorithm. EGM reveals similar morphologies, and  <0.1% . No current cardiac medications.     She presents today for follow up. She reports feeling well and offers no complaints. She denies chest discomfort, palpitations, abdominal fullness/bloating or peripheral edema, shortness of breath, paroxysmal nocturnal dyspnea, orthopnea, lightheadedness, dizziness, pre-syncope, or syncope. Presenting 12 lead ECG shows SR with PVC Vent Rate 92 bpm,  ms,  ms, QTc 507 ms. Device interrogation shows normal device function, stable lead parameters, 4 PSVT up to 55 seconds, and  <0.1%. No current cardiac medications.      I have reviewed and updated the patient's Past Medical History, Social History, Family History and Medication List.     Cardiographics (Personally Reviewed) :   LAST CARDIAC MRA:  3/6/14  FINDINGS:   SITUS: There is a normal spleen in the left upper quadrant. There is situs solitus in the chest, as demonstrated by a normal airway pulmonary artery relationship bilaterally.   CAVAE: Single right-sided inferior and superior vena cavae drain normally into the right atrium unobstructed.   PULMONARY VEINS: Two right and two left pulmonary veins drain into the left atrium unobstructed.   ATRIA: There is no interatrial communication demonstrated. The atrial sizes are normal.   ATRIOVENTRICULAR CONNECTION: Concordant. Separate mitral and tricuspid valves without evidence of significant regurgitation or stenosis.   VENTRICLES: D-loop ventricles with levocardia. Ventricles are normal in size and contraction. Right ventricular end-diastolic volume is upper limits of normal for BSA. If utilizing an ideal body weight, this would be mildly enlarged. No residual interventricular communication is demonstrated. No myocardial hyperenhancement identified on delayed post gadolinium imaging, except at the ventricular insertion points.   VENTRICULOARTERIAL CONNECTION: Concordant. Trileaflet aortic valve without regurgitation or stenosis. The oversewn native pulmonary artery is directly posterior to the aorta. There is now an RV to PA conduit. Mild stenosis of the RVOT conduit with moderate regurgitation, 31% by direct flow measurement, and 33% by ventricular stroke volume comparison.   AORTA AND SUPRA-AORTIC VESSELS: A left-sided aortic arch is demonstrated with normal cervical branching pattern. No evidence of patent ductus arteriosus, coarctation, or aortopulmonary collateral arteries. The coronary arteries are not well visualized in this study.   PULMONARY ARTERY: The RV to PA conduit is patent with normal pulmonary artery  branching pattern.   LAST ECHO:  2/2020  ------CONCLUSIONS------  Technically difficult study due to poor acoustic windows. D-TGA status post  Rastelli and subsequent placement of a bio-prosthetic valve in the pulmonary  position. There is no obvious residual ventricular level shunt. Mild (1+)  aortic valve insufficiency. Unable to adequately visualize the RV-PA conduit  or the bio-prosthetic valve; however, normal antegrade flow is visualized in  the branch pulmonary arteries. A transvenous pacemaker lead is seen in the  right ventricle, with the tip of the lead implanted into the right ventricular  apex. Trivial tricuspid valve insufficiency. Estimated right ventricular  systolic pressure is 26 mmHg plus right atrial pressure. The left and right  ventricles have normal chamber size, wall thickness, and systolic function.  The calculated biplane left ventricular ejection fraction is 57%. No  pericardial effusion.  No significant change from last echocardiogram.  7/25/19 CTA:  IMPRESSION:  1.  D-transposition of aorta s/p Rastelli with bioprosthetic pulmonic  valve  2.  Mildly reduced left ventricular function with LV EF 52%.  3.  Normal RV size and function with RV EF 55%.  4.  RV-PA valved conduit without visual evidence of stenosis.  5.  Mild pulmonic regurgitation (<30 mL) using biventricular stroke  volumes.  6.  No significant change from CT scan in 2014.  7.  Please review radiology review for incidental non-cardiovascular  findings to follow separately     3/23/21 ECHO:  ##### CONCLUSIONS #####  Technically difficult study due to poor acoustic windows. D-TGA status-post  Rastelli and subsequent placement of a bio-prosthetic valve in the pulmonary  position.     Trivial tricuspid valve regurgitation; estimated right ventricular systolic  pressure 26mmHg plus right atrial pressure. Normal right ventricular size and  systolic function. Unable to adequately visualize the RV-PA conduit or the  bio-prosthetic  valve; normal antegrade flow profile at the valve annulus; no  obvious regurgitation. Mildly dilated left ventricle with normal wall  thickness and mildly-depressed systolic function; biplane LV EF 47%. A  transvenous pacemaker lead is seen in the right ventricle, with the tip of the  lead implanted into the right ventricular apex. No effusion.     No significant change from last echocardiogram.       Physical Examination   /70 (BP Location: Right arm, Patient Position: Sitting, Cuff Size: Adult Large)   Pulse 91   SpO2 94%    Wt Readings from Last 3 Encounters:   03/17/23 80.8 kg (178 lb 1.6 oz)   12/13/22 85.3 kg (188 lb)   07/12/22 86.4 kg (190 lb 6.4 oz)     General Appearance:   Alert, well-appearing and in no acute distress.   HEENT: Atraumatic, normocephalic. PERRL.  MMM.   Chest/Lungs:   Respirations unlabored.  Lungs are clear to auscultation.   Cardiovascular:   Regular rate and rhythm. III/VI KRISTI   Abdomen:  Soft, nontender, nondistended.   Extremities: No cyanosis or clubbing. No edema.    Musculoskeletal: Moves all extremities.  Gait normal.   Skin: Warm, dry, intact.    Neurologic: Mood and affect are appropriate.  Alert and oriented to person, place, time, and situation.          Medications  Allergies   Current Outpatient Medications   Medication Sig Dispense Refill    ACE NOT PRESCRIBED, INTENTIONAL, 1 each as needed for other ACE Inhibitor not prescribed due to Other: BP at goal, pending microalbumin test (Patient not taking: Reported on 12/13/2022) 0 each 0    amoxicillin (AMOXIL) 500 MG capsule Please take 2,000 mg (4 capsule) 30-60 min prior to all dental procedures 4 capsule 3    atorvastatin (LIPITOR) 40 MG tablet Take 1 tablet (40 mg) by mouth At Bedtime 90 tablet 3    blood glucose (ACCU-CHEK SMARTVIEW) test strip Use to test blood sugar 1 times daily or as directed. 100 strip 1    blood glucose monitoring (ACCU-CHEK FASTCLIX) lancets Use to test blood sugar 1 times daily or as  directed. 1 Box 3    empagliflozin (JARDIANCE) 10 MG TABS tablet Take 1 tablet (10 mg) by mouth daily 90 tablet 1    etonogestrel (NEXPLANON) 68 MG IMPL 1 each (68 mg) by Subdermal route once      metFORMIN (GLUCOPHAGE) 500 MG tablet Take 2 tablets (1,000 mg) by mouth 2 times daily (with meals) 360 tablet 1    sertraline (ZOLOFT) 25 MG tablet Take 1 tablet (25 mg) by mouth At Bedtime 90 tablet 1    Allergies   Allergen Reactions    Adhesive Tape Rash         Lab Results (Personally Reviewed)    Chemistry/lipid CBC Cardiac Enzymes/BNP/TSH/INR   Lab Results   Component Value Date    BUN 5.9 (L) 12/13/2022     12/13/2022    CO2 22 12/13/2022     Creatinine   Date Value Ref Range Status   12/13/2022 0.54 0.51 - 0.95 mg/dL Final   03/23/2021 0.61 0.52 - 1.04 mg/dL Final       Lab Results   Component Value Date    CHOL 193 12/13/2022    HDL 46 (L) 12/13/2022     (H) 12/13/2022      Lab Results   Component Value Date    WBC 6.2 04/13/2022    HGB 13.4 04/13/2022    HCT 41.2 04/13/2022    MCV 86 04/13/2022     04/13/2022    Lab Results   Component Value Date    TSH 2.01 03/17/2023    INR 1.13 06/01/2015        The patient states understanding and is agreeable with the plan.   Vimal Ray MD [facility]  [facility]iology - Electrophysiology    Total time spent on patient visit, reviewing notes, imaging, labs, orders, and completing necessary documentation: 30 minutes.  >50% of visit spent on counseling patient and/or coordination of care.                   Please do not hesitate to contact me if you have any questions/concerns.     Sincerely,     Vimal Ray MD

## 2023-04-28 NOTE — PROGRESS NOTES
CARDIOLOGY CONSULTATION:  Ms. 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      Today pt reports that from a cardiac standpoint she has been feeling well.   She denies any chest pain or pressure, sob/maher, orthopnea, pnd, palpitations, syncope/presyncope, change in exercise tolerance or any ICD shocks.  She started Jardiance last year and her A1C is now 7.6.  Her LDL is 136 on lipitor 40. She was exercising on a bike 30 minutes regularly till she lost her cat after [place]. 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 She is on implantable birth control and has a boyfriend. She has been working on eating healthier and has lost 10 lbs since [place].    PAST MEDICAL HISTORY:  Past Medical History:   Diagnosis Date     Allergy, unspecified not elsewhere classified      Attention deficit disorder without mention of hyperactivity     Mild MR; difficulty attention span     Congenital heart disease      Corrected transposition of great vessels     still needs SBE prophlaxis     Diabetes mellitus type 2, noninsulin dependent (H)      Impaired fasting glucose     103 7/06     Ventricular tachycardia (H) 3/4/2014    s/p ablation and ICD placement 3/13/14       CURRENT MEDICATIONS:  Current Outpatient Medications   Medication Sig Dispense Refill     ACE NOT PRESCRIBED, INTENTIONAL, 1 each as needed for other ACE Inhibitor not prescribed due to  Other: BP at goal, pending microalbumin test (Patient not taking: Reported on 12/13/2022) 0 each 0     amoxicillin (AMOXIL) 500 MG capsule Please take 2,000 mg (4 capsule) 30-60 min prior to all dental procedures 4 capsule 3     atorvastatin (LIPITOR) 40 MG tablet Take 1 tablet (40 mg) by mouth At Bedtime 90 tablet 3     blood glucose (ACCU-CHEK SMARTVIEW) test strip Use to test blood sugar 1 times daily or as directed. 100 strip 1     blood glucose monitoring (ACCU-CHEK FASTCLIX) lancets Use to test blood sugar 1 times daily or as directed. 1 Box 3     empagliflozin (JARDIANCE) 10 MG TABS tablet Take 1 tablet (10 mg) by mouth daily 90 tablet 1     etonogestrel (NEXPLANON) 68 MG IMPL 1 each (68 mg) by Subdermal route once       metFORMIN (GLUCOPHAGE) 500 MG tablet Take 2 tablets (1,000 mg) by mouth 2 times daily (with meals) 360 tablet 1     sertraline (ZOLOFT) 25 MG tablet Take 1 tablet (25 mg) by mouth At Bedtime 90 tablet 1       PAST SURGICAL HISTORY:  Past Surgical History:   Procedure Laterality Date     CARDIAC SURGERY  Had 4 surgeries    transposition of the major arteries     SURGICAL HISTORY OF -   2014    ablation for v. tach; ICD placed     [facility] NONSPECIFIC PROCEDURE  11/98    open heart surgery x 3, transposition of major vessels     Z NONSPECIFIC PROCEDURE  10/03    Additional heart surgery       ALLERGIES  Adhesive tape    FAMILY HX:  Family History   Adopted: Yes   Problem Relation Age of Onset     Family History Negative Mother      Family History Negative Father        SOCIAL HX:  Social History     Socioeconomic History     Marital status:      Number of children: 0   Occupational History     Occupation:  at [employer]     Employer: [employer]     Employer: DISABLED   Tobacco Use     Smoking status: Never     Smokeless tobacco: Never   Vaping Use     Vaping status: Never Used   Substance and Sexual Activity     Alcohol use: No     Alcohol/week: 0.0 standard drinks of alcohol      Drug use: No     Sexual activity: Not Currently     Partners: Male   Other Topics Concern     Special Diet No     Comment: working on dairy. fruits and veggies.     Exercise Yes     Comment: Peter Monzon tape     Parent/sibling w/ CABG, MI or angioplasty before 65F 55M? No     Social Determinants of Health     Financial Resource Strain: Low Risk  (11/22/2021)    Overall Financial Resource Strain (CARDIA)      Difficulty of Paying Living Expenses: Not hard at all   Food Insecurity: No Food Insecurity (11/22/2021)    Hunger Vital Sign      Worried About Running Out of Food in the Last Year: Never true      Ran Out of Food in the Last Year: Never true   Transportation Needs: No Transportation Needs (11/22/2021)    PRAPARE - Transportation      Lack of Transportation (Medical): No      Lack of Transportation (Non-Medical): No   Physical Activity: Insufficiently Active (11/22/2021)    Exercise Vital Sign      Days of Exercise per Week: 2 days      Minutes of Exercise per Session: 30 min   Stress: No Stress Concern Present (11/22/2021)    [place] [employer] - Occupational Stress Questionnaire      Feeling of Stress : Not at all   Social Connections: Unknown (11/22/2021)    Social Connection and Isolation Panel [NHANES]      Frequency of Communication with Friends and Family: Twice a week      Frequency of Social Gatherings with Friends and Family: Twice a week      Attends Yazidi Services: Never      Active Member of Clubs or Organizations: No      Marital Status: Patient refused   Housing Stability: Low Risk  (11/22/2021)    Housing Stability Vital Sign      Unable to Pay for Housing in the Last Year: No      Number of Places Lived in the Last Year: 1      Unstable Housing in the Last Year: No       ROS:  Constitutional: No fever, chills, or sweats. No weight gain/loss.   ENT: No visual disturbance, ear ache, epistaxis, sore throat.   Allergies/Immunologic: Negative.   Respiratory: No cough,  hemoptysis.   Cardiovascular: As per HPI.   GI: No nausea, vomiting, hematemesis, melena, or hematochezia.   : No urinary frequency, dysuria, or hematuria.   Integument: Negative.   Psychiatric: Negative.   Neuro: Negative.   Endocrinology: Negative.   Musculoskeletal: No myalgia.    VITAL SIGNS:  /70 (BP Location: Right arm, Patient Position: Sitting, Cuff Size: Adult Regular)   Pulse 91   SpO2 94%   There is no height or weight on file to calculate BMI.  Wt Readings from Last 2 Encounters:   03/17/23 80.8 kg (178 lb 1.6 oz)   12/13/22 85.3 kg (188 lb)       PHYSICAL EXAM  Chikis Hannon IS A 42 year old female.in no acute distress.  HEENT: Unremarkable.  Neck: JVP normal.  Lungs: CTA.  Cor: RRR. Normal S1 and S2. Soft diastolic murmur. Abd: Soft, nontender, nondistended.  Extremities: No C/C/E.  Neuro: Grossly intact.    LABS    Lab Results   Component Value Date    WBC 7.9 04/28/2023    WBC 8.4 02/28/2020     Lab Results   Component Value Date    RBC 4.74 04/28/2023    RBC 4.75 02/28/2020     Lab Results   Component Value Date    HGB 13.4 04/28/2023    HGB 13.8 02/28/2020     Lab Results   Component Value Date    HCT 41.8 04/28/2023    HCT 41.9 02/28/2020     No components found for: MCT  Lab Results   Component Value Date    MCV 88 04/28/2023    MCV 88 02/28/2020     Lab Results   Component Value Date    MCH 28.3 04/28/2023    MCH 29.1 02/28/2020     Lab Results   Component Value Date    MCHC 32.1 04/28/2023    MCHC 32.9 02/28/2020     Lab Results   Component Value Date    RDW 12.8 04/28/2023    RDW 12.3 02/28/2020     Lab Results   Component Value Date     04/28/2023     02/28/2020      Recent Labs   Lab Test 04/28/23  0905 12/13/22  1056    139   POTASSIUM 4.0 3.9   CHLORIDE 102 102   CO2 25 22   ANIONGAP 10 15   * 242*   BUN 10.3 5.9*   CR 0.57 0.54   LITA 9.7 9.2     Recent Labs   Lab Test 12/13/22  1056 11/29/21  1324 01/15/16  1017 06/18/15  1002   CHOL 193 179   < > 188    HDL 46* 42*   < > 40*   * 119*   < > 134*   TRIG 76 90   < > 72   CHOLHDLRATIO  --   --   --  4.7   NHDL 147* 137*   < >  --     < > = values in this interval not displayed.        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 Hannon. I discussed her history and symptoms in detail and testing was reviewed personally.  We discussed doing a cardiac MRI/MRA for better evaluation of her systemic ventricular function and outflow insufficiency.  We also discussed doing a CPX.  We increased her Lipitor to 80 mg daily and we will recheck lipids when she returns for testing and I will see her at that time. She would like a prescription for amoxicillin - will send that in.      She will work on diet and exercise.     It was a pleasure to see her. Please do not hesitate to contact me with questions.     SANDRA Aragon MD   45 minutes face-face, documentation and review of records on day of visit

## 2023-04-28 NOTE — PATIENT INSTRUCTIONS
You were seen today in the Adult Congenital and Cardiovascular Genetics Clinic at the [facility].    Cardiology Providers you saw during your visit:  SANDRA Aragon MD and Vimal Ray MD    Diagnosis:  TGA    Results:  SANDRA Aragon MD and Vimal Ray MD reviewed the results of your EKG, labs, echo and device testing today in clinic.    Recommendations for you:    INCREASE Lipitor to 80 mg daily  We refilled your amoxicillin      General Cardiac Recommendations:  Continue to eat a heart healthy, low salt diet.  Continue to get 20-30 minutes of aerobic activity, 4-5 days per week.  Examples of aerobic activity include walking, running, swimming, cycling, etc.  Continue to observe good oral hygiene, with regular dental visits.      SBE prophylaxis:   Yes_X___  No____    If YES is checked, follow the recommendations outlined below:  Take antibiotic(s) prior to recommended dental procedures and procedures on the respiratory tract or with infected skin, muscle or bones. SBE prophylaxis is not needed for routine GI and  procedures (ie. Colonoscopy or vaginal delivery)  Observe good oral hygiene daily, as advised by your dentist. Get regular professional dental care.  Keep cuts clean.  Infections should be treated promptly.  Symptoms of Infective Endocarditis could include: fever lasting more than 4-5 days or a recurrent fever that initially resolves but returns within 1-2 days)      Exercise restrictions:   Yes__X__  No____         If yes, list restrictions:  Must be allowed to rest if fatigued or SOB      FASTING CHOLESTEROL was checked in the last 5 years YES__x_  NO___ (2021)      Follow-up:  Follow up with Dr Ray in 2 years. Follow up with Dr Aragon with CMRI/MRA, CPX and fasting labs prior    CardioPulmonary Stress Test (CPX)  CPX is a maximal (meaning you exercise to exhaustion, not to achieve a heart rate) exercise test where we measure how well your heart/body uses oxygen or energy. It is  the gold standard for measuring functional capacity and helps us differentiate limitations due to lungs, heart, or fitness.   A CPX is NOT a typical stress test. You will NOT be asked to hold your Beta Blocker medication.   You will be scheduled for a CardioPulmonary Stress Test at the [facility] ([address], [facility] [address], [phone]).  Follow these instructions:    1. Report to the GOLD waiting room in the [facility] hospital.  2. Nothing to eat for 3 hours prior to your test. You may have clear liquids up to the time of your test.  3. Please wear loose, two-piece clothing and comfortable, rubber-soled shoes for walking.       What happens during this test?   We will place a blood pressure cuff on your arm. We will also attach small pads to your chest. The pads are hooked to an EKG (electrocardiogram) machine that shows how your heart is working.  You will walk on a treadmill or pedal a bicycle.  You will breathe through a mouthpiece, and the air you breathe out will be measured at rest and during exercise.  We will watch your EKG, heart rate and heart rhythm the entire time.  We will check your blood pressure during the test.  This test takes two (2) hours.      For Patients with Diabetes: Your RN Coordinator will give you instructions on adjusting your diabetic medications for the day of your test                           If you have questions please contact your diabetic care team.                          Remember to  bring your glucometer & insulin with you to take after your test if needed.      Cardiac MRI  Magnetic resonance imaging (MRI) is a test that lets your doctor see detailed pictures of the inside of your body. MRI combines the use of strong magnets and radio waves to form an MRI image. A Cardiac MRI will give a detailed image of your heart.  Follow these instructions:  1. Please no eating or drinking 3 hours prior to the procedure.   2. No caffeine, alcohol or tobacco  12 hours prior to the procedure.    During the procedure:  Please arrive to the Gold Waiting Room in the Main Hospital.   You will be required to lay flat and follow breath-hold instructions.   You will need to remove all metal and answer a safety questionnaire. Please wear clothes without metal (snaps and zippers). Please remove any body piercings and hair extensions before you arrive. You will also remove watches, jewelry, hairpins, wallets, dentures, partial dental plates and hearing aids. You may wear contact lenses, and you may be able to wear your rings. We have a safe place to keep your personal items, but it is safer to leave them at home.  You will be given IV contrast for this exam.  To prepare:  The day before the exam drink extra fluid - at least six 8oz glasses (unless you are on a fluid restriction per your doctor)       If you have questions or concerns please contact us at:    Blanca Coles, RN, BSN   Nora Collins (Scheduling)  Nurse Care Coordinator     Clinic   Adult Congenital and CV Genetics   Adult Congenital and CV Genetic  [facility]   [facility]  (P) [phone]     (P) [phone]            (F) [phone]        For after hours urgent needs, call [phone] and ask to speak to the Adult Congenital Physician on call.  Mention Job Code 0401.    For emergencies call 911.    [facility]   Clinics and Surgery Center  Mail Code [id number]  [address] [place] [address]

## 2023-04-28 NOTE — NURSING NOTE
Cardiac Testing: Patient given instructions regarding CMRI and CPX. Discussed purpose, preparation, procedure and when to expect results reported back to the patient. Patient demonstrated understanding of this information and agreed to call with further questions or concerns.    Labs: Patient was given results of the laboratory testing obtained today. Patient was instructed to return for the next laboratory testing at next follow up . Patient demonstrated understanding of this information and agreed to call with further questions or concerns.     Med Reconcile: Reviewed and verified all current medications with the patient. The updated medication list was printed and given to the patient.    Return Appointment: Patient given instructions regarding scheduling next clinic visit. Patient demonstrated understanding of this information and agreed to call with further questions or concerns.    Medication Change: Patient was educated regarding prescribed medication change, including discussion of the indication, administration, side effects, and when to report to MD or RN. Patient demonstrated understanding of this information and agreed to call with further questions or concerns.    Patient stated she understood all health information given and agreed to call with further questions or concerns.

## 2023-04-28 NOTE — PATIENT INSTRUCTIONS
It was a pleasure to see you in clinic today.  Your next automatic remote ICD check is scheduled for 7/28/2023, we will plan to see you back in clinic in 1 year.  Please do nothesitate to call us for any device related questions or concerns.    Joanne Villalba RN    Electrophysiology Nurse Clinician  [facility]    During Business Hours Please Call:  [phone]  After Hours Please Call:  [phone] - select option #4 and ask for job code [id number]  .

## 2023-04-28 NOTE — PATIENT INSTRUCTIONS
You were seen today in the Adult Congenital and Cardiovascular Genetics Clinic at the [facility].    Cardiology Providers you saw during your visit:  SANDRA Aragon MD and Vimal Ray MD    Diagnosis:  TGA    Results:  SANDRA Aragon MD and Vimal Ray MD reviewed the results of your EKG, labs, echo and device testing today in clinic.    Recommendations for you:    INCREASE Lipitor to 80 mg daily  We refilled your amoxicillin      General Cardiac Recommendations:  Continue to eat a heart healthy, low salt diet.  Continue to get 20-30 minutes of aerobic activity, 4-5 days per week.  Examples of aerobic activity include walking, running, swimming, cycling, etc.  Continue to observe good oral hygiene, with regular dental visits.      SBE prophylaxis:   Yes_X___  No____    If YES is checked, follow the recommendations outlined below:  Take antibiotic(s) prior to recommended dental procedures and procedures on the respiratory tract or with infected skin, muscle or bones. SBE prophylaxis is not needed for routine GI and  procedures (ie. Colonoscopy or vaginal delivery)  Observe good oral hygiene daily, as advised by your dentist. Get regular professional dental care.  Keep cuts clean.  Infections should be treated promptly.  Symptoms of Infective Endocarditis could include: fever lasting more than 4-5 days or a recurrent fever that initially resolves but returns within 1-2 days)      Exercise restrictions:   Yes__X__  No____         If yes, list restrictions:  Must be allowed to rest if fatigued or SOB      FASTING CHOLESTEROL was checked in the last 5 years YES__x_  NO___ (2021)      Follow-up:  Follow up with Dr Ray in 2 years. Follow up with Dr Aragon with CMRI/MRA, CPX and fasting labs prior    CardioPulmonary Stress Test (CPX)  CPX is a maximal (meaning you exercise to exhaustion, not to achieve a heart rate) exercise test where we measure how well your heart/body uses oxygen or energy. It is  the gold standard for measuring functional capacity and helps us differentiate limitations due to lungs, heart, or fitness.   A CPX is NOT a typical stress test. You will NOT be asked to hold your Beta Blocker medication.   You will be scheduled for a CardioPulmonary Stress Test at the [facility] ([address], [facility] [address], [phone]).  Follow these instructions:    1. Report to the GOLD waiting room in the [facility] hospital.  2. Nothing to eat for 3 hours prior to your test. You may have clear liquids up to the time of your test.  3. Please wear loose, two-piece clothing and comfortable, rubber-soled shoes for walking.       What happens during this test?   We will place a blood pressure cuff on your arm. We will also attach small pads to your chest. The pads are hooked to an EKG (electrocardiogram) machine that shows how your heart is working.  You will walk on a treadmill or pedal a bicycle.  You will breathe through a mouthpiece, and the air you breathe out will be measured at rest and during exercise.  We will watch your EKG, heart rate and heart rhythm the entire time.  We will check your blood pressure during the test.  This test takes two (2) hours.      For Patients with Diabetes: Your RN Coordinator will give you instructions on adjusting your diabetic medications for the day of your test                           If you have questions please contact your diabetic care team.                          Remember to  bring your glucometer & insulin with you to take after your test if needed.      Cardiac MRI  Magnetic resonance imaging (MRI) is a test that lets your doctor see detailed pictures of the inside of your body. MRI combines the use of strong magnets and radio waves to form an MRI image. A Cardiac MRI will give a detailed image of your heart.  Follow these instructions:  1. Please no eating or drinking 3 hours prior to the procedure.   2. No caffeine, alcohol or tobacco  12 hours prior to the procedure.    During the procedure:  Please arrive to the Gold Waiting Room in the Main Hospital.   You will be required to lay flat and follow breath-hold instructions.   You will need to remove all metal and answer a safety questionnaire. Please wear clothes without metal (snaps and zippers). Please remove any body piercings and hair extensions before you arrive. You will also remove watches, jewelry, hairpins, wallets, dentures, partial dental plates and hearing aids. You may wear contact lenses, and you may be able to wear your rings. We have a safe place to keep your personal items, but it is safer to leave them at home.  You will be given IV contrast for this exam.  To prepare:  The day before the exam drink extra fluid - at least six 8oz glasses (unless you are on a fluid restriction per your doctor)       If you have questions or concerns please contact us at:    Blanca Coles, RN, BSN   Nora Collins (Scheduling)  Nurse Care Coordinator     Clinic   Adult Congenital and CV Genetics   Adult Congenital and CV Genetic  [facility]   [facility]  (P) [phone]     (P) [phone]            (F) [phone]        For after hours urgent needs, call [phone] and ask to speak to the Adult Congenital Physician on call.  Mention Job Code 0401.    For emergencies call 911.    [facility]   Clinics and Surgery Center  Mail Code [id number]  [address] [place] [address]

## 2023-05-02 ENCOUNTER — TRANSFERRED RECORDS (OUTPATIENT)
Dept: HEALTH INFORMATION MANAGEMENT | Facility: CLINIC | Age: 43
End: 2023-05-02
Payer: MEDICARE

## 2023-05-02 LAB — RETINOPATHY: NEGATIVE

## 2023-05-03 ENCOUNTER — TELEPHONE (OUTPATIENT)
Dept: CARDIOLOGY | Facility: CLINIC | Age: 43
End: 2023-05-03
Payer: MEDICARE

## 2023-05-03 NOTE — TELEPHONE ENCOUNTER
ELSAM in regards to getting pt scheduled for MRI/MRA, CPX and labs prior to  March next available.

## 2023-05-04 ENCOUNTER — TELEPHONE (OUTPATIENT)
Dept: FAMILY MEDICINE | Facility: CLINIC | Age: 43
End: 2023-05-04
Payer: MEDICARE

## 2023-05-04 NOTE — TELEPHONE ENCOUNTER
Patient Quality Outreach    Patient is due for the following:   Diabetes -  Eye Exam    Next Steps:   No follow up needed at this time.    Type of outreach:    Chart review performed, no outreach needed. and Pt had a diabetic eye exam at [facility] on 5/2/23      Questions for provider review:    None           Marine Ruiz, [facility]

## 2023-05-23 ENCOUNTER — CARE COORDINATION (OUTPATIENT)
Dept: CARDIOLOGY | Facility: CLINIC | Age: 43
End: 2023-05-23
Payer: MEDICARE

## 2023-05-23 NOTE — PROGRESS NOTES
Request received for patient to have a CMRI. Patient has a MRI non-conditional Medtronic [id number] Evera XT VR ICD. Joanne Carias NP, has approved the MRI utilizing the Magnetic Resonance Imagining nonconditional cardiac Implantable electronic device (CIED) Protocol. MRI is scheduled for 9/7/23.

## 2023-06-03 LAB
MDC_IDC_EPISODE_DTM: NORMAL
MDC_IDC_EPISODE_DURATION: 14 S
MDC_IDC_EPISODE_DURATION: 15 S
MDC_IDC_EPISODE_DURATION: 19 S
MDC_IDC_EPISODE_DURATION: 55 S
MDC_IDC_EPISODE_ID: 666
MDC_IDC_EPISODE_ID: 667
MDC_IDC_EPISODE_ID: 668
MDC_IDC_EPISODE_ID: 669
MDC_IDC_EPISODE_TYPE: NORMAL
MDC_IDC_LEAD_IMPLANT_DT: NORMAL
MDC_IDC_LEAD_LOCATION: NORMAL
MDC_IDC_LEAD_LOCATION_DETAIL_1: NORMAL
MDC_IDC_LEAD_MFG: NORMAL
MDC_IDC_LEAD_MODEL: NORMAL
MDC_IDC_LEAD_POLARITY_TYPE: NORMAL
MDC_IDC_LEAD_SERIAL: NORMAL
MDC_IDC_MSMT_BATTERY_DTM: NORMAL
MDC_IDC_MSMT_BATTERY_REMAINING_LONGEVITY: 41 MO
MDC_IDC_MSMT_BATTERY_RRT_TRIGGER: 2.73
MDC_IDC_MSMT_BATTERY_STATUS: NORMAL
MDC_IDC_MSMT_BATTERY_VOLTAGE: 2.96 V
MDC_IDC_MSMT_CAP_CHARGE_DTM: NORMAL
MDC_IDC_MSMT_CAP_CHARGE_ENERGY: 18 J
MDC_IDC_MSMT_CAP_CHARGE_TIME: 4.02
MDC_IDC_MSMT_CAP_CHARGE_TYPE: NORMAL
MDC_IDC_MSMT_LEADCHNL_RV_IMPEDANCE_VALUE: 266 OHM
MDC_IDC_MSMT_LEADCHNL_RV_PACING_THRESHOLD_AMPLITUDE: 0.75 V
MDC_IDC_MSMT_LEADCHNL_RV_PACING_THRESHOLD_PULSEWIDTH: 0.4 MS
MDC_IDC_MSMT_LEADCHNL_RV_SENSING_INTR_AMPL: 5.75 MV
MDC_IDC_PG_IMPLANT_DTM: NORMAL
MDC_IDC_PG_MFG: NORMAL
MDC_IDC_PG_MODEL: NORMAL
MDC_IDC_PG_SERIAL: NORMAL
MDC_IDC_PG_TYPE: NORMAL
MDC_IDC_SESS_CLINIC_NAME: NORMAL
MDC_IDC_SESS_DTM: NORMAL
MDC_IDC_SESS_TYPE: NORMAL
MDC_IDC_SET_BRADY_HYSTRATE: NORMAL
MDC_IDC_SET_BRADY_LOWRATE: 40 {BEATS}/MIN
MDC_IDC_SET_BRADY_MODE: NORMAL
MDC_IDC_SET_LEADCHNL_RV_PACING_AMPLITUDE: 2 V
MDC_IDC_SET_LEADCHNL_RV_PACING_ANODE_ELECTRODE_1: NORMAL
MDC_IDC_SET_LEADCHNL_RV_PACING_ANODE_LOCATION_1: NORMAL
MDC_IDC_SET_LEADCHNL_RV_PACING_CAPTURE_MODE: NORMAL
MDC_IDC_SET_LEADCHNL_RV_PACING_CATHODE_ELECTRODE_1: NORMAL
MDC_IDC_SET_LEADCHNL_RV_PACING_CATHODE_LOCATION_1: NORMAL
MDC_IDC_SET_LEADCHNL_RV_PACING_POLARITY: NORMAL
MDC_IDC_SET_LEADCHNL_RV_PACING_PULSEWIDTH: 0.4 MS
MDC_IDC_SET_LEADCHNL_RV_SENSING_ANODE_ELECTRODE_1: NORMAL
MDC_IDC_SET_LEADCHNL_RV_SENSING_ANODE_LOCATION_1: NORMAL
MDC_IDC_SET_LEADCHNL_RV_SENSING_CATHODE_ELECTRODE_1: NORMAL
MDC_IDC_SET_LEADCHNL_RV_SENSING_CATHODE_LOCATION_1: NORMAL
MDC_IDC_SET_LEADCHNL_RV_SENSING_POLARITY: NORMAL
MDC_IDC_SET_LEADCHNL_RV_SENSING_SENSITIVITY: 0.3 MV
MDC_IDC_SET_ZONE_DETECTION_BEATS_DENOMINATOR: 40 {BEATS}
MDC_IDC_SET_ZONE_DETECTION_BEATS_NUMERATOR: 30 {BEATS}
MDC_IDC_SET_ZONE_DETECTION_INTERVAL: 270 MS
MDC_IDC_SET_ZONE_DETECTION_INTERVAL: 360 MS
MDC_IDC_SET_ZONE_DETECTION_INTERVAL: 370 MS
MDC_IDC_SET_ZONE_DETECTION_INTERVAL: NORMAL
MDC_IDC_SET_ZONE_TYPE: NORMAL
MDC_IDC_STAT_BRADY_DTM_END: NORMAL
MDC_IDC_STAT_BRADY_DTM_START: NORMAL
MDC_IDC_STAT_BRADY_RV_PERCENT_PACED: 0.02 %
MDC_IDC_STAT_EPISODE_RECENT_COUNT: 0
MDC_IDC_STAT_EPISODE_RECENT_COUNT: 2
MDC_IDC_STAT_EPISODE_RECENT_COUNT: 28
MDC_IDC_STAT_EPISODE_RECENT_COUNT_DTM_END: NORMAL
MDC_IDC_STAT_EPISODE_RECENT_COUNT_DTM_START: NORMAL
MDC_IDC_STAT_EPISODE_TOTAL_COUNT: 0
MDC_IDC_STAT_EPISODE_TOTAL_COUNT: 1
MDC_IDC_STAT_EPISODE_TOTAL_COUNT: 19
MDC_IDC_STAT_EPISODE_TOTAL_COUNT: 264
MDC_IDC_STAT_EPISODE_TOTAL_COUNT_DTM_END: NORMAL
MDC_IDC_STAT_EPISODE_TOTAL_COUNT_DTM_START: NORMAL
MDC_IDC_STAT_EPISODE_TYPE: NORMAL
MDC_IDC_STAT_TACHYTHERAPY_ATP_DELIVERED_RECENT: 0
MDC_IDC_STAT_TACHYTHERAPY_ATP_DELIVERED_TOTAL: 0
MDC_IDC_STAT_TACHYTHERAPY_RECENT_DTM_END: NORMAL
MDC_IDC_STAT_TACHYTHERAPY_RECENT_DTM_START: NORMAL
MDC_IDC_STAT_TACHYTHERAPY_SHOCKS_ABORTED_RECENT: 0
MDC_IDC_STAT_TACHYTHERAPY_SHOCKS_ABORTED_TOTAL: 0
MDC_IDC_STAT_TACHYTHERAPY_SHOCKS_DELIVERED_RECENT: 0
MDC_IDC_STAT_TACHYTHERAPY_SHOCKS_DELIVERED_TOTAL: 1
MDC_IDC_STAT_TACHYTHERAPY_TOTAL_DTM_END: NORMAL
MDC_IDC_STAT_TACHYTHERAPY_TOTAL_DTM_START: NORMAL

## 2023-06-13 ENCOUNTER — ALLIED HEALTH/NURSE VISIT (OUTPATIENT)
Dept: FAMILY MEDICINE | Facility: CLINIC | Age: 43
End: 2023-06-13
Payer: MEDICARE

## 2023-06-13 DIAGNOSIS — Z23 NEED FOR HEPATITIS B VACCINATION: Primary | ICD-10-CM

## 2023-06-13 PROCEDURE — 90746 HEPB VACCINE 3 DOSE ADULT IM: CPT

## 2023-06-13 PROCEDURE — G0010 ADMIN HEPATITIS B VACCINE: HCPCS

## 2023-06-13 PROCEDURE — 99207 PR NO CHARGE NURSE ONLY: CPT

## 2023-06-13 NOTE — PROGRESS NOTES
Prior to immunization administration, verified patients identity using patient s name and date of birth. Please see Immunization Activity for additional information.     Screening Questionnaire for Adult Immunization    Are you sick today?   No   Do you have allergies to medications, food, a vaccine component or latex?   No   Have you ever had a serious reaction after receiving a vaccination?   No   Do you have a long-term health problem with heart, lung, kidney, or metabolic disease (e.g., diabetes), asthma, a blood disorder, no spleen, complement component deficiency, a cochlear implant, or a spinal fluid leak?  Are you on long-term aspirin therapy?   Yes   Do you have cancer, leukemia, HIV/AIDS, or any other immune system problem?   No   Do you have a parent, brother, or sister with an immune system problem?   No   In the past 3 months, have you taken medications that affect  your immune system, such as prednisone, other steroids, or anticancer drugs; drugs for the treatment of rheumatoid arthritis, Crohn s disease, or psoriasis; or have you had radiation treatments?   No   Have you had a seizure, or a brain or other nervous system problem?   No   During the past year, have you received a transfusion of blood or blood    products, or been given immune (gamma) globulin or antiviral drug?   No   For women: Are you pregnant or is there a chance you could become       pregnant during the next month?   No   Have you received any vaccinations in the past 4 weeks?   No     Immunization questionnaire was positive for at least one answer.     I have reviewed the following standing orders:   This patient is due and qualifies for the Hepatitis B vaccine.    Click here for Hepatitis B Standing Order    I have reviewed the vaccines inclusion and exclusion criteria; No concerns regarding eligibility.         Injection of Hepatitis B given by Marine Ruiz CMA. Patient instructed to remain in clinic for 15 minutes  afterwards, and to report any adverse reactions.     Screening performed by Marine Ruiz CMA on 6/13/2023 at 9:04 AM.

## 2023-07-28 ENCOUNTER — ANCILLARY PROCEDURE (OUTPATIENT)
Dept: CARDIOLOGY | Facility: CLINIC | Age: 43
End: 2023-07-28
Attending: INTERNAL MEDICINE
Payer: MEDICARE

## 2023-07-28 DIAGNOSIS — Q20.3 D-TGA (DEXTRO-TRANSPOSITION OF GREAT ARTERIES): ICD-10-CM

## 2023-07-28 DIAGNOSIS — Z86.79 HX OF VENTRICULAR TACHYCARDIA: ICD-10-CM

## 2023-07-28 PROCEDURE — 93296 REM INTERROG EVL PM/IDS: CPT

## 2023-07-28 PROCEDURE — 93295 DEV INTERROG REMOTE 1/2/MLT: CPT | Performed by: INTERNAL MEDICINE

## 2023-07-30 LAB
MDC_IDC_EPISODE_DTM: NORMAL
MDC_IDC_EPISODE_DURATION: 10 S
MDC_IDC_EPISODE_DURATION: 147 S
MDC_IDC_EPISODE_DURATION: 16 S
MDC_IDC_EPISODE_DURATION: 35 S
MDC_IDC_EPISODE_DURATION: 5 S
MDC_IDC_EPISODE_ID: 670
MDC_IDC_EPISODE_ID: 671
MDC_IDC_EPISODE_ID: 672
MDC_IDC_EPISODE_ID: 673
MDC_IDC_EPISODE_ID: 674
MDC_IDC_EPISODE_TYPE: NORMAL
MDC_IDC_LEAD_IMPLANT_DT: NORMAL
MDC_IDC_LEAD_LOCATION: NORMAL
MDC_IDC_LEAD_LOCATION_DETAIL_1: NORMAL
MDC_IDC_LEAD_MFG: NORMAL
MDC_IDC_LEAD_MODEL: NORMAL
MDC_IDC_LEAD_POLARITY_TYPE: NORMAL
MDC_IDC_LEAD_SERIAL: NORMAL
MDC_IDC_MSMT_BATTERY_DTM: NORMAL
MDC_IDC_MSMT_BATTERY_REMAINING_LONGEVITY: 37 MO
MDC_IDC_MSMT_BATTERY_RRT_TRIGGER: 2.73
MDC_IDC_MSMT_BATTERY_STATUS: NORMAL
MDC_IDC_MSMT_BATTERY_VOLTAGE: 2.97 V
MDC_IDC_MSMT_CAP_CHARGE_DTM: NORMAL
MDC_IDC_MSMT_CAP_CHARGE_ENERGY: 18 J
MDC_IDC_MSMT_CAP_CHARGE_TIME: 4.09
MDC_IDC_MSMT_CAP_CHARGE_TYPE: NORMAL
MDC_IDC_MSMT_LEADCHNL_RV_IMPEDANCE_VALUE: 266 OHM
MDC_IDC_MSMT_LEADCHNL_RV_PACING_THRESHOLD_AMPLITUDE: 0.62 V
MDC_IDC_MSMT_LEADCHNL_RV_PACING_THRESHOLD_PULSEWIDTH: 0.4 MS
MDC_IDC_MSMT_LEADCHNL_RV_SENSING_INTR_AMPL: 6 MV
MDC_IDC_PG_IMPLANT_DTM: NORMAL
MDC_IDC_PG_MFG: NORMAL
MDC_IDC_PG_MODEL: NORMAL
MDC_IDC_PG_SERIAL: NORMAL
MDC_IDC_PG_TYPE: NORMAL
MDC_IDC_SESS_CLINIC_NAME: NORMAL
MDC_IDC_SESS_DTM: NORMAL
MDC_IDC_SESS_TYPE: NORMAL
MDC_IDC_SET_BRADY_HYSTRATE: NORMAL
MDC_IDC_SET_BRADY_LOWRATE: 40 {BEATS}/MIN
MDC_IDC_SET_BRADY_MODE: NORMAL
MDC_IDC_SET_LEADCHNL_RV_PACING_AMPLITUDE: 2 V
MDC_IDC_SET_LEADCHNL_RV_PACING_ANODE_ELECTRODE_1: NORMAL
MDC_IDC_SET_LEADCHNL_RV_PACING_ANODE_LOCATION_1: NORMAL
MDC_IDC_SET_LEADCHNL_RV_PACING_CAPTURE_MODE: NORMAL
MDC_IDC_SET_LEADCHNL_RV_PACING_CATHODE_ELECTRODE_1: NORMAL
MDC_IDC_SET_LEADCHNL_RV_PACING_CATHODE_LOCATION_1: NORMAL
MDC_IDC_SET_LEADCHNL_RV_PACING_POLARITY: NORMAL
MDC_IDC_SET_LEADCHNL_RV_PACING_PULSEWIDTH: 0.4 MS
MDC_IDC_SET_LEADCHNL_RV_SENSING_ANODE_ELECTRODE_1: NORMAL
MDC_IDC_SET_LEADCHNL_RV_SENSING_ANODE_LOCATION_1: NORMAL
MDC_IDC_SET_LEADCHNL_RV_SENSING_CATHODE_ELECTRODE_1: NORMAL
MDC_IDC_SET_LEADCHNL_RV_SENSING_CATHODE_LOCATION_1: NORMAL
MDC_IDC_SET_LEADCHNL_RV_SENSING_POLARITY: NORMAL
MDC_IDC_SET_LEADCHNL_RV_SENSING_SENSITIVITY: 0.3 MV
MDC_IDC_SET_ZONE_DETECTION_BEATS_DENOMINATOR: 40 {BEATS}
MDC_IDC_SET_ZONE_DETECTION_BEATS_NUMERATOR: 30 {BEATS}
MDC_IDC_SET_ZONE_DETECTION_INTERVAL: 270 MS
MDC_IDC_SET_ZONE_DETECTION_INTERVAL: 360 MS
MDC_IDC_SET_ZONE_DETECTION_INTERVAL: 370 MS
MDC_IDC_SET_ZONE_DETECTION_INTERVAL: NORMAL
MDC_IDC_SET_ZONE_TYPE: NORMAL
MDC_IDC_STAT_BRADY_DTM_END: NORMAL
MDC_IDC_STAT_BRADY_DTM_START: NORMAL
MDC_IDC_STAT_BRADY_RV_PERCENT_PACED: 0.01 %
MDC_IDC_STAT_EPISODE_RECENT_COUNT: 0
MDC_IDC_STAT_EPISODE_RECENT_COUNT: 1
MDC_IDC_STAT_EPISODE_RECENT_COUNT: 3
MDC_IDC_STAT_EPISODE_RECENT_COUNT_DTM_END: NORMAL
MDC_IDC_STAT_EPISODE_RECENT_COUNT_DTM_START: NORMAL
MDC_IDC_STAT_EPISODE_TOTAL_COUNT: 0
MDC_IDC_STAT_EPISODE_TOTAL_COUNT: 1
MDC_IDC_STAT_EPISODE_TOTAL_COUNT: 20
MDC_IDC_STAT_EPISODE_TOTAL_COUNT: 267
MDC_IDC_STAT_EPISODE_TOTAL_COUNT_DTM_END: NORMAL
MDC_IDC_STAT_EPISODE_TOTAL_COUNT_DTM_START: NORMAL
MDC_IDC_STAT_EPISODE_TYPE: NORMAL
MDC_IDC_STAT_TACHYTHERAPY_ATP_DELIVERED_RECENT: 0
MDC_IDC_STAT_TACHYTHERAPY_ATP_DELIVERED_TOTAL: 0
MDC_IDC_STAT_TACHYTHERAPY_RECENT_DTM_END: NORMAL
MDC_IDC_STAT_TACHYTHERAPY_RECENT_DTM_START: NORMAL
MDC_IDC_STAT_TACHYTHERAPY_SHOCKS_ABORTED_RECENT: 0
MDC_IDC_STAT_TACHYTHERAPY_SHOCKS_ABORTED_TOTAL: 0
MDC_IDC_STAT_TACHYTHERAPY_SHOCKS_DELIVERED_RECENT: 0
MDC_IDC_STAT_TACHYTHERAPY_SHOCKS_DELIVERED_TOTAL: 1
MDC_IDC_STAT_TACHYTHERAPY_TOTAL_DTM_END: NORMAL
MDC_IDC_STAT_TACHYTHERAPY_TOTAL_DTM_START: NORMAL

## 2023-08-19 ENCOUNTER — HEALTH MAINTENANCE LETTER (OUTPATIENT)
Age: 43
End: 2023-08-19

## 2023-08-22 ENCOUNTER — OFFICE VISIT (OUTPATIENT)
Dept: FAMILY MEDICINE | Facility: CLINIC | Age: 43
End: 2023-08-22
Payer: MEDICARE

## 2023-08-22 VITALS
DIASTOLIC BLOOD PRESSURE: 73 MMHG | BODY MASS INDEX: 35.44 KG/M2 | RESPIRATION RATE: 16 BRPM | TEMPERATURE: 97.9 F | WEIGHT: 192.6 LBS | SYSTOLIC BLOOD PRESSURE: 115 MMHG | OXYGEN SATURATION: 98 % | HEIGHT: 62 IN | HEART RATE: 95 BPM

## 2023-08-22 DIAGNOSIS — Z12.31 VISIT FOR SCREENING MAMMOGRAM: ICD-10-CM

## 2023-08-22 DIAGNOSIS — E78.5 HYPERLIPIDEMIA LDL GOAL <100: ICD-10-CM

## 2023-08-22 DIAGNOSIS — Z12.11 SCREEN FOR COLON CANCER: ICD-10-CM

## 2023-08-22 DIAGNOSIS — E11.59 TYPE 2 DIABETES MELLITUS WITH OTHER CIRCULATORY COMPLICATION, WITHOUT LONG-TERM CURRENT USE OF INSULIN (H): ICD-10-CM

## 2023-08-22 DIAGNOSIS — R09.82 POST-NASAL DRIP: ICD-10-CM

## 2023-08-22 DIAGNOSIS — F33.1 MODERATE EPISODE OF RECURRENT MAJOR DEPRESSIVE DISORDER (H): ICD-10-CM

## 2023-08-22 DIAGNOSIS — F41.9 ANXIETY: ICD-10-CM

## 2023-08-22 DIAGNOSIS — Z00.00 ENCOUNTER FOR MEDICARE ANNUAL WELLNESS EXAM: Primary | ICD-10-CM

## 2023-08-22 LAB
ALBUMIN SERPL BCG-MCNC: 4.3 G/DL (ref 3.5–5.2)
ALP SERPL-CCNC: 122 U/L (ref 35–104)
ALT SERPL W P-5'-P-CCNC: 26 U/L (ref 0–50)
ANION GAP SERPL CALCULATED.3IONS-SCNC: 11 MMOL/L (ref 7–15)
AST SERPL W P-5'-P-CCNC: 26 U/L (ref 0–45)
BILIRUB SERPL-MCNC: 0.8 MG/DL
BUN SERPL-MCNC: 7.4 MG/DL (ref 6–20)
CALCIUM SERPL-MCNC: 9.1 MG/DL (ref 8.6–10)
CHLORIDE SERPL-SCNC: 102 MMOL/L (ref 98–107)
CHOLEST SERPL-MCNC: 198 MG/DL
CREAT SERPL-MCNC: 0.55 MG/DL (ref 0.51–0.95)
CREAT UR-MCNC: 128 MG/DL
DEPRECATED HCO3 PLAS-SCNC: 25 MMOL/L (ref 22–29)
ERYTHROCYTE [DISTWIDTH] IN BLOOD BY AUTOMATED COUNT: 11.9 % (ref 10–15)
GFR SERPL CREATININE-BSD FRML MDRD: >90 ML/MIN/1.73M2
GLUCOSE SERPL-MCNC: 218 MG/DL (ref 70–99)
HBA1C MFR BLD: 9.4 % (ref 0–5.6)
HCT VFR BLD AUTO: 42.2 % (ref 35–47)
HDLC SERPL-MCNC: 43 MG/DL
HGB BLD-MCNC: 14.1 G/DL (ref 11.7–15.7)
LDLC SERPL CALC-MCNC: 134 MG/DL
MCH RBC QN AUTO: 28.6 PG (ref 26.5–33)
MCHC RBC AUTO-ENTMCNC: 33.4 G/DL (ref 31.5–36.5)
MCV RBC AUTO: 86 FL (ref 78–100)
MICROALBUMIN UR-MCNC: <12 MG/L
MICROALBUMIN/CREAT UR: NORMAL MG/G{CREAT}
NONHDLC SERPL-MCNC: 155 MG/DL
PLATELET # BLD AUTO: 153 10E3/UL (ref 150–450)
POTASSIUM SERPL-SCNC: 4.3 MMOL/L (ref 3.4–5.3)
PROT SERPL-MCNC: 7.6 G/DL (ref 6.4–8.3)
RBC # BLD AUTO: 4.93 10E6/UL (ref 3.8–5.2)
SODIUM SERPL-SCNC: 138 MMOL/L (ref 136–145)
TRIGL SERPL-MCNC: 106 MG/DL
TSH SERPL DL<=0.005 MIU/L-ACNC: 1.85 UIU/ML (ref 0.3–4.2)
WBC # BLD AUTO: 8.5 10E3/UL (ref 4–11)

## 2023-08-22 PROCEDURE — 99207 PR ANNUAL WELLNESS VISIT, PPS, SUBSEQUENT STAT: CPT | Performed by: FAMILY MEDICINE

## 2023-08-22 PROCEDURE — 80053 COMPREHEN METABOLIC PANEL: CPT | Performed by: FAMILY MEDICINE

## 2023-08-22 PROCEDURE — 99214 OFFICE O/P EST MOD 30 MIN: CPT | Mod: 25 | Performed by: FAMILY MEDICINE

## 2023-08-22 PROCEDURE — 83036 HEMOGLOBIN GLYCOSYLATED A1C: CPT | Performed by: FAMILY MEDICINE

## 2023-08-22 PROCEDURE — 82043 UR ALBUMIN QUANTITATIVE: CPT | Performed by: FAMILY MEDICINE

## 2023-08-22 PROCEDURE — 84443 ASSAY THYROID STIM HORMONE: CPT | Performed by: FAMILY MEDICINE

## 2023-08-22 PROCEDURE — 36415 COLL VENOUS BLD VENIPUNCTURE: CPT | Performed by: FAMILY MEDICINE

## 2023-08-22 PROCEDURE — 80061 LIPID PANEL: CPT | Performed by: FAMILY MEDICINE

## 2023-08-22 PROCEDURE — 82570 ASSAY OF URINE CREATININE: CPT | Performed by: FAMILY MEDICINE

## 2023-08-22 PROCEDURE — 85027 COMPLETE CBC AUTOMATED: CPT | Performed by: FAMILY MEDICINE

## 2023-08-22 RX ORDER — LANCETS
EACH MISCELLANEOUS
Qty: 100 EACH | Refills: 6 | Status: SHIPPED | OUTPATIENT
Start: 2023-08-22

## 2023-08-22 RX ORDER — SERTRALINE HYDROCHLORIDE 25 MG/1
25 TABLET, FILM COATED ORAL AT BEDTIME
Qty: 90 TABLET | Refills: 1 | Status: SHIPPED | OUTPATIENT
Start: 2023-08-22 | End: 2023-09-05

## 2023-08-22 RX ORDER — FLUTICASONE PROPIONATE 50 MCG
1 SPRAY, SUSPENSION (ML) NASAL DAILY
Qty: 9.9 ML | Refills: 1 | Status: SHIPPED | OUTPATIENT
Start: 2023-08-22 | End: 2023-10-25

## 2023-08-22 SDOH — HEALTH STABILITY: PHYSICAL HEALTH: ON AVERAGE, HOW MANY DAYS PER WEEK DO YOU ENGAGE IN MODERATE TO STRENUOUS EXERCISE (LIKE A BRISK WALK)?: 0 DAYS

## 2023-08-22 SDOH — ECONOMIC STABILITY: FOOD INSECURITY: WITHIN THE PAST 12 MONTHS, YOU WORRIED THAT YOUR FOOD WOULD RUN OUT BEFORE YOU GOT MONEY TO BUY MORE.: NEVER TRUE

## 2023-08-22 SDOH — ECONOMIC STABILITY: FOOD INSECURITY: WITHIN THE PAST 12 MONTHS, THE FOOD YOU BOUGHT JUST DIDN'T LAST AND YOU DIDN'T HAVE MONEY TO GET MORE.: NEVER TRUE

## 2023-08-22 SDOH — ECONOMIC STABILITY: INCOME INSECURITY: IN THE LAST 12 MONTHS, WAS THERE A TIME WHEN YOU WERE NOT ABLE TO PAY THE MORTGAGE OR RENT ON TIME?: NO

## 2023-08-22 SDOH — ECONOMIC STABILITY: INCOME INSECURITY: HOW HARD IS IT FOR YOU TO PAY FOR THE VERY BASICS LIKE FOOD, HOUSING, MEDICAL CARE, AND HEATING?: NOT HARD AT ALL

## 2023-08-22 SDOH — HEALTH STABILITY: PHYSICAL HEALTH: ON AVERAGE, HOW MANY MINUTES DO YOU ENGAGE IN EXERCISE AT THIS LEVEL?: 30 MIN

## 2023-08-22 ASSESSMENT — ENCOUNTER SYMPTOMS
SORE THROAT: 1
BREAST MASS: 0

## 2023-08-22 ASSESSMENT — ANXIETY QUESTIONNAIRES
IF YOU CHECKED OFF ANY PROBLEMS ON THIS QUESTIONNAIRE, HOW DIFFICULT HAVE THESE PROBLEMS MADE IT FOR YOU TO DO YOUR WORK, TAKE CARE OF THINGS AT HOME, OR GET ALONG WITH OTHER PEOPLE: NOT DIFFICULT AT ALL
6. BECOMING EASILY ANNOYED OR IRRITABLE: SEVERAL DAYS
1. FEELING NERVOUS, ANXIOUS, OR ON EDGE: NOT AT ALL
4. TROUBLE RELAXING: NOT AT ALL
2. NOT BEING ABLE TO STOP OR CONTROL WORRYING: NOT AT ALL
GAD7 TOTAL SCORE: 1
5. BEING SO RESTLESS THAT IT IS HARD TO SIT STILL: NOT AT ALL
7. FEELING AFRAID AS IF SOMETHING AWFUL MIGHT HAPPEN: NOT AT ALL
3. WORRYING TOO MUCH ABOUT DIFFERENT THINGS: NOT AT ALL
GAD7 TOTAL SCORE: 1

## 2023-08-22 ASSESSMENT — PATIENT HEALTH QUESTIONNAIRE - PHQ9
SUM OF ALL RESPONSES TO PHQ QUESTIONS 1-9: 3
SUM OF ALL RESPONSES TO PHQ QUESTIONS 1-9: 3
10. IF YOU CHECKED OFF ANY PROBLEMS, HOW DIFFICULT HAVE THESE PROBLEMS MADE IT FOR YOU TO DO YOUR WORK, TAKE CARE OF THINGS AT HOME, OR GET ALONG WITH OTHER PEOPLE: NOT DIFFICULT AT ALL

## 2023-08-22 ASSESSMENT — SOCIAL DETERMINANTS OF HEALTH (SDOH)
HOW OFTEN DO YOU GET TOGETHER WITH FRIENDS OR RELATIVES?: TWICE A WEEK
IN A TYPICAL WEEK, HOW MANY TIMES DO YOU TALK ON THE PHONE WITH FAMILY, FRIENDS, OR NEIGHBORS?: ONCE A WEEK
DO YOU BELONG TO ANY CLUBS OR ORGANIZATIONS SUCH AS CHURCH GROUPS UNIONS, FRATERNAL OR ATHLETIC GROUPS, OR SCHOOL GROUPS?: YES
HOW OFTEN DO YOU ATTEND CHURCH OR RELIGIOUS SERVICES?: 1 TO 4 TIMES PER YEAR

## 2023-08-22 ASSESSMENT — LIFESTYLE VARIABLES
HOW MANY STANDARD DRINKS CONTAINING ALCOHOL DO YOU HAVE ON A TYPICAL DAY: PATIENT DOES NOT DRINK
HOW OFTEN DO YOU HAVE SIX OR MORE DRINKS ON ONE OCCASION: NEVER
HOW OFTEN DO YOU HAVE A DRINK CONTAINING ALCOHOL: NEVER
AUDIT-C TOTAL SCORE: 0
SKIP TO QUESTIONS 9-10: 1

## 2023-08-22 ASSESSMENT — ACTIVITIES OF DAILY LIVING (ADL): CURRENT_FUNCTION: NO ASSISTANCE NEEDED

## 2023-08-22 NOTE — PROGRESS NOTES
Needs to establish care with new primary.    Wellness visit.    Constant sore throat and clear phlegm in throat, has seasonal allergies.

## 2023-08-22 NOTE — PATIENT INSTRUCTIONS
Patient Education   Personalized Prevention Plan  You are due for the preventive services outlined below.  Your care team is available to assist you in scheduling these services.  If you have already completed any of these items, please share that information with your care team to update in your medical record.  Health Maintenance Due   Topic Date Due     Mammogram  Never done     Colorectal Cancer Screening  Never done     Pneumococcal Vaccine (2 - PCV) 03/11/2015     ANNUAL REVIEW OF HM ORDERS  03/11/2023     Kidney Microalbumin Urine Test  06/03/2023     Your Health Risk Assessment indicates you feel you are not in good health    A healthy lifestyle helps keep the body fit and the mind alert. It helps protect you from disease, helps you fight disease, and helps prevent chronic disease (disease that doesn't go away) from getting worse. This is important as you get older and begin to notice twinges in muscles and joints and a decline in the strength and stamina you once took for granted. A healthy lifestyle includes good healthcare, good nutrition, weight control, recreation, and regular exercise. Avoid harmful substances and do what you can to keep safe. Another part of a healthy lifestyle is stay mentally active and socially involved.    Good healthcare   Have a wellness visit every year.   If you have new symptoms, let us know right away. Don't wait until the next checkup.   Take medicines exactly as prescribed and keep your medicines in a safe place. Tell us if your medicine causes problems.   Healthy diet and weight control   Eat 3 or 4 small, nutritious, low-fat, high-fiber meals a day. Include a variety of fruits, vegetables, and whole-grain foods.   Make sure you get enough calcium in your diet. Calcium, vitamin D, and exercise help prevent osteoporosis (bone thinning).   If you live alone, try eating with others when you can. That way you get a good meal and have company while you eat it.   Try to keep a  "healthy weight. If you eat more calories than your body uses for energy, it will be stored as fat and you will gain weight.     Recreation   Recreation is not limited to sports and team events. It includes any activity that provides relaxation, interest, enjoyment, and exercise. Recreation provides an outlet for physical, mental, and social energy. It can give a sense of worth and achievement. It can help you stay healthy.    Mental Exercise and Social Involvement  Mental and emotional health is as important as physical health. Keep in touch with friends and family. Stay as active as possible. Continue to learn and challenge yourself.   Things you can do to stay mentally active are:  Learn something new, like a foreign language or musical instrument.   Play SCRABBLE or do crossword puzzles. If you cannot find people to play these games with you at home, you can play them with others on your computer through the Internet.   Join a games club--anything from card games to chess or checkers or lawn bowling.   Start a new hobby.   Go back to school.   Volunteer.   Read.   Keep up with world events.  Learning About Being Physically Active  What is physical activity?     Being physically active means doing any kind of activity that gets your body moving.  The types of physical activity that can help you get fit and stay healthy include:  Aerobic or \"cardio\" activities. These make your heart beat faster and make you breathe harder, such as brisk walking, riding a bike, or running. They strengthen your heart and lungs and build up your endurance.  Strength training activities. These make your muscles work against, or \"resist,\" something. Examples include lifting weights or doing push-ups. These activities help tone and strengthen your muscles and bones.  Stretches. These let you move your joints and muscles through their full range of motion. Stretching helps you be more flexible.  Reaching a balance between these three " types of physical activity is important because each one contributes to your overall fitness.  What are the benefits of being active?  Being active is one of the best things you can do for your health. It helps you to:  Feel stronger and have more energy to do all the things you like to do.  Focus better at school or work.  Feel, think, and sleep better.  Reach and stay at a healthy weight.  Lose fat and build lean muscle.  Lower your risk for serious health problems, including diabetes, heart attack, high blood pressure, and some cancers.  Keep your heart, lungs, bones, muscles, and joints strong and healthy.  How can you make being active part of your life?  Start slowly. Make it your long-term goal to get at least 30 minutes of exercise on most days of the week. Walking is a good choice. You also may want to do other activities, such as running, swimming, cycling, or playing tennis or team sports.  Pick activities that you like--ones that make your heart beat faster, your muscles stronger, and your muscles and joints more flexible. If you find more than one thing you like doing, do them all. You don't have to do the same thing every day.  Get your heart pumping every day. Any activity that makes your heart beat faster and keeps it at that rate for a while counts.  Here are some great ways to get your heart beating faster:  Go for a brisk walk, run, or bike ride.  Go for a hike or swim.  Go in-line skating.  Play a game of touch football, basketball, or soccer.  Ride a bike.  Play tennis or racquetball.  Climb stairs.  Even some household chores can be aerobic--just do them at a faster pace. Vacuuming, raking or mowing the lawn, sweeping the garage, and washing and waxing the car all can help get your heart rate up.  Strengthen your muscles during the week. You don't have to lift heavy weights or grow big, bulky muscles to get stronger. Doing a few simple activities that make your muscles work against, or  "\"resist,\" something can help you get stronger.  For example, you can:  Do push-ups or sit-ups, which use your own body weight as resistance.  Lift weights or dumbbells or use stretch bands at home or in a gym or community center.  Stretch your muscles often. Stretching will help you as you become more active. It can help you stay flexible, loosen tight muscles, and avoid injury. It can also help improve your balance and posture and can be a great way to relax.  Be sure to stretch the muscles you'll be using when you work out. It's best to warm your muscles slightly before you stretch them. Walk or do some other light aerobic activity for a few minutes, and then start stretching.  When you stretch your muscles:  Do it slowly. Stretching is not about going fast or making sudden movements.  Don't push or bounce during a stretch.  Hold each stretch for at least 15 to 30 seconds, if you can. You should feel a stretch in the muscle, but not pain.  Breathe out as you do the stretch. Then breathe in as you hold the stretch. Don't hold your breath.  If you're worried about how more activity might affect your health, have a checkup before you start. Follow any special advice your doctor gives you for getting a smart start.  Where can you learn more?  Go to https://www.[employer].net/patiented  Enter W332 in the search box to learn more about \"Learning About Being Physically Active.\"  Current as of: October 10, 2022               Content Version: 13.7    1918-2575 [employer].   Care instructions adapted under license by your healthcare professional. If you have questions about a medical condition or this instruction, always ask your healthcare professional. [employer] disclaims any warranty or liability for your use of this information.      Learning About Dietary Guidelines  What are the Dietary Guidelines for Americans?     Dietary Guidelines for Americans provide tips for eating well and staying " healthy. This helps reduce the risk for long-term (chronic) diseases.  These guidelines recommend that you:  Eat and drink the right amount for you. The [place] government's food guide is called MyPlate. It can help you make your own well-balanced eating plan.  Try to balance your eating with your activity. This helps you stay at a healthy weight.  Drink alcohol in moderation, if at all.  Limit foods high in salt, saturated fat, trans fat, and added sugar.  These guidelines are from the [place] Department of Agriculture and the [place] Department of Health and Human Services. They are updated every 5 years.  What is MyPlate?  MyPlate is the [place] government's food guide. It can help you make your own well-balanced eating plan. A balanced eating plan means that you eat enough, but not too much, and that your food gives you the nutrients you need to stay healthy.  MyPlate focuses on eating plenty of whole grains, fruits, and vegetables, and on limiting fat and sugar. It is available online at www.ChooseMyPlate.gov.  How can you get started?  If you're trying to eat healthier, you can slowly change your eating habits over time. You don't have to make big changes all at once. Start by adding one or two healthy foods to your meals each day.  Grains  Choose whole-grain breads and cereals and whole-wheat pasta and whole-grain crackers.  Vegetables  Eat a variety of vegetables every day. They have lots of nutrients and are part of a heart-healthy diet.  Fruits  Eat a variety of fruits every day. Fruits contain lots of nutrients. Choose fresh fruit instead of fruit juice.  Protein foods  Choose fish and lean poultry more often. Eat red meat and fried meats less often. Dried beans, tofu, and nuts are also good sources of protein.  Dairy  Choose low-fat or fat-free products from this food group. If you have problems digesting milk, try eating cheese or yogurt instead.  Fats and oils  Limit fats and oils if you're trying to cut calories.  "Choose healthy fats when you cook. These include canola oil and olive oil.  Where can you learn more?  Go to https://www.[employer].net/patiented  Enter D676 in the search box to learn more about \"Learning About Dietary Guidelines.\"  Current as of: March 1, 2023               Content Version: 13.7    5241-8485 [employer].   Care instructions adapted under license by your healthcare professional. If you have questions about a medical condition or this instruction, always ask your healthcare professional. [employer] disclaims any warranty or liability for your use of this information.      Bladder Training: Care Instructions  Your Care Instructions     Bladder training is used to treat urge incontinence and stress incontinence. Urge incontinence means that the need to urinate comes on so fast that you can't get to a toilet in time. Stress incontinence means that you leak urine because of pressure on your bladder. For example, it may happen when you laugh, cough, or lift something heavy.  Bladder training can increase how long you can wait before you have to urinate. It can also help your bladder hold more urine. And it can give you better control over the urge to urinate.  It is important to remember that bladder training takes a few weeks to a few months to make a difference. You may not see results right away, but don't give up.  Follow-up care is a key part of your treatment and safety. Be sure to make and go to all appointments, and call your doctor if you are having problems. It's also a good idea to know your test results and keep a list of the medicines you take.  How can you care for yourself at home?  Work with your doctor to come up with a bladder training program that is right for you. You may use one or more of the following methods.  Delayed urination  In the beginning, try to keep from urinating for 5 minutes after you first feel the need to go.  While you wait, take deep, " "slow breaths to relax. Kegel exercises can also help you delay the need to go to the bathroom.  After some practice, when you can easily wait 5 minutes to urinate, try to wait 10 minutes before you urinate.  Slowly increase the waiting period until you are able to control when you have to urinate.  Scheduled urination  Empty your bladder when you first wake up in the morning.  Schedule times throughout the day when you will urinate.  Start by going to the bathroom every hour, even if you don't need to go.  Slowly increase the time between trips to the bathroom.  When you have found a schedule that works well for you, keep doing it.  If you wake up during the night and have to urinate, do it. Apply your schedule to waking hours only.  Kegel exercises  These tighten and strengthen pelvic muscles, which can help you control the flow of urine. (If doing these exercises causes pain, stop doing them and talk with your doctor.) To do Kegel exercises:  Squeeze your muscles as if you were trying not to pass gas. Or squeeze your muscles as if you were stopping the flow of urine. Your belly, legs, and buttocks shouldn't move.  Hold the squeeze for 3 seconds, then relax for 5 to 10 seconds.  Start with 3 seconds, then add 1 second each week until you are able to squeeze for 10 seconds.  Repeat the exercise 10 times a session. Do 3 to 8 sessions a day.  When should you call for help?  Watch closely for changes in your health, and be sure to contact your doctor if:    Your incontinence is getting worse.     You do not get better as expected.   Where can you learn more?  Go to https://www.[employer].net/patiented  Enter V684 in the search box to learn more about \"Bladder Training: Care Instructions.\"  Current as of: March 1, 2023               Content Version: 13.7    3946-1611 [employer].   Care instructions adapted under license by your healthcare professional. If you have questions about a medical condition or " this instruction, always ask your healthcare professional. [employer] disclaims any warranty or liability for your use of this information.

## 2023-08-22 NOTE — PROGRESS NOTES
"SUBJECTIVE:   hCikis is a 43 year old who presents for Preventive Visit.      8/22/2023     9:33 AM   Additional Questions   Roomed by Antonella Valles     Needs to establish care with new primary.    Wellness visit.    Constant sore throat and clear phlegm in throat, has seasonal allergies.    Thinks that her A1c is going to be worse, has had some stressors and does not take medications regularly.    Are you in the first 12 months of your Medicare coverage?  NO    Healthy Habits:     In general, how would you rate your overall health?  Fair    Frequency of exercise:  None    Do you usually eat at least 4 servings of fruit and vegetables a day, include whole grains    & fiber and avoid regularly eating high fat or \"junk\" foods?  No    Taking medications regularly:  No    Barriers to taking medications:  None    Medication side effects:  Not applicable    Ability to successfully perform activities of daily living:  No assistance needed    Home Safety:  No safety concerns identified    Hearing Impairment:  No hearing concerns    In the past 6 months, have you been bothered by leaking of urine? Yes    In general, how would you rate your overall mental or emotional health?  Good    Additional concerns today:  No        Have you ever done Advance Care Planning? (For example, a Health Directive, POLST, or a discussion with a medical provider or your loved ones about your wishes): Yes, advance care planning is on file.    Cognitive Screening   1) Repeat 3 items (Leader, Season, Table)    2) Clock draw: NORMAL  3) 3 item recall: Recalls NO objects   Results: 0 items recalled: PROBABLE COGNITIVE IMPAIRMENT, **INFORM PROVIDER**    Mini-CogTM Copyright CHANELLE Tobias. Licensed by the author for use in [facility]; reprinted with permission (elyse@.[facility]). All rights reserved.      Do you have sleep apnea, excessive snoring or daytime drowsiness? : no    Reviewed and updated as needed this visit by clinical staff   Tobacco  " Allergies               Reviewed and updated as needed this visit by Provider                 Social History     Tobacco Use    Smoking status: Never    Smokeless tobacco: Never   Substance Use Topics    Alcohol use: No     Alcohol/week: 0.0 standard drinks of alcohol           8/22/2023     9:23 AM   Alcohol Use   Prescreen: >3 drinks/day or >7 drinks/week? Not Applicable     Do you have a current opioid prescription? No  Do you use any other controlled substances or medications that are not prescribed by a provider? None      Current providers sharing in care for this patient include:   Patient Care Team:  Clinic - [facility] as PCP - General  Fady Jameson, RN as Nurse Coordinator (Cardiology)  Betsy Valle MD as MD (Cardiology)  Tammy Benedict MD as Assigned PCP  [place] as Personal Advocate & Liaison (PAL) (Family Medicine)  Blanca Coles RN as Specialty Care Coordinator (Cardiology)  Vimal Ray MD as Assigned Heart and Vascular Provider    The following health maintenance items are reviewed in Epic and correct as of today:  Health Maintenance   Topic Date Due    MAMMO SCREENING  Never done    COLORECTAL CANCER SCREENING  Never done    Pneumococcal Vaccine: Pediatrics (0 to 5 Years) and At-Risk Patients (6 to 64 Years) (2 - PCV) 03/11/2015    ANNUAL REVIEW OF HM ORDERS  03/11/2023    MICROALBUMIN  06/03/2023    INFLUENZA VACCINE (1) 09/01/2023    A1C  09/17/2023    HEPATITIS B IMMUNIZATION (3 of 3 - 19+ 3-dose series) 10/11/2023    LIPID  12/13/2023    PHQ-9  02/22/2024    MEDICARE ANNUAL WELLNESS VISIT  03/17/2024    TSH W/FREE T4 REFLEX  03/17/2024    DIABETIC FOOT EXAM  03/17/2024    BMP  04/28/2024    EYE EXAM  05/02/2024    HPV TEST  03/17/2028    PAP  03/17/2028    ADVANCE CARE PLANNING  08/22/2028    DTAP/TDAP/TD IMMUNIZATION (3 - Td or Tdap) 06/03/2032    HEPATITIS C SCREENING  Completed    HIV SCREENING  Completed    DEPRESSION ACTION  PLAN  Completed    COVID-19 Vaccine  Completed    IPV IMMUNIZATION  Aged Out    MENINGITIS IMMUNIZATION  Aged Out     Lab work is in process  Labs reviewed in EPIC  BP Readings from Last 3 Encounters:   08/22/23 115/73   04/28/23 108/70   04/28/23 108/70    Wt Readings from Last 3 Encounters:   08/22/23 87.4 kg (192 lb 9.6 oz)   03/17/23 80.8 kg (178 lb 1.6 oz)   12/13/22 85.3 kg (188 lb)                  Patient Active Problem List   Diagnosis    Corrected transposition of great vessels    Allergic state    Attention deficit disorder    Anxiety    GERD (gastroesophageal reflux disease)    Major depression in complete remission (H)    * * * SBE PROPHYLAXIS * * *    Mildly mentally retarded    Transaminase or LDH elevation    Health Care Home    Congenital heart disease    Ventricular tachycardia (H)    Cardiac ICD- Medtronic- single chamber- NOT dependent    Type 2 diabetes, HbA1C goal < 8% (H)    Type 2 diabetes mellitus with other circulatory complication, without long-term current use of insulin (H)    Patient is followed by [facility]    Morbid obesity (H)    Moderate episode of recurrent major depressive disorder (H)     Past Surgical History:   Procedure Laterality Date    CARDIAC SURGERY  Had 4 surgeries    transposition of the major arteries    SURGICAL HISTORY OF -   01/01/2014    ablation for v. tach; ICD placed    wisdom teeth removal      [facility] NONSPECIFIC PROCEDURE  11/01/1998    open heart surgery x 3, transposition of major vessels    Z NONSPECIFIC PROCEDURE  10/01/2003    Additional heart surgery       Social History     Tobacco Use    Smoking status: Never    Smokeless tobacco: Never   Substance Use Topics    Alcohol use: No     Alcohol/week: 0.0 standard drinks of alcohol     Family History   Adopted: Yes   Problem Relation Age of Onset    Family History Negative Mother     Family History Negative Father          Current Outpatient Medications    Medication Sig Dispense Refill    amoxicillin (AMOXIL) 500 MG capsule Please take 2,000 mg (4 capsule) 30-60 min prior to all dental procedures 4 capsule 3    atorvastatin (LIPITOR) 80 MG tablet Take 1 tablet (80 mg) by mouth At Bedtime 90 tablet 3    blood glucose (NO BRAND SPECIFIED) test strip Use to test blood sugar 1 times daily or as directed. To accompany: Blood Glucose Monitor Brands: per insurance. 100 strip 6    blood glucose monitoring (NO BRAND SPECIFIED) meter device kit Use to test blood sugar 1 times daily or as directed. Preferred blood glucose meter OR supplies to accompany: Blood Glucose Monitor Brands: per insurance. 1 kit 0    empagliflozin (JARDIANCE) 10 MG TABS tablet Take 1 tablet (10 mg) by mouth daily 90 tablet 1    etonogestrel (NEXPLANON) 68 MG IMPL 1 each (68 mg) by Subdermal route once      metFORMIN (GLUCOPHAGE) 500 MG tablet Take 2 tablets (1,000 mg) by mouth 2 times daily (with meals) 360 tablet 1    sertraline (ZOLOFT) 25 MG tablet Take 1 tablet (25 mg) by mouth At Bedtime 90 tablet 1    thin (NO BRAND SPECIFIED) lancets Use with lanceting device. To accompany: Blood Glucose Monitor Brands: per insurance. 100 each 6    ACE NOT PRESCRIBED, INTENTIONAL, 1 each as needed for other ACE Inhibitor not prescribed due to Other: BP at goal, pending microalbumin test (Patient not taking: Reported on 12/13/2022) 0 each 0     Allergies   Allergen Reactions    Adhesive Tape Rash     Recent Labs   Lab Test 04/28/23  0905 03/17/23  1515 12/13/22  1056 06/03/22  1338 04/13/22  1549 03/11/22  0934 12/19/21  1728 11/29/21  1324 10/10/21  0514 03/23/21  0751 02/11/21  1027 08/06/20  1050 02/28/20  0841   A1C  --  7.6* 9.1* 7.9*  --  9.0*  --  8.6*   < >  --    < > 7.5*  --    LDL  --   --  132*  --   --   --   --  119*  --   --   --  137*  --    HDL  --   --  46*  --   --   --   --  42*  --   --   --  44*  --    TRIG  --   --  76  --   --   --   --  90  --   --   --  119  --    ALT 40*  --   --  32  "77* 36  --  32   < > 32  --   --  47   CR 0.57  --  0.54 0.54 0.43* 0.57   < > 0.49*   < > 0.61  --   --  0.56   GFRESTIMATED >90  --  >90 >90 >90 >90   < > >90   < > >90  --   --  >90   GFRESTBLACK  --   --   --   --   --   --   --   --   --  >90  --   --  >90   POTASSIUM 4.0  --  3.9 3.6 3.7 3.7   < > 3.7   < > 3.9  --   --  3.9   TSH  --  2.01  --   --   --  2.13  --   --   --   --    < >  --   --     < > = values in this interval not displayed.                  11/22/2021    12:28 PM   Breast CA Risk Assessment (FHS-7)   Do you have a family history of breast, colon, or ovarian cancer? No / Unknown       click delete button to remove this line now  Mammogram Screening: Recommended annual mammography  Pertinent mammograms are reviewed under the imaging tab.    Review of Systems   HENT:  Positive for congestion and sore throat.    Breasts:  Negative for tenderness, breast mass and discharge.   Genitourinary:  Negative for pelvic pain, vaginal bleeding and vaginal discharge.         OBJECTIVE:   /73 (BP Location: Right arm, Patient Position: Sitting, Cuff Size: Adult Large)   Pulse 95   Temp 97.9  F (36.6  C) (Oral)   Resp 16   Ht 1.575 m (5' 2\")   Wt 87.4 kg (192 lb 9.6 oz)   SpO2 98%   BMI 35.23 kg/m   Estimated body mass index is 35.23 kg/m  as calculated from the following:    Height as of this encounter: 1.575 m (5' 2\").    Weight as of this encounter: 87.4 kg (192 lb 9.6 oz).  Physical Exam  GENERAL: healthy, alert and no distress  EYES: Eyes grossly normal to inspection, PERRL and conjunctivae and sclerae normal  HENT: normal cephalic/atraumatic, ear canals and TM's normal, nose and mouth without ulcers or lesions, nasal mucosa edematous , oropharynx clear, and oral mucous membranes moist  NECK: no adenopathy, no asymmetry, masses, or scars and thyroid normal to palpation  RESP: lungs clear to auscultation - no rales, rhonchi or wheezes  CV: RRR, systolic murmur throughout, no edema  ABDOMEN: " soft, nontender, no hepatosplenomegaly, no masses and bowel sounds normal  MS: no gross musculoskeletal defects noted, no edema  SKIN: no suspicious lesions or rashes  NEURO: Normal strength and tone, mentation intact and speech normal  PSYCH: mentation appears normal, affect normal/bright    Diagnostic Test Results:  Labs reviewed in Epic    ASSESSMENT / PLAN:       ICD-10-CM    1. Encounter for Medicare annual wellness exam  Z00.00 TSH with free T4 reflex     CBC with platelets     TSH with free T4 reflex     CBC with platelets      2. Post-nasal drip  R09.82 fluticasone (FLONASE) 50 MCG/ACT nasal spray      3. Type 2 diabetes mellitus with other circulatory complication, without long-term current use of insulin (H)  E11.59 Albumin Random Urine Quantitative with Creat Ratio     Albumin Random Urine Quantitative with Creat Ratio     blood glucose monitoring (NO BRAND SPECIFIED) meter device kit     blood glucose (NO BRAND SPECIFIED) test strip     thin (NO BRAND SPECIFIED) lancets     empagliflozin (JARDIANCE) 10 MG TABS tablet     metFORMIN (GLUCOPHAGE) 500 MG tablet     Hemoglobin A1c     Comprehensive metabolic panel (BMP + Alb, Alk Phos, ALT, AST, Total. Bili, TP)     AMB Adult Diabetes Educator Referral     Hemoglobin A1c     Comprehensive metabolic panel (BMP + Alb, Alk Phos, ALT, AST, Total. Bili, TP)      4. Anxiety  F41.9 sertraline (ZOLOFT) 25 MG tablet      5. Moderate episode of recurrent major depressive disorder (H)  F33.1 sertraline (ZOLOFT) 25 MG tablet      6. Hyperlipidemia LDL goal <100  E78.5 Lipid panel reflex to direct LDL Fasting     Lipid panel reflex to direct LDL Fasting      7. Visit for screening mammogram  Z12.31 MA Screen Bilateral w/Gato      8. Screen for colon cancer  Z12.11 Colonoscopy Screening  Referral          Patient has been advised of split billing requirements and indicates understanding: Yes      COUNSELING:  Reviewed preventive health counseling, as reflected in  "patient instructions      BMI:   Estimated body mass index is 35.23 kg/m  as calculated from the following:    Height as of this encounter: 1.575 m (5' 2\").    Weight as of this encounter: 87.4 kg (192 lb 9.6 oz).   Weight management plan: Discussed healthy diet and exercise guidelines      She reports that she has never smoked. She has never used smokeless tobacco.      Appropriate preventive services were discussed with this patient, including applicable screening as appropriate for cardiovascular disease, diabetes, osteopenia/osteoporosis, and glaucoma.  As appropriate for age/gender, discussed screening for colorectal cancer, prostate cancer, breast cancer, and cervical cancer. Checklist reviewing preventive services available has been given to the patient.    Reviewed patients plan of care and provided an AVS. The Basic Care Plan (routine screening as documented in Health Maintenance) for Chikis meets the Care Plan requirement. This Care Plan has been established and reviewed with the Patient.          Brianne Peguero MD  [facility]    Identified Health Risks:  I have reviewed Opioid Use Disorder and Substance Use Disorder risk factors and made any needed referrals. Answers submitted by the patient for this visit:  Patient Health Questionnaire (Submitted on 8/22/2023)  If you checked off any problems, how difficult have these problems made it for you to do your work, take care of things at home, or get along with other people?: Not difficult at all  PHQ9 TOTAL SCORE: 3  FREDIS-7 (Submitted on 8/22/2023)  FREDIS 7 TOTAL SCORE: 1  The patient was provided with suggestions to help her develop a healthy physical lifestyle.  She is at risk for lack of exercise and has been provided with information to increase physical activity for the benefit of her well-being.  The patient was counseled and encouraged to consider modifying their diet and eating habits. She was provided with information on " recommended healthy diet options.  Information on urinary incontinence and treatment options given to patient.

## 2023-08-23 ENCOUNTER — TELEPHONE (OUTPATIENT)
Dept: FAMILY MEDICINE | Facility: CLINIC | Age: 43
End: 2023-08-23

## 2023-08-23 NOTE — TELEPHONE ENCOUNTER
Diabetes Education Scheduling Outreach #1:    Call to patient to schedule. Left message with phone number to call to schedule.    Plan for 2nd outreach attempt within 2 business days.    Michelle Patton OnCall  Diabetes and Nutrition Scheduling

## 2023-09-03 ENCOUNTER — MYC MEDICAL ADVICE (OUTPATIENT)
Dept: FAMILY MEDICINE | Facility: CLINIC | Age: 43
End: 2023-09-03
Payer: MEDICARE

## 2023-09-05 NOTE — TELEPHONE ENCOUNTER
Dr. Brett Peguero   Please review my chart message, would you like virtual visit to discuss starting medications?     Thank you   Jaylin Wolfe, Registered Nurse  [facility]

## 2023-09-07 ENCOUNTER — ANCILLARY PROCEDURE (OUTPATIENT)
Dept: CARDIOLOGY | Facility: CLINIC | Age: 43
End: 2023-09-07
Attending: INTERNAL MEDICINE
Payer: MEDICARE

## 2023-09-07 ENCOUNTER — HOSPITAL ENCOUNTER (OUTPATIENT)
Dept: MRI IMAGING | Facility: CLINIC | Age: 43
Discharge: HOME | End: 2023-09-07
Attending: INTERNAL MEDICINE
Payer: MEDICARE

## 2023-09-07 DIAGNOSIS — Z86.79 HX OF VENTRICULAR TACHYCARDIA: ICD-10-CM

## 2023-09-07 DIAGNOSIS — Z13.6 CARDIOVASCULAR SCREENING; LDL GOAL LESS THAN 70: ICD-10-CM

## 2023-09-07 DIAGNOSIS — I47.20 VENTRICULAR TACHYCARDIA (H): Primary | ICD-10-CM

## 2023-09-07 DIAGNOSIS — Z45.02 ENCOUNTER FOR ADJUSTMENT AND MANAGEMENT OF AUTOMATIC IMPLANTABLE CARDIAC DEFIBRILLATOR: ICD-10-CM

## 2023-09-07 DIAGNOSIS — Q24.9 CONGENITAL HEART DISEASE: ICD-10-CM

## 2023-09-07 DIAGNOSIS — I47.20 VENTRICULAR TACHYCARDIA (H): ICD-10-CM

## 2023-09-07 DIAGNOSIS — Z29.89 SBE (SUBACUTE BACTERIAL ENDOCARDITIS) PROPHYLAXIS CANDIDATE: ICD-10-CM

## 2023-09-07 DIAGNOSIS — Q20.3 D-TGA (DEXTRO-TRANSPOSITION OF GREAT ARTERIES): ICD-10-CM

## 2023-09-07 LAB
MDC_IDC_EPISODE_DTM: NORMAL
MDC_IDC_EPISODE_DURATION: 0 S
MDC_IDC_EPISODE_DURATION: 14 S
MDC_IDC_EPISODE_DURATION: 18 S
MDC_IDC_EPISODE_DURATION: 5 S
MDC_IDC_EPISODE_DURATION: 54 S
MDC_IDC_EPISODE_ID: 675
MDC_IDC_EPISODE_ID: 676
MDC_IDC_EPISODE_ID: 677
MDC_IDC_EPISODE_ID: 678
MDC_IDC_EPISODE_ID: 679
MDC_IDC_EPISODE_TYPE: NORMAL
MDC_IDC_LEAD_IMPLANT_DT: NORMAL
MDC_IDC_LEAD_IMPLANT_DT: NORMAL
MDC_IDC_LEAD_LOCATION: NORMAL
MDC_IDC_LEAD_LOCATION: NORMAL
MDC_IDC_LEAD_LOCATION_DETAIL_1: NORMAL
MDC_IDC_LEAD_LOCATION_DETAIL_1: NORMAL
MDC_IDC_LEAD_MFG: NORMAL
MDC_IDC_LEAD_MFG: NORMAL
MDC_IDC_LEAD_MODEL: NORMAL
MDC_IDC_LEAD_MODEL: NORMAL
MDC_IDC_LEAD_POLARITY_TYPE: NORMAL
MDC_IDC_LEAD_POLARITY_TYPE: NORMAL
MDC_IDC_LEAD_SERIAL: NORMAL
MDC_IDC_LEAD_SERIAL: NORMAL
MDC_IDC_MSMT_BATTERY_DTM: NORMAL
MDC_IDC_MSMT_BATTERY_DTM: NORMAL
MDC_IDC_MSMT_BATTERY_REMAINING_LONGEVITY: 42 MO
MDC_IDC_MSMT_BATTERY_REMAINING_LONGEVITY: 42 MO
MDC_IDC_MSMT_BATTERY_RRT_TRIGGER: 2.73
MDC_IDC_MSMT_BATTERY_RRT_TRIGGER: 2.73
MDC_IDC_MSMT_BATTERY_STATUS: NORMAL
MDC_IDC_MSMT_BATTERY_STATUS: NORMAL
MDC_IDC_MSMT_BATTERY_VOLTAGE: 2.96 V
MDC_IDC_MSMT_BATTERY_VOLTAGE: 2.96 V
MDC_IDC_MSMT_CAP_CHARGE_DTM: NORMAL
MDC_IDC_MSMT_CAP_CHARGE_DTM: NORMAL
MDC_IDC_MSMT_CAP_CHARGE_ENERGY: 18 J
MDC_IDC_MSMT_CAP_CHARGE_ENERGY: 18 J
MDC_IDC_MSMT_CAP_CHARGE_TIME: 4.06
MDC_IDC_MSMT_CAP_CHARGE_TIME: 4.06
MDC_IDC_MSMT_CAP_CHARGE_TYPE: NORMAL
MDC_IDC_MSMT_CAP_CHARGE_TYPE: NORMAL
MDC_IDC_MSMT_LEADCHNL_RV_IMPEDANCE_VALUE: 304 OHM
MDC_IDC_MSMT_LEADCHNL_RV_IMPEDANCE_VALUE: 304 OHM
MDC_IDC_MSMT_LEADCHNL_RV_PACING_THRESHOLD_AMPLITUDE: 0.5 V
MDC_IDC_MSMT_LEADCHNL_RV_PACING_THRESHOLD_AMPLITUDE: 0.5 V
MDC_IDC_MSMT_LEADCHNL_RV_PACING_THRESHOLD_PULSEWIDTH: 0.4 MS
MDC_IDC_MSMT_LEADCHNL_RV_PACING_THRESHOLD_PULSEWIDTH: 0.4 MS
MDC_IDC_MSMT_LEADCHNL_RV_SENSING_INTR_AMPL: 7.4 MV
MDC_IDC_MSMT_LEADCHNL_RV_SENSING_INTR_AMPL: 7.9 MV
MDC_IDC_PG_IMPLANT_DTM: NORMAL
MDC_IDC_PG_IMPLANT_DTM: NORMAL
MDC_IDC_PG_MFG: NORMAL
MDC_IDC_PG_MFG: NORMAL
MDC_IDC_PG_MODEL: NORMAL
MDC_IDC_PG_MODEL: NORMAL
MDC_IDC_PG_SERIAL: NORMAL
MDC_IDC_PG_SERIAL: NORMAL
MDC_IDC_PG_TYPE: NORMAL
MDC_IDC_PG_TYPE: NORMAL
MDC_IDC_SESS_CLINIC_NAME: NORMAL
MDC_IDC_SESS_CLINIC_NAME: NORMAL
MDC_IDC_SESS_DTM: NORMAL
MDC_IDC_SESS_DTM: NORMAL
MDC_IDC_SESS_TYPE: NORMAL
MDC_IDC_SESS_TYPE: NORMAL
MDC_IDC_SET_BRADY_HYSTRATE: NORMAL
MDC_IDC_SET_BRADY_HYSTRATE: NORMAL
MDC_IDC_SET_BRADY_LOWRATE: 40 {BEATS}/MIN
MDC_IDC_SET_BRADY_MODE: NORMAL
MDC_IDC_SET_BRADY_MODE: NORMAL
MDC_IDC_SET_LEADCHNL_RV_PACING_AMPLITUDE: 2 V
MDC_IDC_SET_LEADCHNL_RV_PACING_ANODE_ELECTRODE_1: NORMAL
MDC_IDC_SET_LEADCHNL_RV_PACING_ANODE_ELECTRODE_1: NORMAL
MDC_IDC_SET_LEADCHNL_RV_PACING_ANODE_LOCATION_1: NORMAL
MDC_IDC_SET_LEADCHNL_RV_PACING_ANODE_LOCATION_1: NORMAL
MDC_IDC_SET_LEADCHNL_RV_PACING_CAPTURE_MODE: NORMAL
MDC_IDC_SET_LEADCHNL_RV_PACING_CATHODE_ELECTRODE_1: NORMAL
MDC_IDC_SET_LEADCHNL_RV_PACING_CATHODE_ELECTRODE_1: NORMAL
MDC_IDC_SET_LEADCHNL_RV_PACING_CATHODE_LOCATION_1: NORMAL
MDC_IDC_SET_LEADCHNL_RV_PACING_CATHODE_LOCATION_1: NORMAL
MDC_IDC_SET_LEADCHNL_RV_PACING_POLARITY: NORMAL
MDC_IDC_SET_LEADCHNL_RV_PACING_POLARITY: NORMAL
MDC_IDC_SET_LEADCHNL_RV_PACING_PULSEWIDTH: 0.4 MS
MDC_IDC_SET_LEADCHNL_RV_SENSING_ANODE_ELECTRODE_1: NORMAL
MDC_IDC_SET_LEADCHNL_RV_SENSING_ANODE_ELECTRODE_1: NORMAL
MDC_IDC_SET_LEADCHNL_RV_SENSING_ANODE_LOCATION_1: NORMAL
MDC_IDC_SET_LEADCHNL_RV_SENSING_ANODE_LOCATION_1: NORMAL
MDC_IDC_SET_LEADCHNL_RV_SENSING_CATHODE_ELECTRODE_1: NORMAL
MDC_IDC_SET_LEADCHNL_RV_SENSING_CATHODE_ELECTRODE_1: NORMAL
MDC_IDC_SET_LEADCHNL_RV_SENSING_CATHODE_LOCATION_1: NORMAL
MDC_IDC_SET_LEADCHNL_RV_SENSING_CATHODE_LOCATION_1: NORMAL
MDC_IDC_SET_LEADCHNL_RV_SENSING_POLARITY: NORMAL
MDC_IDC_SET_LEADCHNL_RV_SENSING_POLARITY: NORMAL
MDC_IDC_SET_LEADCHNL_RV_SENSING_SENSITIVITY: 0.3 MV
MDC_IDC_SET_LEADCHNL_RV_SENSING_SENSITIVITY: 0.3 MV
MDC_IDC_SET_ZONE_DETECTION_BEATS_DENOMINATOR: 40 {BEATS}
MDC_IDC_SET_ZONE_DETECTION_BEATS_DENOMINATOR: 40 {BEATS}
MDC_IDC_SET_ZONE_DETECTION_BEATS_NUMERATOR: 30 {BEATS}
MDC_IDC_SET_ZONE_DETECTION_BEATS_NUMERATOR: 30 {BEATS}
MDC_IDC_SET_ZONE_DETECTION_INTERVAL: 270 MS
MDC_IDC_SET_ZONE_DETECTION_INTERVAL: 270 MS
MDC_IDC_SET_ZONE_DETECTION_INTERVAL: 360 MS
MDC_IDC_SET_ZONE_DETECTION_INTERVAL: 360 MS
MDC_IDC_SET_ZONE_DETECTION_INTERVAL: 370 MS
MDC_IDC_SET_ZONE_DETECTION_INTERVAL: 370 MS
MDC_IDC_SET_ZONE_DETECTION_INTERVAL: NORMAL
MDC_IDC_SET_ZONE_DETECTION_INTERVAL: NORMAL
MDC_IDC_SET_ZONE_TYPE: NORMAL
MDC_IDC_STAT_BRADY_DTM_END: NORMAL
MDC_IDC_STAT_BRADY_DTM_END: NORMAL
MDC_IDC_STAT_BRADY_DTM_START: NORMAL
MDC_IDC_STAT_BRADY_DTM_START: NORMAL
MDC_IDC_STAT_BRADY_RV_PERCENT_PACED: 0.01 %
MDC_IDC_STAT_BRADY_RV_PERCENT_PACED: 0.01 %
MDC_IDC_STAT_EPISODE_RECENT_COUNT: 0
MDC_IDC_STAT_EPISODE_RECENT_COUNT: 1
MDC_IDC_STAT_EPISODE_RECENT_COUNT: 1
MDC_IDC_STAT_EPISODE_RECENT_COUNT_DTM_END: NORMAL
MDC_IDC_STAT_EPISODE_RECENT_COUNT_DTM_START: NORMAL
MDC_IDC_STAT_EPISODE_TOTAL_COUNT: 0
MDC_IDC_STAT_EPISODE_TOTAL_COUNT: 1
MDC_IDC_STAT_EPISODE_TOTAL_COUNT: 1
MDC_IDC_STAT_EPISODE_TOTAL_COUNT: 21
MDC_IDC_STAT_EPISODE_TOTAL_COUNT: 21
MDC_IDC_STAT_EPISODE_TOTAL_COUNT: 267
MDC_IDC_STAT_EPISODE_TOTAL_COUNT: 267
MDC_IDC_STAT_EPISODE_TOTAL_COUNT_DTM_END: NORMAL
MDC_IDC_STAT_EPISODE_TOTAL_COUNT_DTM_START: NORMAL
MDC_IDC_STAT_EPISODE_TYPE: NORMAL
MDC_IDC_STAT_TACHYTHERAPY_ATP_DELIVERED_RECENT: 0
MDC_IDC_STAT_TACHYTHERAPY_ATP_DELIVERED_RECENT: 0
MDC_IDC_STAT_TACHYTHERAPY_ATP_DELIVERED_TOTAL: 0
MDC_IDC_STAT_TACHYTHERAPY_ATP_DELIVERED_TOTAL: 0
MDC_IDC_STAT_TACHYTHERAPY_RECENT_DTM_END: NORMAL
MDC_IDC_STAT_TACHYTHERAPY_RECENT_DTM_END: NORMAL
MDC_IDC_STAT_TACHYTHERAPY_RECENT_DTM_START: NORMAL
MDC_IDC_STAT_TACHYTHERAPY_RECENT_DTM_START: NORMAL
MDC_IDC_STAT_TACHYTHERAPY_SHOCKS_ABORTED_RECENT: 0
MDC_IDC_STAT_TACHYTHERAPY_SHOCKS_ABORTED_RECENT: 0
MDC_IDC_STAT_TACHYTHERAPY_SHOCKS_ABORTED_TOTAL: 0
MDC_IDC_STAT_TACHYTHERAPY_SHOCKS_ABORTED_TOTAL: 0
MDC_IDC_STAT_TACHYTHERAPY_SHOCKS_DELIVERED_RECENT: 0
MDC_IDC_STAT_TACHYTHERAPY_SHOCKS_DELIVERED_RECENT: 0
MDC_IDC_STAT_TACHYTHERAPY_SHOCKS_DELIVERED_TOTAL: 1
MDC_IDC_STAT_TACHYTHERAPY_SHOCKS_DELIVERED_TOTAL: 1
MDC_IDC_STAT_TACHYTHERAPY_TOTAL_DTM_END: NORMAL
MDC_IDC_STAT_TACHYTHERAPY_TOTAL_DTM_END: NORMAL
MDC_IDC_STAT_TACHYTHERAPY_TOTAL_DTM_START: NORMAL
MDC_IDC_STAT_TACHYTHERAPY_TOTAL_DTM_START: NORMAL

## 2023-09-07 PROCEDURE — 255N000002 HC RX 255 OP 636: Mod: JZ | Performed by: INTERNAL MEDICINE

## 2023-09-07 PROCEDURE — G1010 CDSM STANSON: HCPCS

## 2023-09-07 PROCEDURE — 75561 CARDIAC MRI FOR MORPH W/DYE: CPT | Mod: 26 | Performed by: INTERNAL MEDICINE

## 2023-09-07 PROCEDURE — 93287 PERI-PX DEVICE EVAL & PRGR: CPT

## 2023-09-07 PROCEDURE — 999N000127 HC STATISTIC PERIPHERAL IV START W US GUIDANCE

## 2023-09-07 PROCEDURE — A9585 GADOBUTROL INJECTION: HCPCS | Mod: JZ | Performed by: INTERNAL MEDICINE

## 2023-09-07 PROCEDURE — 93287 PERI-PX DEVICE EVAL & PRGR: CPT | Mod: 26 | Performed by: INTERNAL MEDICINE

## 2023-09-07 PROCEDURE — G1010 CDSM STANSON: HCPCS | Performed by: INTERNAL MEDICINE

## 2023-09-07 PROCEDURE — 71555 MRI ANGIO CHEST W OR W/O DYE: CPT | Mod: 26 | Performed by: INTERNAL MEDICINE

## 2023-09-07 PROCEDURE — 250N000011 HC RX IP 250 OP 636: Performed by: INTERNAL MEDICINE

## 2023-09-07 RX ORDER — LORAZEPAM 2 MG/ML
0.5 INJECTION INTRAMUSCULAR ONCE
Status: COMPLETED | OUTPATIENT
Start: 2023-09-07 | End: 2023-09-07

## 2023-09-07 RX ORDER — GADOBUTROL 604.72 MG/ML
10 INJECTION INTRAVENOUS ONCE
Status: COMPLETED | OUTPATIENT
Start: 2023-09-07 | End: 2023-09-07

## 2023-09-07 RX ADMIN — LORAZEPAM 0.5 MG: 2 INJECTION INTRAMUSCULAR; INTRAVENOUS at 11:48

## 2023-09-07 RX ADMIN — GADOBUTROL 10 ML: 604.72 INJECTION INTRAVENOUS at 11:49

## 2023-09-07 NOTE — PROGRESS NOTES
Pt here for cardiac MRI. Requested sedation for claustrophobia. Administered 0.5 mg IV ativan. Pt's sister Bethany to drive home.    Lorraine Martinez RN

## 2023-09-15 ENCOUNTER — TELEPHONE (OUTPATIENT)
Dept: FAMILY MEDICINE | Facility: CLINIC | Age: 43
End: 2023-09-15

## 2023-09-15 ENCOUNTER — OFFICE VISIT (OUTPATIENT)
Dept: CARDIOLOGY | Facility: CLINIC | Age: 43
End: 2023-09-15
Attending: INTERNAL MEDICINE
Payer: MEDICARE

## 2023-09-15 ENCOUNTER — HOSPITAL ENCOUNTER (OUTPATIENT)
Dept: CARDIOLOGY | Facility: CLINIC | Age: 43
Discharge: HOME | End: 2023-09-15
Attending: INTERNAL MEDICINE
Payer: MEDICARE

## 2023-09-15 ENCOUNTER — LAB (OUTPATIENT)
Dept: LAB | Facility: CLINIC | Age: 43
End: 2023-09-15
Attending: INTERNAL MEDICINE
Payer: MEDICARE

## 2023-09-15 VITALS
BODY MASS INDEX: 33.7 KG/M2 | DIASTOLIC BLOOD PRESSURE: 57 MMHG | WEIGHT: 190.2 LBS | HEIGHT: 63 IN | HEART RATE: 102 BPM | OXYGEN SATURATION: 96 % | SYSTOLIC BLOOD PRESSURE: 126 MMHG

## 2023-09-15 DIAGNOSIS — Q24.9 CONGENITAL HEART DISEASE: ICD-10-CM

## 2023-09-15 DIAGNOSIS — Z13.6 CARDIOVASCULAR SCREENING; LDL GOAL LESS THAN 70: ICD-10-CM

## 2023-09-15 DIAGNOSIS — Q20.3 D-TGA (DEXTRO-TRANSPOSITION OF GREAT ARTERIES): ICD-10-CM

## 2023-09-15 DIAGNOSIS — Z29.89 SBE (SUBACUTE BACTERIAL ENDOCARDITIS) PROPHYLAXIS CANDIDATE: ICD-10-CM

## 2023-09-15 DIAGNOSIS — Z86.79 HX OF VENTRICULAR TACHYCARDIA: ICD-10-CM

## 2023-09-15 LAB
CHOLEST SERPL-MCNC: 120 MG/DL
HDLC SERPL-MCNC: 44 MG/DL
LDLC SERPL CALC-MCNC: 64 MG/DL
NONHDLC SERPL-MCNC: 76 MG/DL
TRIGL SERPL-MCNC: 61 MG/DL

## 2023-09-15 PROCEDURE — G0463 HOSPITAL OUTPT CLINIC VISIT: HCPCS | Mod: 25 | Performed by: INTERNAL MEDICINE

## 2023-09-15 PROCEDURE — 99215 OFFICE O/P EST HI 40 MIN: CPT | Mod: 25 | Performed by: INTERNAL MEDICINE

## 2023-09-15 PROCEDURE — 94621 CARDIOPULM EXERCISE TESTING: CPT | Mod: 26 | Performed by: INTERNAL MEDICINE

## 2023-09-15 PROCEDURE — 36415 COLL VENOUS BLD VENIPUNCTURE: CPT

## 2023-09-15 PROCEDURE — 94621 CARDIOPULM EXERCISE TESTING: CPT

## 2023-09-15 PROCEDURE — 80061 LIPID PANEL: CPT

## 2023-09-15 ASSESSMENT — PAIN SCALES - GENERAL: PAINLEVEL: NO PAIN (0)

## 2023-09-15 NOTE — PROGRESS NOTES
CARDIOLOGY CONSULTATION:    Ms. 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       Today pt reports that from a cardiac standpoint she has been feeling well.   She denies any chest pain or pressure, sob/maher, orthopnea, pnd, palpitations, syncope/presyncope, change in exercise tolerance or any ICD shocks.      She has diabetes and was on metformin and then jardiance was added. Her A1C was 9.4 when recently checked and it was found she was not taking these meds as she felt GI side affects but she is now for the past week. There are plans to recheck in 3 months. She knows to let us know of she feels she needs to stop them and we can work with her on adjustments. She is seeing a nutritionist as well.     She had a cardiac MRI 9/2023 and that showed her root at 4 X 3.7 CM and no obstruction in her conduit. She tried to do a CPX but stopped at RER 0.89 as she didn't like the mask and was concerned about the next speed. She still went 65% of predicted with no concerning findings. She is back on statin as well ( 3 weeks ago, also was not taking it - lipitor 40 mg daily).    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 She is on implantable birth control and has a boyfriend. She has been working on eating healthier.    PAST MEDICAL HISTORY:  Past Medical History:   Diagnosis Date    Allergy, unspecified not elsewhere classified     Attention  deficit disorder without mention of hyperactivity     Mild MR; difficulty attention span    Congenital heart disease     Corrected transposition of great vessels     still needs SBE prophlaxis    Diabetes mellitus type 2, noninsulin dependent (H)     Impaired fasting glucose     103 7/06    Ventricular tachycardia (H) 3/4/2014    s/p ablation and ICD placement 3/13/14       CURRENT MEDICATIONS:  Current Outpatient Medications   Medication Sig Dispense Refill    atorvastatin (LIPITOR) 80 MG tablet Take 1 tablet (80 mg) by mouth At Bedtime 90 tablet 3    blood glucose (NO BRAND SPECIFIED) test strip Use to test blood sugar 1 times daily or as directed. To accompany: Blood Glucose Monitor Brands: per insurance. 100 strip 6    blood glucose monitoring (NO BRAND SPECIFIED) meter device kit Use to test blood sugar 1 times daily or as directed. Preferred blood glucose meter OR supplies to accompany: Blood Glucose Monitor Brands: per insurance. 1 kit 0    empagliflozin (JARDIANCE) 10 MG TABS tablet Take 1 tablet (10 mg) by mouth daily 90 tablet 1    etonogestrel (NEXPLANON) 68 MG IMPL 1 each (68 mg) by Subdermal route once      fluticasone (FLONASE) 50 MCG/ACT nasal spray Spray 1 spray into both nostrils daily 9.9 mL 1    metFORMIN (GLUCOPHAGE) 500 MG tablet Take 2 tablets (1,000 mg) by mouth 2 times daily (with meals) 360 tablet 1    thin (NO BRAND SPECIFIED) lancets Use with lanceting device. To accompany: Blood Glucose Monitor Brands: per insurance. 100 each 6    ACE NOT PRESCRIBED, INTENTIONAL, 1 each as needed for other ACE Inhibitor not prescribed due to Other: BP at goal, pending microalbumin test (Patient not taking: Reported on 12/13/2022) 0 each 0    amoxicillin (AMOXIL) 500 MG capsule Please take 2,000 mg (4 capsule) 30-60 min prior to all dental procedures (Patient not taking: Reported on 9/15/2023) 4 capsule 3       PAST SURGICAL HISTORY:  Past Surgical History:   Procedure Laterality Date    CARDIAC SURGERY   Had 4 surgeries    transposition of the major arteries    SURGICAL HISTORY OF -   01/01/2014    ablation for v. tach; ICD placed    wisdom teeth removal      [facility] NONSPECIFIC PROCEDURE  11/01/1998    open heart surgery x 3, transposition of major vessels    Z NONSPECIFIC PROCEDURE  10/01/2003    Additional heart surgery       ALLERGIES  Adhesive tape    FAMILY HX:  Family History   Adopted: Yes   Problem Relation Age of Onset    Family History Negative Mother     Family History Negative Father        SOCIAL HX:  Social History     Socioeconomic History    Marital status:      Spouse name: None    Number of children: 0    Years of education: None    Highest education level: None   Occupational History    Occupation:  at [employer]     Employer: [employer]     Employer: [employer]   Tobacco Use    Smoking status: Never    Smokeless tobacco: Never   Vaping Use    Vaping Use: Never used   Substance and Sexual Activity    Alcohol use: No     Alcohol/week: 0.0 standard drinks of alcohol    Drug use: No    Sexual activity: Not Currently     Partners: Male   Other Topics Concern    Special Diet No     Comment: working on dairy. fruits and veggies.    Exercise Yes     Comment: Peter siddiqui    Parent/sibling w/ CABG, MI or angioplasty before 65F 55M? No     Social Determinants of Health     Financial Resource Strain: Low Risk  (8/22/2023)    Overall Financial Resource Strain (CARDIA)     Difficulty of Paying Living Expenses: Not hard at all   Food Insecurity: No Food Insecurity (8/22/2023)    Hunger Vital Sign     Worried About Running Out of Food in the Last Year: Never true     Ran Out of Food in the Last Year: Never true   Transportation Needs: No Transportation Needs (8/22/2023)    PRAPARE - Transportation     Lack of Transportation (Medical): No     Lack of Transportation (Non-Medical): No   Physical Activity: Inactive (8/22/2023)    Exercise Vital Sign     Days of Exercise per Week: 0 days      "Minutes of Exercise per Session: 30 min   Stress: No Stress Concern Present (8/22/2023)    [place] [employer] - Occupational Stress Questionnaire     Feeling of Stress : Not at all   Social Connections: Moderately Integrated (8/22/2023)    Social Connection and Isolation Panel [NHANES]     Frequency of Communication with Friends and Family: Once a week     Frequency of Social Gatherings with Friends and Family: Twice a week     Attends Lutheran Services: 1 to 4 times per year     Active Member of Clubs or Organizations: Yes     Marital Status:    Housing Stability: Low Risk  (8/22/2023)    Housing Stability Vital Sign     Unable to Pay for Housing in the Last Year: No     Number of Places Lived in the Last Year: 1     Unstable Housing in the Last Year: No       ROS:  Constitutional: No fever, chills, or sweats. No weight gain/loss.   ENT: No visual disturbance, ear ache, epistaxis, sore throat.   Allergies/Immunologic: Negative.   Respiratory: No cough, hemoptysis.   Cardiovascular: As per HPI.   GI: No nausea, vomiting, hematemesis, melena, or hematochezia.   : No urinary frequency, dysuria, or hematuria.   Integument: Negative.   Psychiatric: Negative.   Neuro: Negative.   Endocrinology: Negative.   Musculoskeletal: No myalgia.    VITAL SIGNS:  /57 (BP Location: Left arm, Patient Position: Chair, Cuff Size: Adult Regular)   Pulse 102   Ht 1.602 m (5' 3.07\")   Wt 86.3 kg (190 lb 3.2 oz)   SpO2 96%   BMI 33.62 kg/m    Body mass index is 33.62 kg/m .  Wt Readings from Last 2 Encounters:   09/15/23 86.3 kg (190 lb 3.2 oz)   08/22/23 87.4 kg (192 lb 9.6 oz)       PHYSICAL EXAM  Chikis Hannon IS A 43 year old female.in no acute distress.  HEENT: Unremarkable.      LABS    Lab Results   Component Value Date    WBC 8.5 08/22/2023    WBC 8.4 02/28/2020     Lab Results   Component Value Date    RBC 4.93 08/22/2023    RBC 4.75 02/28/2020     Lab Results   Component Value Date    " HGB 14.1 08/22/2023    HGB 13.8 02/28/2020     Lab Results   Component Value Date    HCT 42.2 08/22/2023    HCT 41.9 02/28/2020     No components found for: MCT  Lab Results   Component Value Date    MCV 86 08/22/2023    MCV 88 02/28/2020     Lab Results   Component Value Date    MCH 28.6 08/22/2023    MCH 29.1 02/28/2020     Lab Results   Component Value Date    MCHC 33.4 08/22/2023    MCHC 32.9 02/28/2020     Lab Results   Component Value Date    RDW 11.9 08/22/2023    RDW 12.3 02/28/2020     Lab Results   Component Value Date     08/22/2023     02/28/2020      Recent Labs   Lab Test 08/22/23  1042 04/28/23  0905    137   POTASSIUM 4.3 4.0   CHLORIDE 102 102   CO2 25 25   ANIONGAP 11 10   * 189*   BUN 7.4 10.3   CR 0.55 0.57   LITA 9.1 9.7     Recent Labs   Lab Test 09/15/23  0811 08/22/23  1042   CHOL 120 198   HDL 44* 43*   LDL 64 134*   TRIG 61 106   NHDL 76 155*        Assessment and Plan:   #1 Complete Transposition of the Great Arteries, VSD, and pulmonic stenosis, s/p Rastelli procedure with VSD closure and RV to PA conduit (these procedures were done at [place] and [place] during early life per 's notes, complete details are not available currently, total of 4 surgeries)  #2 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 Hannon. I discussed her the results of her testing, which was reassuring.     She is taking meds now.  Will plan echo and repeat labs in April 2024.      She will work on diet and exercise.      It was a pleasure to see her. Please do not hesitate to contact me with questions.      SANDRA Aragon MD   40 minutes face-face, documentation and review of records on day of visit

## 2023-09-15 NOTE — NURSING NOTE
Chief Complaint   Patient presents with    Follow Up     43 year old female with history of TGA s/p correctional surgery in 1998, a history of VT with ablation and ICD placement as secondary prevention in 03/2014, now s/p recent new single chamber ICD system 6/1/15 presenting for follow up.     Vitals were taken and medications reconciled.    Faustino Valdez, EMT  11:28 AM

## 2023-09-15 NOTE — LETTER
9/15/2023      RE: Chikis Hannon  [address]  [facility] [address]       Dear Colleague,    Thank you for the opportunity to participate in the care of your patient, Chikis Hannon, at the [facility] [place] [facility]. Please see a copy of my visit note below.    CARDIOLOGY CONSULTATION:    Ms. 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       Today pt reports that from a cardiac standpoint she has been feeling well.   She denies any chest pain or pressure, sob/maher, orthopnea, pnd, palpitations, syncope/presyncope, change in exercise tolerance or any ICD shocks.      She has diabetes and was on metformin and then jardiance was added. Her A1C was 9.4 when recently checked and it was found she was not taking these meds as she felt GI side affects but she is now for the past week. There are plans to recheck in 3 months. She knows to let us know of she feels she needs to stop them and we can work with her on adjustments. She is seeing a nutritionist as well.     She had a cardiac MRI 9/2023 and that showed her root at 4 X 3.7 CM and no obstruction in her conduit. She tried to do a CPX but stopped at RER 0.89 as she didn't like the mask and was concerned about the next speed. She still went 65% of predicted with no concerning findings. She is back on statin as well ( 3 weeks ago, also was not taking it - lipitor 40 mg  daily).    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 She is on implantable birth control and has a boyfriend. She has been working on eating healthier.    PAST MEDICAL HISTORY:  Past Medical History:   Diagnosis Date    Allergy, unspecified not elsewhere classified     Attention deficit disorder without mention of hyperactivity     Mild MR; difficulty attention span    Congenital heart disease     Corrected transposition of great vessels     still needs SBE prophlaxis    Diabetes mellitus type 2, noninsulin dependent (H)     Impaired fasting glucose     103 7/06    Ventricular tachycardia (H) 3/4/2014    s/p ablation and ICD placement 3/13/14       CURRENT MEDICATIONS:  Current Outpatient Medications   Medication Sig Dispense Refill    atorvastatin (LIPITOR) 80 MG tablet Take 1 tablet (80 mg) by mouth At Bedtime 90 tablet 3    blood glucose (NO BRAND SPECIFIED) test strip Use to test blood sugar 1 times daily or as directed. To accompany: Blood Glucose Monitor Brands: per insurance. 100 strip 6    blood glucose monitoring (NO BRAND SPECIFIED) meter device kit Use to test blood sugar 1 times daily or as directed. Preferred blood glucose meter OR supplies to accompany: Blood Glucose Monitor Brands: per insurance. 1 kit 0    empagliflozin (JARDIANCE) 10 MG TABS tablet Take 1 tablet (10 mg) by mouth daily 90 tablet 1    etonogestrel (NEXPLANON) 68 MG IMPL 1 each (68 mg) by Subdermal route once      fluticasone (FLONASE) 50 MCG/ACT nasal spray Spray 1 spray into both nostrils daily 9.9 mL 1    metFORMIN (GLUCOPHAGE) 500 MG tablet Take 2 tablets (1,000 mg) by mouth 2 times daily (with meals) 360 tablet 1    thin (NO BRAND SPECIFIED) lancets Use with lanceting device. To accompany: Blood Glucose Monitor Brands: per insurance. 100 each 6    ACE NOT PRESCRIBED, INTENTIONAL, 1 each as needed for other ACE Inhibitor not prescribed due to Other: BP  at goal, pending microalbumin test (Patient not taking: Reported on 12/13/2022) 0 each 0    amoxicillin (AMOXIL) 500 MG capsule Please take 2,000 mg (4 capsule) 30-60 min prior to all dental procedures (Patient not taking: Reported on 9/15/2023) 4 capsule 3       PAST SURGICAL HISTORY:  Past Surgical History:   Procedure Laterality Date    CARDIAC SURGERY  Had 4 surgeries    transposition of the major arteries    SURGICAL HISTORY OF -   01/01/2014    ablation for v. tach; ICD placed    wisdom teeth removal      [facility] NONSPECIFIC PROCEDURE  11/01/1998    open heart surgery x 3, transposition of major vessels    Z NONSPECIFIC PROCEDURE  10/01/2003    Additional heart surgery       ALLERGIES  Adhesive tape    FAMILY HX:  Family History   Adopted: Yes   Problem Relation Age of Onset    Family History Negative Mother     Family History Negative Father        SOCIAL HX:  Social History     Socioeconomic History    Marital status:      Spouse name: None    Number of children: 0    Years of education: None    Highest education level: None   Occupational History    Occupation:  at [employer]     Employer: [employer]     Employer: [employer]   Tobacco Use    Smoking status: Never    Smokeless tobacco: Never   Vaping Use    Vaping Use: Never used   Substance and Sexual Activity    Alcohol use: No     Alcohol/week: 0.0 standard drinks of alcohol    Drug use: No    Sexual activity: Not Currently     Partners: Male   Other Topics Concern    Special Diet No     Comment: working on dairy. fruits and veggies.    Exercise Yes     Comment: Peter siddiqui    Parent/sibling w/ CABG, MI or angioplasty before 65F 55M? No     Social Determinants of Health     Financial Resource Strain: Low Risk  (8/22/2023)    Overall Financial Resource Strain (CARDIA)     Difficulty of Paying Living Expenses: Not hard at all   Food Insecurity: No Food Insecurity (8/22/2023)    Hunger Vital Sign     Worried About Running Out of Food  "in the Last Year: Never true     Ran Out of Food in the Last Year: Never true   Transportation Needs: No Transportation Needs (8/22/2023)    PRAPARE - Transportation     Lack of Transportation (Medical): No     Lack of Transportation (Non-Medical): No   Physical Activity: Inactive (8/22/2023)    Exercise Vital Sign     Days of Exercise per Week: 0 days     Minutes of Exercise per Session: 30 min   Stress: No Stress Concern Present (8/22/2023)    [employer] [place] [employer] - Occupational Stress Questionnaire     Feeling of Stress : Not at all   Social Connections: Moderately Integrated (8/22/2023)    Social Connection and Isolation Panel [NHANES]     Frequency of Communication with Friends and Family: Once a week     Frequency of Social Gatherings with Friends and Family: Twice a week     Attends Caodaism Services: 1 to 4 times per year     Active Member of Clubs or Organizations: Yes     Marital Status:    Housing Stability: Low Risk  (8/22/2023)    Housing Stability Vital Sign     Unable to Pay for Housing in the Last Year: No     Number of Places Lived in the Last Year: 1     Unstable Housing in the Last Year: No       ROS:  Constitutional: No fever, chills, or sweats. No weight gain/loss.   ENT: No visual disturbance, ear ache, epistaxis, sore throat.   Allergies/Immunologic: Negative.   Respiratory: No cough, hemoptysis.   Cardiovascular: As per HPI.   GI: No nausea, vomiting, hematemesis, melena, or hematochezia.   : No urinary frequency, dysuria, or hematuria.   Integument: Negative.   Psychiatric: Negative.   Neuro: Negative.   Endocrinology: Negative.   Musculoskeletal: No myalgia.    VITAL SIGNS:  /57 (BP Location: Left arm, Patient Position: Chair, Cuff Size: Adult Regular)   Pulse 102   Ht 1.602 m (5' 3.07\")   Wt 86.3 kg (190 lb 3.2 oz)   SpO2 96%   BMI 33.62 kg/m    Body mass index is 33.62 kg/m .  Wt Readings from Last 2 Encounters:   09/15/23 86.3 kg (190 lb 3.2 oz) "   08/22/23 87.4 kg (192 lb 9.6 oz)       PHYSICAL EXAM  Chikis Hannon IS A 43 year old female.in no acute distress.  HEENT: Unremarkable.      LABS    Lab Results   Component Value Date    WBC 8.5 08/22/2023    WBC 8.4 02/28/2020     Lab Results   Component Value Date    RBC 4.93 08/22/2023    RBC 4.75 02/28/2020     Lab Results   Component Value Date    HGB 14.1 08/22/2023    HGB 13.8 02/28/2020     Lab Results   Component Value Date    HCT 42.2 08/22/2023    HCT 41.9 02/28/2020     No components found for: MCT  Lab Results   Component Value Date    MCV 86 08/22/2023    MCV 88 02/28/2020     Lab Results   Component Value Date    MCH 28.6 08/22/2023    MCH 29.1 02/28/2020     Lab Results   Component Value Date    MCHC 33.4 08/22/2023    MCHC 32.9 02/28/2020     Lab Results   Component Value Date    RDW 11.9 08/22/2023    RDW 12.3 02/28/2020     Lab Results   Component Value Date     08/22/2023     02/28/2020      Recent Labs   Lab Test 08/22/23  1042 04/28/23  0905    137   POTASSIUM 4.3 4.0   CHLORIDE 102 102   CO2 25 25   ANIONGAP 11 10   * 189*   BUN 7.4 10.3   CR 0.55 0.57   LITA 9.1 9.7     Recent Labs   Lab Test 09/15/23  0811 08/22/23  1042   CHOL 120 198   HDL 44* 43*   LDL 64 134*   TRIG 61 106   NHDL 76 155*        Assessment and Plan:   #1 Complete Transposition of the Great Arteries, VSD, and pulmonic stenosis, s/p Rastelli procedure with VSD closure and RV to PA conduit (these procedures were done at [place] and [place] during early life per 's notes, complete details are not available currently, total of 4 surgeries)  #2 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 Hannon. I discussed her the results of her testing, which was reassuring.     She is taking meds  now.  Will plan echo and repeat labs in April 2024.      She will work on diet and exercise.      It was a pleasure to see her. Please do not hesitate to contact me with questions.      40 minutes face-face, documentation and review of records on day of visit           Please do not hesitate to contact me if you have any questions/concerns.     Sincerely,     Hu Aragon MD

## 2023-09-15 NOTE — TELEPHONE ENCOUNTER
Pt's sister Bethany Storm (CTC on file) called in leaving message on [facility]'s voicemail, advising it was recently discovered that the Pt wasn't taking her medications for what they originally thought was a couple of weeks, when it turns out it's been over a couple of years the medication hasn't been taken at all or taken incorrectly. Pt has visit scheduled on 9/25/23, but Bethany wants to talk to someone about what can be done about her medications if possible prior to the visit on the 25th.    Bethany can be reached at [phone].      Routed to Triage.    [place]/EMT-B  [facility] / [facility]

## 2023-09-15 NOTE — PATIENT INSTRUCTIONS
You were seen today in the Adult Congenital and Cardiovascular Genetics Clinic at the [facility].    Cardiology Providers you saw during your visit:  SANDRA Aragon MD    Diagnosis:  TGA    Results:  SANDRA Aragon MD reviewed the results of your CMRI/MRA, lab and CPX testing today in clinic.    Recommendations for you:    Take your medications as prescribed      General Cardiac Recommendations:  Continue to eat a heart healthy, low salt diet.  Continue to get 20-30 minutes of aerobic activity, 4-5 days per week.  Examples of aerobic activity include walking, running, swimming, cycling, etc.  Continue to observe good oral hygiene, with regular dental visits.      SBE prophylaxis:   Yes__X__  No____    If YES is checked, follow the recommendations outlined below:  Take antibiotic(s) prior to recommended dental procedures and procedures on the respiratory tract or with infected skin, muscle or bones. SBE prophylaxis is not needed for routine GI and  procedures (ie. Colonoscopy or vaginal delivery)  Observe good oral hygiene daily, as advised by your dentist. Get regular professional dental care.  Keep cuts clean.  Infections should be treated promptly.  Symptoms of Infective Endocarditis could include: fever lasting more than 4-5 days or a recurrent fever that initially resolves but returns within 1-2 days)      Exercise restrictions:   Yes__X__  No____         If yes, list restrictions:  Must be allowed to rest if fatigued or SOB      FASTING CHOLESTEROL was checked in the last 5 years YES_X__  NO___ (2021)      Follow-up:  Follow up with Dr Aragon in April with echo and labs prior. Coordinate with Dr Ray appointment    If you have questions or concerns please contact us at:    Blanca Coles, RN, BSN   Nora Collins (Scheduling)  Nurse Care Coordinator     Clinic   Adult Congenital and CV Genetics   Adult Congenital and CV Genetic  [facility]  [facility]   [facility]  (P) [phone]     (P) [phone]            (F) [phone]        For after hours urgent needs, call [phone] and ask to speak to the Adult Congenital Physician on call.  Mention Job Code 0401.    For emergencies call 911.    [facility]  [facility]  Mail Code [id number]  [address] [place] [address]

## 2023-09-15 NOTE — NURSING NOTE
Cardiac Testing: Patient given instructions regarding  echocardiogram . Discussed purpose, preparation, procedure and when to expect results reported back to the patient. Patient demonstrated understanding of this information and agreed to call with further questions or concerns.    Labs: Patient was given results of the laboratory testing obtained today. Patient was instructed to return for the next laboratory testing in 8 months . Patient demonstrated understanding of this information and agreed to call with further questions or concerns.     Med Reconcile: Reviewed and verified all current medications with the patient. The updated medication list was printed and given to the patient.    Return Appointment: Patient given instructions regarding scheduling next clinic visit. Patient demonstrated understanding of this information and agreed to call with further questions or concerns.    Patient stated she understood all health information given and agreed to call with further questions or concerns.

## 2023-09-18 ENCOUNTER — MYC MEDICAL ADVICE (OUTPATIENT)
Dept: FAMILY MEDICINE | Facility: CLINIC | Age: 43
End: 2023-09-18
Payer: MEDICARE

## 2023-09-18 LAB
CARDIOPULMONARY ANAEROBIC THRESHOLD VO2: 0 ML/KG/MIN
CARDIOPULMONARY BLOOD PRESSURE REST: NORMAL MMHG
CARDIOPULMONARY BREATHING RESERVE REST: 90
CARDIOPULMONARY BREATHING RESERVE V02MAX: 47
CARDIOPULMONARY CO2 OUTPUT REST: 230 ML/MIN
CARDIOPULMONARY CO2 OUTPUT VO2MAX: 1563 ML/MIN
CARDIOPULMONARY FUNCTIONAL CAPACITY MAX ML/KG/MIN: 18.9 ML/KG/MIN
CARDIOPULMONARY FUNCTIONAL CAPACITY PERCENT: 63 %
CARDIOPULMONARY FUNCTIONAL CAPACITY PREDICTED: 29.8 ML/KG/MIN
CARDIOPULMONARY HEART RATE REST: 115 BPM
CARDIOPULMONARY MET'S REST: 1
CARDIOPULMONARY MINUTE VENTILATION REST: 9.7 L/MIN
CARDIOPULMONARY MINUTE VENTILATION VO2MAX: 51.3 L/MIN
CARDIOPULMONARY MYOCARDIAC O2 DEMAND MAX: NORMAL
CARDIOPULMONARY OXYGEN CONSUMPTION REST: 3.6 ML/KG/MIN
CARDIOPULMONARY OXYGEN CONSUMPTION VO2MAX: 18.9 ML/KG/MIN
CARDIOPULMONARY OXYGEN PULSE REST: 3 ML/BEAT
CARDIOPULMONARY OXYGEN PULSE VO2MAX: 10 ML/BEAT
CARDIOPULMONARY OXYGEN SATURATION- OXIMETRY REST: 98 %
CARDIOPULMONARY OXYGEN SATURATION- OXIMETRY VO2MAX: 97 %
CARDIOPULMONARY PET C02 REST: 28
CARDIOPULMONARY PET C02 VO2MAX: 33
CARDIOPULMONARY PET02 REST: 109
CARDIOPULMONARY PET02 V02 MAX: 110
CARDIOPULMONARY RER: 0.89
CARDIOPULMONARY RESPIRALORY EXCHANGE RATIO VO2MAX: 0.89
CARDIOPULMONARY RESPIRALORY EXCHANGE RATIO: 0.74
CARDIOPULMONARY RESPIRATORY RATE REST: 13 BR/MIN
CARDIOPULMONARY RESPIRATORY RATE VO2MAX: 43 BR/MIN
CARDIOPULMONARY STRESS BASE 1 BP MMHG: NORMAL MMHG
CARDIOPULMONARY STRESS BASE 1 BPA: 153 BPM
CARDIOPULMONARY STRESS BASE 1 SPO2: 96 % SPO2
CARDIOPULMONARY STRESS BASE 1 TIME SEC: 0 SEC
CARDIOPULMONARY STRESS BASE 1 TIME: 1 MINS
CARDIOPULMONARY STRESS BASE 2 BP MMHG: NORMAL MMHG
CARDIOPULMONARY STRESS BASE 2 BPA: 129 BPM
CARDIOPULMONARY STRESS BASE 2 SPO2: 97 % SPO2
CARDIOPULMONARY STRESS BASE 2 TIME SEC: 0 SEC
CARDIOPULMONARY STRESS BASE 2 TIME: 3 MINS
CARDIOPULMONARY STRESS BASE 3 BP MMHG: NORMAL MMHG
CARDIOPULMONARY STRESS BASE 3 BPA: 117 BPM
CARDIOPULMONARY STRESS BASE 3 SPO2: 97 % SPO2
CARDIOPULMONARY STRESS BASE 3 TIME SEC: 0 SEC
CARDIOPULMONARY STRESS BASE 3 TIME: 5 MINS
CARDIOPULMONARY STRESS PHASE 1 BP MMHG: NORMAL MMHG
CARDIOPULMONARY STRESS PHASE 1 BPM: 132 BPM
CARDIOPULMONARY STRESS PHASE 1 SPO2: 96 % SPO2
CARDIOPULMONARY STRESS PHASE 1 TIME SEC: 0 SEC
CARDIOPULMONARY STRESS PHASE 1 TIME: 2 MINS
CARDIOPULMONARY STRESS PHASE 2 BP MMHG: NORMAL MMHG
CARDIOPULMONARY STRESS PHASE 2 BPM: 144 BPM
CARDIOPULMONARY STRESS PHASE 2 SPO2: 96 % SPO2
CARDIOPULMONARY STRESS PHASE 2 TIME SEC: 0 SEC
CARDIOPULMONARY STRESS PHASE 2 TIME: 4 MINS
CARDIOPULMONARY STRESS PHASE 3 BP MMHG: NORMAL MMHG
CARDIOPULMONARY STRESS PHASE 3 BPM: 155 BPM
CARDIOPULMONARY STRESS PHASE 3 SPO2: 97 % SPO2
CARDIOPULMONARY STRESS PHASE 3 TIME SEC: 0 SEC
CARDIOPULMONARY STRESS PHASE 3 TIME: 6 MINS
CARDIOPULMONARY STRESS PHASE 4 BP MMHG: NORMAL MMHG
CARDIOPULMONARY STRESS PHASE 4 BPM: 166 BPM
CARDIOPULMONARY STRESS PHASE 4 SPO2: 94 % SPO2
CARDIOPULMONARY STRESS PHASE 4 TIME SEC: 0 SEC
CARDIOPULMONARY STRESS PHASE 4 TIME: 8 MINS
CARDIOPULMONARY STRESS PHASE 5 BP MMHG: NORMAL MMHG
CARDIOPULMONARY STRESS PHASE 5 BPM: 175 BPM
CARDIOPULMONARY STRESS PHASE 5 SPO2: 97 % SPO2
CARDIOPULMONARY STRESS PHASE 5 TIME SEC: 0 SEC
CARDIOPULMONARY STRESS PHASE 5 TIME: 9 MINS
CARDIOPULMONARY TIDAL VOLUME REST: 721 ML
CARDIOPULMONARY TIDAL VOLUME VO2MAX: 1181 ML
CARDIOPULMONARY VE/VCO2 SLOPE: 28.09
CARDIOPULMONARY VENTILATORY EQUIVALENT 02 REST: 31
CARDIOPULMONARY VENTILATORY EQUIVALENT 02 V02: 30
CARDIOPULMONARY VENTILATORY EQUIVALENT C02 REST: 42
CARDIOPULMONARY VENTILATORY EQUIVALENT C02 SLOPE VO2MAX: 28.09
CARDIOPULMONARY VENTILATORY EQUIVALENT C02 VO2MAX: 33
CV STRESS MAX HR HE: 175
PREDICTED VO2MAX: 29.8
RATED PERCEIVED EXERTION: 18
STRESS ECHO BASELINE BP: NORMAL MMHG
STRESS ECHO BASELINE HR: 103 BPM
STRESS ECHO CALCULATED PERCENT HR: 100 %
STRESS ECHO LAST STRESS BP: NORMAL MMHG
STRESS ECHO POST ESTIMATED WORKLOAD: 5.4 METS
STRESS ECHO POST EXERCISE DUR MIN: 9 MIN
STRESS ECHO POST EXERCISE DUR SEC: 6 SEC
STRESS ECHO TARGET HR: 175

## 2023-09-19 NOTE — TELEPHONE ENCOUNTER
See [employer] update, routed to [facility],  please confirm follow up on restarting medications, start both Metformin and Jardiance at same time? Titrate metformin dose and work up to 2 twice daily?    INFORM pt of plan via [employer]  Charlene Pete RN, BSN  [facility]

## 2023-09-20 DIAGNOSIS — E11.59 TYPE 2 DIABETES MELLITUS WITH OTHER CIRCULATORY COMPLICATION, WITHOUT LONG-TERM CURRENT USE OF INSULIN (H): ICD-10-CM

## 2023-09-25 ENCOUNTER — OFFICE VISIT (OUTPATIENT)
Dept: FAMILY MEDICINE | Facility: CLINIC | Age: 43
End: 2023-09-25
Payer: MEDICARE

## 2023-09-25 VITALS
HEIGHT: 62 IN | TEMPERATURE: 98.1 F | BODY MASS INDEX: 35.4 KG/M2 | DIASTOLIC BLOOD PRESSURE: 73 MMHG | HEART RATE: 97 BPM | WEIGHT: 192.38 LBS | SYSTOLIC BLOOD PRESSURE: 120 MMHG | OXYGEN SATURATION: 98 % | RESPIRATION RATE: 18 BRPM

## 2023-09-25 DIAGNOSIS — Z13.6 CARDIOVASCULAR SCREENING; LDL GOAL LESS THAN 70: ICD-10-CM

## 2023-09-25 DIAGNOSIS — F33.1 MODERATE EPISODE OF RECURRENT MAJOR DEPRESSIVE DISORDER (H): ICD-10-CM

## 2023-09-25 DIAGNOSIS — Z23 ENCOUNTER FOR IMMUNIZATION: ICD-10-CM

## 2023-09-25 DIAGNOSIS — Z12.11 SCREEN FOR COLON CANCER: Primary | ICD-10-CM

## 2023-09-25 DIAGNOSIS — E11.59 TYPE 2 DIABETES MELLITUS WITH OTHER CIRCULATORY COMPLICATION, WITHOUT LONG-TERM CURRENT USE OF INSULIN (H): ICD-10-CM

## 2023-09-25 PROCEDURE — 90686 IIV4 VACC NO PRSV 0.5 ML IM: CPT | Performed by: FAMILY MEDICINE

## 2023-09-25 PROCEDURE — 99214 OFFICE O/P EST MOD 30 MIN: CPT | Mod: 25 | Performed by: FAMILY MEDICINE

## 2023-09-25 PROCEDURE — G0009 ADMIN PNEUMOCOCCAL VACCINE: HCPCS | Performed by: FAMILY MEDICINE

## 2023-09-25 PROCEDURE — 90677 PCV20 VACCINE IM: CPT | Performed by: FAMILY MEDICINE

## 2023-09-25 PROCEDURE — G0008 ADMIN INFLUENZA VIRUS VAC: HCPCS | Performed by: FAMILY MEDICINE

## 2023-09-25 RX ORDER — ATORVASTATIN CALCIUM 40 MG/1
80 TABLET, FILM COATED ORAL DAILY
Qty: 90 TABLET | Refills: 2 | Status: SHIPPED | OUTPATIENT
Start: 2023-09-25 | End: 2024-01-02

## 2023-09-25 NOTE — PROGRESS NOTES
Assessment & Plan     Screen for colon cancer  Gave number for colonoscopy     Moderate episode of recurrent major depressive disorder (H)  Stable  No changes     Type 2 diabetes mellitus with other circulatory complication, without long-term current use of insulin (H)  This patient has congenital heart disease and ventricular tachycardia in the past with hx of ablation and ICD placement   not under optimal control   Start back up jardiance at 10 mg per day and can increase if need be to 25 mg per day  If this is not enough, could add actos or sulfonylurea or GLP-1 - discussed with patient    - Pneumococcal 20 Valent Conjugate (Prevnar 20)  - REVIEW OF HEALTH MAINTENANCE PROTOCOL ORDERS  - INFLUENZA VACCINE IM > 6 MONTHS VALENT IIV4 (AFLURIA/FLUZONE)  - empagliflozin (JARDIANCE) 10 MG TABS tablet  Dispense: 90 tablet; Refill: 1    CARDIOVASCULAR SCREENING; LDL GOAL LESS THAN 70  Go down to 40 mg at night   Recheck lipids next visit   - atorvastatin (LIPITOR) 40 MG tablet  Dispense: 90 tablet; Refill: 2    Encounter for immunization    - Pneumococcal 20 Valent Conjugate (Prevnar 20)  - INFLUENZA VACCINE IM > 6 MONTHS VALENT IIV4 (AFLURIA/FLUZONE)        26 minutes spent by me on the date of the encounter doing chart review, history and exam, documentation and further activities per the note       See Patient Instructions    Samantha Barrera MD  [facility]    Inés Diop is a 43 year old, presenting for the following health issues:  Recheck Medication (Medication review due to not taken properly. Patient stopped taking medication a week ago) and Medication Update (Sertraline 25mg 1tab at bedtime)  She has not been on metformin for years.   Her cardiologist had her on a statin but this bothers her stomach so she breaks in 1/2 and takes bid.   She also has taken jardiance in the past but has not been on this for a few months          9/25/2023     2:40 PM   Additional Questions    Roomed by Jessica   Accompanied by Staff -Betsy       History of Present Illness       Diabetes:   She presents for follow up of diabetes.  She is checking home blood glucose a few times a month.   She checks blood glucose at bedtime.  Blood glucose is sometimes over 200 and never under 70. She is aware of hypoglycemia symptoms including none.    She has no concerns regarding her diabetes at this time.   She is not experiencing numbness or burning in feet, excessive thirst, blurry vision, weight changes or redness, sores or blisters on feet.           She eats 2-3 servings of fruits and vegetables daily.She consumes 2 sweetened beverage(s) daily.She exercises with enough effort to increase her heart rate 30 to 60 minutes per day.  She exercises with enough effort to increase her heart rate 3 or less days per week. She is missing 7 dose(s) of medications per week.       Past Medical History:   Diagnosis Date    Allergy, unspecified not elsewhere classified     Attention deficit disorder without mention of hyperactivity     Mild MR; difficulty attention span    Congenital heart disease     Corrected transposition of great vessels     still needs SBE prophlaxis    Diabetes mellitus type 2, noninsulin dependent (H) 2014    Impaired fasting glucose     103 7/06    Ventricular tachycardia (H) 03/04/2014    s/p ablation and ICD placement 3/13/14       Past Surgical History:   Procedure Laterality Date    CARDIAC SURGERY  Had 4 surgeries    transposition of the major arteries    SURGICAL HISTORY OF -   01/01/2014    ablation for v. tach; ICD placed    wisdom teeth removal      [facility] NONSPECIFIC PROCEDURE  11/01/1998    open heart surgery x 3, transposition of major vessels    [facility] NONSPECIFIC PROCEDURE  10/01/2003    Additional heart surgery       MEDICATIONS:  Current Outpatient Medications   Medication    ACE NOT PRESCRIBED, INTENTIONAL,    atorvastatin (LIPITOR) 40 MG tablet    empagliflozin (JARDIANCE) 10 MG TABS tablet     "etonogestrel (NEXPLANON) 68 MG IMPL    fluticasone (FLONASE) 50 MCG/ACT nasal spray    blood glucose (NO BRAND SPECIFIED) test strip    blood glucose monitoring (NO BRAND SPECIFIED) meter device kit    thin (NO BRAND SPECIFIED) lancets     Current Facility-Administered Medications   Medication    etonogestrel (IMPLANON/NEXPLANON) subdermal implant 68 mg    etonogestrel (NEXPLANON) subdermal implant 68 mg       SOCIAL HISTORY:  Social History     Tobacco Use    Smoking status: Never    Smokeless tobacco: Never   Substance Use Topics    Alcohol use: No     Alcohol/week: 0.0 standard drinks of alcohol       Family History   Adopted: Yes   Problem Relation Age of Onset    Family History Negative Mother     Family History Negative Father        Diabetes Follow-up    How often are you checking your blood sugar? A few times a month  What time of day are you checking your blood sugars (select all that apply)?  At bedtime  Have you had any blood sugars above 200?  Yes 210  Have you had any blood sugars below 70?  No  What symptoms do you notice when your blood sugar is low?  None  What concerns do you have today about your diabetes? None and Other: discuss how to properly take prescription medication   Do you have any of these symptoms? (Select all that apply)  Excessive thirst      BP Readings from Last 2 Encounters:   09/25/23 120/73   09/15/23 126/57     Hemoglobin A1C (%)   Date Value   08/22/2023 9.4 (H)   03/17/2023 7.6 (H)   02/11/2021 7.9 (H)   08/06/2020 7.5 (H)     LDL Cholesterol Calculated (mg/dL)   Date Value   09/15/2023 64   08/22/2023 134 (H)   08/06/2020 137 (H)   04/25/2019 142 (H)               Review of Systems   Constitutional, HEENT, cardiovascular, pulmonary, gi and gu systems are negative, except as otherwise noted.      Objective    /73 (BP Location: Right arm, Patient Position: Sitting, Cuff Size: Adult Regular)   Pulse 97   Temp 98.1  F (36.7  C) (Oral)   Resp 18   Ht 1.575 m (5' 2\")   " Wt 87.3 kg (192 lb 6 oz)   SpO2 98%   BMI 35.19 kg/m    Body mass index is 35.19 kg/m .  Physical Exam   GENERAL: healthy, alert, no distress, and over weight  EYES: Eyes grossly normal to inspection, PERRL and conjunctivae and sclerae normal  NECK: no adenopathy, no asymmetry, masses, or scars and thyroid normal to palpation  RESP: lungs clear to auscultation - no rales, rhonchi or wheezes  CV: regular rate and rhythm, normal S1 S2, no S3 or S4, no murmur, click or rub, no peripheral edema and peripheral pulses strong  MS: no gross musculoskeletal defects noted, no edema  SKIN: no suspicious lesions or rashes  NEURO: Normal strength and tone, mentation intact and speech normal  PSYCH: mentation appears normal, affect normal/bright  LYMPH: no cervical, supraclavicular, axillary, or inguinal adenopathy    Hospital Outpatient Visit on 09/15/2023   Component Date Value Ref Range Status    Target HR 09/15/2023 175   Final    Exercise duration (min) 09/15/2023 9  min Final    Exercise duration (sec) 09/15/2023 6  sec Final    Estimated workload 09/15/2023 5.4  METS Final    Max 09/15/2023 33,600   Final    Baseline BP 09/15/2023 112/68  mmHg Final    Baseline HR 09/15/2023 103  bpm Final    Rest BP 09/15/2023 136/76  mmHg Final    Rest 09/15/2023 115  bpm Final    Last Stress BP 09/15/2023 192/49  mmHg Final    Max HR  09/15/2023 175   Final    Max Predicted HR  09/15/2023 100  % Final    RPE 09/15/2023 18   Final    PREDICTED VO2MAX 09/15/2023 29.8   Final    CARPULSTRPH1 09/15/2023 2  mins Final    [id number] 09/15/2023 0  sec Final    CARPULSTRPH1 09/15/2023 132  bpm Final    CARPULBPPH1 09/15/2023 126/86  mmHg Final    CARPULSTRPH1 09/15/2023 96  % SpO2 Final    CARPULSTRPH2 09/15/2023 4  mins Final    [id number] 09/15/2023 0  sec Final    CARPULSTRPH2 09/15/2023 144  bpm Final    CARPULBPPH2 09/15/2023 136/72  mmHg Final    CARPULSTRPH2 09/15/2023 96  % SpO2 Final    CARPULSTRPH3 09/15/2023 6  mins Final     [id number] 09/15/2023 0  sec Final    CARPULSTRPH3 09/15/2023 155  bpm Final    CARPULBPPH3 09/15/2023 145/62  mmHg Final    CARPULSTRPH3 09/15/2023 97  % SpO2 Final    CARPULSTRPH4 09/15/2023 8  mins Final    [id number] 09/15/2023 0  sec Final    CARPULSTRPH4 09/15/2023 166  bpm Final    CARPULBPPH4 09/15/2023 146/69  mmHg Final    CARPULSTRPH4 09/15/2023 94  % SpO2 Final    CARPULSTRPH5 09/15/2023 9  mins Final    [id number] 09/15/2023 0  sec Final    CARPULSTRPH5 09/15/2023 175  bpm Final    CARPULBPPH5 09/15/2023 192/49  mmHg Final    CARPULSTRPH5 09/15/2023 97  % SpO2 Final    CARPULSTRPB1 09/15/2023 1  mins Final    [id number] 09/15/2023 0  sec Final    CARPULSTRPHB1 09/15/2023 153  bpm Final    CARPULBPPHB1 09/15/2023 166/57  mmHg Final    CARPULSTRPHB1 09/15/2023 96  % SpO2 Final    CARPULSTRPHB2 09/15/2023 3  mins Final    [id number] 09/15/2023 0  sec Final    CARPULSTRPHB2 09/15/2023 129  bpm Final    CARPULBPPHB2 09/15/2023 166/57  mmHg Final    CARPULSTRPHB2 09/15/2023 97  % SpO2 Final    CARPULSTRPHB3 09/15/2023 5  mins Final    [id number] 09/15/2023 0  sec Final    CARPULSTRPHB3 09/15/2023 117  bpm Final    CARPULBPPHB3 09/15/2023 126/72  mmHg Final    CARPULSTRPHB3 09/15/2023 97  % SpO2 Final    Max 09/15/2023 18.90  ml/kg/min Final    Predicted 09/15/2023 29.80  ml/kg/min Final    Percent 09/15/2023 63  % Final    RER 09/15/2023 0.89   Final    VE/VCO2 [place]  09/15/2023 28.09   Final    CARPULANEROBICTHRES 09/15/2023 0.00  ml/kg/min Final    Rest 09/15/2023 3.60  ml/kg/min Final    Max 09/15/2023 18.90  ml/kg/min Final    Rest  09/15/2023 1.00   Final    Rest  09/15/2023 230.00  ml/min Final    Max 09/15/2023 1,563.00  ml/min Final    Rest 09/15/2023 0.74   Final    Max 09/15/2023 0.89   Final    Rest 09/15/2023 3.00  ml/beat Final    Max  09/15/2023 10.00  ml/beat Final    Rest 09/15/2023 9.70  l/min Final    Max 09/15/2023 51.30  l/min Final    Rest 09/15/2023  721.00  ml Final    Max  09/15/2023 1,181.00  ml Final    Rest 09/15/2023 13.00  br/min Final    Max  09/15/2023 43.00  br/min Final    Rest 09/15/2023 90.00   Final    Max 09/15/2023 47.00   Final    Rest 09/15/2023 31.00   Final    Max  09/15/2023 30.00   Final    Rest 09/15/2023 42.00   Final    Max  09/15/2023 33.00   Final    Max 09/15/2023 28.09   Final    Rest 09/15/2023 109.00   Final    Max 09/15/2023 110.00   Final    Rest 09/15/2023 28.00   Final    Max 09/15/2023 33.00   Final    Rest  09/15/2023 98.00  % Final    Max 09/15/2023 97.00  % Final

## 2023-09-25 NOTE — PATIENT INSTRUCTIONS
COLONOSCOPY If you have not heard from the scheduling office within 2 business days, please call [phone] for [facility], [phone] for [facility] or [phone] for the [facility] [place].

## 2023-10-03 ENCOUNTER — ALLIED HEALTH/NURSE VISIT (OUTPATIENT)
Dept: EDUCATION SERVICES | Facility: CLINIC | Age: 43
End: 2023-10-03
Payer: MEDICARE

## 2023-10-03 ENCOUNTER — ANCILLARY PROCEDURE (OUTPATIENT)
Dept: MAMMOGRAPHY | Facility: CLINIC | Age: 43
End: 2023-10-03
Attending: FAMILY MEDICINE
Payer: MEDICARE

## 2023-10-03 DIAGNOSIS — Z12.31 VISIT FOR SCREENING MAMMOGRAM: ICD-10-CM

## 2023-10-03 DIAGNOSIS — E11.59 TYPE 2 DIABETES MELLITUS WITH OTHER CIRCULATORY COMPLICATION, WITHOUT LONG-TERM CURRENT USE OF INSULIN (H): ICD-10-CM

## 2023-10-03 PROCEDURE — G0108 DIAB MANAGE TRN  PER INDIV: HCPCS

## 2023-10-03 PROCEDURE — 77067 SCR MAMMO BI INCL CAD: CPT | Mod: TC | Performed by: RADIOLOGY

## 2023-10-03 PROCEDURE — 77063 BREAST TOMOSYNTHESIS BI: CPT | Mod: TC | Performed by: RADIOLOGY

## 2023-10-03 NOTE — PATIENT INSTRUCTIONS
Vince Diop,    Sugar substitutes that are easier on your stomach: Stevia or monk fruit are good options.    Continue diabetes medications as prescribed.     Blood sugar monitoring:  Check blood sugars fasting and 2 hours after largest meal of the day  Fasting and before meal targets:  mg/dL  2 hours after start of meal: <180 mg/dL  Keep a blood glucose record for next visit.    Meal Plan Recommendation:   Use portion control and plate planning method.  Carbohydrates to aim for at meals: 30-45 grams and snacks: 0-15 or 30 grams.  Use diabetesfoYoPro Global[employer].org for recipe and meal planning ideas  Use [employer] for carbohydrate reference.    Exercise / activity plan:   Recommend starting slow and increasing as tolerated to goal of 150 minutes per week.       Bethany Dahl RDN, LD, [facility]  Diabetes Education Triage Line: [phone]  Diabetes Education Appointment [phone]

## 2023-10-03 NOTE — LETTER
10/3/2023         RE: Chikis Hannon  [id number]  [facility] [address]        Dear Colleague,    Thank you for referring your patient, Chikis Hannon, to the [facility]. Please see a copy of my visit note below.    Diabetes Self-Management Education & Support    Presents for: Individual review    Type of Service: In Person Visit    ASSESSMENT:  Chikis attends this visit with Betsy, her helper. She is interested in learning more about her carbohydrate counts. States she has been following weight watchers, diabetes meal plan.   Reports she has been consistently taking her 10 mg Jardiance for the last week and is tolerating this.   She has not been testing her BG.  She has not been exercising, though does have an active job at [employer].     Discussed plate planning, label reading, carbohydrate counting and exercise.  Reviewed BG testing times and targets.  Chikis is motivated to feel better. She plans to start exercising, follow meal plan, continue to take medication and test BG. Positive reinforcement given.     Patient's most recent   Lab Results   Component Value Date    A1C 9.4 08/22/2023    A1C 7.9 02/11/2021     is not meeting goal of <7.0    Diabetes knowledge and skills assessment:   Patient is knowledgeable in diabetes management concepts related to: Being Active    Continue education with the following diabetes management concepts: Healthy Eating, Monitoring, Taking Medication, Problem Solving, and Reducing Risks    Based on learning assessment above, most appropriate setting for further diabetes education would be: Individual setting.      PLAN  Continue diabetes medications as prescribed.     Blood sugar monitoring:  Check blood sugars fasting and 2 hours after largest meal of the day  Fasting and before meal targets:  mg/dL  2 hours after start of meal: <180 mg/dL  Keep a blood glucose record for next visit.    Meal Plan Recommendation:   Use portion control  "and plate planning method.  Carbohydrates to aim for at meals: 30-45 grams and snacks: 0-15 or 30 grams.  Use diabetesP21.org for recipe and meal planning ideas  Use ERN layo for carbohydrate reference.    Exercise / activity plan:   Recommend starting slow and increasing as tolerated to goal of 150 minutes per week.     Follow-up: 1 months    See Care Plan for co-developed, patient-state behavior change goals.  AVS provided for patient today.    Education Materials Provided:  My Plate Planner      SUBJECTIVE/OBJECTIVE:  Presents for: Individual review  Accompanied by: Self  Diabetes education in the past 24mo: No  Focus of Visit: Healthy Eating  Diabetes type: Type 2  Date of diagnosis: 9 years  Diabetes management related comments/concerns: Chikis states she didn't take her medications for 9 years. I was taken off Metformin  Difficulty affording diabetes medication?: No  Difficulty affording diabetes testing supplies?: No  Cultural Influences/Ethnic Background:  Not  or     Diabetes Symptoms & Complications:  Weight trend: Fluctuating  Complications assessed today?: No    Patient Problem List and Family Medical History reviewed for relevant medical history, current medical status, and diabetes risk factors.    Vitals:  There were no vitals taken for this visit.  Estimated body mass index is 35.19 kg/m  as calculated from the following:    Height as of 9/25/23: 1.575 m (5' 2\").    Weight as of 9/25/23: 87.3 kg (192 lb 6 oz).   Last 3 BP:   BP Readings from Last 3 Encounters:   09/25/23 120/73   09/15/23 126/57   08/22/23 115/73       History   Smoking Status    Never   Smokeless Tobacco    Never       Labs:  Lab Results   Component Value Date    A1C 9.4 08/22/2023    A1C 7.9 02/11/2021     Lab Results   Component Value Date     08/22/2023     06/03/2022     03/23/2021     Lab Results   Component Value Date    LDL 64 09/15/2023     08/06/2020     HDL Cholesterol "   Date Value Ref Range Status   08/06/2020 44 (L) >49 mg/dL Final     Direct Measure HDL   Date Value Ref Range Status   09/15/2023 44 (L) >=50 mg/dL Final   ]  GFR Estimate   Date Value Ref Range Status   08/22/2023 >90 >60 mL/min/1.73m2 Final   03/23/2021 >90 >60 mL/min/[1.73_m2] Final     Comment:     Non  GFR Calc  Starting 12/18/2018, serum creatinine based estimated GFR (eGFR) will be   calculated using the Chronic Kidney Disease Epidemiology Collaboration   (CKD-EPI) equation.       GFR Estimate If Black   Date Value Ref Range Status   03/23/2021 >90 >60 mL/min/[1.73_m2] Final     Comment:      GFR Calc  Starting 12/18/2018, serum creatinine based estimated GFR (eGFR) will be   calculated using the Chronic Kidney Disease Epidemiology Collaboration   (CKD-EPI) equation.       Lab Results   Component Value Date    CR 0.55 08/22/2023    CR 0.61 03/23/2021     No results found for: MICROALBUMIN    Healthy Eating:  Healthy Eating Assessed Today: Yes  Cultural/Yazdanism diet restrictions?: No  Meal planning/habits: Low carb, Other (has the weight watchers diabetes plan, online only)  Meals include: Breakfast, Lunch, Dinner, Evening Snack  Breakfast: 9 AM- [employer] protein shake- iced coffee with skinny syrup and heavy cream, now using fat free half and half OR eggs with celery, onions and parsley. butter  Lunch: 1145 AM- pizza [employer] pizza - 2 pieces, water  Dinner: 6-6:30 PM: tuna melt sandwich, water  Snacks: candy- skittle, smores. 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 Plans to start  soon.)  Barrier to exercise: None    Monitoring:  Monitoring Assessed Today: Yes  Did patient bring glucose meter to appointment? : No  Times  checking blood sugar at home (number): Never    Taking Medications:  Diabetes Medication(s)       Sodium-Glucose Co-Transporter 2 (SGLT2) Inhibitors       empagliflozin (JARDIANCE) 10 MG TABS tablet    Take 1 tablet (10 mg) by mouth daily            Taking Medication Assessed Today: Yes  Current Treatments: Oral Medication (taken by mouth)  Problems taking diabetes medications regularly?: No (taking consistently for a week)  Diabetes medication side effects?: No    Problem Solving:  Problem Solving Assessed Today: Yes  Is the patient at risk for hypoglycemia?: No    Healthy Coping:  Healthy Coping Assessed Today: Yes  Emotional response to diabetes: Ready to learn  Informal Support system:: Other  Stage of change: ACTION (Actively working towards change)  Support resources: Websites  Patient Activation Measure Survey Score:      7/22/2010    11:00 AM 2/11/2021     9:56 AM   SHERRI Score (Last Two)   SHERRI Raw Score 39 30   Activation Score 56.4 56   SHERRI Level 3 3     Care Plan and Education Provided:  Care Plan: Diabetes   Updates made by Bethany Dahl since 10/3/2023 12:00 AM        Problem: HbA1C Not In Goal         Goal: Establish Regular Follow-Ups with PCP         Task: Discuss with PCP the recommended timing for patient's next follow up visit(s)    Responsible User: Bethany Dahl        Task: Discuss schedule for PCP visits with patient    Responsible User: Bethany Dahl        Goal: Get HbA1C Level in Goal         Task: Educate patient on diabetes education self-management topics    Responsible User: Bethany Dahl        Task: Educate patient on benefits of regular glucose monitoring    Responsible User: Bethany Dahl        Task: Refer patient to appropriate extended care team member, as needed (Medication Therapy Management, Behavioral Health, Physical Therapy, etc.)    Responsible User: Bethany Dahl        Task: Discuss diabetes treatment plan with patient    Responsible User: Bethany Dahl  GYPSY        Problem: Diabetes Self-Management Education Needed to Optimize Self-Care Behaviors         Goal: Understand diabetes pathophysiology and disease progression         Task: Provide education on diabetes pathophysiology and disease progression specfic to patient's diabetes type    Responsible User: Bethany Dahl        Goal: Healthy Eating - follow a healthy eating pattern for diabetes    Note:    Use the plate method for 2 meals/day at least 5 days/week for the next 4 weeks.          Task: Provide education on portion control and consistency in amount, composition and timing of food intake Completed 10/3/2023   Responsible User: Bethany Dahl        Task: Provide education on managing carbohydrate intake (carbohydrate counting, plate planning method, etc.) Completed 10/3/2023   Responsible User: Bethany Dahl        Task: Provide education on weight management Completed 10/3/2023   Responsible User: Bethany Dahl        Task: Provide education on heart healthy eating    Responsible User: Bethany Dahl        Task: Provide education on eating out    Responsible User: Bethany Dahl        Task: Develop individualized healthy eating plan with patient Completed 10/3/2023   Responsible User: Bethany Dahl        Goal: Being Active - get regular physical activity, working up to at least 150 minutes per week         Task: Provide education on relationship of activity to glucose and precautions to take if at risk for low glucose Completed 10/3/2023   Responsible User: Bethany Dahl        Task: Discuss barriers to physical activity with patient    Responsible User: Bethany Dahl        Task: Develop physical activity plan with patient    Responsible User: Bethany Dahl        Task: Explore community resources including walking groups, assistance programs, and home videos    Responsible User: Bethany Dahl        Goal: Monitoring - monitor glucose and ketones as directed         Task:  Provide education on blood glucose monitoring (purpose, proper technique, frequency, glucose targets, interpreting results, when to use glucose control solution, sharps disposal) Completed 10/3/2023   Responsible User: Bethany Dahl        Task: Provide education on continuous glucose monitoring (sensor placement, use of layo or /reader, understanding glucose trends, alerts and alarms, differences between sensor glucose and blood glucose)    Responsible User: Bethany Dahl        Task: Provide education on ketone monitoring (when to monitor, frequency, etc.)    Responsible User: Bethany Dahl        Goal: Taking Medication - patient is consistently taking medications as directed         Task: Provide education on action of prescribed medication, including when to take and possible side effects Completed 10/3/2023   Responsible User: Bethany Dahl        Task: Provide education on insulin and injectable diabetes medications, including administration, storage, site selection and rotation for injection sites    Responsible User: Bethany Dahl        Task: Discuss barriers to medication adherence with patient and provide management technique ideas as appropriate    Responsible User: Bethany Dahl        Task: Provide education on frequency and refill details of medications    Responsible User: Bethany Dahl        Goal: Problem Solving - know how to prevent and manage short-term diabetes complications         Task: Provide education on high blood glucose - causes, signs/symptoms, prevention and treatment Completed 10/3/2023   Responsible User: Bethany Dahl        Task: Provide education on low blood glucose - causes, signs/symptoms, prevention, treatment, carrying a carbohydrate source at all times, and medical identification    Responsible User: Bethany Dahl        Task: Provide education on safe travel with diabetes    Responsible User: Bethany Dahl        Task: Provide  education on how to care for diabetes on sick days    Responsible User: Bethany Dahl        Task: Provide education on when to call a health care provider Completed 10/3/2023   Responsible User: Bethany Dahl        Goal: Reducing Risks - know how to prevent and treat long-term diabetes complications         Task: Provide education on major complications of diabetes, prevention, early diagnostic measures and treatment of complications    Responsible User: Bethany Dahl        Task: Provide education on recommended care for dental, eye and foot health    Responsible User: Bethany Dahl        Task: Provide education on Hemoglobin A1c - goals and relationship to blood glucose levels Completed 10/3/2023   Responsible User: Bethany Dahl        Task: Provide education on recommendations for heart health - lipid levels and goals, blood pressure and goals, and aspirin therapy, if indicated    Responsible User: Bethany Dahl        Task: Provide education on tobacco cessation    Responsible User: Bethany Dahl        Goal: Healthy Coping - use available resources to cope with the challenges of managing diabetes         Task: Discuss recognizing feelings about having diabetes    Responsible User: Bethany Dahl        Task: Provide education on the benefits of making appropriate lifestyle changes Completed 10/3/2023   Responsible User: Bethany Dahl        Task: Provide education on benefits of utilizing support systems Completed 10/3/2023   Responsible User: Bethany Dahl        Task: Discuss methods for coping with stress    Responsible User: Bethany Dahl        Task: Provide education on when to seek professional counseling    Responsible User: Bethany Dahl RDN, DORIAN, CDCES    Time Spent: 60 minutes  Encounter Type: Individual    Any diabetes medication dose changes were made via the CDE Protocol per the patient's referring provider. A copy of this encounter was  shared with the provider.

## 2023-10-03 NOTE — PROGRESS NOTES
Diabetes Self-Management Education & Support    Presents for: Individual review    Type of Service: In Person Visit    ASSESSMENT:  Chikis attends this visit with Betsy, her helper. She is interested in learning more about her carbohydrate counts. States she has been following weight watchers, diabetes meal plan.   Reports she has been consistently taking her 10 mg Jardiance for the last week and is tolerating this.   She has not been testing her BG.  She has not been exercising, though does have an active job at [employer].     Discussed plate planning, label reading, carbohydrate counting and exercise.  Reviewed BG testing times and targets.  Chikis is motivated to feel better. She plans to start exercising, follow meal plan, continue to take medication and test BG. Positive reinforcement given.     Patient's most recent   Lab Results   Component Value Date    A1C 9.4 08/22/2023    A1C 7.9 02/11/2021     is not meeting goal of <7.0    Diabetes knowledge and skills assessment:   Patient is knowledgeable in diabetes management concepts related to: Being Active    Continue education with the following diabetes management concepts: Healthy Eating, Monitoring, Taking Medication, Problem Solving, and Reducing Risks    Based on learning assessment above, most appropriate setting for further diabetes education would be: Individual setting.      PLAN  Continue diabetes medications as prescribed.     Blood sugar monitoring:  Check blood sugars fasting and 2 hours after largest meal of the day  Fasting and before meal targets:  mg/dL  2 hours after start of meal: <180 mg/dL  Keep a blood glucose record for next visit.    Meal Plan Recommendation:   Use portion control and plate planning method.  Carbohydrates to aim for at meals: 30-45 grams and snacks: 0-15 or 30 grams.  Use [email or web address] for recipe and meal planning ideas  Use [employer] layo for carbohydrate reference.    Exercise / activity plan:   Recommend  "starting slow and increasing as tolerated to goal of 150 minutes per week.     Follow-up: 1 months    See Care Plan for co-developed, patient-state behavior change goals.  AVS provided for patient today.    Education Materials Provided:  My Plate Planner      SUBJECTIVE/OBJECTIVE:  Presents for: Individual review  Accompanied by: Self  Diabetes education in the past 24mo: No  Focus of Visit: Healthy Eating  Diabetes type: Type 2  Date of diagnosis: 9 years  Diabetes management related comments/concerns: Chikis states she didn't take her medications for 9 years. I was taken off Metformin  Difficulty affording diabetes medication?: No  Difficulty affording diabetes testing supplies?: No  Cultural Influences/Ethnic Background:  Not  or     Diabetes Symptoms & Complications:  Weight trend: Fluctuating  Complications assessed today?: No    Patient Problem List and Family Medical History reviewed for relevant medical history, current medical status, and diabetes risk factors.    Vitals:  There were no vitals taken for this visit.  Estimated body mass index is 35.19 kg/m  as calculated from the following:    Height as of 9/25/23: 1.575 m (5' 2\").    Weight as of 9/25/23: 87.3 kg (192 lb 6 oz).   Last 3 BP:   BP Readings from Last 3 Encounters:   09/25/23 120/73   09/15/23 126/57   08/22/23 115/73       History   Smoking Status    Never   Smokeless Tobacco    Never       Labs:  Lab Results   Component Value Date    A1C 9.4 08/22/2023    A1C 7.9 02/11/2021     Lab Results   Component Value Date     08/22/2023     06/03/2022     03/23/2021     Lab Results   Component Value Date    LDL 64 09/15/2023     08/06/2020     HDL Cholesterol   Date Value Ref Range Status   08/06/2020 44 (L) >49 mg/dL Final     Direct Measure HDL   Date Value Ref Range Status   09/15/2023 44 (L) >=50 mg/dL Final   ]  GFR Estimate   Date Value Ref Range Status   08/22/2023 >90 >60 mL/min/1.73m2 Final   03/23/2021 " >90 >60 mL/min/[1.73_m2] Final     Comment:     Non  GFR Calc  Starting 12/18/2018, serum creatinine based estimated GFR (eGFR) will be   calculated using the Chronic Kidney Disease Epidemiology Collaboration   (CKD-EPI) equation.       GFR Estimate If Black   Date Value Ref Range Status   03/23/2021 >90 >60 mL/min/[1.73_m2] Final     Comment:      GFR Calc  Starting 12/18/2018, serum creatinine based estimated GFR (eGFR) will be   calculated using the Chronic Kidney Disease Epidemiology Collaboration   (CKD-EPI) equation.       Lab Results   Component Value Date    CR 0.55 08/22/2023    CR 0.61 03/23/2021     No results found for: MICROALBUMIN    Healthy Eating:  Healthy Eating Assessed Today: Yes  Cultural/Rastafari diet restrictions?: No  Meal planning/habits: Low carb, Other (has the weight watchers diabetes plan, online only)  Meals include: Breakfast, Lunch, Dinner, Evening Snack  Breakfast: 9 AM- [employer] protein shake- iced coffee with skinny syrup and heavy cream, now using fat free half and half OR eggs with celery, onions and parsley. butter  Lunch: 1145 AM- [employer] pizza - 2 pieces, water  Dinner: 6-6:30 PM: tuna melt sandwich, water  Snacks: candy- skittle, smores. 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 Plans to start  soon.)  Barrier to exercise: None    Monitoring:  Monitoring Assessed Today: Yes  Did patient bring glucose meter to appointment? : No  Times checking blood sugar at home (number): Never    Taking Medications:  Diabetes Medication(s)       Sodium-Glucose Co-Transporter 2 (SGLT2) Inhibitors       empagliflozin (JARDIANCE) 10 MG TABS tablet    Take 1 tablet (10 mg) by mouth daily            Taking  Medication Assessed Today: Yes  Current Treatments: Oral Medication (taken by mouth)  Problems taking diabetes medications regularly?: No (taking consistently for a week)  Diabetes medication side effects?: No    Problem Solving:  Problem Solving Assessed Today: Yes  Is the patient at risk for hypoglycemia?: No    Healthy Coping:  Healthy Coping Assessed Today: Yes  Emotional response to diabetes: Ready to learn  Informal Support system:: Other  Stage of change: ACTION (Actively working towards change)  Support resources: Websites  Patient Activation Measure Survey Score:      7/22/2010    11:00 AM 2/11/2021     9:56 AM   SHERRI Score (Last Two)   SHERRI Raw Score 39 30   Activation Score 56.4 56   SHERRI Level 3 3     Care Plan and Education Provided:  Care Plan: Diabetes   Updates made by Bethany Dahl since 10/3/2023 12:00 AM        Problem: HbA1C Not In Goal         Goal: Establish Regular Follow-Ups with PCP         Task: Discuss with PCP the recommended timing for patient's next follow up visit(s)    Responsible User: Bethany Dahl        Task: Discuss schedule for PCP visits with patient    Responsible User: Bethany Dahl        Goal: Get HbA1C Level in Goal         Task: Educate patient on diabetes education self-management topics    Responsible User: Bethany Dahl        Task: Educate patient on benefits of regular glucose monitoring    Responsible User: Bethany Dahl        Task: Refer patient to appropriate extended care team member, as needed (Medication Therapy Management, Behavioral Health, Physical Therapy, etc.)    Responsible User: Bethany Dahl        Task: Discuss diabetes treatment plan with patient    Responsible User: Bethany Dahl        Problem: Diabetes Self-Management Education Needed to Optimize Self-Care Behaviors         Goal: Understand diabetes pathophysiology and disease progression         Task: Provide education on diabetes pathophysiology and disease progression specfic  to patient's diabetes type    Responsible User: Bethany Dahl        Goal: Healthy Eating - follow a healthy eating pattern for diabetes    Note:    Use the plate method for 2 meals/day at least 5 days/week for the next 4 weeks.          Task: Provide education on portion control and consistency in amount, composition and timing of food intake Completed 10/3/2023   Responsible User: Bethany Dahl        Task: Provide education on managing carbohydrate intake (carbohydrate counting, plate planning method, etc.) Completed 10/3/2023   Responsible User: Bethany Dahl        Task: Provide education on weight management Completed 10/3/2023   Responsible User: Bethany Dahl        Task: Provide education on heart healthy eating    Responsible User: Bethany Dahl        Task: Provide education on eating out    Responsible User: Bethany Dahl        Task: Develop individualized healthy eating plan with patient Completed 10/3/2023   Responsible User: Bethany Dahl        Goal: Being Active - get regular physical activity, working up to at least 150 minutes per week         Task: Provide education on relationship of activity to glucose and precautions to take if at risk for low glucose Completed 10/3/2023   Responsible User: Bethany Dahl        Task: Discuss barriers to physical activity with patient    Responsible User: Bethany Dahl        Task: Develop physical activity plan with patient    Responsible User: Bethany Dahl        Task: Explore community resources including walking groups, assistance programs, and home videos    Responsible User: Bethany Dahl        Goal: Monitoring - monitor glucose and ketones as directed         Task: Provide education on blood glucose monitoring (purpose, proper technique, frequency, glucose targets, interpreting results, when to use glucose control solution, sharps disposal) Completed 10/3/2023   Responsible User: Bethany Dahl        Task: Provide  education on continuous glucose monitoring (sensor placement, use of layo or /reader, understanding glucose trends, alerts and alarms, differences between sensor glucose and blood glucose)    Responsible User: Bethany Dahl        Task: Provide education on ketone monitoring (when to monitor, frequency, etc.)    Responsible User: Bethany Dahl        Goal: Taking Medication - patient is consistently taking medications as directed         Task: Provide education on action of prescribed medication, including when to take and possible side effects Completed 10/3/2023   Responsible User: Bethany Dahl        Task: Provide education on insulin and injectable diabetes medications, including administration, storage, site selection and rotation for injection sites    Responsible User: Bethany Dahl        Task: Discuss barriers to medication adherence with patient and provide management technique ideas as appropriate    Responsible User: Bethany Dahl        Task: Provide education on frequency and refill details of medications    Responsible User: Bethany Dahl        Goal: Problem Solving - know how to prevent and manage short-term diabetes complications         Task: Provide education on high blood glucose - causes, signs/symptoms, prevention and treatment Completed 10/3/2023   Responsible User: Bethany Dahl        Task: Provide education on low blood glucose - causes, signs/symptoms, prevention, treatment, carrying a carbohydrate source at all times, and medical identification    Responsible User: Bethany Dahl        Task: Provide education on safe travel with diabetes    Responsible User: Bethany Dahl        Task: Provide education on how to care for diabetes on sick days    Responsible User: Bethany Dahl        Task: Provide education on when to call a health care provider Completed 10/3/2023   Responsible User: Bethany Dahl        Goal: Reducing Risks - know how to prevent  and treat long-term diabetes complications         Task: Provide education on major complications of diabetes, prevention, early diagnostic measures and treatment of complications    Responsible User: Bethany Dahl        Task: Provide education on recommended care for dental, eye and foot health    Responsible User: Bethany Dahl        Task: Provide education on Hemoglobin A1c - goals and relationship to blood glucose levels Completed 10/3/2023   Responsible User: Bethany Dahl        Task: Provide education on recommendations for heart health - lipid levels and goals, blood pressure and goals, and aspirin therapy, if indicated    Responsible User: Bethany Dahl        Task: Provide education on tobacco cessation    Responsible User: Bethany Dahl        Goal: Healthy Coping - use available resources to cope with the challenges of managing diabetes         Task: Discuss recognizing feelings about having diabetes    Responsible User: Bethany Dahl        Task: Provide education on the benefits of making appropriate lifestyle changes Completed 10/3/2023   Responsible User: Bethany Dahl        Task: Provide education on benefits of utilizing support systems Completed 10/3/2023   Responsible User: Bethany Dahl        Task: Discuss methods for coping with stress    Responsible User: Bethany Dahl        Task: Provide education on when to seek professional counseling    Responsible User: Bethany Dahl RDN, DORIAN, CDCES    Time Spent: 60 minutes  Encounter Type: Individual    Any diabetes medication dose changes were made via the CDE Protocol per the patient's referring provider. A copy of this encounter was shared with the provider.

## 2023-10-17 ENCOUNTER — TELEPHONE (OUTPATIENT)
Dept: FAMILY MEDICINE | Facility: CLINIC | Age: 43
End: 2023-10-17
Payer: MEDICARE

## 2023-10-17 ENCOUNTER — TELEPHONE (OUTPATIENT)
Dept: GASTROENTEROLOGY | Facility: CLINIC | Age: 43
End: 2023-10-17
Payer: MEDICARE

## 2023-10-17 DIAGNOSIS — Z12.11 SPECIAL SCREENING FOR MALIGNANT NEOPLASMS, COLON: Primary | ICD-10-CM

## 2023-10-17 NOTE — TELEPHONE ENCOUNTER
"Endoscopy Scheduling Screen    Have you had a positive Covid test in the last 14 days?  No    Are you active on MyChart?   Yes    What insurance is in the chart?  Other:  [employer]    Ordering/Referring Provider:   ALEXY SCHOFIELD        (If ordering provider performs procedure, schedule with ordering provider unless otherwise instructed. )    BMI: Estimated body mass index is 35.19 kg/m  as calculated from the following:    Height as of 9/25/23: 1.575 m (5' 2\").    Weight as of 9/25/23: 87.3 kg (192 lb 6 oz).     Sedation Ordered  moderate sedation.   If patient BMI > 50 do not schedule in [facility].    If patient BMI > 45 do not schedule at [facility].    Are you taking methadone or Suboxone?  No    Are you taking any prescription medications for pain 3 or more times per week?   No    Do you have a history of malignant hyperthermia or adverse reaction to anesthesia?  No    (Females) Are you currently pregnant?   No     Have you been diagnosed or told you have pulmonary hypertension?   No    Do you have an LVAD?  No    Have you been told you have moderate to severe sleep apnea?  No    Have you been told you have COPD, asthma, or any other lung disease?  No    Do you have any heart conditions?  Yes     In the past 6 months, have you had any hospitalizations for heart related issues including cardiomyopathy, heart attack, or stent placement?  No    Do you have any implantable devices in your body (pacemaker, ICD)?  Pacemaker (schedule colonoscopy in Hospital Only)    Do you take nitroglycerine?  No    Have you ever had an organ transplant?   No    Have you ever had or are you awaiting a heart or lung transplant?   No    Have you had a stroke or transient ischemic attack (TIA aka \"mini stroke\" in the last 6 months?   No    Have you been diagnosed with or been told you have cirrhosis of the liver?   No    Are you currently on dialysis?   No    Do you need assistance transferring?   No    BMI: Estimated body mass index is " "35.19 kg/m  as calculated from the following:    Height as of 9/25/23: 1.575 m (5' 2\").    Weight as of 9/25/23: 87.3 kg (192 lb 6 oz).     Is patients BMI > 40 and scheduling location [facility]?  No    Do you take an injectable medication for weight loss or diabetes (excluding insulin)?  No    Do you take the medication Naltrexone?  No    Do you take blood thinners?  No       Prep   Are you currently on dialysis or do you have chronic kidney disease?  No    Do you have a diagnosis of diabetes?  Yes (Golytely Prep)    Do you have a diagnosis of cystic fibrosis (CF)?  No    On a regular basis do you go 3 -5 days between bowel movements?  No    BMI > 40?  No    Preferred Pharmacy:    [id number] - [facility] [address] [facility] [place] [address]  [address]  [facility] [address]  Phone: [phone] Fax: [phone]      Final Scheduling Details   Colonoscopy prep sent?  Standard Golytely    Procedure scheduled  Colonoscopy    Surgeon:  Escobar     Date of procedure:  1/16/24     Pre-OP / PAC:   No - Not required for this site.    Location  [facility] - Patient preference.    Sedation   Moderate Sedation - Per order.      Patient Reminders:   You will receive a call from a Nurse to review instructions and health history.  This assessment must be completed prior to your procedure.  Failure to complete the Nurse assessment may result in the procedure being cancelled.      On the day of your procedure, please designate an adult(s) who can drive you home stay with you for the next 24 hours. The medicines used in the exam will make you sleepy. You will not be able to drive.      You cannot take public transportation, ride share services, or non-medical taxi service without a responsible caregiver.  Medical transport services are allowed with the requirement that a responsible caregiver will receive you at your destination.  We require that drivers and caregivers are confirmed prior to your " procedure.

## 2023-10-25 DIAGNOSIS — R09.82 POST-NASAL DRIP: ICD-10-CM

## 2023-10-25 RX ORDER — FLUTICASONE PROPIONATE 50 MCG
1 SPRAY, SUSPENSION (ML) NASAL DAILY
Qty: 16 G | Refills: 11 | Status: SHIPPED | OUTPATIENT
Start: 2023-10-25

## 2023-10-31 ENCOUNTER — ALLIED HEALTH/NURSE VISIT (OUTPATIENT)
Dept: EDUCATION SERVICES | Facility: CLINIC | Age: 43
End: 2023-10-31
Payer: MEDICARE

## 2023-10-31 VITALS — BODY MASS INDEX: 33.89 KG/M2 | WEIGHT: 185.3 LBS

## 2023-10-31 DIAGNOSIS — E11.59 TYPE 2 DIABETES MELLITUS WITH OTHER CIRCULATORY COMPLICATION, WITHOUT LONG-TERM CURRENT USE OF INSULIN (H): Primary | ICD-10-CM

## 2023-10-31 PROCEDURE — G0108 DIAB MANAGE TRN  PER INDIV: HCPCS

## 2023-10-31 NOTE — PROGRESS NOTES
Diabetes Self-Management Education & Support    Presents for: Follow-up    Type of Service: In Person Visit    ASSESSMENT:  Chikis has made many positive changes in her meal plan since her last diabetes visit, as below. She has also lost 8 Lb since the end of August.   Her BG are about 62% in target. She knows why her after meal glucose is high as it's usually related to pizza or regular soda. She has since quit drinking regular soda recently as well. Positive reinforcement given.  She plans to add exercise to her regimen to allow for further weight loss as she would prefer to not increase Jardiance.     Patient's most recent   Lab Results   Component Value Date    A1C 9.4 08/22/2023    A1C 7.9 02/11/2021     is not meeting goal of <7.0, but improving nicely. She has 62% of BG in target, BG log below.    Diabetes knowledge and skills assessment:   Patient is knowledgeable in diabetes management concepts related to: Healthy Eating, Monitoring, and Taking Medication    Continue education with the following diabetes management concepts: Being Active and Reducing Risks    Based on learning assessment above, most appropriate setting for further diabetes education would be: Individual setting.      PLAN  Continue 10 mg Jardiance.  Continue testing BG BID, fasting and one 2 hours post meal per day.  Continue to follow plate planning and carbohydrate counting meal plan.  Start exercise as discussed. She plans to go to the gym for 30 minutes. Recommend starting slow and increase as tolerated.    Follow-up: 12/5/23    See Care Plan for co-developed, patient-state behavior change goals.    SUBJECTIVE/OBJECTIVE:  Presents for: Follow-up  Accompanied by: Self, Other (Betsy)  Diabetes education in the past 24mo: No  Focus of Visit: Healthy Eating  Diabetes type: Type 2  Date of diagnosis: 9 years  Disease course: Improving  Diabetes management related comments/concerns: Chikis states she didn't take her medications for 9 years. I  "was taken off Metformin  Difficulty affording diabetes medication?: No  Difficulty affording diabetes testing supplies?: No  Cultural Influences/Ethnic Background:  Not  or     Diabetes Symptoms & Complications:  Weight trend: Fluctuating  Complications assessed today?: No    Patient Problem List and Family Medical History reviewed for relevant medical history, current medical status, and diabetes risk factors.    Vitals:  Wt 84.1 kg (185 lb 4.8 oz)   BMI 33.89 kg/m    Estimated body mass index is 33.89 kg/m  as calculated from the following:    Height as of 9/25/23: 1.575 m (5' 2\").    Weight as of this encounter: 84.1 kg (185 lb 4.8 oz).   Last 3 BP:   BP Readings from Last 3 Encounters:   09/25/23 120/73   09/15/23 126/57   08/22/23 115/73       History   Smoking Status    Never   Smokeless Tobacco    Never       Labs:  Lab Results   Component Value Date    A1C 9.4 08/22/2023    A1C 7.9 02/11/2021     Lab Results   Component Value Date     08/22/2023     06/03/2022     03/23/2021     Lab Results   Component Value Date    LDL 64 09/15/2023     08/06/2020     HDL Cholesterol   Date Value Ref Range Status   08/06/2020 44 (L) >49 mg/dL Final     Direct Measure HDL   Date Value Ref Range Status   09/15/2023 44 (L) >=50 mg/dL Final   ]  GFR Estimate   Date Value Ref Range Status   08/22/2023 >90 >60 mL/min/1.73m2 Final   03/23/2021 >90 >60 mL/min/[1.73_m2] Final     Comment:     Non  GFR Calc  Starting 12/18/2018, serum creatinine based estimated GFR (eGFR) will be   calculated using the Chronic Kidney Disease Epidemiology Collaboration   (CKD-EPI) equation.       GFR Estimate If Black   Date Value Ref Range Status   03/23/2021 >90 >60 mL/min/[1.73_m2] Final     Comment:      GFR Calc  Starting 12/18/2018, serum creatinine based estimated GFR (eGFR) will be   calculated using the Chronic Kidney Disease Epidemiology Collaboration   (CKD-EPI) " "equation.       Lab Results   Component Value Date    CR 0.55 08/22/2023    CR 0.61 03/23/2021     No results found for: \"MICROALBUMIN\"    Healthy Eating:  Healthy Eating Assessed Today: Yes  Cultural/Judaism diet restrictions?: No  Meal planning/habits: Low carb, Other (has the weight watchers diabetes plan, online only)  Meals include: Breakfast, Lunch, Dinner, Evening Snack  Breakfast: 9 AM- fairlife protein shake- iced coffee with skinny syrup and heavy cream, now using fat free half and half OR eggs with celery, onions and parsley. butter  Lunch: 1145 AM- pizza hut pizza - 2 pieces, water  Dinner: 6-6:30 PM: tuna melt sandwich, water  Snacks: candy- skittle, smores. bought sugar free candy, 1-2 butterscotch soda  Other: She has made many positive changes including an alternative to regular soda, poppi or lolipop, lower carbohydrate snacks, smaller portions too.  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 Plans to start  soon.)  Barrier to exercise: None    Monitoring:  Monitoring Assessed Today: Yes  Did patient bring glucose meter to appointment? : Yes  Blood Glucose Meter: Accu-chek  Times checking blood sugar at home (number): 2  Times checking blood sugar at home (per): Day  Blood glucose trend: Decreasing    Date Breakfast  Lunch  Dinner  Bedtime    Before After Before After Before After    10/31 128         10/30      221- pizza and Mt Dew 4 oz    10/29      214- lasagna and garlic bread    10/28 120         10/26      175    10/25      122    10/24  10/23  10/22  10/21       126     175  154  141        Taking Medications:  Diabetes Medication(s)       Sodium-Glucose Co-Transporter 2 (SGLT2) Inhibitors       empagliflozin (JARDIANCE) 10 MG TABS tablet    Take 1 tablet (10 " mg) by mouth daily            Taking Medication Assessed Today: Yes  Current Treatments: Oral Medication (taken by mouth)  Problems taking diabetes medications regularly?: No (taking consistently for a week)  Diabetes medication side effects?: No    Problem Solving:  Problem Solving Assessed Today: Yes  Is the patient at risk for hypoglycemia?: No    Healthy Coping:  Healthy Coping Assessed Today: Yes  Emotional response to diabetes: Ready to learn  Informal Support system:: Other  Stage of change: ACTION (Actively working towards change)  Support resources: Websites  Patient Activation Measure Survey Score:      7/22/2010    11:00 AM 2/11/2021     9:56 AM   SHERRI Score (Last Two)   SHERRI Raw Score 39 30   Activation Score 56.4 56   SHERRI Level 3 3     Care Plan and Education Provided:  Care Plan: Diabetes   Updates made by Bethany Dahl since 10/31/2023 12:00 AM        Problem: Diabetes Self-Management Education Needed to Optimize Self-Care Behaviors         Goal: Taking Medication - patient is consistently taking medications as directed         Task: Provide education on frequency and refill details of medications Completed 10/31/2023   Responsible User: Bethany Dahl        Goal: Problem Solving - know how to prevent and manage short-term diabetes complications         Task: Provide education on how to care for diabetes on sick days Completed 10/31/2023   Responsible User: Bethany Dahl RDN, DORIAN, [facility]    Time Spent: 30 minutes  Encounter Type: Individual    Any diabetes medication dose changes were made via the CDE Protocol per the patient's referring provider. A copy of this encounter was shared with the provider.

## 2023-12-05 ENCOUNTER — LAB (OUTPATIENT)
Dept: LAB | Facility: CLINIC | Age: 43
End: 2023-12-05
Payer: MEDICARE

## 2023-12-05 ENCOUNTER — TELEPHONE (OUTPATIENT)
Dept: FAMILY MEDICINE | Facility: CLINIC | Age: 43
End: 2023-12-05

## 2023-12-05 ENCOUNTER — ALLIED HEALTH/NURSE VISIT (OUTPATIENT)
Dept: EDUCATION SERVICES | Facility: CLINIC | Age: 43
End: 2023-12-05
Payer: MEDICARE

## 2023-12-05 VITALS — WEIGHT: 184.5 LBS | BODY MASS INDEX: 33.75 KG/M2

## 2023-12-05 DIAGNOSIS — E11.59 TYPE 2 DIABETES MELLITUS WITH OTHER CIRCULATORY COMPLICATION, WITHOUT LONG-TERM CURRENT USE OF INSULIN (H): ICD-10-CM

## 2023-12-05 DIAGNOSIS — E11.59 TYPE 2 DIABETES MELLITUS WITH OTHER CIRCULATORY COMPLICATION, WITHOUT LONG-TERM CURRENT USE OF INSULIN (H): Primary | ICD-10-CM

## 2023-12-05 LAB — HBA1C MFR BLD: 6.8 % (ref 0–5.6)

## 2023-12-05 PROCEDURE — G0108 DIAB MANAGE TRN  PER INDIV: HCPCS

## 2023-12-05 PROCEDURE — 36415 COLL VENOUS BLD VENIPUNCTURE: CPT

## 2023-12-05 PROCEDURE — 83036 HEMOGLOBIN GLYCOSYLATED A1C: CPT

## 2023-12-05 NOTE — LETTER
12/5/2023         RE: Chikis Hannon  [id number]  [place] [id number]        Dear Colleague,    Thank you for referring your patient, Chikis Hannon, to the [facility]. Please see a copy of my visit note below.    Diabetes Self-Management Education & Support  Presents for: Follow-up    Type of Service: In Person Visit    ASSESSMENT:  Chikis's BG has been slightly higher the last few weeks. She states she has been stressed and also had a cold. She is agreeable to getting an A1c drawn today.   She has started exercising and is doing this most days of the week. Positive reinforcement given. Reviewed diabetes education topics as below.     Patient's most recent   Lab Results   Component Value Date    A1C 9.4 08/22/2023    A1C 7.9 02/11/2021     is not meeting goal of <7.0    Diabetes knowledge and skills assessment:   Patient is knowledgeable in diabetes management concepts related to: Healthy Eating, Monitoring, and Taking Medication    Continue education with the following diabetes management concepts: Problem Solving, Reducing Risks, and Healthy Coping    Based on learning assessment above, most appropriate setting for further diabetes education would be: Individual setting.      PLAN  Continue 10 mg Jardiance. Obtain A1c today to assess need for 25 mg Jardiance.  Continue testing BG BID, fasting and one 2 hours post meal per day, rotate meals.  Continue to follow plate planning and carbohydrate counting meal plan.  Continue 30 minutes exercise most days of the week    Follow-up: January 30th 2024    See Care Plan for co-developed, patient-state behavior change goals.    SUBJECTIVE/OBJECTIVE:  Presents for: Follow-up  Accompanied by: Self, Other (Betsy)  Diabetes education in the past 24mo: No  Focus of Visit: Healthy Eating  Diabetes type: Type 2  Date of diagnosis: 9 years  Disease course: Improving  Diabetes management related comments/concerns: Chikis states she didn't  "take her medications for 9 years. I was taken off Metformin  Difficulty affording diabetes medication?: No  Difficulty affording diabetes testing supplies?: No  Cultural Influences/Ethnic Background:  Not  or     Diabetes Symptoms & Complications:  Symptom course: Stable  Weight trend: Fluctuating  Complications assessed today?: No    Patient Problem List and Family Medical History reviewed for relevant medical history, current medical status, and diabetes risk factors.    Vitals:  Wt 83.7 kg (184 lb 8 oz)   BMI 33.75 kg/m    Estimated body mass index is 33.75 kg/m  as calculated from the following:    Height as of 9/25/23: 1.575 m (5' 2\").    Weight as of this encounter: 83.7 kg (184 lb 8 oz).   Last 3 BP:   BP Readings from Last 3 Encounters:   09/25/23 120/73   09/15/23 126/57   08/22/23 115/73       History   Smoking Status    Never   Smokeless Tobacco    Never       Labs:  Lab Results   Component Value Date    A1C 9.4 08/22/2023    A1C 7.9 02/11/2021     Lab Results   Component Value Date     08/22/2023     06/03/2022     03/23/2021     Lab Results   Component Value Date    LDL 64 09/15/2023     08/06/2020     HDL Cholesterol   Date Value Ref Range Status   08/06/2020 44 (L) >49 mg/dL Final     Direct Measure HDL   Date Value Ref Range Status   09/15/2023 44 (L) >=50 mg/dL Final   ]  GFR Estimate   Date Value Ref Range Status   08/22/2023 >90 >60 mL/min/1.73m2 Final   03/23/2021 >90 >60 mL/min/[1.73_m2] Final     Comment:     Non  GFR Calc  Starting 12/18/2018, serum creatinine based estimated GFR (eGFR) will be   calculated using the Chronic Kidney Disease Epidemiology Collaboration   (CKD-EPI) equation.       GFR Estimate If Black   Date Value Ref Range Status   03/23/2021 >90 >60 mL/min/[1.73_m2] Final     Comment:      GFR Calc  Starting 12/18/2018, serum creatinine based estimated GFR (eGFR) will be   calculated using the Chronic " "Kidney Disease Epidemiology Collaboration   (CKD-EPI) equation.       Lab Results   Component Value Date    CR 0.55 08/22/2023    CR 0.61 03/23/2021     No results found for: \"MICROALBUMIN\"    Healthy Eating:  Healthy Eating Assessed Today: Yes  Cultural/Alevism diet restrictions?: No  Meal planning/habits: Low carb, Other (has the weight watchers diabetes plan, online only)  Meals include: Breakfast, Lunch, Dinner, Evening Snack  Breakfast: 9 AM- Fairlife protein shake- iced coffee with skinny syrup and heavy cream, now using fat free half and half OR eggs with celery, onions and parsley. butter  Lunch: 1145 AM- pizza hut pizza - 2 pieces, water  Dinner: 6-6:30 PM: tuna melt sandwich, water  Snacks: candy- skittle, smores. bought sugar free candy, 1-2 butterscotch soda  Other: She has made many positive changes including an alternative to regular soda, poppi or lolipop, lower carbohydrate snacks, smaller portions too.  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 Plans to start  soon.)  Days per week of moderate to strenuous exercise (like a brisk walk): 7  On average, minutes per day of exercise at this level: 30  How intense was your typical exercise? : Moderate (like brisk walking)  Exercise Minutes per Week: 210  Barrier to exercise: None    Monitoring:  Monitoring Assessed Today: Yes  Did patient bring glucose meter to appointment? : Yes  Blood Glucose Meter: Accu-chek  Times checking blood sugar at home (number): 2  Times checking blood sugar at home (per): Day  Blood glucose trend: Stable- 58% in target before bed. May have snacks in the afternoon leading to higher post dinner BG readings.     Date Breakfast  Lunch  Dinner  Bedtime    Before After Before After Before After    12/4       130   12/3     "   147   12/2       146   12/1       150   11/30       120   11/29     187     11/28 11/27     168    117       Taking Medications:  Diabetes Medication(s)       Sodium-Glucose Co-Transporter 2 (SGLT2) Inhibitors       empagliflozin (JARDIANCE) 10 MG TABS tablet    Take 1 tablet (10 mg) by mouth daily            Taking Medication Assessed Today: Yes  Current Treatments: Oral Medication (taken by mouth)  Problems taking diabetes medications regularly?: No (taking consistently for a week)  Diabetes medication side effects?: No    Problem Solving:  Problem Solving Assessed Today: Yes  Is the patient at risk for hypoglycemia?: No    Reducing Risks:  Reducing Risks Assessed Today: Yes  Diabetes Risks:  (Adopted)  Has dilated eye exam at least once a year?: Yes  Sees dentist every 6 months?: Yes    Healthy Coping:  Healthy Coping Assessed Today: Yes  Emotional response to diabetes: Ready to learn  Informal Support system:: Other  Stage of change: ACTION (Actively working towards change)  Support resources: Websites  Patient Activation Measure Survey Score:      7/22/2010    11:00 AM 2/11/2021     9:56 AM   SHERRI Score (Last Two)   SHERRI Raw Score 39 30   Activation Score 56.4 56   SHERRI Level 3 3       Care Plan and Education Provided:  Care Plan: Diabetes   Updates made by Bethany Dahl since 12/5/2023 12:00 AM        Problem: Diabetes Self-Management Education Needed to Optimize Self-Care Behaviors         Goal: Reducing Risks - know how to prevent and treat long-term diabetes complications         Task: Provide education on major complications of diabetes, prevention, early diagnostic measures and treatment of complications Completed 12/5/2023   Responsible User: Bethany Dahl        Task: Provide education on recommended care for dental, eye and foot health Completed 12/5/2023   Responsible User: Bethany Dahl        Goal: Healthy Coping - use available resources to cope with the challenges of managing diabetes          Task: Discuss methods for coping with stress Completed 12/5/2023   Responsible User: Bethany Dahl RDN, LD, [facility]    Time Spent: 30 minutes  Encounter Type: Individual    Any diabetes medication dose changes were made via the CDE Protocol per the patient's referring provider. A copy of this encounter was shared with the provider.

## 2023-12-05 NOTE — TELEPHONE ENCOUNTER
General Call      Reason for Call:  Change Pharmacy    What are your questions or concerns: Patient is change pharmacy to  [facility] off [address]    Date of last appointment with provider:     Could we send this information to you in [facility] or would you prefer to receive a phone call?:   No preference   Okay to leave a detailed message?: No at Other phone number:

## 2023-12-05 NOTE — PROGRESS NOTES
Diabetes Self-Management Education & Support  Presents for: Follow-up    Type of Service: In Person Visit    ASSESSMENT:  Chikis's BG has been slightly higher the last few weeks. She states she has been stressed and also had a cold. She is agreeable to getting an A1c drawn today.   She has started exercising and is doing this most days of the week. Positive reinforcement given. Reviewed diabetes education topics as below.     Patient's most recent   Lab Results   Component Value Date    A1C 9.4 08/22/2023    A1C 7.9 02/11/2021     is not meeting goal of <7.0    Diabetes knowledge and skills assessment:   Patient is knowledgeable in diabetes management concepts related to: Healthy Eating, Monitoring, and Taking Medication    Continue education with the following diabetes management concepts: Problem Solving, Reducing Risks, and Healthy Coping    Based on learning assessment above, most appropriate setting for further diabetes education would be: Individual setting.      PLAN  Continue 10 mg Jardiance. Obtain A1c today to assess need for 25 mg Jardiance.  Continue testing BG BID, fasting and one 2 hours post meal per day, rotate meals.  Continue to follow plate planning and carbohydrate counting meal plan.  Continue 30 minutes exercise most days of the week    Follow-up: January 30th 2024    See Care Plan for co-developed, patient-state behavior change goals.    SUBJECTIVE/OBJECTIVE:  Presents for: Follow-up  Accompanied by: Self, Other (Betsy)  Diabetes education in the past 24mo: No  Focus of Visit: Healthy Eating  Diabetes type: Type 2  Date of diagnosis: 9 years  Disease course: Improving  Diabetes management related comments/concerns: Chikis states she didn't take her medications for 9 years. I was taken off Metformin  Difficulty affording diabetes medication?: No  Difficulty affording diabetes testing supplies?: No  Cultural Influences/Ethnic Background:  Not  or     Diabetes Symptoms &  "Complications:  Symptom course: Stable  Weight trend: Fluctuating  Complications assessed today?: No    Patient Problem List and Family Medical History reviewed for relevant medical history, current medical status, and diabetes risk factors.    Vitals:  Wt 83.7 kg (184 lb 8 oz)   BMI 33.75 kg/m    Estimated body mass index is 33.75 kg/m  as calculated from the following:    Height as of 9/25/23: 1.575 m (5' 2\").    Weight as of this encounter: 83.7 kg (184 lb 8 oz).   Last 3 BP:   BP Readings from Last 3 Encounters:   09/25/23 120/73   09/15/23 126/57   08/22/23 115/73       History   Smoking Status    Never   Smokeless Tobacco    Never       Labs:  Lab Results   Component Value Date    A1C 9.4 08/22/2023    A1C 7.9 02/11/2021     Lab Results   Component Value Date     08/22/2023     06/03/2022     03/23/2021     Lab Results   Component Value Date    LDL 64 09/15/2023     08/06/2020     HDL Cholesterol   Date Value Ref Range Status   08/06/2020 44 (L) >49 mg/dL Final     Direct Measure HDL   Date Value Ref Range Status   09/15/2023 44 (L) >=50 mg/dL Final   ]  GFR Estimate   Date Value Ref Range Status   08/22/2023 >90 >60 mL/min/1.73m2 Final   03/23/2021 >90 >60 mL/min/[1.73_m2] Final     Comment:     Non  GFR Calc  Starting 12/18/2018, serum creatinine based estimated GFR (eGFR) will be   calculated using the Chronic Kidney Disease Epidemiology Collaboration   (CKD-EPI) equation.       GFR Estimate If Black   Date Value Ref Range Status   03/23/2021 >90 >60 mL/min/[1.73_m2] Final     Comment:      GFR Calc  Starting 12/18/2018, serum creatinine based estimated GFR (eGFR) will be   calculated using the Chronic Kidney Disease Epidemiology Collaboration   (CKD-EPI) equation.       Lab Results   Component Value Date    CR 0.55 08/22/2023    CR 0.61 03/23/2021     No results found for: \"MICROALBUMIN\"    Healthy Eating:  Healthy Eating Assessed Today: " Yes  Cultural/Restorationist diet restrictions?: No  Meal planning/habits: Low carb, Other (has the weight watchers diabetes plan, online only)  Meals include: Breakfast, Lunch, Dinner, Evening Snack  Breakfast: 9 AM- Fairlife protein shake- iced coffee with skinny syrup and heavy cream, now using fat free half and half OR eggs with celery, onions and parsley. butter  Lunch: 1145 AM- pizza hut pizza - 2 pieces, water  Dinner: 6-6:30 PM: tuna melt sandwich, water  Snacks: candy- skittle, smores. bought sugar free candy, 1-2 butterscotch soda  Other: She has made many positive changes including an alternative to regular soda, poppi or lolipop, lower carbohydrate snacks, smaller portions too.  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 Plans to start  soon.)  Days per week of moderate to strenuous exercise (like a brisk walk): 7  On average, minutes per day of exercise at this level: 30  How intense was your typical exercise? : Moderate (like brisk walking)  Exercise Minutes per Week: 210  Barrier to exercise: None    Monitoring:  Monitoring Assessed Today: Yes  Did patient bring glucose meter to appointment? : Yes  Blood Glucose Meter: Accu-chek  Times checking blood sugar at home (number): 2  Times checking blood sugar at home (per): Day  Blood glucose trend: Stable- 58% in target before bed. May have snacks in the afternoon leading to higher post dinner BG readings.     Date Breakfast  Lunch  Dinner  Bedtime    Before After Before After Before After    12/4       130   12/3       147   12/2       146   12/1       150   11/30       120   11/29     187     11/28 11/27     168    117       Taking Medications:  Diabetes Medication(s)       Sodium-Glucose Co-Transporter 2 (SGLT2) Inhibitors       empagliflozin  (JARDIANCE) 10 MG TABS tablet    Take 1 tablet (10 mg) by mouth daily            Taking Medication Assessed Today: Yes  Current Treatments: Oral Medication (taken by mouth)  Problems taking diabetes medications regularly?: No (taking consistently for a week)  Diabetes medication side effects?: No    Problem Solving:  Problem Solving Assessed Today: Yes  Is the patient at risk for hypoglycemia?: No    Reducing Risks:  Reducing Risks Assessed Today: Yes  Diabetes Risks:  (Adopted)  Has dilated eye exam at least once a year?: Yes  Sees dentist every 6 months?: Yes    Healthy Coping:  Healthy Coping Assessed Today: Yes  Emotional response to diabetes: Ready to learn  Informal Support system:: Other  Stage of change: ACTION (Actively working towards change)  Support resources: Websites  Patient Activation Measure Survey Score:      7/22/2010    11:00 AM 2/11/2021     9:56 AM   SHERRI Score (Last Two)   SHERRI Raw Score 39 30   Activation Score 56.4 56   SHERRI Level 3 3       Care Plan and Education Provided:  Care Plan: Diabetes   Updates made by Bethany Dahl since 12/5/2023 12:00 AM        Problem: Diabetes Self-Management Education Needed to Optimize Self-Care Behaviors         Goal: Reducing Risks - know how to prevent and treat long-term diabetes complications         Task: Provide education on major complications of diabetes, prevention, early diagnostic measures and treatment of complications Completed 12/5/2023   Responsible User: Bethany Dahl        Task: Provide education on recommended care for dental, eye and foot health Completed 12/5/2023   Responsible User: Bethany Dahl        Goal: Healthy Coping - use available resources to cope with the challenges of managing diabetes         Task: Discuss methods for coping with stress Completed 12/5/2023   Responsible User: Bethany Dahl RDN, DORIAN, [facility]    Time Spent: 30 minutes  Encounter Type: Individual    Any diabetes medication dose  changes were made via the CDE Protocol per the patient's referring provider. A copy of this encounter was shared with the provider.

## 2023-12-11 ENCOUNTER — TELEPHONE (OUTPATIENT)
Dept: CARDIOLOGY | Facility: CLINIC | Age: 43
End: 2023-12-11
Payer: MEDICARE

## 2023-12-11 ENCOUNTER — ANCILLARY PROCEDURE (OUTPATIENT)
Dept: CARDIOLOGY | Facility: CLINIC | Age: 43
End: 2023-12-11
Attending: INTERNAL MEDICINE
Payer: MEDICARE

## 2023-12-11 DIAGNOSIS — Q20.3 D-TGA (DEXTRO-TRANSPOSITION OF GREAT ARTERIES): ICD-10-CM

## 2023-12-11 DIAGNOSIS — Z86.79 HX OF VENTRICULAR TACHYCARDIA: ICD-10-CM

## 2023-12-11 PROCEDURE — 93296 REM INTERROG EVL PM/IDS: CPT

## 2023-12-11 PROCEDURE — 93294 REM INTERROG EVL PM/LDLS PM: CPT | Performed by: INTERNAL MEDICINE

## 2023-12-11 NOTE — TELEPHONE ENCOUNTER
Attempted to return call to pts sister to schedule pt follow up with March with testing prior and coordinate with Cory. No answer, LVM

## 2023-12-22 LAB
MDC_IDC_LEAD_CONNECTION_STATUS: NORMAL
MDC_IDC_LEAD_IMPLANT_DT: NORMAL
MDC_IDC_LEAD_LOCATION: NORMAL
MDC_IDC_LEAD_LOCATION_DETAIL_1: NORMAL
MDC_IDC_LEAD_MFG: NORMAL
MDC_IDC_LEAD_MODEL: NORMAL
MDC_IDC_LEAD_POLARITY_TYPE: NORMAL
MDC_IDC_LEAD_SERIAL: NORMAL
MDC_IDC_MSMT_BATTERY_DTM: NORMAL
MDC_IDC_MSMT_BATTERY_REMAINING_LONGEVITY: 39 MO
MDC_IDC_MSMT_BATTERY_RRT_TRIGGER: 2.73
MDC_IDC_MSMT_BATTERY_STATUS: NORMAL
MDC_IDC_MSMT_BATTERY_VOLTAGE: 2.96 V
MDC_IDC_MSMT_CAP_CHARGE_DTM: NORMAL
MDC_IDC_MSMT_CAP_CHARGE_ENERGY: 18 J
MDC_IDC_MSMT_CAP_CHARGE_TIME: 4.08
MDC_IDC_MSMT_CAP_CHARGE_TYPE: NORMAL
MDC_IDC_MSMT_LEADCHNL_RV_IMPEDANCE_VALUE: 304 OHM
MDC_IDC_MSMT_LEADCHNL_RV_IMPEDANCE_VALUE: 4047 OHM
MDC_IDC_MSMT_LEADCHNL_RV_PACING_THRESHOLD_AMPLITUDE: 0.62 V
MDC_IDC_MSMT_LEADCHNL_RV_PACING_THRESHOLD_PULSEWIDTH: 0.4 MS
MDC_IDC_MSMT_LEADCHNL_RV_SENSING_INTR_AMPL: 5.88 MV
MDC_IDC_PG_IMPLANT_DTM: NORMAL
MDC_IDC_PG_MFG: NORMAL
MDC_IDC_PG_MODEL: NORMAL
MDC_IDC_PG_SERIAL: NORMAL
MDC_IDC_PG_TYPE: NORMAL
MDC_IDC_SESS_CLINIC_NAME: NORMAL
MDC_IDC_SESS_DTM: NORMAL
MDC_IDC_SESS_TYPE: NORMAL
MDC_IDC_SET_BRADY_HYSTRATE: NORMAL
MDC_IDC_SET_BRADY_LOWRATE: 40 {BEATS}/MIN
MDC_IDC_SET_BRADY_MODE: NORMAL
MDC_IDC_SET_LEADCHNL_RV_PACING_AMPLITUDE: 2 V
MDC_IDC_SET_LEADCHNL_RV_PACING_ANODE_ELECTRODE_1: NORMAL
MDC_IDC_SET_LEADCHNL_RV_PACING_ANODE_LOCATION_1: NORMAL
MDC_IDC_SET_LEADCHNL_RV_PACING_CAPTURE_MODE: NORMAL
MDC_IDC_SET_LEADCHNL_RV_PACING_CATHODE_ELECTRODE_1: NORMAL
MDC_IDC_SET_LEADCHNL_RV_PACING_CATHODE_LOCATION_1: NORMAL
MDC_IDC_SET_LEADCHNL_RV_PACING_POLARITY: NORMAL
MDC_IDC_SET_LEADCHNL_RV_PACING_PULSEWIDTH: 0.4 MS
MDC_IDC_SET_LEADCHNL_RV_SENSING_ANODE_ELECTRODE_1: NORMAL
MDC_IDC_SET_LEADCHNL_RV_SENSING_ANODE_LOCATION_1: NORMAL
MDC_IDC_SET_LEADCHNL_RV_SENSING_CATHODE_ELECTRODE_1: NORMAL
MDC_IDC_SET_LEADCHNL_RV_SENSING_CATHODE_LOCATION_1: NORMAL
MDC_IDC_SET_LEADCHNL_RV_SENSING_POLARITY: NORMAL
MDC_IDC_SET_LEADCHNL_RV_SENSING_SENSITIVITY: 0.3 MV
MDC_IDC_SET_ZONE_DETECTION_BEATS_DENOMINATOR: 32 {BEATS}
MDC_IDC_SET_ZONE_DETECTION_BEATS_DENOMINATOR: 32 {BEATS}
MDC_IDC_SET_ZONE_DETECTION_BEATS_DENOMINATOR: 40 {BEATS}
MDC_IDC_SET_ZONE_DETECTION_BEATS_NUMERATOR: 30 {BEATS}
MDC_IDC_SET_ZONE_DETECTION_BEATS_NUMERATOR: 32 {BEATS}
MDC_IDC_SET_ZONE_DETECTION_BEATS_NUMERATOR: 32 {BEATS}
MDC_IDC_SET_ZONE_DETECTION_INTERVAL: 270 MS
MDC_IDC_SET_ZONE_DETECTION_INTERVAL: 360 MS
MDC_IDC_SET_ZONE_DETECTION_INTERVAL: 370 MS
MDC_IDC_SET_ZONE_DETECTION_INTERVAL: NORMAL
MDC_IDC_SET_ZONE_STATUS: NORMAL
MDC_IDC_SET_ZONE_TYPE: NORMAL
MDC_IDC_SET_ZONE_VENDOR_TYPE: NORMAL
MDC_IDC_STAT_BRADY_DTM_END: NORMAL
MDC_IDC_STAT_BRADY_DTM_START: NORMAL
MDC_IDC_STAT_BRADY_RV_PERCENT_PACED: 0.01 %
MDC_IDC_STAT_EPISODE_RECENT_COUNT: 0
MDC_IDC_STAT_EPISODE_RECENT_COUNT_DTM_END: NORMAL
MDC_IDC_STAT_EPISODE_RECENT_COUNT_DTM_START: NORMAL
MDC_IDC_STAT_EPISODE_TOTAL_COUNT: 0
MDC_IDC_STAT_EPISODE_TOTAL_COUNT: 1
MDC_IDC_STAT_EPISODE_TOTAL_COUNT: 22
MDC_IDC_STAT_EPISODE_TOTAL_COUNT: 270
MDC_IDC_STAT_EPISODE_TOTAL_COUNT_DTM_END: NORMAL
MDC_IDC_STAT_EPISODE_TOTAL_COUNT_DTM_START: NORMAL
MDC_IDC_STAT_EPISODE_TYPE: NORMAL
MDC_IDC_STAT_TACHYTHERAPY_ATP_DELIVERED_RECENT: 0
MDC_IDC_STAT_TACHYTHERAPY_ATP_DELIVERED_TOTAL: 0
MDC_IDC_STAT_TACHYTHERAPY_RECENT_DTM_END: NORMAL
MDC_IDC_STAT_TACHYTHERAPY_RECENT_DTM_START: NORMAL
MDC_IDC_STAT_TACHYTHERAPY_SHOCKS_ABORTED_RECENT: 0
MDC_IDC_STAT_TACHYTHERAPY_SHOCKS_ABORTED_TOTAL: 0
MDC_IDC_STAT_TACHYTHERAPY_SHOCKS_DELIVERED_RECENT: 0
MDC_IDC_STAT_TACHYTHERAPY_SHOCKS_DELIVERED_TOTAL: 1
MDC_IDC_STAT_TACHYTHERAPY_TOTAL_DTM_END: NORMAL
MDC_IDC_STAT_TACHYTHERAPY_TOTAL_DTM_START: NORMAL

## 2023-12-29 RX ORDER — BISACODYL 5 MG/1
TABLET, DELAYED RELEASE ORAL
Qty: 4 TABLET | Refills: 0 | Status: SHIPPED | OUTPATIENT
Start: 2023-12-29 | End: 2024-01-02

## 2023-12-29 NOTE — TELEPHONE ENCOUNTER
General Call      Reason for Call:  Patient requesting a call back from a nurse in regards her medications.    Date of last appointment with provider: 9/25/2023    Could we send this information to you in [place] or would you prefer to receive a phone call?:   Patient would prefer a phone call   Okay to leave a detailed message?: Yes at Cell number on file:    Telephone Information:   Mobile [phone]       
2nd attempt  Left message to call back any triage nurse.   Pearl Medina RN    
Called patient and left voicemail to call back and ask to speak to any triage nurse.    Eneida FLORES RN    
Called patient she states she is now using [facility] [address], inside [employer]. No needs at this time. Ruth Behrens.    
Called pt, informed [facility] won't refill test strips, pt had to pay cash, discussed, pt is on Medicare, wonder if it's [employer] medicare form for DM testing supplies, no form found at [facility] for [facility] to fax form if needed or call us or follow up with pt, pt will also follow up with pharmacy to confirm, routed FYI to [facility] TC, if you see form can you process today?  Charlene Pete RN, BSN  [facility]    
hide

## 2023-12-29 NOTE — TELEPHONE ENCOUNTER
Standard Golytely Bowel Prep. Instructions were sent via [facility]. Bowel prep was sent [id number] - [place], [place] - [address] [place] [address] 42  AT [address] [facility] [address]  Phone: [phone] Fax: [phone]

## 2024-01-02 ENCOUNTER — OFFICE VISIT (OUTPATIENT)
Dept: FAMILY MEDICINE | Facility: CLINIC | Age: 44
End: 2024-01-02
Attending: FAMILY MEDICINE
Payer: MEDICARE

## 2024-01-02 VITALS
TEMPERATURE: 98 F | HEART RATE: 93 BPM | DIASTOLIC BLOOD PRESSURE: 77 MMHG | OXYGEN SATURATION: 93 % | RESPIRATION RATE: 17 BRPM | SYSTOLIC BLOOD PRESSURE: 119 MMHG | BODY MASS INDEX: 33.82 KG/M2 | HEIGHT: 62 IN | WEIGHT: 183.8 LBS

## 2024-01-02 DIAGNOSIS — Z13.6 CARDIOVASCULAR SCREENING; LDL GOAL LESS THAN 70: ICD-10-CM

## 2024-01-02 DIAGNOSIS — E11.59 TYPE 2 DIABETES MELLITUS WITH OTHER CIRCULATORY COMPLICATION, WITHOUT LONG-TERM CURRENT USE OF INSULIN (H): Primary | ICD-10-CM

## 2024-01-02 DIAGNOSIS — F33.1 MODERATE EPISODE OF RECURRENT MAJOR DEPRESSIVE DISORDER (H): ICD-10-CM

## 2024-01-02 DIAGNOSIS — K59.00 CONSTIPATION, UNSPECIFIED CONSTIPATION TYPE: ICD-10-CM

## 2024-01-02 DIAGNOSIS — I77.810 THORACIC AORTIC ECTASIA (H): ICD-10-CM

## 2024-01-02 DIAGNOSIS — E66.01 MORBID OBESITY (H): ICD-10-CM

## 2024-01-02 DIAGNOSIS — I47.20 VENTRICULAR TACHYCARDIA (H): ICD-10-CM

## 2024-01-02 PROCEDURE — 99214 OFFICE O/P EST MOD 30 MIN: CPT | Performed by: FAMILY MEDICINE

## 2024-01-02 RX ORDER — ATORVASTATIN CALCIUM 80 MG/1
80 TABLET, FILM COATED ORAL DAILY
Qty: 90 TABLET | Refills: 3 | Status: SHIPPED | OUTPATIENT
Start: 2024-01-02

## 2024-01-02 RX ORDER — ATORVASTATIN CALCIUM 40 MG/1
80 TABLET, FILM COATED ORAL DAILY
Qty: 90 TABLET | Refills: 2 | Status: SHIPPED | OUTPATIENT
Start: 2024-01-02 | End: 2024-01-02

## 2024-01-02 NOTE — PROGRESS NOTES
Assessment & Plan     Type 2 diabetes mellitus with other circulatory complication, without long-term current use of insulin (H)  A1c looks excellent.  Commended patient on taking her medications and making good food choices to get her sugars into control from A1c of 9.4 in August 2023.  Continue current plan and follow up in 2-3 months.  If doing well at that time, can push visit out to 6 months.  - empagliflozin (JARDIANCE) 10 MG TABS tablet; Take 1 tablet (10 mg) by mouth daily  - blood glucose (NO BRAND SPECIFIED) test strip; Use to test blood sugar 1 times daily or as directed. To accompany: Blood Glucose Monitor Brands: per insurance.  - atorvastatin (LIPITOR) 40 MG tablet; Take 2 tablets (80 mg) by mouth daily    Constipation, unspecified constipation type  Discussed use of Miralax PRN, also stool softeners and fiber.    Thoracic aortic ectasia (H24)  Managed per Cardiology    Ventricular tachycardia (H)  Resolved, s/p ablation.    Moderate episode of recurrent major depressive disorder (H)  Controlled, continue Sertraline.    Morbid obesity (H)  Has lost 9 lbs since last visit with exercise and diet changes.  Continue to work on these.      13 minutes spent by me on the date of the encounter doing chart review, history and exam, documentation and further activities per the note       See Patient Instructions    Brianne Peguero MD  [facility]    Inés Diop is a 43 year old, presenting for the following health issues:  Diabetes (Has been using her old meter- needs a new one and her prescriptions sent to her [facility] pharmacy [facility]/Pt states she has blood in her stool and constipation started yesterday. )        1/2/2024     9:47 AM   Additional Questions   Roomed by Antonella Valles       History of Present Illness       Diabetes:   She presents for follow up of diabetes.  She is checking home blood glucose one time daily.   She checks blood glucose at bedtime.  Blood  glucose is sometimes over 200 and never under 70. She is aware of hypoglycemia symptoms including none.    She has no concerns regarding her diabetes at this time.   She is not experiencing numbness or burning in feet, excessive thirst, blurry vision, weight changes or redness, sores or blisters on feet.               Has been constipated, after BM had some blood in her underwear, had some pain with BM.    Diabetes follow up.  She has been seeing a nutritionist. AM sugars are less than 130 most often, sometimes up to 150.  This morning was 114.  Taking her meds more regularly now, Jardiance.    Restarted the Sertraline, mood is okay.    Current Outpatient Medications   Medication Sig Dispense Refill    ACE NOT PRESCRIBED, INTENTIONAL, 1 each as needed for other ACE Inhibitor not prescribed due to Other: BP at goal, pending microalbumin test 0 each 0    atorvastatin (LIPITOR) 40 MG tablet Take 2 tablets (80 mg) by mouth daily 90 tablet 2    blood glucose (NO BRAND SPECIFIED) test strip Use to test blood sugar 1 times daily or as directed. To accompany: Blood Glucose Monitor Brands: per insurance. 100 strip 6    blood glucose monitoring (NO BRAND SPECIFIED) meter device kit Use to test blood sugar 1 times daily or as directed. Preferred blood glucose meter OR supplies to accompany: Blood Glucose Monitor Brands: per insurance. 1 kit 0    empagliflozin (JARDIANCE) 10 MG TABS tablet Take 1 tablet (10 mg) by mouth daily 90 tablet 1    etonogestrel (NEXPLANON) 68 MG IMPL 1 each (68 mg) by Subdermal route once      sertraline (ZOLOFT) 50 MG tablet Take 50 mg by mouth daily      thin (NO BRAND SPECIFIED) lancets Use with lanceting device. To accompany: Blood Glucose Monitor Brands: per insurance. 100 each 6    fluticasone (FLONASE) 50 MCG/ACT nasal spray SHAKE LIQUID AND USE 1 SPRAY IN EACH NOSTRIL DAILY (Patient not taking: Reported on 1/2/2024) 16 g 11         Review of Systems   Constitutional, HEENT, cardiovascular,  "pulmonary, gi and gu systems are negative, except as otherwise noted.      Objective    /77 (BP Location: Right arm, Patient Position: Sitting, Cuff Size: Adult Large)   Pulse 93   Temp 98  F (36.7  C) (Oral)   Resp 17   Ht 1.575 m (5' 2\")   Wt 83.4 kg (183 lb 12.8 oz)   SpO2 93%   BMI 33.62 kg/m    Body mass index is 33.62 kg/m .  Physical Exam   GENERAL: healthy, alert and no distress  PSYCH: mentation appears normal, affect normal/bright    Labs reviewed in chart:  A1c 6.8 on 12-5-23                "

## 2024-01-04 ENCOUNTER — TELEPHONE (OUTPATIENT)
Dept: FAMILY MEDICINE | Facility: CLINIC | Age: 44
End: 2024-01-04
Payer: MEDICARE

## 2024-01-04 NOTE — TELEPHONE ENCOUNTER
Forms/Letter Request    Type of form/letter:  ICD-10 request/pcp signature      Do we have the form/letter: Yes    Who is the form from? [employer]    Where did/will the form come from? form was faxed in    *Printed ICD-10 list, in pcp basket for signature and completion if needed. Fax back to [phone]     Isabela GORDON, - [facility]  Primary Care- ADS, [facility]

## 2024-01-09 RX ORDER — BISACODYL 5 MG/1
TABLET, DELAYED RELEASE ORAL
Qty: 4 TABLET | Refills: 0 | Status: SHIPPED | OUTPATIENT
Start: 2024-01-09 | End: 2024-08-06

## 2024-01-09 NOTE — TELEPHONE ENCOUNTER
Pt's friend Betsy calling (no CTC on file). Pt is on line with pt and gives verbal CTC on call.    Betsy informs they have not gotten prep meds for colonoscopy yet. Informed Betsy that rx was sent to the [facility] in [place] for pt on 12/29/23 (see rx below)     Disp Refills Start End HEIDI   polyethylene glycol (GOLYTELY) 236 g suspension (Discontinued) 4000 mL 0 12/29/2023 1/2/2024 --   Sig: The night before the exam at 6 pm drink an 8-ounce glass every 15 minutes until the jug is half empty. If you arrive before 11 AM: Drink the other half of the Golytely jug at 11 PM night before procedure. If you arrive after 11 AM: Drink the other half of the Golytely jug at 6 AM day of procedure. For additional instructions refer to your colonoscopy prep instructions.   Sent to pharmacy as: PEG 3350-KCl-NaBcb-NaCl-NaSulf 236 GM Oral Solution Reconstituted (GoLYTELY)   Class: E-Prescribe   Notes to Pharmacy: Pharmacy may substitute for equivalent.   Reason for Discontinue: Therapy completed (No AVS)   Order: [id number]   E-Prescribing Status: Receipt confirmed by pharmacy (12/29/2023  8:10 AM CST)   E-Cancel Status: Request approved by pharmacy (1/2/2024 10:14 AM CST)            Disp Refills Start End HEIDI   bisacodyl (DULCOLAX) 5 MG EC tablet (Discontinued) 4 tablet 0 12/29/2023 1/2/2024 --   Sig: Take 2 tablets at 3 pm the day before your procedure. If your procedure is before 11 am, take 2 additional tablets at 11 pm. If your procedure is after 11 am, take 2 additional tablets at 6 am. For additional instructions refer to your colonoscopy prep instructions.   Sent to pharmacy as: Bisacodyl 5 MG Oral Tablet Delayed Release (DULCOLAX)   Class: E-Prescribe   Reason for Discontinue: Therapy completed (No AVS)   Order: [id number]   E-Prescribing Status: Receipt confirmed by pharmacy (12/29/2023  8:10 AM CST)   E-Cancel Status: Request approved by pharmacy (1/2/2024 10:05 AM CST)         Pt would like to  medications at  [facility] instead. Advised pt to call pharmacy in [employer] and let them know she has medications on file at [facility] so she can get medications transferred over to [facility].    Advised pt contact her gastroenterology care team if issues getting medications.  Provided phone number to call- [phone]    Patient was given an opportunity to ask questions, verbalized understanding of plan, and is agreeable.    Eneida FLORES RN

## 2024-01-16 ENCOUNTER — HOSPITAL ENCOUNTER (OUTPATIENT)
Facility: CLINIC | Age: 44
Discharge: HOME | End: 2024-01-16
Attending: COLON & RECTAL SURGERY | Admitting: COLON & RECTAL SURGERY
Payer: MEDICARE

## 2024-01-16 VITALS
HEART RATE: 88 BPM | OXYGEN SATURATION: 99 % | WEIGHT: 183 LBS | SYSTOLIC BLOOD PRESSURE: 112 MMHG | BODY MASS INDEX: 33.68 KG/M2 | RESPIRATION RATE: 20 BRPM | DIASTOLIC BLOOD PRESSURE: 64 MMHG | HEIGHT: 62 IN

## 2024-01-16 DIAGNOSIS — Z12.11 COLON CANCER SCREENING: Primary | ICD-10-CM

## 2024-01-16 LAB
COLONOSCOPY: NORMAL
GLUCOSE BLDC GLUCOMTR-MCNC: 133 MG/DL (ref 70–99)

## 2024-01-16 PROCEDURE — 250N000011 HC RX IP 250 OP 636: Performed by: COLON & RECTAL SURGERY

## 2024-01-16 PROCEDURE — 45380 COLONOSCOPY AND BIOPSY: CPT | Performed by: COLON & RECTAL SURGERY

## 2024-01-16 PROCEDURE — 88305 TISSUE EXAM BY PATHOLOGIST: CPT | Mod: TC | Performed by: COLON & RECTAL SURGERY

## 2024-01-16 PROCEDURE — G0500 MOD SEDAT ENDO SERVICE >5YRS: HCPCS | Performed by: COLON & RECTAL SURGERY

## 2024-01-16 PROCEDURE — 82962 GLUCOSE BLOOD TEST: CPT

## 2024-01-16 PROCEDURE — 250N000013 HC RX MED GY IP 250 OP 250 PS 637: Performed by: COLON & RECTAL SURGERY

## 2024-01-16 RX ORDER — ONDANSETRON 2 MG/ML
4 INJECTION INTRAMUSCULAR; INTRAVENOUS
Status: DISCONTINUED | OUTPATIENT
Start: 2024-01-16 | End: 2024-01-16 | Stop reason: HOSPADM

## 2024-01-16 RX ORDER — ATROPINE SULFATE 0.1 MG/ML
1 INJECTION INTRAVENOUS
Status: DISCONTINUED | OUTPATIENT
Start: 2024-01-16 | End: 2024-01-16 | Stop reason: HOSPADM

## 2024-01-16 RX ORDER — NALOXONE HYDROCHLORIDE 0.4 MG/ML
0.4 INJECTION, SOLUTION INTRAMUSCULAR; INTRAVENOUS; SUBCUTANEOUS
Status: DISCONTINUED | OUTPATIENT
Start: 2024-01-16 | End: 2024-01-16 | Stop reason: HOSPADM

## 2024-01-16 RX ORDER — DIPHENHYDRAMINE HYDROCHLORIDE 50 MG/ML
25-50 INJECTION INTRAMUSCULAR; INTRAVENOUS
Status: DISCONTINUED | OUTPATIENT
Start: 2024-01-16 | End: 2024-01-16 | Stop reason: HOSPADM

## 2024-01-16 RX ORDER — FLUMAZENIL 0.1 MG/ML
0.2 INJECTION, SOLUTION INTRAVENOUS
Status: DISCONTINUED | OUTPATIENT
Start: 2024-01-16 | End: 2024-01-16 | Stop reason: HOSPADM

## 2024-01-16 RX ORDER — LIDOCAINE 40 MG/G
CREAM TOPICAL
Status: DISCONTINUED | OUTPATIENT
Start: 2024-01-16 | End: 2024-01-16 | Stop reason: HOSPADM

## 2024-01-16 RX ORDER — ONDANSETRON 4 MG/1
4 TABLET, ORALLY DISINTEGRATING ORAL EVERY 6 HOURS PRN
Status: DISCONTINUED | OUTPATIENT
Start: 2024-01-16 | End: 2024-01-16 | Stop reason: HOSPADM

## 2024-01-16 RX ORDER — PROCHLORPERAZINE MALEATE 10 MG
10 TABLET ORAL EVERY 6 HOURS PRN
Status: DISCONTINUED | OUTPATIENT
Start: 2024-01-16 | End: 2024-01-16 | Stop reason: HOSPADM

## 2024-01-16 RX ORDER — ONDANSETRON 2 MG/ML
4 INJECTION INTRAMUSCULAR; INTRAVENOUS EVERY 6 HOURS PRN
Status: DISCONTINUED | OUTPATIENT
Start: 2024-01-16 | End: 2024-01-16 | Stop reason: HOSPADM

## 2024-01-16 RX ORDER — EPINEPHRINE 1 MG/ML
0.1 INJECTION, SOLUTION INTRAMUSCULAR; SUBCUTANEOUS
Status: DISCONTINUED | OUTPATIENT
Start: 2024-01-16 | End: 2024-01-16 | Stop reason: HOSPADM

## 2024-01-16 RX ORDER — SIMETHICONE 40MG/0.6ML
133 SUSPENSION, DROPS(FINAL DOSAGE FORM)(ML) ORAL
Status: COMPLETED | OUTPATIENT
Start: 2024-01-16 | End: 2024-01-16

## 2024-01-16 RX ORDER — FENTANYL CITRATE 50 UG/ML
50-100 INJECTION, SOLUTION INTRAMUSCULAR; INTRAVENOUS EVERY 5 MIN PRN
Status: DISCONTINUED | OUTPATIENT
Start: 2024-01-16 | End: 2024-01-16 | Stop reason: HOSPADM

## 2024-01-16 RX ORDER — NALOXONE HYDROCHLORIDE 0.4 MG/ML
0.2 INJECTION, SOLUTION INTRAMUSCULAR; INTRAVENOUS; SUBCUTANEOUS
Status: DISCONTINUED | OUTPATIENT
Start: 2024-01-16 | End: 2024-01-16 | Stop reason: HOSPADM

## 2024-01-16 RX ADMIN — MIDAZOLAM 1 MG: 1 INJECTION INTRAMUSCULAR; INTRAVENOUS at 10:30

## 2024-01-16 RX ADMIN — SIMETHICONE 133 MG: 20 SUSPENSION/ DROPS ORAL at 10:38

## 2024-01-16 RX ADMIN — MIDAZOLAM 2 MG: 1 INJECTION INTRAMUSCULAR; INTRAVENOUS at 10:29

## 2024-01-16 RX ADMIN — FENTANYL CITRATE 100 MCG: 0.05 INJECTION, SOLUTION INTRAMUSCULAR; INTRAVENOUS at 10:29

## 2024-01-16 ASSESSMENT — ACTIVITIES OF DAILY LIVING (ADL): ADLS_ACUITY_SCORE: 37

## 2024-01-16 NOTE — H&P
Pre-Endoscopy History and Physical     Chikis Hannon MRN# [id number]   YOB: 1980 Age: 43 year old     Date of Procedure: 1/16/2024  Primary care provider: Brianne Escobar  Type of Endoscopy: Colonoscopy  Reason for Procedure: Screening  Type of Anesthesia Anticipated: Moderate Sedation    HPI:    Chikis is a 43 year old female who will be undergoing the above procedure.      A history and physical has been performed. The patient's medications and allergies have been reviewed. The risks and benefits of the procedure and the sedation options and risks were discussed with the patient.  All questions were answered and informed consent was obtained.      She denies a personal or family history of anesthesia complications or bleeding disorders.     Allergies   Allergen Reactions    Adhesive Tape Rash        No current facility-administered medications on file prior to encounter.  etonogestrel (NEXPLANON) 68 MG IMPL, 1 each (68 mg) by Subdermal route once  ACE NOT PRESCRIBED, INTENTIONAL,, 1 each as needed for other ACE Inhibitor not prescribed due to Other: BP at goal, pending microalbumin test  blood glucose monitoring (NO BRAND SPECIFIED) meter device kit, Use to test blood sugar 1 times daily or as directed. Preferred blood glucose meter OR supplies to accompany: Blood Glucose Monitor Brands: per insurance.  thin (NO BRAND SPECIFIED) lancets, Use with lanceting device. To accompany: Blood Glucose Monitor Brands: per insurance.        Patient Active Problem List   Diagnosis    Corrected transposition of great vessels    Allergic state    Attention deficit disorder    Anxiety    GERD (gastroesophageal reflux disease)    Major depression in complete remission (H)    * * * SBE PROPHYLAXIS * * *    Mildly mentally retarded    Transaminase or LDH elevation    Health Care Home    Congenital heart disease    Ventricular tachycardia (H)    Cardiac ICD- Medtronic- single chamber- NOT dependent    Type 2  "diabetes, HbA1C goal < 8% (H)    Type 2 diabetes mellitus with other circulatory complication, without long-term current use of insulin (H)    Patient [facility]    Morbid obesity (H)    Moderate episode of recurrent major depressive disorder (H)    Thoracic aortic ectasia (H24)        Past Medical History:   Diagnosis Date    Allergy, unspecified not elsewhere classified     Attention deficit disorder without mention of hyperactivity     Mild MR; difficulty attention span    Congenital heart disease     Corrected transposition of great vessels     still needs SBE prophlaxis    Diabetes mellitus type 2, noninsulin dependent (H) 2014    Impaired fasting glucose     103 7/06    Ventricular tachycardia (H) 03/04/2014    s/p ablation and ICD placement 3/13/14        Past Surgical History:   Procedure Laterality Date    CARDIAC SURGERY  Had 4 surgeries    transposition of the major arteries    SURGICAL HISTORY OF -   01/01/2014    ablation for v. tach; ICD placed    wisdom teeth removal      [facility] NONSPECIFIC PROCEDURE  11/01/1998    open heart surgery x 3, transposition of major vessels    [facility] NONSPECIFIC PROCEDURE  10/01/2003    Additional heart surgery       Social History     Tobacco Use    Smoking status: Never    Smokeless tobacco: Never   Substance Use Topics    Alcohol use: No     Alcohol/week: 0.0 standard drinks of alcohol       Family History   Adopted: Yes   Problem Relation Age of Onset    Family History Negative Mother     Family History Negative Father     Unknown/Adopted No family hx of        REVIEW OF SYSTEMS:     5 point ROS negative except as noted above in HPI, including Gen., Resp., CV, GI &  system review.      PHYSICAL EXAM:   There were no vitals taken for this visit. Estimated body mass index is 33.62 kg/m  as calculated from the following:    Height as of 1/2/24: 1.575 m (5' 2\").    Weight as of 1/2/24: 83.4 kg (183 lb 12.8 oz). "   GENERAL APPEARANCE: healthy and alert  MENTAL STATUS: alert  AIRWAY EXAM: Mallampatti Class I (visualization of the soft palate, fauces, uvula, anterior and posterior pillars)  RESP: lungs clear to auscultation - no rales, rhonchi or wheezes  CV: regular rates and rhythm      IMPRESSION   ASA Class 3 - Severe systemic disease, but not incapacitating        PLAN:     Plan for colonoscopy. We discussed the risks, benefits and alternatives and the patient wished to proceed.    The above has been forwarded to the consulting provider.      Flor Cody MD  [facility]  Phone: [phone]  Fax: [phone]  January 16, 2024

## 2024-01-17 LAB
PATH REPORT.COMMENTS IMP SPEC: NORMAL
PATH REPORT.COMMENTS IMP SPEC: NORMAL
PATH REPORT.FINAL DX SPEC: NORMAL
PATH REPORT.GROSS SPEC: NORMAL
PATH REPORT.MICROSCOPIC SPEC OTHER STN: NORMAL
PATH REPORT.RELEVANT HX SPEC: NORMAL
PHOTO IMAGE: NORMAL

## 2024-01-17 PROCEDURE — 88305 TISSUE EXAM BY PATHOLOGIST: CPT | Mod: 26 | Performed by: PATHOLOGY

## 2024-01-23 ENCOUNTER — TRANSFERRED RECORDS (OUTPATIENT)
Dept: HEALTH INFORMATION MANAGEMENT | Facility: CLINIC | Age: 44
End: 2024-01-23
Payer: MEDICARE

## 2024-01-30 ENCOUNTER — ALLIED HEALTH/NURSE VISIT (OUTPATIENT)
Dept: EDUCATION SERVICES | Facility: CLINIC | Age: 44
End: 2024-01-30
Payer: MEDICARE

## 2024-01-30 VITALS — BODY MASS INDEX: 33.11 KG/M2 | WEIGHT: 181 LBS

## 2024-01-30 DIAGNOSIS — E11.59 TYPE 2 DIABETES MELLITUS WITH OTHER CIRCULATORY COMPLICATION, WITHOUT LONG-TERM CURRENT USE OF INSULIN (H): Primary | ICD-10-CM

## 2024-01-30 PROCEDURE — G0108 DIAB MANAGE TRN  PER INDIV: HCPCS

## 2024-01-30 NOTE — LETTER
1/30/2024         RE: Chikis Hannon  [address]  [place] [facility] [place] [facility] [place] [address]        Dear Colleague,    Thank you for referring your patient, Chikis Hannon, to the [facility]. Please see a copy of my visit note below.    Diabetes Self-Management Education & Support  Presents for: Follow-up    Type of Service: In Person Visit    ASSESSMENT:  Chikis reports she hasn't been checking BG this past month as she was worried about the colonoscopy. She did have questions regarding diagnosis of diverticulitis which were discussed today.   Diabetes education complete. Positive reinforcement given for the changes she has made to lower her A1c and manage her diabetes.     Patient's most recent   Lab Results   Component Value Date    A1C 6.8 12/05/2023    A1C 7.9 02/11/2021     is meeting goal of <7.0    Diabetes knowledge and skills assessment:   Patient is knowledgeable in diabetes management concepts related to: Healthy Eating, Monitoring, Taking Medication, Problem Solving, Reducing Risks, and Healthy Coping    Continue education with the following diabetes management concepts: Being Active    Based on learning assessment above, most appropriate setting for further diabetes education would be: Individual setting.      PLAN  Continue to take diabetes medication as prescribed.  Continue to test BG, Ok to only test 1-2 days per week, BID (fasting and post meal). Reviewed testing times and targets.   Follow-up: with PCP in March    See Care Plan for co-developed, patient-state behavior change goals.    SUBJECTIVE/OBJECTIVE:  Presents for: Follow-up  Accompanied by: Self, Other (Betsy)  Diabetes education in the past 24mo: No  Focus of Visit: Healthy Eating  Diabetes type: Type 2  Date of diagnosis: 9 years  Disease course: Improving  Diabetes management related comments/concerns: Chikis is happy with improvement in A1c, states she hasn't checked her BG in January  Difficulty affording diabetes  "medication?: No  Difficulty affording diabetes testing supplies?: No  Cultural Influences/Ethnic Background:  Not  or     Diabetes Symptoms & Complications:  Weight trend: Fluctuating  Symptom course: Stable  Disease course: Improving  Complications assessed today?: No    Patient Problem List and Family Medical History reviewed for relevant medical history, current medical status, and diabetes risk factors.    Vitals:  Wt 82.1 kg (181 lb)   BMI 33.11 kg/m    Estimated body mass index is 33.11 kg/m  as calculated from the following:    Height as of 1/16/24: 1.575 m (5' 2\").    Weight as of this encounter: 82.1 kg (181 lb).   Last 3 BP:   BP Readings from Last 3 Encounters:   01/16/24 112/64   01/02/24 119/77   09/25/23 120/73       History   Smoking Status    Never   Smokeless Tobacco    Never       Labs:  Lab Results   Component Value Date    A1C 6.8 12/05/2023    A1C 7.9 02/11/2021     Lab Results   Component Value Date     01/16/2024     06/03/2022     03/23/2021     Lab Results   Component Value Date    LDL 64 09/15/2023     08/06/2020     HDL Cholesterol   Date Value Ref Range Status   08/06/2020 44 (L) >49 mg/dL Final     Direct Measure HDL   Date Value Ref Range Status   09/15/2023 44 (L) >=50 mg/dL Final   ]  GFR Estimate   Date Value Ref Range Status   08/22/2023 >90 >60 mL/min/1.73m2 Final   03/23/2021 >90 >60 mL/min/[1.73_m2] Final     Comment:     Non  GFR Calc  Starting 12/18/2018, serum creatinine based estimated GFR (eGFR) will be   calculated using the Chronic Kidney Disease Epidemiology Collaboration   (CKD-EPI) equation.       GFR Estimate If Black   Date Value Ref Range Status   03/23/2021 >90 >60 mL/min/[1.73_m2] Final     Comment:      GFR Calc  Starting 12/18/2018, serum creatinine based estimated GFR (eGFR) will be   calculated using the Chronic Kidney Disease Epidemiology Collaboration   (CKD-EPI) equation.       Lab " "Results   Component Value Date    CR 0.55 08/22/2023    CR 0.61 03/23/2021     No results found for: \"MICROALBUMIN\"    Healthy Eating:  Healthy Eating Assessed Today: Yes  Cultural/Pentecostal diet restrictions?: No  Do you have any food allergies or intolerances?: Yes  Please list your food allergies / intolerances: Recently diagnosed with diverticulitis  Meal planning/habits: Low carb, Other (has the weight watchers diabetes plan, online only)  Who cooks/prepares meals for you?: Self  Who purchases food in  your home?: Self  Meals include: Breakfast, Lunch, Dinner, Evening Snack  Breakfast: 9 AM- [employer] protein shake- iced coffee with skinny syrup and heavy cream, now using fat free half and half OR eggs with celery, onions and parsley. butter  Lunch: 1145 AM- pizza hut pizza - 2 pieces, water  Dinner: 6-6:30 PM: tuna melt sandwich, water  Snacks: candy- skittle, smores. bought sugar free candy, 1-2 butterscotch soda  Other: She has made many positive changes including an alternative to regular soda, poppi or lolipop, lower carbohydrate snacks, smaller portions too.  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 : Moderate (like brisk walking)  Barrier to exercise: None    Monitoring:  Monitoring Assessed Today: Yes  Did patient bring glucose meter to appointment? : No  Blood Glucose Meter: Accu-chek  Times checking blood sugar at home (number): 2  Times checking blood sugar at home (per): Day    Taking Medications:  Diabetes Medication(s)       Sodium-Glucose Co-Transporter 2 (SGLT2) Inhibitors       empagliflozin (JARDIANCE) 10 MG TABS tablet Take 1 tablet (10 mg) by mouth daily      "     Taking Medication Assessed Today: Yes  Current Treatments: Oral Medication (taken by mouth)  Problems taking diabetes medications regularly?: No (taking consistently for a week)  Diabetes medication side effects?: No    Problem Solving:  Problem Solving Assessed Today: Yes  Is the patient at risk for hypoglycemia?: No    Reducing Risks:  Reducing Risks Assessed Today: Yes  Diabetes Risks:  (Adopted)  Has dilated eye exam at least once a year?: Yes  Sees dentist every 6 months?: Yes  Feet checked by healthcare provider in the last year?: Yes    Healthy Coping:  Healthy Coping Assessed Today: Yes  Emotional response to diabetes: Ready to learn  Informal Support system:: Other  Stage of change: ACTION (Actively working towards change)  Support resources: Websites  Patient Activation Measure Survey Score:      2/11/2021     9:56 AM 1/2/2024     9:43 AM   SHERRI Score (Last Two)   SHERRI Raw Score 30 30   Activation Score 56 56   SHERRI Level 3 3     Care Plan and Education Provided:  Care Plan: Diabetes   Updates made by Bethany Dahl since 1/30/2024 12:00 AM        Problem: Diabetes Self-Management Education Needed to Optimize Self-Care Behaviors         Goal: Understand diabetes pathophysiology and disease progression         Task: Provide education on diabetes pathophysiology and disease progression specfic to patient's diabetes type Completed 1/30/2024   Responsible User: Bethany Dahl        Goal: Healthy Eating - follow a healthy eating pattern for diabetes    Note:    Use the plate method for 2 meals/day at least 5 days/week for the next 4 weeks.          Task: Provide education on eating out Completed 1/30/2024   Responsible User: Bethany Dahl        Goal: Being Active - get regular physical activity, working up to at least 150 minutes per week         Task: Explore community resources including walking groups, assistance programs, and home videos Completed 1/30/2024   Responsible User: Bethany Dahl         Goal: Problem Solving - know how to prevent and manage short-term diabetes complications         Task: Provide education on safe travel with diabetes Completed 1/30/2024   Responsible User: Bethany Dahl        Goal: Reducing Risks - know how to prevent and treat long-term diabetes complications         Task: Provide education on recommendations for heart health - lipid levels and goals, blood pressure and goals, and aspirin therapy, if indicated Completed 1/30/2024   Responsible User: Bethany Dahl RDN, DORIAN, [facility]    Time Spent: 30 minutes  Encounter Type: Individual    Any diabetes medication dose changes were made via the CDE Protocol per the patient's referring provider. A copy of this encounter was shared with the provider.

## 2024-01-30 NOTE — PROGRESS NOTES
Diabetes Self-Management Education & Support  Presents for: Follow-up    Type of Service: In Person Visit    ASSESSMENT:  Chkiis reports she hasn't been checking BG this past month as she was worried about the colonoscopy. She did have questions regarding diagnosis of diverticulitis which were discussed today.   Diabetes education complete. Positive reinforcement given for the changes she has made to lower her A1c and manage her diabetes.     Patient's most recent   Lab Results   Component Value Date    A1C 6.8 12/05/2023    A1C 7.9 02/11/2021     is meeting goal of <7.0    Diabetes knowledge and skills assessment:   Patient is knowledgeable in diabetes management concepts related to: Healthy Eating, Monitoring, Taking Medication, Problem Solving, Reducing Risks, and Healthy Coping    Continue education with the following diabetes management concepts: Being Active    Based on learning assessment above, most appropriate setting for further diabetes education would be: Individual setting.      PLAN  Continue to take diabetes medication as prescribed.  Continue to test BG, Ok to only test 1-2 days per week, BID (fasting and post meal). Reviewed testing times and targets.   Follow-up: with PCP in March    See Care Plan for co-developed, patient-state behavior change goals.    SUBJECTIVE/OBJECTIVE:  Presents for: Follow-up  Accompanied by: Self, Other (Betsy)  Diabetes education in the past 24mo: No  Focus of Visit: Healthy Eating  Diabetes type: Type 2  Date of diagnosis: 9 years  Disease course: Improving  Diabetes management related comments/concerns: Chikis is happy with improvement in A1c, states she hasn't checked her BG in January  Difficulty affording diabetes medication?: No  Difficulty affording diabetes testing supplies?: No  Cultural Influences/Ethnic Background:  Not  or     Diabetes Symptoms & Complications:  Weight trend: Fluctuating  Symptom course: Stable  Disease course:  "Improving  Complications assessed today?: No    Patient Problem List and Family Medical History reviewed for relevant medical history, current medical status, and diabetes risk factors.    Vitals:  Wt 82.1 kg (181 lb)   BMI 33.11 kg/m    Estimated body mass index is 33.11 kg/m  as calculated from the following:    Height as of 1/16/24: 1.575 m (5' 2\").    Weight as of this encounter: 82.1 kg (181 lb).   Last 3 BP:   BP Readings from Last 3 Encounters:   01/16/24 112/64   01/02/24 119/77   09/25/23 120/73       History   Smoking Status    Never   Smokeless Tobacco    Never       Labs:  Lab Results   Component Value Date    A1C 6.8 12/05/2023    A1C 7.9 02/11/2021     Lab Results   Component Value Date     01/16/2024     06/03/2022     03/23/2021     Lab Results   Component Value Date    LDL 64 09/15/2023     08/06/2020     HDL Cholesterol   Date Value Ref Range Status   08/06/2020 44 (L) >49 mg/dL Final     Direct Measure HDL   Date Value Ref Range Status   09/15/2023 44 (L) >=50 mg/dL Final   ]  GFR Estimate   Date Value Ref Range Status   08/22/2023 >90 >60 mL/min/1.73m2 Final   03/23/2021 >90 >60 mL/min/[1.73_m2] Final     Comment:     Non  GFR Calc  Starting 12/18/2018, serum creatinine based estimated GFR (eGFR) will be   calculated using the Chronic Kidney Disease Epidemiology Collaboration   (CKD-EPI) equation.       GFR Estimate If Black   Date Value Ref Range Status   03/23/2021 >90 >60 mL/min/[1.73_m2] Final     Comment:      GFR Calc  Starting 12/18/2018, serum creatinine based estimated GFR (eGFR) will be   calculated using the Chronic Kidney Disease Epidemiology Collaboration   (CKD-EPI) equation.       Lab Results   Component Value Date    CR 0.55 08/22/2023    CR 0.61 03/23/2021     No results found for: \"MICROALBUMIN\"    Healthy Eating:  Healthy Eating Assessed Today: Yes  Cultural/Adventism diet restrictions?: No  Do you have any food " allergies or intolerances?: Yes  Please list your food allergies / intolerances: Recently diagnosed with diverticulitis  Meal planning/habits: Low carb, Other (has the weight watchers diabetes plan, online only)  Who cooks/prepares meals for you?: Self  Who purchases food in  your home?: Self  Meals include: Breakfast, Lunch, Dinner, Evening Snack  Breakfast: 9 AM- [employer] protein shake- iced coffee with skinny syrup and heavy cream, now using fat free half and half OR eggs with celery, onions and parsley. butter  Lunch: 1145 AM- pizza hut pizza - 2 pieces, water  Dinner: 6-6:30 PM: tuna melt sandwich, water  Snacks: candy- skittle, smores. bought sugar free candy, 1-2 butterscotch soda  Other: She has made many positive changes including an alternative to regular soda, poppi or lolipop, lower carbohydrate snacks, smaller portions too.  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 : Moderate (like brisk walking)  Barrier to exercise: None    Monitoring:  Monitoring Assessed Today: Yes  Did patient bring glucose meter to appointment? : No  Blood Glucose Meter: Accu-chek  Times checking blood sugar at home (number): 2  Times checking blood sugar at home (per): Day    Taking Medications:  Diabetes Medication(s)       Sodium-Glucose Co-Transporter 2 (SGLT2) Inhibitors       empagliflozin (JARDIANCE) 10 MG TABS tablet Take 1 tablet (10 mg) by mouth daily          Taking Medication Assessed Today: Yes  Current Treatments: Oral Medication (taken by mouth)  Problems taking diabetes medications regularly?: No (taking consistently for a week)  Diabetes medication side effects?: No    Problem  Solving:  Problem Solving Assessed Today: Yes  Is the patient at risk for hypoglycemia?: No    Reducing Risks:  Reducing Risks Assessed Today: Yes  Diabetes Risks:  (Adopted)  Has dilated eye exam at least once a year?: Yes  Sees dentist every 6 months?: Yes  Feet checked by healthcare provider in the last year?: Yes    Healthy Coping:  Healthy Coping Assessed Today: Yes  Emotional response to diabetes: Ready to learn  Informal Support system:: Other  Stage of change: ACTION (Actively working towards change)  Support resources: Websites  Patient Activation Measure Survey Score:      2/11/2021     9:56 AM 1/2/2024     9:43 AM   SHERRI Score (Last Two)   SHERRI Raw Score 30 30   Activation Score 56 56   SHERRI Level 3 3     Care Plan and Education Provided:  Care Plan: Diabetes   Updates made by Bethany Dahl since 1/30/2024 12:00 AM        Problem: Diabetes Self-Management Education Needed to Optimize Self-Care Behaviors         Goal: Understand diabetes pathophysiology and disease progression         Task: Provide education on diabetes pathophysiology and disease progression specfic to patient's diabetes type Completed 1/30/2024   Responsible User: Bethany Dahl        Goal: Healthy Eating - follow a healthy eating pattern for diabetes    Note:    Use the plate method for 2 meals/day at least 5 days/week for the next 4 weeks.          Task: Provide education on eating out Completed 1/30/2024   Responsible User: Bethany Dahl        Goal: Being Active - get regular physical activity, working up to at least 150 minutes per week         Task: Explore community resources including walking groups, assistance programs, and home videos Completed 1/30/2024   Responsible User: Bethany Dahl        Goal: Problem Solving - know how to prevent and manage short-term diabetes complications         Task: Provide education on safe travel with diabetes Completed 1/30/2024   Responsible User: Bethany Dahl        Goal: Reducing  Risks - know how to prevent and treat long-term diabetes complications         Task: Provide education on recommendations for heart health - lipid levels and goals, blood pressure and goals, and aspirin therapy, if indicated Completed 1/30/2024   Responsible User: Bethany Dahl RDN, LD, [facility]    Time Spent: 30 minutes  Encounter Type: Individual    Any diabetes medication dose changes were made via the CDE Protocol per the patient's referring provider. A copy of this encounter was shared with the provider.

## 2024-02-22 ENCOUNTER — TELEPHONE (OUTPATIENT)
Dept: CARDIOLOGY | Facility: CLINIC | Age: 44
End: 2024-02-22
Payer: MEDICARE

## 2024-02-22 NOTE — TELEPHONE ENCOUNTER
Spoke with pt to reschedule their 4/26 appt and they wanted me to reach out to their sister for scheduling purposes. I attempted to connect with the pts sister, no answer, LVM

## 2024-02-27 NOTE — TELEPHONE ENCOUNTER
Spoke with pt to reschedule 4/26 appt and she directed me to her sister to get it reschedule. Called sister and there was not answer, LVM. This was the 2nd attempt in trying to reschedule the appt

## 2024-03-01 NOTE — TELEPHONE ENCOUNTER
Attempted to return sister to pt call trying to reschedule 4/26 canceled clinic. I tentatively schedule the pt 6/11 @11:45 am. I asked that sister call back confirm that day would work.

## 2024-03-04 ENCOUNTER — ANCILLARY PROCEDURE (OUTPATIENT)
Dept: CARDIOLOGY | Facility: CLINIC | Age: 44
End: 2024-03-04
Attending: INTERNAL MEDICINE
Payer: MEDICARE

## 2024-03-04 DIAGNOSIS — Z45.02 FITTING AND ADJUSTMENT OF AUTOMATIC IMPLANTABLE CARDIOVERTER-DEFIBRILLATOR: Primary | ICD-10-CM

## 2024-03-04 DIAGNOSIS — Z45.02 FITTING AND ADJUSTMENT OF AUTOMATIC IMPLANTABLE CARDIOVERTER-DEFIBRILLATOR: ICD-10-CM

## 2024-03-04 PROCEDURE — 93295 DEV INTERROG REMOTE 1/2/MLT: CPT | Performed by: INTERNAL MEDICINE

## 2024-03-05 ENCOUNTER — OFFICE VISIT (OUTPATIENT)
Dept: FAMILY MEDICINE | Facility: CLINIC | Age: 44
End: 2024-03-05
Payer: MEDICARE

## 2024-03-05 VITALS
RESPIRATION RATE: 16 BRPM | TEMPERATURE: 97.7 F | HEART RATE: 94 BPM | SYSTOLIC BLOOD PRESSURE: 124 MMHG | WEIGHT: 182.9 LBS | BODY MASS INDEX: 33.66 KG/M2 | OXYGEN SATURATION: 96 % | HEIGHT: 62 IN | DIASTOLIC BLOOD PRESSURE: 73 MMHG

## 2024-03-05 DIAGNOSIS — E11.59 TYPE 2 DIABETES MELLITUS WITH OTHER CIRCULATORY COMPLICATION, WITHOUT LONG-TERM CURRENT USE OF INSULIN (H): Primary | ICD-10-CM

## 2024-03-05 DIAGNOSIS — Z02.9 ADMINISTRATIVE ENCOUNTER: ICD-10-CM

## 2024-03-05 DIAGNOSIS — R07.89 CHEST WALL PAIN: ICD-10-CM

## 2024-03-05 LAB — HBA1C MFR BLD: 7.9 % (ref 0–5.6)

## 2024-03-05 PROCEDURE — 36415 COLL VENOUS BLD VENIPUNCTURE: CPT | Performed by: FAMILY MEDICINE

## 2024-03-05 PROCEDURE — 83036 HEMOGLOBIN GLYCOSYLATED A1C: CPT | Performed by: FAMILY MEDICINE

## 2024-03-05 PROCEDURE — 84443 ASSAY THYROID STIM HORMONE: CPT | Performed by: FAMILY MEDICINE

## 2024-03-05 PROCEDURE — 99213 OFFICE O/P EST LOW 20 MIN: CPT | Performed by: FAMILY MEDICINE

## 2024-03-05 ASSESSMENT — ANXIETY QUESTIONNAIRES
GAD7 TOTAL SCORE: 0
5. BEING SO RESTLESS THAT IT IS HARD TO SIT STILL: NOT AT ALL
1. FEELING NERVOUS, ANXIOUS, OR ON EDGE: NOT AT ALL
GAD7 TOTAL SCORE: 0
GAD7 TOTAL SCORE: 0
7. FEELING AFRAID AS IF SOMETHING AWFUL MIGHT HAPPEN: NOT AT ALL
3. WORRYING TOO MUCH ABOUT DIFFERENT THINGS: NOT AT ALL
8. IF YOU CHECKED OFF ANY PROBLEMS, HOW DIFFICULT HAVE THESE MADE IT FOR YOU TO DO YOUR WORK, TAKE CARE OF THINGS AT HOME, OR GET ALONG WITH OTHER PEOPLE?: NOT DIFFICULT AT ALL
7. FEELING AFRAID AS IF SOMETHING AWFUL MIGHT HAPPEN: NOT AT ALL
4. TROUBLE RELAXING: NOT AT ALL
6. BECOMING EASILY ANNOYED OR IRRITABLE: NOT AT ALL
IF YOU CHECKED OFF ANY PROBLEMS ON THIS QUESTIONNAIRE, HOW DIFFICULT HAVE THESE PROBLEMS MADE IT FOR YOU TO DO YOUR WORK, TAKE CARE OF THINGS AT HOME, OR GET ALONG WITH OTHER PEOPLE: NOT DIFFICULT AT ALL
2. NOT BEING ABLE TO STOP OR CONTROL WORRYING: NOT AT ALL

## 2024-03-05 ASSESSMENT — PATIENT HEALTH QUESTIONNAIRE - PHQ9
SUM OF ALL RESPONSES TO PHQ QUESTIONS 1-9: 0
10. IF YOU CHECKED OFF ANY PROBLEMS, HOW DIFFICULT HAVE THESE PROBLEMS MADE IT FOR YOU TO DO YOUR WORK, TAKE CARE OF THINGS AT HOME, OR GET ALONG WITH OTHER PEOPLE: NOT DIFFICULT AT ALL
SUM OF ALL RESPONSES TO PHQ QUESTIONS 1-9: 0

## 2024-03-05 NOTE — PROGRESS NOTES
"  Assessment & Plan     Type 2 diabetes mellitus with other circulatory complication, without long-term current use of insulin (H)  Due for labs, recommendations pending results.  - Hemoglobin A1c; Future  - TSH with free T4 reflex  - Hemoglobin A1c    Administrative encounter  [employer] paperwork filled out with patient in clinic.    Chest wall pain  Suspect musculoskeletal pains, particularly given pain with movement and reassuring message from device clinic that she had no events.  Continue to monitor, conservative cares.  Follow up if not improving.    21 minutes spent by me on the date of the encounter doing chart review, history and exam, documentation and further activities per the note      BMI  Estimated body mass index is 33.45 kg/m  as calculated from the following:    Height as of this encounter: 1.575 m (5' 2\").    Weight as of this encounter: 83 kg (182 lb 14.4 oz).       See Patient Instructions    Inés Diop is a 43 year old, presenting for the following health issues:  Diabetes and Heart Problem (Moved neck yesterday and felt pain by her pacemaker. Did call cardiology clinic regarding it)        3/5/2024     9:44 AM   Additional Questions   Roomed by Antonella Valles     History of Present Illness       Diabetes:   She presents for follow up of diabetes.  She is checking home blood glucose one time daily.   She checks blood glucose at bedtime.  Blood glucose is never over 200 and never under 70. She is aware of hypoglycemia symptoms including none.    She has no concerns regarding her diabetes at this time.   She is not experiencing numbness or burning in feet, excessive thirst, blurry vision, weight changes or redness, sores or blisters on feet.           She eats 2-3 servings of fruits and vegetables daily.She consumes 1 sweetened beverage(s) daily.She exercises with enough effort to increase her heart rate 20 to 29 minutes per day.  She exercises with enough effort to increase her " "heart rate 3 or less days per week.   She is taking medications regularly.     Diabetes follow up.    Needs [employer] paperwork filled out    Pain History:  When did you first notice your pain? yesterday   Have you seen anyone else for your pain? No  How has your pain affected your ability to work? Happened after work, hasn't gone to work yet  Where in your body do you have pain? Chest Pain  Onset/Duration: yesterday  Description:   Location: left side  Character: pulling/tighting  Radiation: neck, shoulder  Duration: intermittent but when she moves her arm is pulls and hurts  Intensity: 5/10 currently at its worse 8/10  Progression of Symptoms: improving  Accompanying Signs & Symptoms:  Shortness of breath: No  Sweating: YES  Nausea/vomiting: YES- both  Lightheadedness: No  Palpitations: not sure  Fever/Chills: No  Cough: No           Heartburn: No  History:   Family history of heart disease: No  Tobacco use: No  Previous similar symptoms: no   Precipitating factors:   Worse with exertion: YES  Worse with deep breaths: YES           Related to eating: No           Better with burping: No  Alleviating factors: none  Therapies tried and outcome: acetaminophen    Device clinic did a check and everything was normal, they feel pain is musculoskeletal.        Objective    /73 (BP Location: Right arm, Patient Position: Sitting, Cuff Size: Adult Regular)   Pulse 94   Temp 97.7  F (36.5  C) (Oral)   Resp 16   Ht 1.575 m (5' 2\")   Wt 83 kg (182 lb 14.4 oz)   SpO2 96%   BMI 33.45 kg/m    Body mass index is 33.45 kg/m .  Physical Exam   GENERAL: alert and no distress  CV: Chest wall tenderness to left upper chest  PSYCH: mentation appears normal, affect normal/bright          Signed Electronically by: Brianne Peguero MD    "

## 2024-03-06 LAB — TSH SERPL DL<=0.005 MIU/L-ACNC: 1.93 UIU/ML (ref 0.3–4.2)

## 2024-03-11 DIAGNOSIS — E11.59 TYPE 2 DIABETES MELLITUS WITH OTHER CIRCULATORY COMPLICATION, WITHOUT LONG-TERM CURRENT USE OF INSULIN (H): ICD-10-CM

## 2024-04-08 LAB
MDC_IDC_LEAD_CONNECTION_STATUS: NORMAL
MDC_IDC_LEAD_IMPLANT_DT: NORMAL
MDC_IDC_LEAD_LOCATION: NORMAL
MDC_IDC_LEAD_LOCATION_DETAIL_1: NORMAL
MDC_IDC_LEAD_MFG: NORMAL
MDC_IDC_LEAD_MODEL: NORMAL
MDC_IDC_LEAD_POLARITY_TYPE: NORMAL
MDC_IDC_LEAD_SERIAL: NORMAL
MDC_IDC_MSMT_BATTERY_DTM: NORMAL
MDC_IDC_MSMT_BATTERY_REMAINING_LONGEVITY: 35 MO
MDC_IDC_MSMT_BATTERY_RRT_TRIGGER: 2.73
MDC_IDC_MSMT_BATTERY_STATUS: NORMAL
MDC_IDC_MSMT_BATTERY_VOLTAGE: 2.96 V
MDC_IDC_MSMT_CAP_CHARGE_DTM: NORMAL
MDC_IDC_MSMT_CAP_CHARGE_ENERGY: 18 J
MDC_IDC_MSMT_CAP_CHARGE_TIME: 4.12
MDC_IDC_MSMT_CAP_CHARGE_TYPE: NORMAL
MDC_IDC_MSMT_LEADCHNL_RV_IMPEDANCE_VALUE: 323 OHM
MDC_IDC_MSMT_LEADCHNL_RV_IMPEDANCE_VALUE: 4047 OHM
MDC_IDC_MSMT_LEADCHNL_RV_PACING_THRESHOLD_AMPLITUDE: 0.62 V
MDC_IDC_MSMT_LEADCHNL_RV_PACING_THRESHOLD_PULSEWIDTH: 0.4 MS
MDC_IDC_MSMT_LEADCHNL_RV_SENSING_INTR_AMPL: 6.25 MV
MDC_IDC_MSMT_LEADCHNL_RV_SENSING_INTR_AMPL: 6.25 MV
MDC_IDC_PG_IMPLANT_DTM: NORMAL
MDC_IDC_PG_MFG: NORMAL
MDC_IDC_PG_MODEL: NORMAL
MDC_IDC_PG_SERIAL: NORMAL
MDC_IDC_PG_TYPE: NORMAL
MDC_IDC_SESS_CLINIC_NAME: NORMAL
MDC_IDC_SESS_DTM: NORMAL
MDC_IDC_SESS_TYPE: NORMAL
MDC_IDC_SET_BRADY_HYSTRATE: NORMAL
MDC_IDC_SET_BRADY_LOWRATE: 40 {BEATS}/MIN
MDC_IDC_SET_BRADY_MODE: NORMAL
MDC_IDC_SET_LEADCHNL_RV_PACING_AMPLITUDE: 2 V
MDC_IDC_SET_LEADCHNL_RV_PACING_ANODE_ELECTRODE_1: NORMAL
MDC_IDC_SET_LEADCHNL_RV_PACING_ANODE_LOCATION_1: NORMAL
MDC_IDC_SET_LEADCHNL_RV_PACING_CAPTURE_MODE: NORMAL
MDC_IDC_SET_LEADCHNL_RV_PACING_CATHODE_ELECTRODE_1: NORMAL
MDC_IDC_SET_LEADCHNL_RV_PACING_CATHODE_LOCATION_1: NORMAL
MDC_IDC_SET_LEADCHNL_RV_PACING_POLARITY: NORMAL
MDC_IDC_SET_LEADCHNL_RV_PACING_PULSEWIDTH: 0.4 MS
MDC_IDC_SET_LEADCHNL_RV_SENSING_ANODE_ELECTRODE_1: NORMAL
MDC_IDC_SET_LEADCHNL_RV_SENSING_ANODE_LOCATION_1: NORMAL
MDC_IDC_SET_LEADCHNL_RV_SENSING_CATHODE_ELECTRODE_1: NORMAL
MDC_IDC_SET_LEADCHNL_RV_SENSING_CATHODE_LOCATION_1: NORMAL
MDC_IDC_SET_LEADCHNL_RV_SENSING_POLARITY: NORMAL
MDC_IDC_SET_LEADCHNL_RV_SENSING_SENSITIVITY: 0.3 MV
MDC_IDC_SET_ZONE_DETECTION_BEATS_DENOMINATOR: 32 {BEATS}
MDC_IDC_SET_ZONE_DETECTION_BEATS_DENOMINATOR: 32 {BEATS}
MDC_IDC_SET_ZONE_DETECTION_BEATS_DENOMINATOR: 40 {BEATS}
MDC_IDC_SET_ZONE_DETECTION_BEATS_NUMERATOR: 30 {BEATS}
MDC_IDC_SET_ZONE_DETECTION_BEATS_NUMERATOR: 32 {BEATS}
MDC_IDC_SET_ZONE_DETECTION_BEATS_NUMERATOR: 32 {BEATS}
MDC_IDC_SET_ZONE_DETECTION_INTERVAL: 270 MS
MDC_IDC_SET_ZONE_DETECTION_INTERVAL: 360 MS
MDC_IDC_SET_ZONE_DETECTION_INTERVAL: 370 MS
MDC_IDC_SET_ZONE_DETECTION_INTERVAL: NORMAL
MDC_IDC_SET_ZONE_STATUS: NORMAL
MDC_IDC_SET_ZONE_TYPE: NORMAL
MDC_IDC_SET_ZONE_VENDOR_TYPE: NORMAL
MDC_IDC_STAT_BRADY_DTM_END: NORMAL
MDC_IDC_STAT_BRADY_DTM_START: NORMAL
MDC_IDC_STAT_BRADY_RV_PERCENT_PACED: 0.01 %
MDC_IDC_STAT_EPISODE_RECENT_COUNT: 0
MDC_IDC_STAT_EPISODE_RECENT_COUNT_DTM_END: NORMAL
MDC_IDC_STAT_EPISODE_RECENT_COUNT_DTM_START: NORMAL
MDC_IDC_STAT_EPISODE_TOTAL_COUNT: 0
MDC_IDC_STAT_EPISODE_TOTAL_COUNT: 1
MDC_IDC_STAT_EPISODE_TOTAL_COUNT: 24
MDC_IDC_STAT_EPISODE_TOTAL_COUNT: 270
MDC_IDC_STAT_EPISODE_TOTAL_COUNT_DTM_END: NORMAL
MDC_IDC_STAT_EPISODE_TOTAL_COUNT_DTM_START: NORMAL
MDC_IDC_STAT_EPISODE_TYPE: NORMAL
MDC_IDC_STAT_TACHYTHERAPY_ATP_DELIVERED_RECENT: 0
MDC_IDC_STAT_TACHYTHERAPY_ATP_DELIVERED_TOTAL: 0
MDC_IDC_STAT_TACHYTHERAPY_RECENT_DTM_END: NORMAL
MDC_IDC_STAT_TACHYTHERAPY_RECENT_DTM_START: NORMAL
MDC_IDC_STAT_TACHYTHERAPY_SHOCKS_ABORTED_RECENT: 0
MDC_IDC_STAT_TACHYTHERAPY_SHOCKS_ABORTED_TOTAL: 0
MDC_IDC_STAT_TACHYTHERAPY_SHOCKS_DELIVERED_RECENT: 0
MDC_IDC_STAT_TACHYTHERAPY_SHOCKS_DELIVERED_TOTAL: 1
MDC_IDC_STAT_TACHYTHERAPY_TOTAL_DTM_END: NORMAL
MDC_IDC_STAT_TACHYTHERAPY_TOTAL_DTM_START: NORMAL

## 2024-04-12 ENCOUNTER — ANCILLARY PROCEDURE (OUTPATIENT)
Dept: CARDIOLOGY | Facility: CLINIC | Age: 44
End: 2024-04-12
Attending: INTERNAL MEDICINE
Payer: MEDICARE

## 2024-04-12 ENCOUNTER — LAB (OUTPATIENT)
Dept: LAB | Facility: CLINIC | Age: 44
End: 2024-04-12
Attending: INTERNAL MEDICINE
Payer: MEDICARE

## 2024-04-12 VITALS
OXYGEN SATURATION: 98 % | SYSTOLIC BLOOD PRESSURE: 109 MMHG | HEART RATE: 86 BPM | BODY MASS INDEX: 34.26 KG/M2 | DIASTOLIC BLOOD PRESSURE: 63 MMHG | WEIGHT: 187.3 LBS

## 2024-04-12 DIAGNOSIS — Q24.9 CONGENITAL HEART DISEASE: ICD-10-CM

## 2024-04-12 DIAGNOSIS — Q20.3 D-TGA (DEXTRO-TRANSPOSITION OF GREAT ARTERIES): ICD-10-CM

## 2024-04-12 DIAGNOSIS — Z86.79 HX OF VENTRICULAR TACHYCARDIA: ICD-10-CM

## 2024-04-12 DIAGNOSIS — Z13.6 CARDIOVASCULAR SCREENING; LDL GOAL LESS THAN 70: ICD-10-CM

## 2024-04-12 DIAGNOSIS — Z29.89 SBE (SUBACUTE BACTERIAL ENDOCARDITIS) PROPHYLAXIS CANDIDATE: ICD-10-CM

## 2024-04-12 DIAGNOSIS — Z95.810 AUTOMATIC IMPLANTABLE CARDIOVERTER-DEFIBRILLATOR IN SITU: ICD-10-CM

## 2024-04-12 LAB
CHOLEST SERPL-MCNC: 147 MG/DL
FASTING STATUS PATIENT QL REPORTED: YES
HDLC SERPL-MCNC: 51 MG/DL
LDLC SERPL CALC-MCNC: 79 MG/DL
MDC_IDC_EPISODE_DTM: NORMAL
MDC_IDC_EPISODE_DURATION: 34 S
MDC_IDC_EPISODE_ID: 689
MDC_IDC_EPISODE_TYPE: NORMAL
MDC_IDC_EPISODE_TYPE_INDUCED: NO
MDC_IDC_LEAD_CONNECTION_STATUS: NORMAL
MDC_IDC_LEAD_IMPLANT_DT: NORMAL
MDC_IDC_LEAD_LOCATION: NORMAL
MDC_IDC_LEAD_LOCATION_DETAIL_1: NORMAL
MDC_IDC_LEAD_MFG: NORMAL
MDC_IDC_LEAD_MODEL: NORMAL
MDC_IDC_LEAD_POLARITY_TYPE: NORMAL
MDC_IDC_LEAD_SERIAL: NORMAL
MDC_IDC_MSMT_BATTERY_DTM: NORMAL
MDC_IDC_MSMT_BATTERY_REMAINING_LONGEVITY: 36 MO
MDC_IDC_MSMT_BATTERY_RRT_TRIGGER: 2.73
MDC_IDC_MSMT_BATTERY_STATUS: NORMAL
MDC_IDC_MSMT_BATTERY_VOLTAGE: 2.95 V
MDC_IDC_MSMT_CAP_CHARGE_DTM: NORMAL
MDC_IDC_MSMT_CAP_CHARGE_ENERGY: 18 J
MDC_IDC_MSMT_CAP_CHARGE_TIME: 4.12
MDC_IDC_MSMT_CAP_CHARGE_TYPE: NORMAL
MDC_IDC_MSMT_LEADCHNL_RV_IMPEDANCE_VALUE: 304 OHM
MDC_IDC_MSMT_LEADCHNL_RV_IMPEDANCE_VALUE: 4047 OHM
MDC_IDC_MSMT_LEADCHNL_RV_PACING_THRESHOLD_AMPLITUDE: 0.75 V
MDC_IDC_MSMT_LEADCHNL_RV_PACING_THRESHOLD_PULSEWIDTH: 0.4 MS
MDC_IDC_MSMT_LEADCHNL_RV_SENSING_INTR_AMPL: 6.38 MV
MDC_IDC_MSMT_LEADCHNL_RV_SENSING_INTR_AMPL: 6.5 MV
MDC_IDC_PG_IMPLANT_DTM: NORMAL
MDC_IDC_PG_MFG: NORMAL
MDC_IDC_PG_MODEL: NORMAL
MDC_IDC_PG_SERIAL: NORMAL
MDC_IDC_PG_TYPE: NORMAL
MDC_IDC_SESS_CLINIC_NAME: NORMAL
MDC_IDC_SESS_DTM: NORMAL
MDC_IDC_SESS_TYPE: NORMAL
MDC_IDC_SET_BRADY_HYSTRATE: NORMAL
MDC_IDC_SET_BRADY_LOWRATE: 40 {BEATS}/MIN
MDC_IDC_SET_BRADY_MODE: NORMAL
MDC_IDC_SET_LEADCHNL_RV_PACING_AMPLITUDE: 2 V
MDC_IDC_SET_LEADCHNL_RV_PACING_ANODE_ELECTRODE_1: NORMAL
MDC_IDC_SET_LEADCHNL_RV_PACING_ANODE_LOCATION_1: NORMAL
MDC_IDC_SET_LEADCHNL_RV_PACING_CAPTURE_MODE: NORMAL
MDC_IDC_SET_LEADCHNL_RV_PACING_CATHODE_ELECTRODE_1: NORMAL
MDC_IDC_SET_LEADCHNL_RV_PACING_CATHODE_LOCATION_1: NORMAL
MDC_IDC_SET_LEADCHNL_RV_PACING_POLARITY: NORMAL
MDC_IDC_SET_LEADCHNL_RV_PACING_PULSEWIDTH: 0.4 MS
MDC_IDC_SET_LEADCHNL_RV_SENSING_ANODE_ELECTRODE_1: NORMAL
MDC_IDC_SET_LEADCHNL_RV_SENSING_ANODE_LOCATION_1: NORMAL
MDC_IDC_SET_LEADCHNL_RV_SENSING_CATHODE_ELECTRODE_1: NORMAL
MDC_IDC_SET_LEADCHNL_RV_SENSING_CATHODE_LOCATION_1: NORMAL
MDC_IDC_SET_LEADCHNL_RV_SENSING_POLARITY: NORMAL
MDC_IDC_SET_LEADCHNL_RV_SENSING_SENSITIVITY: 0.3 MV
MDC_IDC_SET_ZONE_DETECTION_BEATS_DENOMINATOR: 32 {BEATS}
MDC_IDC_SET_ZONE_DETECTION_BEATS_DENOMINATOR: 32 {BEATS}
MDC_IDC_SET_ZONE_DETECTION_BEATS_DENOMINATOR: 40 {BEATS}
MDC_IDC_SET_ZONE_DETECTION_BEATS_NUMERATOR: 30 {BEATS}
MDC_IDC_SET_ZONE_DETECTION_BEATS_NUMERATOR: 32 {BEATS}
MDC_IDC_SET_ZONE_DETECTION_BEATS_NUMERATOR: 32 {BEATS}
MDC_IDC_SET_ZONE_DETECTION_INTERVAL: 270 MS
MDC_IDC_SET_ZONE_DETECTION_INTERVAL: 360 MS
MDC_IDC_SET_ZONE_DETECTION_INTERVAL: 370 MS
MDC_IDC_SET_ZONE_DETECTION_INTERVAL: NORMAL
MDC_IDC_SET_ZONE_STATUS: NORMAL
MDC_IDC_SET_ZONE_TYPE: NORMAL
MDC_IDC_SET_ZONE_VENDOR_TYPE: NORMAL
MDC_IDC_STAT_BRADY_DTM_END: NORMAL
MDC_IDC_STAT_BRADY_DTM_START: NORMAL
MDC_IDC_STAT_BRADY_RV_PERCENT_PACED: 0.01 %
MDC_IDC_STAT_EPISODE_RECENT_COUNT: 0
MDC_IDC_STAT_EPISODE_RECENT_COUNT: 3
MDC_IDC_STAT_EPISODE_RECENT_COUNT: 4
MDC_IDC_STAT_EPISODE_RECENT_COUNT_DTM_END: NORMAL
MDC_IDC_STAT_EPISODE_RECENT_COUNT_DTM_START: NORMAL
MDC_IDC_STAT_EPISODE_TOTAL_COUNT: 0
MDC_IDC_STAT_EPISODE_TOTAL_COUNT: 1
MDC_IDC_STAT_EPISODE_TOTAL_COUNT: 24
MDC_IDC_STAT_EPISODE_TOTAL_COUNT: 271
MDC_IDC_STAT_EPISODE_TOTAL_COUNT_DTM_END: NORMAL
MDC_IDC_STAT_EPISODE_TOTAL_COUNT_DTM_START: NORMAL
MDC_IDC_STAT_EPISODE_TYPE: NORMAL
MDC_IDC_STAT_TACHYTHERAPY_ATP_DELIVERED_RECENT: 0
MDC_IDC_STAT_TACHYTHERAPY_ATP_DELIVERED_TOTAL: 0
MDC_IDC_STAT_TACHYTHERAPY_RECENT_DTM_END: NORMAL
MDC_IDC_STAT_TACHYTHERAPY_RECENT_DTM_START: NORMAL
MDC_IDC_STAT_TACHYTHERAPY_SHOCKS_ABORTED_RECENT: 0
MDC_IDC_STAT_TACHYTHERAPY_SHOCKS_ABORTED_TOTAL: 0
MDC_IDC_STAT_TACHYTHERAPY_SHOCKS_DELIVERED_RECENT: 0
MDC_IDC_STAT_TACHYTHERAPY_SHOCKS_DELIVERED_TOTAL: 1
MDC_IDC_STAT_TACHYTHERAPY_TOTAL_DTM_END: NORMAL
MDC_IDC_STAT_TACHYTHERAPY_TOTAL_DTM_START: NORMAL
NONHDLC SERPL-MCNC: 96 MG/DL
TRIGL SERPL-MCNC: 85 MG/DL

## 2024-04-12 PROCEDURE — 36415 COLL VENOUS BLD VENIPUNCTURE: CPT | Performed by: PATHOLOGY

## 2024-04-12 PROCEDURE — 99214 OFFICE O/P EST MOD 30 MIN: CPT | Mod: 25 | Performed by: INTERNAL MEDICINE

## 2024-04-12 PROCEDURE — 93282 PRGRMG EVAL IMPLANTABLE DFB: CPT | Performed by: INTERNAL MEDICINE

## 2024-04-12 PROCEDURE — 93306 TTE W/DOPPLER COMPLETE: CPT | Performed by: STUDENT IN AN ORGANIZED HEALTH CARE EDUCATION/TRAINING PROGRAM

## 2024-04-12 PROCEDURE — G0463 HOSPITAL OUTPT CLINIC VISIT: HCPCS | Performed by: INTERNAL MEDICINE

## 2024-04-12 PROCEDURE — 80061 LIPID PANEL: CPT | Performed by: PATHOLOGY

## 2024-04-12 ASSESSMENT — PAIN SCALES - GENERAL: PAINLEVEL: NO PAIN (0)

## 2024-04-12 NOTE — NURSING NOTE
Chief Complaint   Patient presents with    Follow Up     43 year old female with history of TGA s/p correctional surgery in 1998, a history of VT with ablation and ICD placement as secondary prevention in 03/2014, now s/p recent new single chamber ICD system 6/1/15 presenting for follow up.       Vitals were taken and medications reconciled.    Eneida Ly CNA  1:12 PM

## 2024-04-12 NOTE — PATIENT INSTRUCTIONS
Thank you for visiting the Adult Congenital and Cardiovascular Genetics Clinic at the [facility].    Cardiology Providers you saw during your visit:  SANDRA Aragon MD    Diagnosis:  TGA    Results:  SANDRA Aragon MD reviewed the results of your lab, echo and device testing today in clinic.    ______________________________________________________________________________    Recommendations from your Cardiology Provider:    We have placed a referral for weight management.  If you don t hear from a representative within 2 business days, please call [phone].   We have placed a referral for sleep medicine. If you don't hear from a representative within 2 business days, please call [phone].       General Cardiac Recommendations:  Continue to eat a heart healthy, low salt diet.  Continue to get 20-30 minutes of aerobic activity, 4-5 days per week.  Examples of aerobic activity include walking, running, swimming, cycling, etc.  Continue to observe good oral hygiene, with regular dental visits.    ____________________________________________________________________________________________________    SBE prophylaxis:   Yes__X__  No____    SBE prophylaxis applies to patients with certain heart conditions who are recommended to take antibiotics before dental appointments and other specific procedures. These antibiotics are to help prevent an infection of the heart (endocarditis) that certain patients are at higher risk of developing. The guidelines used come from the American Heart Association and are periodically updated.    If YES is checked, follow the recommendations outlined below:  Take antibiotic(s) prior to recommended dental procedures and procedures involving the respiratory tract or procedures involving infections of the skin, muscle or bones.   SBE prophylaxis is not needed for routine gastrointestinal and genitourinary procedures (ie. Colonoscopy or vaginal delivery)  Observe good  oral hygiene daily, as advised by your dentist. Get regular professional dental care.  Keep cuts and other open injuries clean.  All infections should be treated as soon as possible.  Symptoms of Infective Endocarditis could include: fever lasting more than 4-5 days or a recurrent fever that initially resolves but returns within 1-2 days). Call us a [phone] if you are experiencing these symptoms.  ____________________________________________________________________________________________________    FASTING CHOLESTEROL was checked in the last 5 years YES__X__  NO____ (2024)  If no, please follow up with your primary care physician. You should have a cholesterol screening every 5 years at minimum, and every year if taking a medication for your cholesterol levels.     ____________________________________________________________________________________________________    Follow-up Plan:  Follow up with Dr Aragon in 1 year with an echo prior    ____________________________________________________________________________________________________    If you have questions or concerns, please call us at [phone] or contact us through [email or web address].    Blanca Coles RN, BSN    Tia Tia Rodriguez (Scheduling)  Nurse Care Coordinator     Clinic   Adult Congenital and CV Genetics   Adult Congenital and CV Genetic  [facility]  (P) [phone]     (P) [phone]  (F) [phone]     (F) [phone]      For after hours urgent needs, call [phone] and ask to speak to the Adult Congenital Physician on call.  Mention Job Code 0401.    For emergencies call 911.    [facility]  Mail Code [id number]  [address] [place] [address]

## 2024-04-12 NOTE — LETTER
4/12/2024      RE: Chikis Hannon  [address]  [facility] [address]       Dear Colleague,    Thank you for the opportunity to participate in the care of your patient, Chikis Hannon, at the [facility] [place] [facility]. Please see a copy of my visit note below.    CARDIOLOGY CONSULTATION:    Ms. 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       Today pt reports that from a cardiac standpoint she has been feeling well.   She denies any chest pain or pressure, sob/maher, orthopnea, pnd, palpitations, syncope/presyncope, change in exercise tolerance or any ICD shocks.      She has diabetes and was on metformin and then jardiance was added. Her A1C was 7.9 which is an improvement from 9.4 seven months ago.     She had a cardiac MRI 9/2023 and that showed her root at 4 X 3.7 CM and no obstruction in her conduit. She tried to do a CPX but stopped at RER 0.89 as she didn't like the mask and was concerned about the next speed. She still went 65% of predicted with no concerning findings.  Today's cholesterol panel showed LDL 79, Total Cholestrol 147, HDL 51.    Had some discomfort at site of ICD a few weeks ago. Device interrogation was unremarkable. Followed up with PCP and it was deemed to be musculoskeletal. Has subsequently resolved.  Patient denies chest pain/pressure/discomfort, palpitations, dyspnea on exertion, orthopnea, pedal  edema.      PAST MEDICAL HISTORY:  Past Medical History:   Diagnosis Date    Allergy, unspecified not elsewhere classified     Attention deficit disorder without mention of hyperactivity     Mild MR; difficulty attention span    Congenital heart disease     Corrected transposition of great vessels     still needs SBE prophlaxis    Diabetes mellitus type 2, noninsulin dependent (H) 2014    Impaired fasting glucose     103 7/06    Ventricular tachycardia (H) 03/04/2014    s/p ablation and ICD placement 3/13/14       CURRENT MEDICATIONS:  Current Outpatient Medications   Medication Sig Dispense Refill    ACE NOT PRESCRIBED, INTENTIONAL, 1 each as needed for other ACE Inhibitor not prescribed due to Other: BP at goal, pending microalbumin test 0 each 0    atorvastatin (LIPITOR) 80 MG tablet Take 1 tablet (80 mg) by mouth daily 90 tablet 3    bisacodyl (DULCOLAX) 5 MG EC tablet Take 2 tablets at 3 pm the day before your procedure. If your procedure is before 11 am, take 2 additional tablets at 11 pm. If your procedure is after 11 am, take 2 additional tablets at 6 am. For additional instructions refer to your colonoscopy prep instructions. 4 tablet 0    blood glucose (NO BRAND SPECIFIED) test strip Use to test blood sugar 1 times daily or as directed. To accompany: Blood Glucose Monitor Brands: per insurance. 100 strip 6    blood glucose monitoring (NO BRAND SPECIFIED) meter device kit Use to test blood sugar 1 times daily or as directed. Preferred blood glucose meter OR supplies to accompany: Blood Glucose Monitor Brands: per insurance. 1 kit 0    empagliflozin (JARDIANCE) 25 MG TABS tablet Take 1 tablet (25 mg) by mouth daily 90 tablet 1    etonogestrel (NEXPLANON) 68 MG IMPL 1 each (68 mg) by Subdermal route once      fluticasone (FLONASE) 50 MCG/ACT nasal spray SHAKE LIQUID AND USE 1 SPRAY IN EACH NOSTRIL DAILY 16 g 11    polyethylene glycol (GOLYTELY) 236 g suspension The night before the exam at 6 pm drink an  8-ounce glass every 15 minutes until the jug is half empty. If you arrive before 11 AM: Drink the other half of the Golytely jug at 11 PM night before procedure. If you arrive after 11 AM: Drink the other half of the Golytely jug at 6 AM day of procedure. For additional instructions refer to your colonoscopy prep instructions. 4000 mL 0    sertraline (ZOLOFT) 50 MG tablet Take 50 mg by mouth daily      thin (NO BRAND SPECIFIED) lancets Use with lanceting device. To accompany: Blood Glucose Monitor Brands: per insurance. 100 each 6       PAST SURGICAL HISTORY:  Past Surgical History:   Procedure Laterality Date    CARDIAC SURGERY  Had 4 surgeries    transposition of the major arteries    COLONOSCOPY N/A 1/16/2024    Procedure: Colonoscopy FV random biopsies using cold bx forcep;  Surgeon: Shaylee Cody MD;  Location:  GI    SURGICAL HISTORY OF -   01/01/2014    ablation for v. tach; ICD placed    wisdom teeth removal      [facility] NONSPECIFIC PROCEDURE  11/01/1998    open heart surgery x 3, transposition of major vessels    [facility] NONSPECIFIC PROCEDURE  10/01/2003    Additional heart surgery       ALLERGIES  Adhesive tape    FAMILY HX:  Family History   Adopted: Yes   Problem Relation Age of Onset    Family History Negative Mother     Family History Negative Father     Unknown/Adopted No family hx of        SOCIAL HX:  Social History     Socioeconomic History    Marital status:      Spouse name: None    Number of children: 0    Years of education: None    Highest education level: None   Occupational History    Occupation:  at [employer]     Employer: [employer]     Employer: DISABLED   Tobacco Use    Smoking status: Never    Smokeless tobacco: Never   Vaping Use    Vaping Use: Never used   Substance and Sexual Activity    Alcohol use: No     Alcohol/week: 0.0 standard drinks of alcohol    Drug use: No    Sexual activity: Not Currently     Partners: Male   Other Topics Concern    Special Diet No      Comment: working on dairy. fruits and veggies.    Exercise Yes     Comment: Peter Monzon tape    Parent/sibling w/ CABG, MI or angioplasty before 65F 55M? No     Social Determinants of Health     Financial Resource Strain: Low Risk  (8/22/2023)    Overall Financial Resource Strain (CARDIA)     Difficulty of Paying Living Expenses: Not hard at all   Food Insecurity: No Food Insecurity (8/22/2023)    Hunger Vital Sign     Worried About Running Out of Food in the Last Year: Never true     Ran Out of Food in the Last Year: Never true   Transportation Needs: No Transportation Needs (8/22/2023)    PRAPARE - Transportation     Lack of Transportation (Medical): No     Lack of Transportation (Non-Medical): No   Physical Activity: Inactive (8/22/2023)    Exercise Vital Sign     Days of Exercise per Week: 0 days     Minutes of Exercise per Session: 30 min   Stress: No Stress Concern Present (8/22/2023)    [employer] [place] [employer] - Occupational Stress Questionnaire     Feeling of Stress : Not at all   Social Connections: Moderately Integrated (8/22/2023)    Social Connection and Isolation Panel [NHANES]     Frequency of Communication with Friends and Family: Once a week     Frequency of Social Gatherings with Friends and Family: Twice a week     Attends Orthodoxy Services: 1 to 4 times per year     Active Member of Clubs or Organizations: Yes     Marital Status:    Housing Stability: Low Risk  (8/22/2023)    Housing Stability Vital Sign     Unable to Pay for Housing in the Last Year: No     Number of Places Lived in the Last Year: 1     Unstable Housing in the Last Year: No       ROS:  Constitutional: No fever, chills, or sweats. No weight gain/loss.   ENT: No visual disturbance, ear ache, epistaxis, sore throat.   Allergies/Immunologic: Negative.   Respiratory: No cough, hemoptysis.   Cardiovascular: As per HPI.   GI: No nausea, vomiting, hematemesis, melena, or hematochezia.   : No urinary  frequency, dysuria, or hematuria.   Integument: Negative.   Psychiatric: Negative.   Neuro: Negative.   Endocrinology: Negative.   Musculoskeletal: No myalgia.    VITAL SIGNS:  There were no vitals taken for this visit.  There is no height or weight on file to calculate BMI.  Wt Readings from Last 2 Encounters:   03/05/24 83 kg (182 lb 14.4 oz)   01/30/24 82.1 kg (181 lb)       PHYSICAL EXAM  Chikis Hannon IS A 43 year old female.in no acute distress.    Physical Exam:  GEN: pleasant, no acute distress  HEENT: No discharge  EYES: no icterus  CV: RRR, normal s1/s2, Systolic murmur present, no rubs/s3/s4, no heave. JVP not elevated.   CHEST: clear to ausculation bilaterally, no rales or wheezing  ABD: soft, non-tender, normal active bowel sounds  : no flank/suprapubic tenderness  EXTR: No clubbing, cyanosis or edema.   NEURO: alert oriented, speech fluent/appropriate, motor grossly nonfocal  PSYCH: cooperative, affect appropriate, pleasant      LABS    Lab Results   Component Value Date    WBC 8.5 08/22/2023    WBC 8.4 02/28/2020     Lab Results   Component Value Date    RBC 4.93 08/22/2023    RBC 4.75 02/28/2020     Lab Results   Component Value Date    HGB 14.1 08/22/2023    HGB 13.8 02/28/2020     Lab Results   Component Value Date    HCT 42.2 08/22/2023    HCT 41.9 02/28/2020     No components found for: MCT  Lab Results   Component Value Date    MCV 86 08/22/2023    MCV 88 02/28/2020     Lab Results   Component Value Date    MCH 28.6 08/22/2023    MCH 29.1 02/28/2020     Lab Results   Component Value Date    MCHC 33.4 08/22/2023    MCHC 32.9 02/28/2020     Lab Results   Component Value Date    RDW 11.9 08/22/2023    RDW 12.3 02/28/2020     Lab Results   Component Value Date     08/22/2023     02/28/2020      Recent Labs   Lab Test 08/22/23  1042 04/28/23  0905    137   POTASSIUM 4.3 4.0   CHLORIDE 102 102   CO2 25 25   ANIONGAP 11 10   * 189*   BUN 7.4 10.3   CR 0.55 0.57   LITA 9.1 9.7      Recent Labs   Lab Test 09/15/23  0811 08/22/23  1042   CHOL 120 198   HDL 44* 43*   LDL 64 134*   TRIG 61 106   NHDL 76 155*        Assessment and Plan:   #1 Complete Transposition of the Great Arteries, VSD, and pulmonic stenosis, s/p Rastelli procedure with VSD closure and RV to PA conduit (these procedures were done at [place] and [place] during early life per 's notes, complete details are not available currently, total of 4 surgeries)  #2 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 Hannon. I discussed her the results of her testing, which was reassuring.     Start Aspirin 81mg daily for prosthetic valve    Will plan echo and repeat labs in April 2025.      It was a pleasure to see her. Please do not hesitate to contact me with questions.      Plan of care discussed with Dr. Aragon who agrees with the above assessment and plan.    Sage Garza MD  Cardiology Fellow     ATTESTATION:  Ms. Hannon was seen and examined. Agree with note and mutually determined plan of care.  Doing well at this time with no concerning symptoms.  She is interested in seeing weight management and will also refer for sleep evaluation. Started baby ASA.    Follow up in a year. It was a pleasure to see her. Please do not hesitate to contact us with questions.   SANDRA Aragon MD  35 minutes face-face, documentation and review of records on day of visit

## 2024-04-12 NOTE — PROGRESS NOTES
"[facility] Metrics    Blood Pressure   {BP < 140/90:4606939::\"BP < 140/90 Yes\"}    On Aspirin  {On Aspirin Yes/No:1736339::\"Yes\"}    On Statin  {On Statin Yes/No:9020284::\"Yes\"}    Tobacco use  {Tobacco use Yes/No:8979768::\"No\"}    "

## 2024-04-12 NOTE — PROGRESS NOTES
CARDIOLOGY CONSULTATION:    Ms. 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       Today pt reports that from a cardiac standpoint she has been feeling well.   She denies any chest pain or pressure, sob/maher, orthopnea, pnd, palpitations, syncope/presyncope, change in exercise tolerance or any ICD shocks.      She has diabetes and was on metformin and then jardiance was added. Her A1C was 7.9 which is an improvement from 9.4 seven months ago.     She had a cardiac MRI 9/2023 and that showed her root at 4 X 3.7 CM and no obstruction in her conduit. She tried to do a CPX but stopped at RER 0.89 as she didn't like the mask and was concerned about the next speed. She still went 65% of predicted with no concerning findings.  Today's cholesterol panel showed LDL 79, Total Cholestrol 147, HDL 51.    Had some discomfort at site of ICD a few weeks ago. Device interrogation was unremarkable. Followed up with PCP and it was deemed to be musculoskeletal. Has subsequently resolved.  Patient denies chest pain/pressure/discomfort, palpitations, dyspnea on exertion, orthopnea, pedal edema.      PAST MEDICAL HISTORY:  Past Medical History:   Diagnosis Date    Allergy, unspecified not elsewhere classified     Attention deficit disorder without mention of hyperactivity     Mild MR; difficulty attention span    Congenital heart disease     Corrected transposition of great vessels     still needs SBE prophlaxis    Diabetes mellitus type  2, noninsulin dependent (H) 2014    Impaired fasting glucose     103 7/06    Ventricular tachycardia (H) 03/04/2014    s/p ablation and ICD placement 3/13/14       CURRENT MEDICATIONS:  Current Outpatient Medications   Medication Sig Dispense Refill    ACE NOT PRESCRIBED, INTENTIONAL, 1 each as needed for other ACE Inhibitor not prescribed due to Other: BP at goal, pending microalbumin test 0 each 0    atorvastatin (LIPITOR) 80 MG tablet Take 1 tablet (80 mg) by mouth daily 90 tablet 3    bisacodyl (DULCOLAX) 5 MG EC tablet Take 2 tablets at 3 pm the day before your procedure. If your procedure is before 11 am, take 2 additional tablets at 11 pm. If your procedure is after 11 am, take 2 additional tablets at 6 am. For additional instructions refer to your colonoscopy prep instructions. 4 tablet 0    blood glucose (NO BRAND SPECIFIED) test strip Use to test blood sugar 1 times daily or as directed. To accompany: Blood Glucose Monitor Brands: per insurance. 100 strip 6    blood glucose monitoring (NO BRAND SPECIFIED) meter device kit Use to test blood sugar 1 times daily or as directed. Preferred blood glucose meter OR supplies to accompany: Blood Glucose Monitor Brands: per insurance. 1 kit 0    empagliflozin (JARDIANCE) 25 MG TABS tablet Take 1 tablet (25 mg) by mouth daily 90 tablet 1    etonogestrel (NEXPLANON) 68 MG IMPL 1 each (68 mg) by Subdermal route once      fluticasone (FLONASE) 50 MCG/ACT nasal spray SHAKE LIQUID AND USE 1 SPRAY IN EACH NOSTRIL DAILY 16 g 11    polyethylene glycol (GOLYTELY) 236 g suspension The night before the exam at 6 pm drink an 8-ounce glass every 15 minutes until the jug is half empty. If you arrive before 11 AM: Drink the other half of the Golytely jug at 11 PM night before procedure. If you arrive after 11 AM: Drink the other half of the Golytely jug at 6 AM day of procedure. For additional instructions refer to your colonoscopy prep instructions. 4000 mL 0    sertraline (ZOLOFT)  50 MG tablet Take 50 mg by mouth daily      thin (NO BRAND SPECIFIED) lancets Use with lanceting device. To accompany: Blood Glucose Monitor Brands: per insurance. 100 each 6       PAST SURGICAL HISTORY:  Past Surgical History:   Procedure Laterality Date    CARDIAC SURGERY  Had 4 surgeries    transposition of the major arteries    COLONOSCOPY N/A 1/16/2024    Procedure: Colonoscopy FV random biopsies using cold bx forcep;  Surgeon: Shaylee Cody MD;  Location:  GI    SURGICAL HISTORY OF -   01/01/2014    ablation for v. tach; ICD placed    wisdom teeth removal      [facility] NONSPECIFIC PROCEDURE  11/01/1998    open heart surgery x 3, transposition of major vessels    [facility] NONSPECIFIC PROCEDURE  10/01/2003    Additional heart surgery       ALLERGIES  Adhesive tape    FAMILY HX:  Family History   Adopted: Yes   Problem Relation Age of Onset    Family History Negative Mother     Family History Negative Father     Unknown/Adopted No family hx of        SOCIAL HX:  Social History     Socioeconomic History    Marital status:      Spouse name: None    Number of children: 0    Years of education: None    Highest education level: None   Occupational History    Occupation:  at [employer]     Employer: [employer]     Employer: [employer]   Tobacco Use    Smoking status: Never    Smokeless tobacco: Never   Vaping Use    Vaping Use: Never used   Substance and Sexual Activity    Alcohol use: No     Alcohol/week: 0.0 standard drinks of alcohol    Drug use: No    Sexual activity: Not Currently     Partners: Male   Other Topics Concern    Special Diet No     Comment: working on dairy. fruits and veggies.    Exercise Yes     Comment: Peter siddiqui    Parent/sibling w/ CABG, MI or angioplasty before 65F 55M? No     Social Determinants of Health     Financial Resource Strain: Low Risk  (8/22/2023)    Overall Financial Resource Strain (CARDIA)     Difficulty of Paying Living Expenses: Not hard at all   Food  Insecurity: No Food Insecurity (8/22/2023)    Hunger Vital Sign     Worried About Running Out of Food in the Last Year: Never true     Ran Out of Food in the Last Year: Never true   Transportation Needs: No Transportation Needs (8/22/2023)    PRAPARE - Transportation     Lack of Transportation (Medical): No     Lack of Transportation (Non-Medical): No   Physical Activity: Inactive (8/22/2023)    Exercise Vital Sign     Days of Exercise per Week: 0 days     Minutes of Exercise per Session: 30 min   Stress: No Stress Concern Present (8/22/2023)    [place] [place] of [employer]ational Health - Occupational Stress Questionnaire     Feeling of Stress : Not at all   Social Connections: Moderately Integrated (8/22/2023)    Social Connection and Isolation Panel [NHANES]     Frequency of Communication with Friends and Family: Once a week     Frequency of Social Gatherings with Friends and Family: Twice a week     Attends Judaism Services: 1 to 4 times per year     Active Member of Clubs or Organizations: Yes     Marital Status:    Housing Stability: Low Risk  (8/22/2023)    Housing Stability Vital Sign     Unable to Pay for Housing in the Last Year: No     Number of Places Lived in the Last Year: 1     Unstable Housing in the Last Year: No       ROS:  Constitutional: No fever, chills, or sweats. No weight gain/loss.   ENT: No visual disturbance, ear ache, epistaxis, sore throat.   Allergies/Immunologic: Negative.   Respiratory: No cough, hemoptysis.   Cardiovascular: As per HPI.   GI: No nausea, vomiting, hematemesis, melena, or hematochezia.   : No urinary frequency, dysuria, or hematuria.   Integument: Negative.   Psychiatric: Negative.   Neuro: Negative.   Endocrinology: Negative.   Musculoskeletal: No myalgia.    VITAL SIGNS:  There were no vitals taken for this visit.  There is no height or weight on file to calculate BMI.  Wt Readings from Last 2 Encounters:   03/05/24 83 kg (182 lb 14.4 oz)   01/30/24 82.1  kg (181 lb)       PHYSICAL EXAM  Chikis Hannon IS A 43 year old female.in no acute distress.    Physical Exam:  GEN: pleasant, no acute distress  HEENT: No discharge  EYES: no icterus  CV: RRR, normal s1/s2, Systolic murmur present, no rubs/s3/s4, no heave. JVP not elevated.   CHEST: clear to ausculation bilaterally, no rales or wheezing  ABD: soft, non-tender, normal active bowel sounds  : no flank/suprapubic tenderness  EXTR: No clubbing, cyanosis or edema.   NEURO: alert oriented, speech fluent/appropriate, motor grossly nonfocal  PSYCH: cooperative, affect appropriate, pleasant      LABS    Lab Results   Component Value Date    WBC 8.5 08/22/2023    WBC 8.4 02/28/2020     Lab Results   Component Value Date    RBC 4.93 08/22/2023    RBC 4.75 02/28/2020     Lab Results   Component Value Date    HGB 14.1 08/22/2023    HGB 13.8 02/28/2020     Lab Results   Component Value Date    HCT 42.2 08/22/2023    HCT 41.9 02/28/2020     No components found for: MCT  Lab Results   Component Value Date    MCV 86 08/22/2023    MCV 88 02/28/2020     Lab Results   Component Value Date    MCH 28.6 08/22/2023    MCH 29.1 02/28/2020     Lab Results   Component Value Date    MCHC 33.4 08/22/2023    MCHC 32.9 02/28/2020     Lab Results   Component Value Date    RDW 11.9 08/22/2023    RDW 12.3 02/28/2020     Lab Results   Component Value Date     08/22/2023     02/28/2020      Recent Labs   Lab Test 08/22/23  1042 04/28/23  0905    137   POTASSIUM 4.3 4.0   CHLORIDE 102 102   CO2 25 25   ANIONGAP 11 10   * 189*   BUN 7.4 10.3   CR 0.55 0.57   LITA 9.1 9.7     Recent Labs   Lab Test 09/15/23  0811 08/22/23  1042   CHOL 120 198   HDL 44* 43*   LDL 64 134*   TRIG 61 106   NHDL 76 155*        Assessment and Plan:   #1 Complete Transposition of the Great Arteries, VSD, and pulmonic stenosis, s/p Rastelli procedure with VSD closure and RV to PA conduit (these procedures were done at [place] and [place] during early  life per 's notes, complete details are not available currently, total of 4 surgeries)  #2 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 Hannon. I discussed her the results of her testing, which was reassuring.     Start Aspirin 81mg daily for prosthetic valve    Will plan echo and repeat labs in April 2025.      It was a pleasure to see her. Please do not hesitate to contact me with questions.      Plan of care discussed with Dr. Aragon who agrees with the above assessment and plan.    Sage Garza MD  Cardiology Fellow     ATTESTATION:  Ms. Hannon was seen and examined. Agree with note and mutually determined plan of care.  Doing well at this time with no concerning symptoms.  She is interested in seeing weight management and will also refer for sleep evaluation. Started baby ASA.    Follow up in a year. It was a pleasure to see her. Please do not hesitate to contact us with questions.   SANDRA Aragon MD  35 minutes face-face, documentation and review of records on day of visit

## 2024-04-16 ENCOUNTER — TELEPHONE (OUTPATIENT)
Dept: FAMILY MEDICINE | Facility: CLINIC | Age: 44
End: 2024-04-16
Payer: MEDICARE

## 2024-04-16 DIAGNOSIS — F41.9 ANXIETY: ICD-10-CM

## 2024-04-16 DIAGNOSIS — F33.1 MODERATE EPISODE OF RECURRENT MAJOR DEPRESSIVE DISORDER (H): Primary | ICD-10-CM

## 2024-04-16 NOTE — TELEPHONE ENCOUNTER
Medication Question or Refill        What medication are you calling about (include dose and sig)?: Sertraline 50mg    Preferred Pharmacy:   [facility] [address] [place] [address] [facility] [facility] [address]  Phone: [phone] Fax: [phone]      Controlled Substance Agreement on file:   CSA -- Patient Level:    CSA: None found at the patient level.       Who prescribed the medication?:     Do you need a refill? Yes    When did you use the medication last? April 16th    Patient offered an appointment? No    Do you have any questions or concerns?  Yes: Took last medication today.  All out      Could we send this information to you in [employer] or would you prefer to receive a phone call?:   Patient would prefer a phone call   Okay to leave a detailed message?: Yes at Home number on file [phone] (home)

## 2024-06-10 ENCOUNTER — ANCILLARY PROCEDURE (OUTPATIENT)
Dept: CARDIOLOGY | Facility: CLINIC | Age: 44
End: 2024-06-10
Attending: INTERNAL MEDICINE
Payer: MEDICARE

## 2024-06-10 DIAGNOSIS — Z86.79 HX OF VENTRICULAR TACHYCARDIA: ICD-10-CM

## 2024-06-10 DIAGNOSIS — Q20.3 D-TGA (DEXTRO-TRANSPOSITION OF GREAT ARTERIES): ICD-10-CM

## 2024-06-10 PROCEDURE — 93295 DEV INTERROG REMOTE 1/2/MLT: CPT | Performed by: INTERNAL MEDICINE

## 2024-06-10 PROCEDURE — 93296 REM INTERROG EVL PM/IDS: CPT

## 2024-06-11 ENCOUNTER — VIRTUAL VISIT (OUTPATIENT)
Dept: CARDIOLOGY | Facility: CLINIC | Age: 44
End: 2024-06-11
Attending: INTERNAL MEDICINE
Payer: MEDICARE

## 2024-06-11 VITALS — BODY MASS INDEX: 34.78 KG/M2 | HEIGHT: 62 IN | WEIGHT: 189 LBS

## 2024-06-11 DIAGNOSIS — Q20.3 D-TGA (DEXTRO-TRANSPOSITION OF GREAT ARTERIES): ICD-10-CM

## 2024-06-11 DIAGNOSIS — Z95.810 AUTOMATIC IMPLANTABLE CARDIOVERTER-DEFIBRILLATOR IN SITU: ICD-10-CM

## 2024-06-11 DIAGNOSIS — Z86.79 HX OF VENTRICULAR TACHYCARDIA: ICD-10-CM

## 2024-06-11 DIAGNOSIS — Q24.9 ADULT CONGENITAL HEART DISEASE: Primary | ICD-10-CM

## 2024-06-11 PROCEDURE — 99214 OFFICE O/P EST MOD 30 MIN: CPT | Mod: GT | Performed by: INTERNAL MEDICINE

## 2024-06-11 ASSESSMENT — PAIN SCALES - GENERAL: PAINLEVEL: NO PAIN (0)

## 2024-06-11 NOTE — LETTER
6/11/2024      RE: Chikis Hannon  [id number]  [facility] [address]       Dear Colleague,    Thank you for the opportunity to participate in the care of your patient, Chikis Hannon, at the [facility] [place] at [facility]. Please see a copy of my visit note below.        [facility] ELECTROPHYSIOLOGY CLINIC VISIT    Assessment/Recommendations   Assessment/Plan:    Ms. 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 She presents today for follow up.       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 Normal systemic ventricular function  2. No further VT episodes for years, off AAT  3. Review of impedance trend shows stable RV tip to RV coil impedance. Lead is programmed: RV pace polarity and RV sense polarity to Tip-Coil. Pacing impedance alert has been turned off and lead integrity alert left on.  Lead integrity alert looks at programmed vector and pacing  impedance alert looks at all vectors.  This will provide an alert if there is change in impedance tip-coil. We discussed that if further lead deterioration or integrity alerts despite this programming, then we would need to replace lead. Currently, lead impedance is stable. She will continue to follow with device clinic per routine and has home monitoring active.     Follow up in 2 years or sooner if need arises.        History of Present Illness/Subjective    Ms. Chikis Hannon is a 43 year old female who comes in today for EP follow-up of ICD lead integrity alert.    Ms. 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     She presented in 3/2014 after having sustained symptomatic VT She was discharged on sotalol 120 mg twice a day. She was seen in clinic on 4/22/14 and her device interrogation showed no recurrence of ventricular arrhythmias. She reported some mild fatigue on Sotalol and her dose was decreased to 80 mg BID. At her July 2014 follow up appointment she only had one 4 second NSVT at 160 bpm which was asymptomatic. At her clinic visit in 2/15 she again had no ventricular arrhythmias and her Sotalol was stopped. After recurrence of VT in June 2015, the patient was restarted on sotalol 80 mg twice a day. We re-attempted VT ablation after holding sotalol for one week on 8/24/2015 with no  inducible VT.  [facility] EP Follow up 9/22/15: She presents today for follow-up.  Her device interrogation shows no ventricular arrhythmias.  Her device site has healed well. She denies any syncope or presyncope, no chest pain or shortness of breath on exertion at rest, no orthopnea, no lower extremity edema. Her EKG shows normal sinus rhythm with RBBB,  ms.  [facility] EP Visit 9/23/17: She presents today for follow up. She reports feeling well. She denies any chest pain, dizziness, lightheadedness, shortness of breath, palpitations, leg/ankle swelling, PND, orthopnea, or syncopal symptoms. She has started exercise classes and is increasing her activity levels. Device interrogation shows normal device function.  17 episodes recorded. 16 VT zone monitored events when she was exercising EGMS shows ST with rates of 168-172 BPM.  1 NSVT for 4 beats at a rate of 211 for 2 second duration.  Intrinsic rhythm = SR @ 90 bpm. = <0.1%. OptiVol fluid index is at baseline. No short v-v intervals recorded.  RV lead impedance trends appear stable. Presenting 12 lead ECG shows SR with RBBB Vent Rate 84 bpm,  ms,  ms, QTc 505 ms. Current cardiac medications include: Sotalol.   Ep Visit 9/2017: She presents today for follow up. She reports feeling well. She denies any chest pain/pressures, dizziness, lightheadedness, shortness of breath, leg/ankle swelling, PND, orthopnea, palpitations, or syncopal symptoms. She continues to exercise and is without limitations. Device interrogation shows normal device function.  2 NSVT episodes recorded, lasting 1-2 seconds. 8 episodes recorded as VT, these EGMs are consistent with ST.  <0.1%. No short v-v intervals recorded. Lead trends appear stable. Presenting 12 lead ECG shows NSR with RBBB Vent Rate 85 bpm,  ms,  ms, QTc 504 ms. Current cardiac medications include: Sotalol.      Ep Visit 2/20/20: She presents today for follow up. She reports feeling well. She states  she has not been taking her Sotalol for the last many months. She denies any chest pain/pressures, dizziness, lightheadedness, worsening shortness of breath, leg/ankle swelling, PND, orthopnea, palpitations, or syncopal symptoms. Device interrogation shows  normal ICD function.  Since last remote transmission on 10/29/19, there have been 5 SVT-Wavelet, 7 monitored VT and 2 NSVT episodes recorded.  The SVT episodes last 5-30 seconds at 167-176 bpm, occur with activity and appear to be ST.  The monitored VT episodes last 5-56 seconds at rates of 167-171 bpm.  These also occur with activity and appear  to be ST with several occurring on 11/27/19.  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  The NSVT episodes last 1 second in duration @ 231-226 bpm.   Intrinsic rhythm = SR @ 80 bpm.    =<0.1%. No short v-v intervals recorded.  Lead trends appear stable.  Echo today is stable with normal systemic function. No current cardiac medications.      EP Visit 3/26/21: She presents today for follow up. She reports feeling well. She denies any chest pain/pressures, dizziness, lightheadedness, worsening shortness of breath, leg/ankle swelling, PND, orthopnea, palpitations, or syncopal symptoms. Recent echo showed normal RV size/function, mildly dialted LV with  LVEF 47%, with no significant changes from prior echo. Device interrogation shows normal device function, stable lead parameters,  <0.1%, 46 VT and 2 NSVT all EGMs suggest ST and not true arrhythmia. No current cardiac medications.     EP Visit 10/13/21: She presents today for follow up. She recently had a ICD alarm that prompted her to go in for evaluation. Investigation showed this was a lead integrity alert. Red Alert for Lead integrity was triggered secondary to increase in RV impedance >75% and multiple short R-R intervals. Max impedance was approximately 1000 ohms.  Review of impedance trend  shows stable RV tip to RV coil impedance. Tech services recommends programming RV pace polarity and RV sense polarity to Tip-Coil.  They recommend turning off pacing impedance alert and leaving lead integrity alert on.  Lead integrity alert looks at programmed vector and pacing impedance alert looks at all vectors.  This will provide an alert if there is change in impedance tip-coil. She was brought into clinic and these changes were made. She reports feeling well. She denies chest discomfort, palpitations, abdominal fullness/bloating or peripheral edema, shortness of breath, paroxysmal nocturnal dyspnea, orthopnea, lightheadedness, dizziness, pre-syncope, or syncope. No current cardiac medications.     EP Visit 4/1/22: She presents today for follow up. She presents today for follow up. She reports feeling well. She denies chest discomfort, palpitations, abdominal fullness/bloating or peripheral edema, shortness of breath, paroxysmal nocturnal dyspnea, orthopnea, lightheadedness, dizziness, pre-syncope, or syncope. Device interrogation shows normal device interrogation, RV lead impedance has had warning in past currently 304 ohms, stable lead parameters, 1 NSVT episode recorded, lasting 2 minutes, at 171 bpm. 2 SVT episodes recorded, lasting 6-31 seconds, at 167 bpm - classified as SVT per Wavelet algorithm. EGM reveals similar morphologies, and  <0.1% . No current cardiac medications.     EP Visit 4/28/23: She presents today for follow up. She reports feeling well and offers no complaints. She denies chest discomfort, palpitations, abdominal fullness/bloating or peripheral edema, shortness of breath, paroxysmal nocturnal dyspnea, orthopnea, lightheadedness, dizziness, pre-syncope, or syncope. Presenting 12 lead ECG shows SR with PVC Vent Rate 92 bpm,  ms,  ms, QTc 507 ms. Device interrogation shows normal device function, stable lead parameters, 4 PSVT up to 55 seconds, and  <0.1%. No current  cardiac medications.     She presents today for follow up. She reports feeling well. No device therapies over last year. She denies chest discomfort, palpitations, abdominal fullness/bloating or peripheral edema, shortness of breath, paroxysmal nocturnal dyspnea, orthopnea, lightheadedness, dizziness, pre-syncope, or syncope. An echo from 4/2024 showed systemic EF 41-46%, mild/moderate PI, mild AI. Device interrogation shows normal device function, stable lead parameters, no true ventricular arrhyhmias recorded, and  0%. Current cardiac medications: atorvastatin, empagliflozin.       I have reviewed and updated the patient's Past Medical History, Social History, Family History and Medication List.     Cardiographics (Personally Reviewed) :   LAST CARDIAC MRA:  3/6/14  FINDINGS:   SITUS: There is a normal spleen in the left upper quadrant. There is situs solitus in the chest, as demonstrated by a normal airway pulmonary artery relationship bilaterally.   CAVAE: Single right-sided inferior and superior vena cavae drain normally into the right atrium unobstructed.   PULMONARY VEINS: Two right and two left pulmonary veins drain into the left atrium unobstructed.   ATRIA: There is no interatrial communication demonstrated. The atrial sizes are normal.   ATRIOVENTRICULAR CONNECTION: Concordant. Separate mitral and tricuspid valves without evidence of significant regurgitation or stenosis.   VENTRICLES: D-loop ventricles with levocardia. Ventricles are normal in size and contraction. Right ventricular end-diastolic volume is upper limits of normal for BSA. If utilizing an ideal body weight, this would be mildly enlarged. No residual interventricular communication is demonstrated. No myocardial hyperenhancement identified on delayed post gadolinium imaging, except at the ventricular insertion points.   VENTRICULOARTERIAL CONNECTION: Concordant. Trileaflet aortic valve without regurgitation or stenosis. The oversewn native  pulmonary artery is directly posterior to the aorta. There is now an RV to PA conduit. Mild stenosis of the RVOT conduit with moderate regurgitation, 31% by direct flow measurement, and 33% by ventricular stroke volume comparison.   AORTA AND SUPRA-AORTIC VESSELS: A left-sided aortic arch is demonstrated with normal cervical branching pattern. No evidence of patent ductus arteriosus, coarctation, or aortopulmonary collateral arteries. The coronary arteries are not well visualized in this study.   PULMONARY ARTERY: The RV to PA conduit is patent with normal pulmonary artery branching pattern.   LAST ECHO:  2/2020  ------CONCLUSIONS------  Technically difficult study due to poor acoustic windows. D-TGA status post  Rastelli and subsequent placement of a bio-prosthetic valve in the pulmonary  position. There is no obvious residual ventricular level shunt. Mild (1+)  aortic valve insufficiency. Unable to adequately visualize the RV-PA conduit  or the bio-prosthetic valve; however, normal antegrade flow is visualized in  the branch pulmonary arteries. A transvenous pacemaker lead is seen in the  right ventricle, with the tip of the lead implanted into the right ventricular  apex. Trivial tricuspid valve insufficiency. Estimated right ventricular  systolic pressure is 26 mmHg plus right atrial pressure. The left and right  ventricles have normal chamber size, wall thickness, and systolic function.  The calculated biplane left ventricular ejection fraction is 57%. No  pericardial effusion.  No significant change from last echocardiogram.    4/12/24 ECHO:  ##### CONCLUSIONS #####  Technically difficult study due to poor acoustic windows. D-TGA status-post  Rastelli and RV-PA conduit and subsequent placement of a bio-prosthetic valve  in the pulmonary position.     Mild tricuspid valve regurgitation; estimated right ventricular systolic  pressure 23 mmHg plus right atrial pressure. Normal right ventricular size and  systolic  function. Unable to adequately visualize the RV-PA conduit or the  bio-prosthetic valve. There is mild to moderate prosthetic pulmonary  insufficiency by spectral Doppler. The peak gradient across the prosthetic  pulmonary valve is 13 mmHg. There is unobstructed flow through the left  ventricular outflow tract. Mild aortic valve insufficiency. Normal left  ventricular size with normal wall thickness and mildly-depressed systolic  function. The calculated single plane left ventricular ejection fraction from  the 4 chamber view is 41-46 %. A transvenous pacemaker lead is seen in the  right ventricle, with the tip of the lead implanted into the right ventricular  apex. No effusion.    7/25/19 CTA:  IMPRESSION:  1.  D-transposition of aorta s/p Rastelli with bioprosthetic pulmonic  valve  2.  Mildly reduced left ventricular function with LV EF 52%.  3.  Normal RV size and function with RV EF 55%.  4.  RV-PA valved conduit without visual evidence of stenosis.  5.  Mild pulmonic regurgitation (<30 mL) using biventricular stroke  volumes.  6.  No significant change from CT scan in 2014.  7.  Please review radiology review for incidental non-cardiovascular  findings to follow separately     3/23/21 ECHO:  ##### CONCLUSIONS #####  Technically difficult study due to poor acoustic windows. D-TGA status-post  Rastelli and subsequent placement of a bio-prosthetic valve in the pulmonary  position.     Trivial tricuspid valve regurgitation; estimated right ventricular systolic  pressure 26mmHg plus right atrial pressure. Normal right ventricular size and  systolic function. Unable to adequately visualize the RV-PA conduit or the  bio-prosthetic valve; normal antegrade flow profile at the valve annulus; no  obvious regurgitation. Mildly dilated left ventricle with normal wall  thickness and mildly-depressed systolic function; biplane LV EF 47%. A  transvenous pacemaker lead is seen in the right ventricle, with the tip of the  lead  "implanted into the right ventricular apex. No effusion.     No significant change from last echocardiogram.       Physical Examination   Ht 1.575 m (5' 2\")   Wt 85.7 kg (189 lb)   BMI 34.57 kg/m     Wt Readings from Last 3 Encounters:   04/12/24 85 kg (187 lb 4.8 oz)   03/05/24 83 kg (182 lb 14.4 oz)   01/30/24 82.1 kg (181 lb)     The rest of a comprehensive physical examination is deferred due to nature of video visit restrictions.  CONSITUTIONAL: no acute distress  HEENT: no icterus, no redness or discharge, neck supple  CV: no visible edema of visualized extremities. No JVD.   RESPIRATORY: respirations nonlabored, no cough  NEURO: AA&Ox3, speech fluent/appropriate, motor grossly nonfocal  PSYCH: cooperative, affect appropriate  DERM: no rashes on visualized face/neck/upper extremities         Medications  Allergies   Current Outpatient Medications   Medication Sig Dispense Refill     ACE NOT PRESCRIBED, INTENTIONAL, 1 each as needed for other ACE Inhibitor not prescribed due to Other: BP at goal, pending microalbumin test 0 each 0     atorvastatin (LIPITOR) 80 MG tablet Take 1 tablet (80 mg) by mouth daily 90 tablet 3     bisacodyl (DULCOLAX) 5 MG EC tablet Take 2 tablets at 3 pm the day before your procedure. If your procedure is before 11 am, take 2 additional tablets at 11 pm. If your procedure is after 11 am, take 2 additional tablets at 6 am. For additional instructions refer to your colonoscopy prep instructions. 4 tablet 0     blood glucose (NO BRAND SPECIFIED) test strip Use to test blood sugar 1 times daily or as directed. To accompany: Blood Glucose Monitor Brands: per insurance. 100 strip 6     blood glucose monitoring (NO BRAND SPECIFIED) meter device kit Use to test blood sugar 1 times daily or as directed. Preferred blood glucose meter OR supplies to accompany: Blood Glucose Monitor Brands: per insurance. 1 kit 0     empagliflozin (JARDIANCE) 25 MG TABS tablet Take 1 tablet (25 mg) by mouth daily " 90 tablet 1     etonogestrel (NEXPLANON) 68 MG IMPL 1 each (68 mg) by Subdermal route once       fluticasone (FLONASE) 50 MCG/ACT nasal spray SHAKE LIQUID AND USE 1 SPRAY IN EACH NOSTRIL DAILY 16 g 11     polyethylene glycol (GOLYTELY) 236 g suspension The night before the exam at 6 pm drink an 8-ounce glass every 15 minutes until the jug is half empty. If you arrive before 11 AM: Drink the other half of the Golytely jug at 11 PM night before procedure. If you arrive after 11 AM: Drink the other half of the Golytely jug at 6 AM day of procedure. For additional instructions refer to your colonoscopy prep instructions. 4000 mL 0     sertraline (ZOLOFT) 50 MG tablet Take 1 tablet (50 mg) by mouth daily 90 tablet 1     thin (NO BRAND SPECIFIED) lancets Use with lanceting device. To accompany: Blood Glucose Monitor Brands: per insurance. 100 each 6    Allergies   Allergen Reactions     Adhesive Tape Rash         Lab Results (Personally Reviewed)    Chemistry/lipid CBC Cardiac Enzymes/BNP/TSH/INR   Lab Results   Component Value Date    BUN 7.4 08/22/2023     08/22/2023    CO2 25 08/22/2023     Creatinine   Date Value Ref Range Status   08/22/2023 0.55 0.51 - 0.95 mg/dL Final   03/23/2021 0.61 0.52 - 1.04 mg/dL Final       Lab Results   Component Value Date    CHOL 147 04/12/2024    HDL 51 04/12/2024    LDL 79 04/12/2024      Lab Results   Component Value Date    WBC 8.5 08/22/2023    HGB 14.1 08/22/2023    HCT 42.2 08/22/2023    MCV 86 08/22/2023     08/22/2023    Lab Results   Component Value Date    TSH 1.93 03/05/2024    INR 1.13 06/01/2015        Video-Visit Details    Type of service:  Video Visit   Video Start Time:  11:45  Video End Time: 12:15    Originating Location (pt. Location): Home  Distant Location (provider location):  On-site  Platform used for Video Visit: Dale    I have reviewed the note as documented above.  This accurately captures the substance of my virtual visit with the patient. The  patient states understanding and is agreeable with the plan.   Vimal Ray MD [facility]  Cardiology - Electrophysiology        Total time spent on patient visit, reviewing notes, imaging, labs, orders, and completing necessary documentation: 30 minutes.  >50% of visit spent on counseling patient and/or coordination of care.                   Please do not hesitate to contact me if you have any questions/concerns.     Sincerely,     Vimal Ray MD

## 2024-06-11 NOTE — PROGRESS NOTES
[facility] ELECTROPHYSIOLOGY CLINIC VISIT    Assessment/Recommendations   Assessment/Plan:    Ms. 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 She presents today for follow up.       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 Normal systemic ventricular function  2. No further VT episodes for years, off AAT  3. Review of impedance trend shows stable RV tip to RV coil impedance. Lead is programmed: RV pace polarity and RV sense polarity to Tip-Coil. Pacing impedance alert has been turned off and lead integrity alert left on.  Lead integrity alert looks at programmed vector and pacing impedance alert looks at all vectors.  This will provide an alert if there is change in impedance tip-coil. We discussed that if further lead deterioration or integrity alerts despite this programming, then we would need to replace lead. Currently, lead impedance is stable. She will continue to follow with device clinic per routine and has home  monitoring active.     Follow up in 2 years or sooner if need arises.        History of Present Illness/Subjective    Ms. Chikis Hannon is a 43 year old female who comes in today for EP follow-up of ICD lead integrity alert.    Ms. 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     She presented in 3/2014 after having sustained symptomatic VT She was discharged on sotalol 120 mg twice a day. She was seen in clinic on 4/22/14 and her device interrogation showed no recurrence of ventricular arrhythmias. She reported some mild fatigue on Sotalol and her dose was decreased to 80 mg BID. At her July 2014 follow up appointment she only had one 4 second NSVT at 160 bpm which was asymptomatic. At her clinic visit in 2/15 she again had no ventricular arrhythmias and her Sotalol was stopped. After recurrence of VT in June 2015, the patient was restarted on sotalol 80 mg twice a day. We re-attempted VT ablation after holding sotalol for one week on 8/24/2015 with no inducible VT.  [facility] EP Follow up 9/22/15: She presents today for follow-up.  Her device interrogation shows no ventricular arrhythmias.  Her device site has healed well. She denies any syncope or presyncope, no chest pain or shortness of breath on exertion at rest, no orthopnea, no lower extremity edema. Her EKG shows normal sinus rhythm with  RBBB,  ms.  [facility] EP Visit 9/23/17: She presents today for follow up. She reports feeling well. She denies any chest pain, dizziness, lightheadedness, shortness of breath, palpitations, leg/ankle swelling, PND, orthopnea, or syncopal symptoms. She has started exercise classes and is increasing her activity levels. Device interrogation shows normal device function.  17 episodes recorded. 16 VT zone monitored events when she was exercising EGMS shows ST with rates of 168-172 BPM.  1 NSVT for 4 beats at a rate of 211 for 2 second duration.  Intrinsic rhythm = SR @ 90 bpm. = <0.1%. OptiVol fluid index is at baseline. No short v-v intervals recorded.  RV lead impedance trends appear stable. Presenting 12 lead ECG shows SR with RBBB Vent Rate 84 bpm,  ms,  ms, QTc 505 ms. Current cardiac medications include: Sotalol.   Ep Visit 9/2017: She presents today for follow up. She reports feeling well. She denies any chest pain/pressures, dizziness, lightheadedness, shortness of breath, leg/ankle swelling, PND, orthopnea, palpitations, or syncopal symptoms. She continues to exercise and is without limitations. Device interrogation shows normal device function.  2 NSVT episodes recorded, lasting 1-2 seconds. 8 episodes recorded as VT, these EGMs are consistent with ST.  <0.1%. No short v-v intervals recorded. Lead trends appear stable. Presenting 12 lead ECG shows NSR with RBBB Vent Rate 85 bpm,  ms,  ms, QTc 504 ms. Current cardiac medications include: Sotalol.      Ep Visit 2/20/20: She presents today for follow up. She reports feeling well. She states she has not been taking her Sotalol for the last many months. She denies any chest pain/pressures, dizziness, lightheadedness, worsening shortness of breath, leg/ankle swelling, PND, orthopnea, palpitations, or syncopal symptoms. Device interrogation shows  normal ICD function.  Since last remote transmission on 10/29/19, there have been 5  SVT-Wavelet, 7 monitored VT and 2 NSVT episodes recorded.  The SVT episodes last 5-30 seconds at 167-176 bpm, occur with activity and appear to be ST.  The monitored VT episodes last 5-56 seconds at rates of 167-171 bpm.  These also occur with activity and appear  to be ST with several occurring on 11/27/19.  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  The NSVT episodes last 1 second in duration @ 231-226 bpm.   Intrinsic rhythm = SR @ 80 bpm.    =<0.1%. No short v-v intervals recorded.  Lead trends appear stable.  Echo today is stable with normal systemic function. No current cardiac medications.      EP Visit 3/26/21: She presents today for follow up. She reports feeling well. She denies any chest pain/pressures, dizziness, lightheadedness, worsening shortness of breath, leg/ankle swelling, PND, orthopnea, palpitations, or syncopal symptoms. Recent echo showed normal RV size/function, mildly dialted LV with  LVEF 47%, with no significant changes from prior echo. Device interrogation shows normal device function, stable lead parameters,  <0.1%, 46 VT and 2 NSVT all EGMs suggest ST and not true arrhythmia. No current cardiac medications.     EP Visit 10/13/21: She presents today for follow up. She recently had a ICD alarm that prompted her to go in for evaluation. Investigation showed this was a lead integrity alert. Red Alert for Lead integrity was triggered secondary to increase in RV impedance >75% and multiple short R-R intervals. Max impedance was approximately 1000 ohms.  Review of impedance trend shows stable RV tip to RV coil impedance. [employer] recommends programming RV pace polarity and RV sense polarity to Tip-Coil.  They recommend turning off pacing impedance alert and leaving lead integrity alert on.  Lead integrity alert looks at programmed vector and pacing impedance alert looks at all vectors.  This will provide an alert  if there is change in impedance tip-coil. She was brought into clinic and these changes were made. She reports feeling well. She denies chest discomfort, palpitations, abdominal fullness/bloating or peripheral edema, shortness of breath, paroxysmal nocturnal dyspnea, orthopnea, lightheadedness, dizziness, pre-syncope, or syncope. No current cardiac medications.     EP Visit 4/1/22: She presents today for follow up. She presents today for follow up. She reports feeling well. She denies chest discomfort, palpitations, abdominal fullness/bloating or peripheral edema, shortness of breath, paroxysmal nocturnal dyspnea, orthopnea, lightheadedness, dizziness, pre-syncope, or syncope. Device interrogation shows normal device interrogation, RV lead impedance has had warning in past currently 304 ohms, stable lead parameters, 1 NSVT episode recorded, lasting 2 minutes, at 171 bpm. 2 SVT episodes recorded, lasting 6-31 seconds, at 167 bpm - classified as SVT per Wavelet algorithm. EGM reveals similar morphologies, and  <0.1% . No current cardiac medications.     EP Visit 4/28/23: She presents today for follow up. She reports feeling well and offers no complaints. She denies chest discomfort, palpitations, abdominal fullness/bloating or peripheral edema, shortness of breath, paroxysmal nocturnal dyspnea, orthopnea, lightheadedness, dizziness, pre-syncope, or syncope. Presenting 12 lead ECG shows SR with PVC Vent Rate 92 bpm,  ms,  ms, QTc 507 ms. Device interrogation shows normal device function, stable lead parameters, 4 PSVT up to 55 seconds, and  <0.1%. No current cardiac medications.     She presents today for follow up. She reports feeling well. No device therapies over last year. She denies chest discomfort, palpitations, abdominal fullness/bloating or peripheral edema, shortness of breath, paroxysmal nocturnal dyspnea, orthopnea, lightheadedness, dizziness, pre-syncope, or syncope. An echo from 4/2024  showed systemic EF 41-46%, mild/moderate PI, mild AI. Device interrogation shows normal device function, stable lead parameters, no true ventricular arrhyhmias recorded, and  0%. Current cardiac medications: atorvastatin, empagliflozin.       I have reviewed and updated the patient's Past Medical History, Social History, Family History and Medication List.     Cardiographics (Personally Reviewed) :   LAST CARDIAC MRA:  3/6/14  FINDINGS:   SITUS: There is a normal spleen in the left upper quadrant. There is situs solitus in the chest, as demonstrated by a normal airway pulmonary artery relationship bilaterally.   CAVAE: Single right-sided inferior and superior vena cavae drain normally into the right atrium unobstructed.   PULMONARY VEINS: Two right and two left pulmonary veins drain into the left atrium unobstructed.   ATRIA: There is no interatrial communication demonstrated. The atrial sizes are normal.   ATRIOVENTRICULAR CONNECTION: Concordant. Separate mitral and tricuspid valves without evidence of significant regurgitation or stenosis.   VENTRICLES: D-loop ventricles with levocardia. Ventricles are normal in size and contraction. Right ventricular end-diastolic volume is upper limits of normal for BSA. If utilizing an ideal body weight, this would be mildly enlarged. No residual interventricular communication is demonstrated. No myocardial hyperenhancement identified on delayed post gadolinium imaging, except at the ventricular insertion points.   VENTRICULOARTERIAL CONNECTION: Concordant. Trileaflet aortic valve without regurgitation or stenosis. The oversewn native pulmonary artery is directly posterior to the aorta. There is now an RV to PA conduit. Mild stenosis of the RVOT conduit with moderate regurgitation, 31% by direct flow measurement, and 33% by ventricular stroke volume comparison.   AORTA AND SUPRA-AORTIC VESSELS: A left-sided aortic arch is demonstrated with normal cervical branching pattern.  No evidence of patent ductus arteriosus, coarctation, or aortopulmonary collateral arteries. The coronary arteries are not well visualized in this study.   PULMONARY ARTERY: The RV to PA conduit is patent with normal pulmonary artery branching pattern.   LAST ECHO:  2/2020  ------CONCLUSIONS------  Technically difficult study due to poor acoustic windows. D-TGA status post  Rastelli and subsequent placement of a bio-prosthetic valve in the pulmonary  position. There is no obvious residual ventricular level shunt. Mild (1+)  aortic valve insufficiency. Unable to adequately visualize the RV-PA conduit  or the bio-prosthetic valve; however, normal antegrade flow is visualized in  the branch pulmonary arteries. A transvenous pacemaker lead is seen in the  right ventricle, with the tip of the lead implanted into the right ventricular  apex. Trivial tricuspid valve insufficiency. Estimated right ventricular  systolic pressure is 26 mmHg plus right atrial pressure. The left and right  ventricles have normal chamber size, wall thickness, and systolic function.  The calculated biplane left ventricular ejection fraction is 57%. No  pericardial effusion.  No significant change from last echocardiogram.    4/12/24 ECHO:  ##### CONCLUSIONS #####  Technically difficult study due to poor acoustic windows. D-TGA status-post  Rastelli and RV-PA conduit and subsequent placement of a bio-prosthetic valve  in the pulmonary position.     Mild tricuspid valve regurgitation; estimated right ventricular systolic  pressure 23 mmHg plus right atrial pressure. Normal right ventricular size and  systolic function. Unable to adequately visualize the RV-PA conduit or the  bio-prosthetic valve. There is mild to moderate prosthetic pulmonary  insufficiency by spectral Doppler. The peak gradient across the prosthetic  pulmonary valve is 13 mmHg. There is unobstructed flow through the left  ventricular outflow tract. Mild aortic valve insufficiency.  "Normal left  ventricular size with normal wall thickness and mildly-depressed systolic  function. The calculated single plane left ventricular ejection fraction from  the 4 chamber view is 41-46 %. A transvenous pacemaker lead is seen in the  right ventricle, with the tip of the lead implanted into the right ventricular  apex. No effusion.    7/25/19 CTA:  IMPRESSION:  1.  D-transposition of aorta s/p Rastelli with bioprosthetic pulmonic  valve  2.  Mildly reduced left ventricular function with LV EF 52%.  3.  Normal RV size and function with RV EF 55%.  4.  RV-PA valved conduit without visual evidence of stenosis.  5.  Mild pulmonic regurgitation (<30 mL) using biventricular stroke  volumes.  6.  No significant change from CT scan in 2014.  7.  Please review radiology review for incidental non-cardiovascular  findings to follow separately     3/23/21 ECHO:  ##### CONCLUSIONS #####  Technically difficult study due to poor acoustic windows. D-TGA status-post  Rastelli and subsequent placement of a bio-prosthetic valve in the pulmonary  position.     Trivial tricuspid valve regurgitation; estimated right ventricular systolic  pressure 26mmHg plus right atrial pressure. Normal right ventricular size and  systolic function. Unable to adequately visualize the RV-PA conduit or the  bio-prosthetic valve; normal antegrade flow profile at the valve annulus; no  obvious regurgitation. Mildly dilated left ventricle with normal wall  thickness and mildly-depressed systolic function; biplane LV EF 47%. A  transvenous pacemaker lead is seen in the right ventricle, with the tip of the  lead implanted into the right ventricular apex. No effusion.     No significant change from last echocardiogram.       Physical Examination   Ht 1.575 m (5' 2\")   Wt 85.7 kg (189 lb)   BMI 34.57 kg/m     Wt Readings from Last 3 Encounters:   04/12/24 85 kg (187 lb 4.8 oz)   03/05/24 83 kg (182 lb 14.4 oz)   01/30/24 82.1 kg (181 lb)     The rest " of a comprehensive physical examination is deferred due to nature of video visit restrictions.  CONSITUTIONAL: no acute distress  HEENT: no icterus, no redness or discharge, neck supple  CV: no visible edema of visualized extremities. No JVD.   RESPIRATORY: respirations nonlabored, no cough  NEURO: AA&Ox3, speech fluent/appropriate, motor grossly nonfocal  PSYCH: cooperative, affect appropriate  DERM: no rashes on visualized face/neck/upper extremities         Medications  Allergies   Current Outpatient Medications   Medication Sig Dispense Refill    ACE NOT PRESCRIBED, INTENTIONAL, 1 each as needed for other ACE Inhibitor not prescribed due to Other: BP at goal, pending microalbumin test 0 each 0    atorvastatin (LIPITOR) 80 MG tablet Take 1 tablet (80 mg) by mouth daily 90 tablet 3    bisacodyl (DULCOLAX) 5 MG EC tablet Take 2 tablets at 3 pm the day before your procedure. If your procedure is before 11 am, take 2 additional tablets at 11 pm. If your procedure is after 11 am, take 2 additional tablets at 6 am. For additional instructions refer to your colonoscopy prep instructions. 4 tablet 0    blood glucose (NO BRAND SPECIFIED) test strip Use to test blood sugar 1 times daily or as directed. To accompany: Blood Glucose Monitor Brands: per insurance. 100 strip 6    blood glucose monitoring (NO BRAND SPECIFIED) meter device kit Use to test blood sugar 1 times daily or as directed. Preferred blood glucose meter OR supplies to accompany: Blood Glucose Monitor Brands: per insurance. 1 kit 0    empagliflozin (JARDIANCE) 25 MG TABS tablet Take 1 tablet (25 mg) by mouth daily 90 tablet 1    etonogestrel (NEXPLANON) 68 MG IMPL 1 each (68 mg) by Subdermal route once      fluticasone (FLONASE) 50 MCG/ACT nasal spray SHAKE LIQUID AND USE 1 SPRAY IN EACH NOSTRIL DAILY 16 g 11    polyethylene glycol (GOLYTELY) 236 g suspension The night before the exam at 6 pm drink an 8-ounce glass every 15 minutes until the jug is half  empty. If you arrive before 11 AM: Drink the other half of the Golytely jug at 11 PM night before procedure. If you arrive after 11 AM: Drink the other half of the Golytely jug at 6 AM day of procedure. For additional instructions refer to your colonoscopy prep instructions. 4000 mL 0    sertraline (ZOLOFT) 50 MG tablet Take 1 tablet (50 mg) by mouth daily 90 tablet 1    thin (NO BRAND SPECIFIED) lancets Use with lanceting device. To accompany: Blood Glucose Monitor Brands: per insurance. 100 each 6    Allergies   Allergen Reactions    Adhesive Tape Rash         Lab Results (Personally Reviewed)    Chemistry/lipid CBC Cardiac Enzymes/BNP/TSH/INR   Lab Results   Component Value Date    BUN 7.4 08/22/2023     08/22/2023    CO2 25 08/22/2023     Creatinine   Date Value Ref Range Status   08/22/2023 0.55 0.51 - 0.95 mg/dL Final   03/23/2021 0.61 0.52 - 1.04 mg/dL Final       Lab Results   Component Value Date    CHOL 147 04/12/2024    HDL 51 04/12/2024    LDL 79 04/12/2024      Lab Results   Component Value Date    WBC 8.5 08/22/2023    HGB 14.1 08/22/2023    HCT 42.2 08/22/2023    MCV 86 08/22/2023     08/22/2023    Lab Results   Component Value Date    TSH 1.93 03/05/2024    INR 1.13 06/01/2015        Video-Visit Details    Type of service:  Video Visit   Video Start Time:  11:45  Video End Time: 12:15    Originating Location (pt. Location): Home  Distant Location (provider location):  On-site  Platform used for Video Visit: Dale    I have reviewed the note as documented above.  This accurately captures the substance of my virtual visit with the patient. The patient states understanding and is agreeable with the plan.   Vimal Ray MD [facility]  Cardiology - Electrophysiology        Total time spent on patient visit, reviewing notes, imaging, labs, orders, and completing necessary documentation: 30 minutes.  >50% of visit spent on counseling patient and/or coordination of care.

## 2024-06-11 NOTE — NURSING NOTE
Is the patient currently in the state of [place]? YES    Visit mode:VIDEO    If the visit is dropped, the patient can be reconnected by: VIDEO VISIT: Send to e-mail at: [email or web address]    Will anyone else be joining the visit? Yes, pt's sister Joanne is with the pt and will be joining the video visit per pt  (If patient encounters technical issues they should call [phone]    How would you like to obtain your AVS? MyChart    Are changes needed to the allergy or medication list? Pt stated no med changes    Are refills needed on medications prescribed by this physician? NO    Reason for visit: RECHECK    No other vitals to report per pt    Stephany SINGERF

## 2024-06-11 NOTE — PATIENT INSTRUCTIONS
Thank you for visiting the Adult Congenital and Cardiovascular Genetics Clinic at the [facility].    Cardiology Providers you saw during your visit:  Vimal Ray MD    Diagnosis:  TGA    Results:  Vimal Ray MD reviewed the results of your device check testing today in clinic.    ______________________________________________________________________________    Recommendations from your Cardiology Provider:    no changes today      General Cardiac Recommendations:  Continue to eat a heart healthy, low salt diet.  Continue to get 20-30 minutes of aerobic activity, 4-5 days per week.  Examples of aerobic activity include walking, running, swimming, cycling, etc.  Continue to observe good oral hygiene, with regular dental visits.    ____________________________________________________________________________________________________    SBE prophylaxis:   Yes_X___  No____    SBE prophylaxis applies to patients with certain heart conditions who are recommended to take antibiotics before dental appointments and other specific procedures. These antibiotics are to help prevent an infection of the heart (endocarditis) that certain patients are at higher risk of developing. The guidelines used come from the American Heart Association and are periodically updated.    If YES is checked, follow the recommendations outlined below:  Take antibiotic(s) prior to recommended dental procedures and procedures involving the respiratory tract or procedures involving infections of the skin, muscle or bones.   SBE prophylaxis is not needed for routine gastrointestinal and genitourinary procedures (ie. Colonoscopy or vaginal delivery)  Observe good oral hygiene daily, as advised by your dentist. Get regular professional dental care.  Keep cuts and other open injuries clean.  All infections should be treated as soon as possible.  Symptoms of Infective Endocarditis could include: fever lasting more than 4-5 days or a recurrent  fever that initially resolves but returns within 1-2 days). Call us a [phone] if you are experiencing these symptoms.  ____________________________________________________________________________________________________    FASTING CHOLESTEROL was checked in the last 5 years YES_X___  NO____ (2024)  If no, please follow up with your primary care physician. You should have a cholesterol screening every 5 years at minimum, and every year if taking a medication for your cholesterol levels.     ____________________________________________________________________________________________________    Follow-up Plan:  Follow up with Dr Ray in 2 years with a device check prior    ____________________________________________________________________________________________________    If you have questions or concerns, please call us at [phone] or contact us through [employer].    Blanca Coles RN, BSN    Saundra Rodriguez (Scheduling)  Nurse Care Coordinator     Clinic   Adult Congenital and CV Genetics   Adult Congenital and CV Genetic  [facility]   [facility]  (P) [phone]     (P) [phone]  (F) [phone]     (F) [phone]      For after hours urgent needs, call [phone] and ask to speak to the Adult Congenital Physician on call.  Mention Job Code [id number].    For emergencies call 911.    [facility]  [facility]  Mail Code [id number]  [address] [place]  [address]

## 2024-06-24 LAB
MDC_IDC_EPISODE_DTM: NORMAL
MDC_IDC_EPISODE_DURATION: 16 S
MDC_IDC_EPISODE_ID: 692
MDC_IDC_EPISODE_TYPE: NORMAL
MDC_IDC_LEAD_CONNECTION_STATUS: NORMAL
MDC_IDC_LEAD_IMPLANT_DT: NORMAL
MDC_IDC_LEAD_LOCATION: NORMAL
MDC_IDC_LEAD_LOCATION_DETAIL_1: NORMAL
MDC_IDC_LEAD_MFG: NORMAL
MDC_IDC_LEAD_MODEL: NORMAL
MDC_IDC_LEAD_POLARITY_TYPE: NORMAL
MDC_IDC_LEAD_SERIAL: NORMAL
MDC_IDC_MSMT_BATTERY_DTM: NORMAL
MDC_IDC_MSMT_BATTERY_REMAINING_LONGEVITY: 33 MO
MDC_IDC_MSMT_BATTERY_RRT_TRIGGER: 2.73
MDC_IDC_MSMT_BATTERY_STATUS: NORMAL
MDC_IDC_MSMT_BATTERY_VOLTAGE: 2.94 V
MDC_IDC_MSMT_CAP_CHARGE_DTM: NORMAL
MDC_IDC_MSMT_CAP_CHARGE_ENERGY: 18 J
MDC_IDC_MSMT_CAP_CHARGE_TIME: 4.16
MDC_IDC_MSMT_CAP_CHARGE_TYPE: NORMAL
MDC_IDC_MSMT_LEADCHNL_RV_IMPEDANCE_VALUE: 266 OHM
MDC_IDC_MSMT_LEADCHNL_RV_IMPEDANCE_VALUE: 4047 OHM
MDC_IDC_MSMT_LEADCHNL_RV_PACING_THRESHOLD_AMPLITUDE: 0.5 V
MDC_IDC_MSMT_LEADCHNL_RV_PACING_THRESHOLD_PULSEWIDTH: 0.4 MS
MDC_IDC_MSMT_LEADCHNL_RV_SENSING_INTR_AMPL: 6.25 MV
MDC_IDC_MSMT_LEADCHNL_RV_SENSING_INTR_AMPL: 6.25 MV
MDC_IDC_PG_IMPLANT_DTM: NORMAL
MDC_IDC_PG_MFG: NORMAL
MDC_IDC_PG_MODEL: NORMAL
MDC_IDC_PG_SERIAL: NORMAL
MDC_IDC_PG_TYPE: NORMAL
MDC_IDC_SESS_CLINIC_NAME: NORMAL
MDC_IDC_SESS_DTM: NORMAL
MDC_IDC_SESS_TYPE: NORMAL
MDC_IDC_SET_BRADY_HYSTRATE: NORMAL
MDC_IDC_SET_BRADY_LOWRATE: 40 {BEATS}/MIN
MDC_IDC_SET_BRADY_MODE: NORMAL
MDC_IDC_SET_LEADCHNL_RV_PACING_AMPLITUDE: 2 V
MDC_IDC_SET_LEADCHNL_RV_PACING_ANODE_ELECTRODE_1: NORMAL
MDC_IDC_SET_LEADCHNL_RV_PACING_ANODE_LOCATION_1: NORMAL
MDC_IDC_SET_LEADCHNL_RV_PACING_CAPTURE_MODE: NORMAL
MDC_IDC_SET_LEADCHNL_RV_PACING_CATHODE_ELECTRODE_1: NORMAL
MDC_IDC_SET_LEADCHNL_RV_PACING_CATHODE_LOCATION_1: NORMAL
MDC_IDC_SET_LEADCHNL_RV_PACING_POLARITY: NORMAL
MDC_IDC_SET_LEADCHNL_RV_PACING_PULSEWIDTH: 0.4 MS
MDC_IDC_SET_LEADCHNL_RV_SENSING_ANODE_ELECTRODE_1: NORMAL
MDC_IDC_SET_LEADCHNL_RV_SENSING_ANODE_LOCATION_1: NORMAL
MDC_IDC_SET_LEADCHNL_RV_SENSING_CATHODE_ELECTRODE_1: NORMAL
MDC_IDC_SET_LEADCHNL_RV_SENSING_CATHODE_LOCATION_1: NORMAL
MDC_IDC_SET_LEADCHNL_RV_SENSING_POLARITY: NORMAL
MDC_IDC_SET_LEADCHNL_RV_SENSING_SENSITIVITY: 0.3 MV
MDC_IDC_SET_ZONE_DETECTION_BEATS_DENOMINATOR: 32 {BEATS}
MDC_IDC_SET_ZONE_DETECTION_BEATS_DENOMINATOR: 32 {BEATS}
MDC_IDC_SET_ZONE_DETECTION_BEATS_DENOMINATOR: 40 {BEATS}
MDC_IDC_SET_ZONE_DETECTION_BEATS_NUMERATOR: 30 {BEATS}
MDC_IDC_SET_ZONE_DETECTION_BEATS_NUMERATOR: 32 {BEATS}
MDC_IDC_SET_ZONE_DETECTION_BEATS_NUMERATOR: 32 {BEATS}
MDC_IDC_SET_ZONE_DETECTION_INTERVAL: 270 MS
MDC_IDC_SET_ZONE_DETECTION_INTERVAL: 360 MS
MDC_IDC_SET_ZONE_DETECTION_INTERVAL: 370 MS
MDC_IDC_SET_ZONE_DETECTION_INTERVAL: NORMAL
MDC_IDC_SET_ZONE_STATUS: NORMAL
MDC_IDC_SET_ZONE_TYPE: NORMAL
MDC_IDC_SET_ZONE_VENDOR_TYPE: NORMAL
MDC_IDC_STAT_BRADY_DTM_END: NORMAL
MDC_IDC_STAT_BRADY_DTM_START: NORMAL
MDC_IDC_STAT_BRADY_RV_PERCENT_PACED: 0.01 %
MDC_IDC_STAT_EPISODE_RECENT_COUNT: 0
MDC_IDC_STAT_EPISODE_RECENT_COUNT: 1
MDC_IDC_STAT_EPISODE_RECENT_COUNT_DTM_END: NORMAL
MDC_IDC_STAT_EPISODE_RECENT_COUNT_DTM_START: NORMAL
MDC_IDC_STAT_EPISODE_TOTAL_COUNT: 0
MDC_IDC_STAT_EPISODE_TOTAL_COUNT: 1
MDC_IDC_STAT_EPISODE_TOTAL_COUNT: 24
MDC_IDC_STAT_EPISODE_TOTAL_COUNT: 273
MDC_IDC_STAT_EPISODE_TOTAL_COUNT_DTM_END: NORMAL
MDC_IDC_STAT_EPISODE_TOTAL_COUNT_DTM_START: NORMAL
MDC_IDC_STAT_EPISODE_TYPE: NORMAL
MDC_IDC_STAT_TACHYTHERAPY_ATP_DELIVERED_RECENT: 0
MDC_IDC_STAT_TACHYTHERAPY_ATP_DELIVERED_TOTAL: 0
MDC_IDC_STAT_TACHYTHERAPY_RECENT_DTM_END: NORMAL
MDC_IDC_STAT_TACHYTHERAPY_RECENT_DTM_START: NORMAL
MDC_IDC_STAT_TACHYTHERAPY_SHOCKS_ABORTED_RECENT: 0
MDC_IDC_STAT_TACHYTHERAPY_SHOCKS_ABORTED_TOTAL: 0
MDC_IDC_STAT_TACHYTHERAPY_SHOCKS_DELIVERED_RECENT: 0
MDC_IDC_STAT_TACHYTHERAPY_SHOCKS_DELIVERED_TOTAL: 1
MDC_IDC_STAT_TACHYTHERAPY_TOTAL_DTM_END: NORMAL
MDC_IDC_STAT_TACHYTHERAPY_TOTAL_DTM_START: NORMAL

## 2024-08-06 ENCOUNTER — OFFICE VISIT (OUTPATIENT)
Dept: SLEEP MEDICINE | Facility: CLINIC | Age: 44
End: 2024-08-06
Attending: INTERNAL MEDICINE
Payer: MEDICARE

## 2024-08-06 VITALS
WEIGHT: 192 LBS | BODY MASS INDEX: 35.33 KG/M2 | HEIGHT: 62 IN | DIASTOLIC BLOOD PRESSURE: 75 MMHG | SYSTOLIC BLOOD PRESSURE: 102 MMHG | HEART RATE: 99 BPM | OXYGEN SATURATION: 99 %

## 2024-08-06 DIAGNOSIS — Z29.89 SBE (SUBACUTE BACTERIAL ENDOCARDITIS) PROPHYLAXIS CANDIDATE: ICD-10-CM

## 2024-08-06 DIAGNOSIS — Q20.3 D-TGA (DEXTRO-TRANSPOSITION OF GREAT ARTERIES): ICD-10-CM

## 2024-08-06 DIAGNOSIS — G47.19 EXCESSIVE DAYTIME SLEEPINESS: Primary | ICD-10-CM

## 2024-08-06 DIAGNOSIS — Z86.79 HX OF VENTRICULAR TACHYCARDIA: ICD-10-CM

## 2024-08-06 DIAGNOSIS — Q24.9 CONGENITAL HEART DISEASE: ICD-10-CM

## 2024-08-06 DIAGNOSIS — Z13.6 CARDIOVASCULAR SCREENING; LDL GOAL LESS THAN 70: ICD-10-CM

## 2024-08-06 PROCEDURE — 99203 OFFICE O/P NEW LOW 30 MIN: CPT | Performed by: INTERNAL MEDICINE

## 2024-08-06 ASSESSMENT — SLEEP AND FATIGUE QUESTIONNAIRES
HOW LIKELY ARE YOU TO NOD OFF OR FALL ASLEEP WHILE SITTING INACTIVE IN A PUBLIC PLACE: MODERATE CHANCE OF DOZING
HOW LIKELY ARE YOU TO NOD OFF OR FALL ASLEEP WHILE LYING DOWN TO REST IN THE AFTERNOON WHEN CIRCUMSTANCES PERMIT: HIGH CHANCE OF DOZING
HOW LIKELY ARE YOU TO NOD OFF OR FALL ASLEEP WHILE WATCHING TV: MODERATE CHANCE OF DOZING
HOW LIKELY ARE YOU TO NOD OFF OR FALL ASLEEP WHILE SITTING AND READING: MODERATE CHANCE OF DOZING
HOW LIKELY ARE YOU TO NOD OFF OR FALL ASLEEP WHILE SITTING AND TALKING TO SOMEONE: WOULD NEVER DOZE
HOW LIKELY ARE YOU TO NOD OFF OR FALL ASLEEP WHILE SITTING QUIETLY AFTER LUNCH WITHOUT ALCOHOL: WOULD NEVER DOZE
HOW LIKELY ARE YOU TO NOD OFF OR FALL ASLEEP IN A CAR, WHILE STOPPED FOR A FEW MINUTES IN TRAFFIC: SLIGHT CHANCE OF DOZING
HOW LIKELY ARE YOU TO NOD OFF OR FALL ASLEEP WHEN YOU ARE A PASSENGER IN A CAR FOR AN HOUR WITHOUT A BREAK: HIGH CHANCE OF DOZING

## 2024-08-06 NOTE — NURSING NOTE
"Chief Complaint   Patient presents with    Sleep Problem     Referred from cardiologist. Not getting adequate rest.       Initial /75   Pulse 99   Ht 1.575 m (5' 2\")   Wt 87.1 kg (192 lb)   SpO2 99%   BMI 35.12 kg/m   Estimated body mass index is 35.12 kg/m  as calculated from the following:    Height as of this encounter: 1.575 m (5' 2\").    Weight as of this encounter: 87.1 kg (192 lb).    Medication Reconciliation: complete  ESS 13  Neck circumference: 37 centimeters.  Isabela Sevilla MA   "

## 2024-08-06 NOTE — PROGRESS NOTES
Sleep Consultation:    Date on this visit: 8/6/2024    Chikis Hannon  is referred by Hu Aragon for a sleep consultation.     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  She sustained symptomatic VT in March 2014 and is status post ICD.  Medical history is otherwise significant for diabetes mellitus type 2 and hyperlipidemia.  Patient is sent for a sleep evaluation by cardiology.  Patient's last echocardiogram in April 2024 showed mild tricuspid regurgitation, and mildly reduced left ventricular systolic function with calculated ejection fraction of 41 to 46%.    She works at [employer] and works from 8:30 AM- 1 PM. She lives alone and has a skills worker that helps her for appointments.     She reports frequent poor quality of sleep for many years. She complains of sleep maintenance difficulty and non restorative sleep.     Chikis sleeps alone and does not know if she snores or has witnessed apneas. Patient sleeps on her side. She has occasional morning denies dry mouth and denies no restless legs.     Chikis goes to bed at 11 PM and falls asleep around 12 AM. Patient does watch TV in bed. She wakes up at 6:45 AM. She falls asleep in 60 minutes.  Chikis has difficulty falling asleep.  She wakes up 2-4 times a night for 30 minutes before falling back to sleep.  Chikis wakes up to go to the bathroom and uncertain reasons.  On weekends, Chikis goes to sleep at 12:00 AM.  She wakes up at 6:45 AM. She falls asleep in 60 minutes.  Patient gets an average of 6-7 hours of sleep per night.     Chikis denies any sleep walking, sleep talking, dream enactment, sleep paralysis, cataplexy, and hypnogogic/hypnopompic hallucinations.    Chikis denies difficulty breathing through her nose.      Patient's [place]  Sleepiness score 13/24 consistent with daytime sleepiness.      Chikis naps 1-2 times per week for 120 minutes, feels refreshed after naps. She takes some inadvertant naps.  She does not drive.     She uses 1-2 cups/day of coffee and 1 soda daily. Last caffeine intake is usually before 3 PM.  Problem List:  Patient Active Problem List    Diagnosis Date Noted    Thoracic aortic ectasia (H24) 01/02/2024     Priority: Medium    Moderate episode of recurrent major depressive disorder (H) 07/12/2022     Priority: Medium    Morbid obesity (H) 02/11/2021     Priority: Medium    Patient [facility] 03/14/2016     Priority: Medium    Type 2 diabetes mellitus with other circulatory complication, without long-term current use of insulin (H) 11/12/2015     Priority: Medium    Type 2 diabetes, HbA1C goal < 8% (H) 09/03/2014     Priority: Medium    Cardiac ICD- Medtronic- single chamber- NOT dependent 03/20/2014     Priority: Medium     Implanted 6/01/2015 by Dr. Ray  Do you wish to do the replacement in the background? yes          Ventricular tachycardia (H) 03/04/2014     Priority: Medium    Congenital heart disease      Priority: Medium    Mildly mentally retarded 01/10/2012     Priority: Medium    * * * SBE PROPHYLAXIS * * * 05/15/2010     Priority: Medium     Recommended; see Cardiology consult 2010      Major depression in complete remission (H) 01/05/2010     Priority: Medium    GERD (gastroesophageal reflux disease) 06/12/2009     Priority: Medium    Anxiety 05/19/2009     Priority: Medium    Corrected transposition of great vessels 02/01/2005     Priority: Medium     IMO Regulatory Load OCT 2020      Allergic state 02/01/2005     Priority: Medium     Problem list name updated by automated process. Provider to review      Attention deficit disorder 02/01/2005     Priority: Medium     Mild MR; difficulty attention span  Problem list name updated by automated  "process. Provider to review          Past Medical/Surgical History:  Past Medical History:   Diagnosis Date    Allergy, unspecified not elsewhere classified     Attention deficit disorder without mention of hyperactivity     Mild MR; difficulty attention span    Congenital heart disease     Corrected transposition of great vessels     still needs SBE prophlaxis    Diabetes mellitus type 2, noninsulin dependent (H) 2014    Impaired fasting glucose     103 7/06    Ventricular tachycardia (H) 03/04/2014    s/p ablation and ICD placement 3/13/14     Past Surgical History:   Procedure Laterality Date    CARDIAC SURGERY  Had 4 surgeries    transposition of the major arteries    COLONOSCOPY N/A 1/16/2024    Procedure: Colonoscopy FV random biopsies using cold bx forcep;  Surgeon: Shaylee Cody MD;  Location:  GI    SURGICAL HISTORY OF -   01/01/2014    ablation for v. tach; ICD placed    wisdom teeth removal      [facility] NONSPECIFIC PROCEDURE  11/01/1998    open heart surgery x 3, transposition of major vessels    [facility] NONSPECIFIC PROCEDURE  10/01/2003    Additional heart surgery     Physical Examination:  Vitals: /75   Pulse 99   Ht 1.575 m (5' 2\")   Wt 87.1 kg (192 lb)   SpO2 99%   BMI 35.12 kg/m    BMI= Body mass index is 35.12 kg/m .  GENERAL APPEARANCE: healthy, alert, and no distress  HENT: oropharynx crowded  NEURO: mentation intact and speech normal  PSYCH: mentation appears normal and affect normal/bright  Mallampati Class: III.  Tonsillar Stage: 1  hidden by pillars.    Impression/Plan:    Excessive daytime sleepiness    Patient is a 44 years old female, BMI of 35, medical comorbidity of congenital heart disease, transposition of great arteries, VSD and pulmonary valve stenosis, status post surgical correction, VSD closure and RV to PA conduit, history of VT status post ICD, diabetes, who presents with complaints of poor sleep quality, sleep maintenance difficulty and daytime sleepiness.  " Oropharynx Mallampati class III on examination.  Without a bed partner, do not have an accurate assessment of snoring or witnessed apneas.  Overall quantity of sleep is slightly less and she wakes up at 6:45 AM with an alarm.  In order to assess if there is clinically seen with sleep disordered breathing or other sleep fragmenting conditions that could explain nonrestorative sleep and daytime sleepiness, a PSG is recommended.    Plan:     Split night PSG for assessment of daytime sleepiness      She will follow up with me in approximately two weeks after her sleep study has been competed to review the results and discuss plan of care.       Polysomnography reviewed.  Obstructive sleep apnea reviewed.  Complications of untreated sleep apnea were reviewed.    I spent a total of 30 minutes for this appointment on this date of service which include time spent before, during and after the visit for chart review, patient care, counseling and coordination of care.    Dr. Luis Castellanos     CC: Hu Aragon

## 2024-08-20 ASSESSMENT — SLEEP AND FATIGUE QUESTIONNAIRES
HOW LIKELY ARE YOU TO NOD OFF OR FALL ASLEEP WHILE LYING DOWN TO REST IN THE AFTERNOON WHEN CIRCUMSTANCES PERMIT: MODERATE CHANCE OF DOZING
HOW LIKELY ARE YOU TO NOD OFF OR FALL ASLEEP IN A CAR, WHILE STOPPED FOR A FEW MINUTES IN TRAFFIC: WOULD NEVER DOZE
HOW LIKELY ARE YOU TO NOD OFF OR FALL ASLEEP WHILE SITTING INACTIVE IN A PUBLIC PLACE: MODERATE CHANCE OF DOZING
HOW LIKELY ARE YOU TO NOD OFF OR FALL ASLEEP WHILE SITTING QUIETLY AFTER LUNCH WITHOUT ALCOHOL: WOULD NEVER DOZE
HOW LIKELY ARE YOU TO NOD OFF OR FALL ASLEEP WHEN YOU ARE A PASSENGER IN A CAR FOR AN HOUR WITHOUT A BREAK: MODERATE CHANCE OF DOZING
HOW LIKELY ARE YOU TO NOD OFF OR FALL ASLEEP WHILE SITTING AND READING: MODERATE CHANCE OF DOZING
HOW LIKELY ARE YOU TO NOD OFF OR FALL ASLEEP WHILE WATCHING TV: MODERATE CHANCE OF DOZING
HOW LIKELY ARE YOU TO NOD OFF OR FALL ASLEEP WHILE SITTING AND TALKING TO SOMEONE: WOULD NEVER DOZE

## 2024-08-27 ENCOUNTER — OFFICE VISIT (OUTPATIENT)
Dept: SURGERY | Facility: CLINIC | Age: 44
End: 2024-08-27
Payer: MEDICARE

## 2024-08-27 VITALS
OXYGEN SATURATION: 96 % | DIASTOLIC BLOOD PRESSURE: 78 MMHG | BODY MASS INDEX: 36.25 KG/M2 | HEART RATE: 91 BPM | HEIGHT: 62 IN | RESPIRATION RATE: 16 BRPM | SYSTOLIC BLOOD PRESSURE: 127 MMHG | WEIGHT: 197 LBS

## 2024-08-27 DIAGNOSIS — E10.9 TYPE 1 DIABETES MELLITUS WITHOUT COMPLICATIONS (H): ICD-10-CM

## 2024-08-27 DIAGNOSIS — E66.01 MORBID OBESITY (H): Primary | ICD-10-CM

## 2024-08-27 DIAGNOSIS — E11.59 TYPE 2 DIABETES MELLITUS WITH OTHER CIRCULATORY COMPLICATION, WITHOUT LONG-TERM CURRENT USE OF INSULIN (H): ICD-10-CM

## 2024-08-27 PROCEDURE — 99204 OFFICE O/P NEW MOD 45 MIN: CPT | Performed by: PHYSICIAN ASSISTANT

## 2024-08-27 NOTE — Clinical Note
Good morning! I just saw this very pleasant mutual patient and wanted to get your thoughts on her starting ozempic for her DMT2. She is getting a pen teach today in our office and is willing to start the injections.  Are you ok with this and what if any adjustments would you make to her Jardiance?  Thanks for your coordination of care Stanley

## 2024-08-27 NOTE — PATIENT INSTRUCTIONS
"Nice to talk with you today! Thank you for allowing me the privilege of caring for you.   We hope we provided you with the excellent service you deserve.     To ensure the quality of our services you may receive a patient satisfaction survey from an independent monitoring company.  The greatest compliment you can give is \"Likely to Recommend\"      Below is our plan we discussed.-  JESUS Angel      Will message primary about ozempic  Continue with planned sleep study in January  Once weekly ride stationary bike  Get in at least 64 oz water daily    Please schedule a follow up with Stanley Ivan PA-C in 3 months.  If you need to reach me sooner you can do so by calling [phone].    Have a great day!       MEDICATION STARTED AT THIS APPOINTMENT    We are starting a GLP-1 (Glucagon-like Peptide-1) medication. Examples of this medication include Byetta, Bydureon, or Victoza, etc. The medication chosen will depend on insurance coverage, and can be changed to the medication that is covered under your plan. These medications work the same, in that they can help you lose weight and control your blood sugar. One of the ways it works is by slowing down the rate that food leaves your stomach. You feel myrick and will eat less.  It also helps regulate hormones that can help improve your blood sugars.    Usually short acting insulins or oral agents are stopped or decreased by the doctor at the appointment. Low blood sugars are rare but can happen if patients are on insulin or other oral agents. If you notice consistent low sugars or high sugars, your medication may need to be adjusted after your appointment. If this is the case, please call RN and provide her your blood sugar record from the last 3-4 days. The RN will get in touch with the doctor and call you back/[employer] message with recommendations. We tolerate high sugars for a bit, so if sugars are running 180-200, this is ok. As weight starts dropping the blood sugars " should too. If readings are consistently over 200 for 1-2 weeks, then you should call the doctor/nurse.    Types of GLP-1 medications include:    Ozempic: Starting dose is 0.25 mg once weekly for 4 weeks, and then increase to 0.5 mg weekly. If after 4 weeks of being on 0.5 mg weekly dosing and still wanting additional weight loss and/or blood sugar benefits, check with your doctor to see if they want to increase the dose to 1 mg weekly. Pen needles come in the Ozempic pen box.    Side effects of GLP- Medications include: The most common side effects are all GI related and consist of: nausea, constipation, diarrhea, burping, or gassiness. Patients are advised to eat slowly and less, and nausea typically passes if people can stick it out.     The risk of pancreatitis (inflammation of the pancreas) has been associated with this type of medication, but is very rare.  If you have had pancreatitis in the past, this medication may not be for you. Please let us know about any past history of pancreas problems.      Symptoms of pancreatitis include: Pain in your upper stomach area which  may travel to your back and be worse after eating. Your stomach area may be tender to the touch.  You may have vomiting or nausea and/or have a fever. If you should develop any of these symptoms, stop the medication and contact your primary care doctor. They will do a blood test to check for pancreatitis.         There is a small chance you may have some low blood sugar after taking the medication.   The signs of low blood sugar are:  Weakness  Shaky   Hungry  Sweating  Confusion      See below for ways to treat low blood sugar without adding in lots of extra calories.      Treating Low Blood Sugar    If you have symptoms of low blood sugar (sweating, shaking, dizzy, confused) eat 15 grams of carbs and wait 15 minutes:    Glucose Tabs are best for sugars under 70 -  Dex4 or BD Glucose tablets are good, you will need to take 3-4 of these to  equal 15 grams.     One small box of raisins  4 oz fruit juice box or   cup fruit juice  1 small apple  1 small banana    cup canned fruit in water    English muffin or a slice of bread with jelly   1 low fat frozen waffle with sugar-free syrup    cup cottage cheese with   cup frozen or fresh blueberries  1 cup skim or low-fat milk    cup whole grain cereal  4-6 crackers such as Triscuits      This medication is usually covered by insurance for patients with a diagnosis of Type 2 Diabetes. Depending on insurance coverage, the medication may be changed to a different formulary, but they all work in the same way. Sometimes a prior authorization is required, which may take up to 1-2 weeks for an insurance company to make a decision if they will cover the medication. Please be patient, you will be notified either way.     Call the nurse at [phone] if you have any questions or concerns. (Do not stop taking it if you don't think it's working. For some people it works without them knowing it.)     In order to get refills of this or any medication we prescribe you must be seen in the medical weight mgmt clinic every 2-4 months.       GLP TIPS    Tips to help with some of those side effects associated with the GLP -1 agonists:    - small meals (either 3 or even 5 smaller meals)  - [place] foods   - Don't skip meals   - low fat meals -  avoiding greasy and fried foods  - Make sure to eat breakfast soon after getting up  - get in plenty of water    If you are struggling with eating regular meals please feel free to reach out to the dietitian    Remember some mild nausea is common. If you have severe nausea, vomiting, abdominal pain or anything else concerning please reach out to your care team at the weight loss clinic!

## 2024-08-27 NOTE — PROGRESS NOTES
Pt seen for Ozempic pen teach.    Demonstrated how to prep pen for injection, inject and dispose of pen.  Pt did teach back.   Written information given.  Patient verbalized understanding and is agreeable to plan.  Nida Brar, MS, RD, RN

## 2024-08-27 NOTE — PROGRESS NOTES
"New Medical Weight Management Consult        PATIENT:  Chikis Hannon  MRN:         [id number]  :         1980  BRADLEY:         2024      Dear Brianne Peguero MD,    I had the pleasure of seeing your patient, Chikis Hannon. Full intake/assessment was done to determine barriers to weight loss success and develop a treatment plan. Chikis Hannon is a 44 year old female interested in treatment of medical problems associated with excess weight. She has a height of 5' 2\", a weight of 197 lbs 0 oz, and the calculated Body mass index is 36.03 kg/m .    Is a type 2 diabetic and taking jardiance    Here today with skills worker Betsy. Has a learning disability and anxiety. Lives alone and does not cook much. Eats processed foods often     ASSESSMENT & PLAN:    Problem List Items Addressed This Visit       Type 2 diabetes mellitus with other circulatory complication, without long-term current use of insulin (H)    Morbid obesity (H) - Primary        PROGRAM OVERVIEW  Reviewed options at [facility] including provider visits, dietician, 24 week healthy lifestyle program, health coaching, food supplements, Get Moving program, and psychological support.  All questions about weight loss program were answered.    SURGICAL WEIGHT LOSS   Option presented given pt BMI and current comorbid conditions. No current interest.      MEDICATIONS:  We discussed healthy habits to assist with weight loss. We reviewed medications associated with weight gain. We discussed the role of pharmacological agents in the treatment of obesity and the \"off-label\" use of medications in this practice. We reviewed medication that may assist with weight loss. Indications, contraindications, risks/benefits, and potential side effects were discussed.  Ozempic was discussed today.  Will reach out to primary regarding current Jardiance use prior to sending over RX. Discussed that medications must always be used together with lifestyle " "changes such as improvements in diet choices, portion control and establishing and maintaining a regular exercise program.     AOM Considerations:  Phentermine:  Not a candidate due to CAD  Topiramate:    GLP-1: great option.     Naltrexone:    Wellbutrin: not candidate due to CAD  Metformin: made very sick           PATIENT INSTRUCTIONS:   Will message primary about   Continue with planned sleep study in January  Once weekly ride stationary bike  Get in at least 64 oz water daily      Will reach out to primary first regarding ozempic and patients current Jardiance     Follow up: Return to clinic in December and April    40 minutes spent on the date of the encounter doing chart review, review of test results, patient visit and documentation         She has the following co-morbidities:        8/20/2024     9:10 AM   --   I have the following health issues associated with obesity Type II Diabetes    High Cholesterol   I have the following symptoms associated with obesity Knee Pain    Depression    Xiomara Serrato     Has a sleep study in January 8/20/2024     9:10 AM   Referring Provider   Please name the provider who referred you to Medical Weight Management  If you do not know, please answer \"I Don't Know\" I don't know           8/20/2024     9:10 AM   Weight History   How concerned are you about your weight? Very Concerned   I became overweight As a Teenager   The following factors have contributed to my weight gain Eating Wrong Types of Food    Eating Too Much    Lack of Exercise    Stress   I have tried the following methods to lose weight Watching Portions or Calories    Exercise    Weight Watchers   My lowest weight since age 16 was 120   My highest weight since age 16 was 198   The most weight I have ever lost was (lbs) 20   I have the following family history of obesity/being overweight Unknown (adopted)   How has your weight changed over the last year? Gained   How many pounds? 10     B: matthew   L: " keshia cold drinks - has been a can of brian  D:   Fluids: 48 oz water daily  One can of poppi can of soda  Drinks 12 oz juice daily       8/20/2024     9:10 AM   Diet Recall Review with Patient   If you do eat breakfast, what types of food do you eat? Protein shake Atkins   If you do eat lunch, what types of food do you typically eat?  boyardee raviolis   If you do eat supper, what types of food do you typically eat? Pizza   If you do snack, what types of food do you typically eat? Yogurt and applesauce   How many glasses of juice do you drink in a typical day? 1   How many of glasses of milk do you drink in a typical day? 0   How many 8oz glasses of sugar containing drinks such as Guero-Aid/sweet tea do you drink in a day? 1   How many cans/bottles of sugar pop/soda/tea/sports drinks do you drink in a day? 1   How many cans/bottles of diet pop/soda/tea or sports drink do you drink in a day? 1   How often do you have a drink of alcohol? Never           8/20/2024     9:10 AM   Eating Habits   Generally, my meals include foods like these bread, pasta, rice, potatoes, corn, crackers, sweet dessert, pop, or juice Almost Everyday   Generally, my meals include foods like these fried meats, brats, burgers, french fries, pizza, cheese, chips, or ice cream Almost Everyday   Eat fast food (like [employer]pointss, [employer], [employer]aco Bell) A Few Times a Week   Eat at a buffet or sit-down restaurant A Few Times a Week   Eat most of my meals in front of the TV or computer Almost Everyday   Often skip meals, eat at random times, have no regular eating times Never   Rarely sit down for a meal but snack or graze throughout A Few Times a Week   Eat extra snacks between meals A Few Times a Week   Eat most of my food at the end of the day A Few Times a Week   Eat in the middle of the night or wake up at night to eat Never   Eat extra snacks to prevent or correct low blood sugar Never   Eat to prevent acid reflux or stomach pain Never    Worry about not having enough food to eat Never   I eat when I am depressed Less Than Weekly   I eat when I am stressed A Few Times a Week   I eat when I am bored A Few Times a Week   I eat when I am anxious A Few Times a Week   I eat when I am happy or as a reward A Few Times a Week   I feel hungry all the time even if I just have eaten A Few Times a Week   Feeling full is important to me A Few Times a Week   I finish all the food on my plate even if I am already full A Few Times a Week   I can't resist eating delicious food or walk past the good food/smell A Few Times a Week   I eat/snack without noticing that I am eating A Few Times a Week   I eat when I am preparing the meal A Few Times a Week   I eat more than usual when I see others eating A Few Times a Week   I have trouble not eating sweets, ice cream, cookies, or chips if they are around the house A Few Times a Week   I think about food all day Never   What foods, if any, do you crave? Sweets/Candy/Chocolate   Please list any other foods you crave? Cheese           8/20/2024     9:10 AM   Amount of Food   I feel out of control when eating Almost Everyday   I eat a large amount of food, like a loaf of bread, a box of cookies, a pint/quart of ice cream, all at once Almost Everyday   I eat a large amount of food even when I am not hungry Never   I eat rapidly Everyday   I eat alone because I feel embarrassed and do not want others to see how much I have eaten Never   I eat until I am uncomfortably full Monthly   I feel bad, disgusted, or guilty after I overeat Never           8/20/2024     9:10 AM   Activity/Exercise History   How much of a typical 12 hour day do you spend sitting? Less Than Half the Day   How much of a typical 12 hour day do you spend lying down? Less Than Half the Day   How much of a typical day do you spend walking/standing? Most of the Day   How many hours (not including work) do you spend on the TV/Video Games/Computer/Tablet/Phone? 6  Hours or More   How many times a week are you active for the purpose of exercise? Never   What keeps you from being more active? Too tired    Other   How many total minutes do you spend doing some activity for the purpose of exercising when you exercise? 15-30 Minutes     Not exercising. Loves to swim but needs someone with her in order to swim. Has a stationary bike that used to ride    PAST MEDICAL HISTORY:  Past Medical History:   Diagnosis Date    Allergy, unspecified not elsewhere classified     Attention deficit disorder without mention of hyperactivity     Mild MR; difficulty attention span    Congenital heart disease     Corrected transposition of great vessels     still needs SBE prophlaxis    Diabetes mellitus type 2, noninsulin dependent (H) 2014    Impaired fasting glucose     103 7/06    Ventricular tachycardia (H) 03/04/2014    s/p ablation and ICD placement 3/13/14           8/20/2024     9:10 AM   Work/Social History Reviewed With Patient   My employment status is Part-Time   My job is [employer]   How much of your job is spent on the computer or phone? Less Than 50%   How many hours do you spend commuting to work daily? 0   What is your marital status? Single   Who do you live with? My self   Who does the food shopping? Self     Works 3 days weekly at [employer].   Live alone - does not like cooking. Boyfriend lives in apartment building so sometimes eats together  Most times convenience foods or eats out      Social History     Tobacco Use    Smoking status: Never     Passive exposure: Past (per pt)    Smokeless tobacco: Never   Vaping Use    Vaping status: Never Used   Substance Use Topics    Alcohol use: No     Alcohol/week: 0.0 standard drinks of alcohol    Drug use: No            8/20/2024     9:10 AM   Mental Health History Reviewed With Patient   Have you ever been physically or sexually abused? No   How often in the past 2 weeks have you felt little interest or pleasure in doing  things? Not at all   Over the past 2 weeks how often have you felt down, depressed, or hopeless? Not at all           8/20/2024     9:10 AM   Sleep History Reviewed With Patient   How many hours do you sleep at night? 6       MEDICATIONS:   Current Outpatient Medications   Medication Sig Dispense Refill    ACE NOT PRESCRIBED, INTENTIONAL, 1 each as needed for other ACE Inhibitor not prescribed due to Other: BP at goal, pending microalbumin test 0 each 0    atorvastatin (LIPITOR) 80 MG tablet Take 1 tablet (80 mg) by mouth daily 90 tablet 3    blood glucose (NO BRAND SPECIFIED) test strip Use to test blood sugar 1 times daily or as directed. To accompany: Blood Glucose Monitor Brands: per insurance. 100 strip 6    blood glucose monitoring (NO BRAND SPECIFIED) meter device kit Use to test blood sugar 1 times daily or as directed. Preferred blood glucose meter OR supplies to accompany: Blood Glucose Monitor Brands: per insurance. 1 kit 0    empagliflozin (JARDIANCE) 25 MG TABS tablet Take 1 tablet (25 mg) by mouth daily 90 tablet 1    etonogestrel (NEXPLANON) 68 MG IMPL 1 each (68 mg) by Subdermal route once      fluticasone (FLONASE) 50 MCG/ACT nasal spray SHAKE LIQUID AND USE 1 SPRAY IN EACH NOSTRIL DAILY 16 g 11    sertraline (ZOLOFT) 50 MG tablet Take 1 tablet (50 mg) by mouth daily 90 tablet 1    thin (NO BRAND SPECIFIED) lancets Use with lanceting device. To accompany: Blood Glucose Monitor Brands: per insurance. 100 each 6       ALLERGIES:   Allergies   Allergen Reactions    Adhesive Tape Rash       ROS:  HEENT  H/O glaucoma:  no  Cardiovascular  CAD:   Yes  - has defibrillator Ventricular tachycardia.  Has Congenital heart disease  Palpitations:   no  HTN:    no  Gastrointestinal  GERD:   Just started and bought tums yesterday  Constipation:   Sometimes will take laxative. Not often  Liver Dz:   no  H/O Pancreatitis:  no  H/O Gallbladder Dz: no  Psychiatric  Moods Stable:  yes  Anxiety:   Yes - takes medication  - doing ok  Depression:  no  Bipolar:  no  H/O ETOH/Drug Use: no  H/O eating disorder: no  Endocrine  PMH/FMH of MTC or MEN2:  adopted  Neurologic:  H/O seizures:   no  Headaches:  no  Memory Impairment:  no    H/O kidney stones:  no  Kidney disease:  no  Current birth control:  Yes - Nexplanon      LABS/RECORDS REVIEWED:  Hemoglobin A1C   Date Value Ref Range Status   03/05/2024 7.9 (H) 0.0 - 5.6 % Final     Comment:     Normal <5.7%   Prediabetes 5.7-6.4%    Diabetes 6.5% or higher     Note: Adopted from ADA consensus guidelines.   02/11/2021 7.9 (H) 0 - 5.6 % Final     Comment:     Normal <5.7% Prediabetes 5.7-6.4%  Diabetes 6.5% or higher - adopted from ADA   consensus guidelines.       TSH   Date Value Ref Range Status   03/05/2024 1.93 0.30 - 4.20 uIU/mL Final   03/11/2022 2.13 0.40 - 4.00 mU/L Final   02/11/2021 2.88 0.40 - 4.00 mU/L Final     Sodium   Date Value Ref Range Status   08/22/2023 138 136 - 145 mmol/L Final   03/23/2021 141 133 - 144 mmol/L Final     Potassium   Date Value Ref Range Status   08/22/2023 4.3 3.4 - 5.3 mmol/L Final   06/03/2022 3.6 3.4 - 5.3 mmol/L Final   03/23/2021 3.9 3.4 - 5.3 mmol/L Final     Chloride   Date Value Ref Range Status   08/22/2023 102 98 - 107 mmol/L Final   06/03/2022 104 94 - 109 mmol/L Final   03/23/2021 108 94 - 109 mmol/L Final     Carbon Dioxide   Date Value Ref Range Status   03/23/2021 27 20 - 32 mmol/L Final     Carbon Dioxide (CO2)   Date Value Ref Range Status   08/22/2023 25 22 - 29 mmol/L Final   06/03/2022 26 20 - 32 mmol/L Final     Anion Gap   Date Value Ref Range Status   08/22/2023 11 7 - 15 mmol/L Final   06/03/2022 7 3 - 14 mmol/L Final   03/23/2021 6 3 - 14 mmol/L Final     Glucose   Date Value Ref Range Status   06/03/2022 147 (H) 70 - 99 mg/dL Final   03/23/2021 170 (H) 70 - 99 mg/dL Final     GLUCOSE BY METER POCT   Date Value Ref Range Status   01/16/2024 133 (H) 70 - 99 mg/dL Final     Urea Nitrogen   Date Value Ref Range Status    08/22/2023 7.4 6.0 - 20.0 mg/dL Final   06/03/2022 7 7 - 30 mg/dL Final   03/23/2021 10 7 - 30 mg/dL Final     Creatinine   Date Value Ref Range Status   08/22/2023 0.55 0.51 - 0.95 mg/dL Final   03/23/2021 0.61 0.52 - 1.04 mg/dL Final     GFR Estimate   Date Value Ref Range Status   08/22/2023 >90 >60 mL/min/1.73m2 Final   03/23/2021 >90 >60 mL/min/[1.73_m2] Final     Comment:     Non  GFR Calc  Starting 12/18/2018, serum creatinine based estimated GFR (eGFR) will be   calculated using the Chronic Kidney Disease Epidemiology Collaboration   (CKD-EPI) equation.       Calcium   Date Value Ref Range Status   08/22/2023 9.1 8.6 - 10.0 mg/dL Final   03/23/2021 9.1 8.5 - 10.1 mg/dL Final     Bilirubin Total   Date Value Ref Range Status   08/22/2023 0.8 <=1.2 mg/dL Final   03/23/2021 0.7 0.2 - 1.3 mg/dL Final     Alkaline Phosphatase   Date Value Ref Range Status   08/22/2023 122 (H) 35 - 104 U/L Final   03/23/2021 120 40 - 150 U/L Final     ALT   Date Value Ref Range Status   08/22/2023 26 0 - 50 U/L Final     Comment:     Reference intervals for this test were updated on 6/12/2023 to more accurately reflect our healthy population. There may be differences in the flagging of prior results with similar values performed with this method. Interpretation of those prior results can be made in the context of the updated reference intervals.     03/23/2021 32 0 - 50 U/L Final     AST   Date Value Ref Range Status   08/22/2023 26 0 - 45 U/L Final     Comment:     Reference intervals for this test were updated on 6/12/2023 to more accurately reflect our healthy population. There may be differences in the flagging of prior results with similar values performed with this method. Interpretation of those prior results can be made in the context of the updated reference intervals.   03/23/2021 15 0 - 45 U/L Final     Cholesterol   Date Value Ref Range Status   04/12/2024 147 <200 mg/dL Final   08/06/2020 205 (H)  "<200 mg/dL Final     Comment:     Desirable:       <200 mg/dl     HDL Cholesterol   Date Value Ref Range Status   08/06/2020 44 (L) >49 mg/dL Final     Direct Measure HDL   Date Value Ref Range Status   04/12/2024 51 >=50 mg/dL Final     LDL Cholesterol Calculated   Date Value Ref Range Status   04/12/2024 79 <=100 mg/dL Final   08/06/2020 137 (H) <100 mg/dL Final     Comment:     Above desirable:  100-129 mg/dl  Borderline High:  130-159 mg/dL  High:             160-189 mg/dL  Very high:       >189 mg/dl       Triglycerides   Date Value Ref Range Status   04/12/2024 85 <150 mg/dL Final   08/06/2020 119 <150 mg/dL Final     Comment:     Fasting specimen     WBC   Date Value Ref Range Status   02/28/2020 8.4 4.0 - 11.0 10e9/L Final     WBC Count   Date Value Ref Range Status   08/22/2023 8.5 4.0 - 11.0 10e3/uL Final     Hemoglobin   Date Value Ref Range Status   08/22/2023 14.1 11.7 - 15.7 g/dL Final   02/28/2020 13.8 11.7 - 15.7 g/dL Final     Hematocrit   Date Value Ref Range Status   08/22/2023 42.2 35.0 - 47.0 % Final   02/28/2020 41.9 35.0 - 47.0 % Final     MCV   Date Value Ref Range Status   08/22/2023 86 78 - 100 fL Final   02/28/2020 88 78 - 100 fl Final     Platelet Count   Date Value Ref Range Status   08/22/2023 153 150 - 450 10e3/uL Final   02/28/2020 138 (L) 150 - 450 10e9/L Final         BP Readings from Last 6 Encounters:   08/27/24 127/78   08/06/24 102/75   04/12/24 109/63   03/05/24 124/73   01/16/24 112/64   01/02/24 119/77       Pulse Readings from Last 6 Encounters:   08/27/24 91   08/06/24 99   04/12/24 86   03/05/24 94   01/16/24 88   01/02/24 93       PHYSICAL EXAM:  /78   Pulse 91   Resp 16   Ht 5' 2\" (1.575 m)   Wt 197 lb (89.4 kg)   SpO2 96%   BMI 36.03 kg/m    GENERAL: Healthy, alert and no distress  EYES: Eyes grossly normal to inspection.  No discharge or erythema, or obvious scleral/conjunctival abnormalities.  RESP: No audible wheeze, cough, or visible cyanosis.  No " visible retractions or increased work of breathing.    SKIN: Visible skin clear. No significant rash, abnormal pigmentation or lesions.  NEURO: Cranial nerves grossly intact.  Mentation and speech appropriate for age.  PSYCH: Mentation appears normal, affect normal/bright, judgement and insight intact, normal speech and appearance well-groomed.    COUNSELING:   Reviewed obesity as a chronic disease and comprehensive management stratagies.      We discussed Bariatric Basics including:  -eating 3 meals daily  -eating protein first  -eating slowly, chewing food well  -avoiding/limiting calorie containing beverages  -limiting carbohydrates and changing to whole grains  -limiting restaurant or cafeteria eating to twice a week or less    We discussed the importance of restorative sleep and stress management in maintaining a healthy weight.  We discussed insulin resistance and glycemic index as it relates to appetite and weight control.   We discussed the importance of physical activity including cardiovascular and strength training in maintaining a healthier weight and explored viable options.  Patient education of above written in AVS.      Sincerely,    Stanley Ivan PA-C

## 2024-08-28 DIAGNOSIS — E66.01 MORBID OBESITY (H): ICD-10-CM

## 2024-08-28 DIAGNOSIS — E11.59 TYPE 2 DIABETES MELLITUS WITH OTHER CIRCULATORY COMPLICATION, WITHOUT LONG-TERM CURRENT USE OF INSULIN (H): Primary | ICD-10-CM

## 2024-09-16 ENCOUNTER — ANCILLARY PROCEDURE (OUTPATIENT)
Dept: CARDIOLOGY | Facility: CLINIC | Age: 44
End: 2024-09-16
Attending: INTERNAL MEDICINE
Payer: MEDICARE

## 2024-09-16 DIAGNOSIS — Z86.79 HX OF VENTRICULAR TACHYCARDIA: ICD-10-CM

## 2024-09-16 DIAGNOSIS — Q20.3 D-TGA (DEXTRO-TRANSPOSITION OF GREAT ARTERIES): ICD-10-CM

## 2024-09-16 PROCEDURE — 93295 DEV INTERROG REMOTE 1/2/MLT: CPT | Performed by: INTERNAL MEDICINE

## 2024-09-16 PROCEDURE — 93296 REM INTERROG EVL PM/IDS: CPT

## 2024-09-17 LAB
MDC_IDC_EPISODE_DTM: NORMAL
MDC_IDC_EPISODE_DURATION: 1 S
MDC_IDC_EPISODE_DURATION: 15 S
MDC_IDC_EPISODE_DURATION: 15 S
MDC_IDC_EPISODE_DURATION: 152 S
MDC_IDC_EPISODE_DURATION: 16 S
MDC_IDC_EPISODE_DURATION: 19 S
MDC_IDC_EPISODE_DURATION: 23 S
MDC_IDC_EPISODE_DURATION: 281 S
MDC_IDC_EPISODE_DURATION: 29 S
MDC_IDC_EPISODE_DURATION: 31 S
MDC_IDC_EPISODE_DURATION: 320 S
MDC_IDC_EPISODE_DURATION: 44 S
MDC_IDC_EPISODE_DURATION: 47 S
MDC_IDC_EPISODE_DURATION: 5 S
MDC_IDC_EPISODE_DURATION: 5 S
MDC_IDC_EPISODE_DURATION: 63 S
MDC_IDC_EPISODE_DURATION: 8 S
MDC_IDC_EPISODE_DURATION: 89 S
MDC_IDC_EPISODE_ID: 693
MDC_IDC_EPISODE_ID: 694
MDC_IDC_EPISODE_ID: 695
MDC_IDC_EPISODE_ID: 696
MDC_IDC_EPISODE_ID: 697
MDC_IDC_EPISODE_ID: 698
MDC_IDC_EPISODE_ID: 699
MDC_IDC_EPISODE_ID: 700
MDC_IDC_EPISODE_ID: 701
MDC_IDC_EPISODE_ID: 702
MDC_IDC_EPISODE_ID: 703
MDC_IDC_EPISODE_ID: 704
MDC_IDC_EPISODE_ID: 705
MDC_IDC_EPISODE_ID: 706
MDC_IDC_EPISODE_ID: 707
MDC_IDC_EPISODE_ID: 708
MDC_IDC_EPISODE_ID: 709
MDC_IDC_EPISODE_ID: 710
MDC_IDC_EPISODE_TYPE: NORMAL
MDC_IDC_LEAD_CONNECTION_STATUS: NORMAL
MDC_IDC_LEAD_IMPLANT_DT: NORMAL
MDC_IDC_LEAD_LOCATION: NORMAL
MDC_IDC_LEAD_LOCATION_DETAIL_1: NORMAL
MDC_IDC_LEAD_MFG: NORMAL
MDC_IDC_LEAD_MODEL: NORMAL
MDC_IDC_LEAD_POLARITY_TYPE: NORMAL
MDC_IDC_LEAD_SERIAL: NORMAL
MDC_IDC_MSMT_BATTERY_DTM: NORMAL
MDC_IDC_MSMT_BATTERY_REMAINING_LONGEVITY: 30 MO
MDC_IDC_MSMT_BATTERY_RRT_TRIGGER: 2.73
MDC_IDC_MSMT_BATTERY_STATUS: NORMAL
MDC_IDC_MSMT_BATTERY_VOLTAGE: 2.94 V
MDC_IDC_MSMT_LEADCHNL_RV_IMPEDANCE_VALUE: 304 OHM
MDC_IDC_MSMT_LEADCHNL_RV_IMPEDANCE_VALUE: 4047 OHM
MDC_IDC_MSMT_LEADCHNL_RV_PACING_THRESHOLD_AMPLITUDE: 0.5 V
MDC_IDC_MSMT_LEADCHNL_RV_PACING_THRESHOLD_PULSEWIDTH: 0.4 MS
MDC_IDC_MSMT_LEADCHNL_RV_SENSING_INTR_AMPL: 6.62 MV
MDC_IDC_MSMT_LEADCHNL_RV_SENSING_INTR_AMPL: 6.62 MV
MDC_IDC_PG_IMPLANT_DTM: NORMAL
MDC_IDC_PG_MFG: NORMAL
MDC_IDC_PG_MODEL: NORMAL
MDC_IDC_PG_SERIAL: NORMAL
MDC_IDC_PG_TYPE: NORMAL
MDC_IDC_SESS_CLINIC_NAME: NORMAL
MDC_IDC_SESS_DTM: NORMAL
MDC_IDC_SESS_TYPE: NORMAL
MDC_IDC_SET_BRADY_HYSTRATE: NORMAL
MDC_IDC_SET_BRADY_LOWRATE: 40 {BEATS}/MIN
MDC_IDC_SET_BRADY_MODE: NORMAL
MDC_IDC_SET_LEADCHNL_RV_PACING_AMPLITUDE: 2 V
MDC_IDC_SET_LEADCHNL_RV_PACING_ANODE_ELECTRODE_1: NORMAL
MDC_IDC_SET_LEADCHNL_RV_PACING_ANODE_LOCATION_1: NORMAL
MDC_IDC_SET_LEADCHNL_RV_PACING_CAPTURE_MODE: NORMAL
MDC_IDC_SET_LEADCHNL_RV_PACING_CATHODE_ELECTRODE_1: NORMAL
MDC_IDC_SET_LEADCHNL_RV_PACING_CATHODE_LOCATION_1: NORMAL
MDC_IDC_SET_LEADCHNL_RV_PACING_POLARITY: NORMAL
MDC_IDC_SET_LEADCHNL_RV_PACING_PULSEWIDTH: 0.4 MS
MDC_IDC_SET_LEADCHNL_RV_SENSING_ANODE_ELECTRODE_1: NORMAL
MDC_IDC_SET_LEADCHNL_RV_SENSING_ANODE_LOCATION_1: NORMAL
MDC_IDC_SET_LEADCHNL_RV_SENSING_CATHODE_ELECTRODE_1: NORMAL
MDC_IDC_SET_LEADCHNL_RV_SENSING_CATHODE_LOCATION_1: NORMAL
MDC_IDC_SET_LEADCHNL_RV_SENSING_POLARITY: NORMAL
MDC_IDC_SET_LEADCHNL_RV_SENSING_SENSITIVITY: 0.3 MV
MDC_IDC_SET_ZONE_DETECTION_BEATS_DENOMINATOR: 32 {BEATS}
MDC_IDC_SET_ZONE_DETECTION_BEATS_DENOMINATOR: 32 {BEATS}
MDC_IDC_SET_ZONE_DETECTION_BEATS_DENOMINATOR: 40 {BEATS}
MDC_IDC_SET_ZONE_DETECTION_BEATS_NUMERATOR: 30 {BEATS}
MDC_IDC_SET_ZONE_DETECTION_BEATS_NUMERATOR: 32 {BEATS}
MDC_IDC_SET_ZONE_DETECTION_BEATS_NUMERATOR: 32 {BEATS}
MDC_IDC_SET_ZONE_DETECTION_INTERVAL: 270 MS
MDC_IDC_SET_ZONE_DETECTION_INTERVAL: 360 MS
MDC_IDC_SET_ZONE_DETECTION_INTERVAL: 370 MS
MDC_IDC_SET_ZONE_DETECTION_INTERVAL: NORMAL
MDC_IDC_SET_ZONE_STATUS: NORMAL
MDC_IDC_SET_ZONE_TYPE: NORMAL
MDC_IDC_SET_ZONE_VENDOR_TYPE: NORMAL
MDC_IDC_STAT_BRADY_DTM_END: NORMAL
MDC_IDC_STAT_BRADY_DTM_START: NORMAL
MDC_IDC_STAT_BRADY_RV_PERCENT_PACED: 0.01 %
MDC_IDC_STAT_EPISODE_RECENT_COUNT: 0
MDC_IDC_STAT_EPISODE_RECENT_COUNT: 1
MDC_IDC_STAT_EPISODE_RECENT_COUNT: 7
MDC_IDC_STAT_EPISODE_RECENT_COUNT_DTM_END: NORMAL
MDC_IDC_STAT_EPISODE_RECENT_COUNT_DTM_START: NORMAL
MDC_IDC_STAT_EPISODE_TOTAL_COUNT: 0
MDC_IDC_STAT_EPISODE_TOTAL_COUNT: 1
MDC_IDC_STAT_EPISODE_TOTAL_COUNT: 25
MDC_IDC_STAT_EPISODE_TOTAL_COUNT: 280
MDC_IDC_STAT_EPISODE_TOTAL_COUNT_DTM_END: NORMAL
MDC_IDC_STAT_EPISODE_TOTAL_COUNT_DTM_START: NORMAL
MDC_IDC_STAT_EPISODE_TYPE: NORMAL
MDC_IDC_STAT_TACHYTHERAPY_ATP_DELIVERED_RECENT: 0
MDC_IDC_STAT_TACHYTHERAPY_ATP_DELIVERED_TOTAL: 0
MDC_IDC_STAT_TACHYTHERAPY_RECENT_DTM_END: NORMAL
MDC_IDC_STAT_TACHYTHERAPY_RECENT_DTM_START: NORMAL
MDC_IDC_STAT_TACHYTHERAPY_SHOCKS_ABORTED_RECENT: 0
MDC_IDC_STAT_TACHYTHERAPY_SHOCKS_ABORTED_TOTAL: 0
MDC_IDC_STAT_TACHYTHERAPY_SHOCKS_DELIVERED_RECENT: 0
MDC_IDC_STAT_TACHYTHERAPY_SHOCKS_DELIVERED_TOTAL: 1
MDC_IDC_STAT_TACHYTHERAPY_TOTAL_DTM_END: NORMAL
MDC_IDC_STAT_TACHYTHERAPY_TOTAL_DTM_START: NORMAL

## 2024-10-01 DIAGNOSIS — Q20.3 D-TGA (DEXTRO-TRANSPOSITION OF GREAT ARTERIES): ICD-10-CM

## 2024-10-01 DIAGNOSIS — Z86.79 HX OF VENTRICULAR TACHYCARDIA: ICD-10-CM

## 2024-10-01 DIAGNOSIS — Z29.89 SBE (SUBACUTE BACTERIAL ENDOCARDITIS) PROPHYLAXIS CANDIDATE: ICD-10-CM

## 2024-10-01 RX ORDER — AMOXICILLIN 500 MG/1
CAPSULE ORAL
Qty: 4 CAPSULE | Refills: 3 | Status: SHIPPED | OUTPATIENT
Start: 2024-10-01

## 2024-10-01 NOTE — TELEPHONE ENCOUNTER
Incoming call from Betsy and patient. Requesting antibiotics to premedicate for upcoming dental appointment 10/15/24. Reports she typically has amoxicillin ordered for her. Last ordered by Marichuy 2023, but refill provided by cardiology. I pended what was last ordered for patient below. Let me know if you want this forwarded to cardiology team.    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

## 2024-10-08 ENCOUNTER — ANCILLARY PROCEDURE (OUTPATIENT)
Dept: MAMMOGRAPHY | Facility: CLINIC | Age: 44
End: 2024-10-08
Attending: FAMILY MEDICINE
Payer: MEDICARE

## 2024-10-08 DIAGNOSIS — Z12.31 VISIT FOR SCREENING MAMMOGRAM: ICD-10-CM

## 2024-10-08 PROCEDURE — 77063 BREAST TOMOSYNTHESIS BI: CPT | Mod: TC | Performed by: RADIOLOGY

## 2024-10-08 PROCEDURE — 77067 SCR MAMMO BI INCL CAD: CPT | Mod: TC | Performed by: RADIOLOGY

## 2024-10-12 ENCOUNTER — HEALTH MAINTENANCE LETTER (OUTPATIENT)
Age: 44
End: 2024-10-12

## 2024-10-15 DIAGNOSIS — E11.59 TYPE 2 DIABETES MELLITUS WITH OTHER CIRCULATORY COMPLICATION, WITHOUT LONG-TERM CURRENT USE OF INSULIN (H): ICD-10-CM

## 2024-10-15 DIAGNOSIS — F41.9 ANXIETY: ICD-10-CM

## 2024-10-15 DIAGNOSIS — F33.1 MODERATE EPISODE OF RECURRENT MAJOR DEPRESSIVE DISORDER (H): ICD-10-CM

## 2024-10-15 DIAGNOSIS — E66.01 MORBID OBESITY (H): ICD-10-CM

## 2024-10-15 RX ORDER — SEMAGLUTIDE 0.68 MG/ML
0.5 INJECTION, SOLUTION SUBCUTANEOUS
Qty: 3 ML | Refills: 2 | Status: SHIPPED | OUTPATIENT
Start: 2024-10-15

## 2024-11-19 ENCOUNTER — OFFICE VISIT (OUTPATIENT)
Dept: FAMILY MEDICINE | Facility: CLINIC | Age: 44
End: 2024-11-19
Payer: MEDICARE

## 2024-11-19 VITALS
BODY MASS INDEX: 34.12 KG/M2 | SYSTOLIC BLOOD PRESSURE: 120 MMHG | RESPIRATION RATE: 18 BRPM | HEIGHT: 62 IN | TEMPERATURE: 97.8 F | DIASTOLIC BLOOD PRESSURE: 72 MMHG | HEART RATE: 105 BPM | OXYGEN SATURATION: 98 % | WEIGHT: 185.4 LBS

## 2024-11-19 DIAGNOSIS — Z23 NEED FOR COVID-19 VACCINE: ICD-10-CM

## 2024-11-19 DIAGNOSIS — E66.01 MORBID OBESITY (H): ICD-10-CM

## 2024-11-19 DIAGNOSIS — Z00.00 ENCOUNTER FOR MEDICARE ANNUAL WELLNESS EXAM: Primary | ICD-10-CM

## 2024-11-19 DIAGNOSIS — E11.59 TYPE 2 DIABETES MELLITUS WITH OTHER CIRCULATORY COMPLICATION, WITHOUT LONG-TERM CURRENT USE OF INSULIN (H): ICD-10-CM

## 2024-11-19 LAB
ANION GAP SERPL CALCULATED.3IONS-SCNC: 12 MMOL/L (ref 7–15)
BUN SERPL-MCNC: 8.6 MG/DL (ref 6–20)
CALCIUM SERPL-MCNC: 10.3 MG/DL (ref 8.8–10.4)
CHLORIDE SERPL-SCNC: 106 MMOL/L (ref 98–107)
CREAT SERPL-MCNC: 0.57 MG/DL (ref 0.51–0.95)
CREAT UR-MCNC: 109 MG/DL
EGFRCR SERPLBLD CKD-EPI 2021: >90 ML/MIN/1.73M2
EST. AVERAGE GLUCOSE BLD GHB EST-MCNC: 183 MG/DL
GLUCOSE SERPL-MCNC: 128 MG/DL (ref 70–99)
HBA1C MFR BLD: 8 % (ref 0–5.6)
HCO3 SERPL-SCNC: 20 MMOL/L (ref 22–29)
HOLD SPECIMEN: NORMAL
HOLD SPECIMEN: NORMAL
MICROALBUMIN UR-MCNC: 13.4 MG/L
MICROALBUMIN/CREAT UR: 12.29 MG/G CR (ref 0–25)
POTASSIUM SERPL-SCNC: 4.2 MMOL/L (ref 3.4–5.3)
SODIUM SERPL-SCNC: 138 MMOL/L (ref 135–145)

## 2024-11-19 PROCEDURE — 36415 COLL VENOUS BLD VENIPUNCTURE: CPT | Performed by: FAMILY MEDICINE

## 2024-11-19 PROCEDURE — 82043 UR ALBUMIN QUANTITATIVE: CPT | Performed by: FAMILY MEDICINE

## 2024-11-19 PROCEDURE — 82570 ASSAY OF URINE CREATININE: CPT | Performed by: FAMILY MEDICINE

## 2024-11-19 PROCEDURE — 80048 BASIC METABOLIC PNL TOTAL CA: CPT | Performed by: FAMILY MEDICINE

## 2024-11-19 PROCEDURE — 83036 HEMOGLOBIN GLYCOSYLATED A1C: CPT | Performed by: FAMILY MEDICINE

## 2024-11-19 SDOH — HEALTH STABILITY: PHYSICAL HEALTH: ON AVERAGE, HOW MANY MINUTES DO YOU ENGAGE IN EXERCISE AT THIS LEVEL?: 30 MIN

## 2024-11-19 SDOH — HEALTH STABILITY: PHYSICAL HEALTH: ON AVERAGE, HOW MANY DAYS PER WEEK DO YOU ENGAGE IN MODERATE TO STRENUOUS EXERCISE (LIKE A BRISK WALK)?: 2 DAYS

## 2024-11-19 ASSESSMENT — PATIENT HEALTH QUESTIONNAIRE - PHQ9
10. IF YOU CHECKED OFF ANY PROBLEMS, HOW DIFFICULT HAVE THESE PROBLEMS MADE IT FOR YOU TO DO YOUR WORK, TAKE CARE OF THINGS AT HOME, OR GET ALONG WITH OTHER PEOPLE: NOT DIFFICULT AT ALL
SUM OF ALL RESPONSES TO PHQ QUESTIONS 1-9: 3
SUM OF ALL RESPONSES TO PHQ QUESTIONS 1-9: 3

## 2024-11-19 ASSESSMENT — PAIN SCALES - GENERAL: PAINLEVEL_OUTOF10: NO PAIN (0)

## 2024-11-19 ASSESSMENT — SOCIAL DETERMINANTS OF HEALTH (SDOH): HOW OFTEN DO YOU GET TOGETHER WITH FRIENDS OR RELATIVES?: ONCE A WEEK

## 2024-11-19 NOTE — PATIENT INSTRUCTIONS
Get last Hep B vaccine at a pharmacy.    Schedule Nexplanon removal for around 7-22-25.  If you want to continue, we can reinsert at that time. Schedule removal and reinsertion.    Restart Jardiance while we make adjustment to your Ozempic, continue until next appointment.    Patient Education   Preventive Care Advice   This is general advice given by our system to help you stay healthy. However, your care team may have specific advice just for you. Please talk to your care team about your preventive care needs.  Nutrition  Eat 5 or more servings of fruits and vegetables each day.  Try wheat bread, brown rice and whole grain pasta (instead of white bread, rice, and pasta).  Get enough calcium and vitamin D. Check the label on foods and aim for 100% of the RDA (recommended daily allowance).  Lifestyle  Exercise at least 150 minutes each week  (30 minutes a day, 5 days a week).  Do muscle strengthening activities 2 days a week. These help control your weight and prevent disease.  No smoking.  Wear sunscreen to prevent skin cancer.  Have a dental exam and cleaning every 6 months.  Yearly exams  See your health care team every year to talk about:  Any changes in your health.  Any medicines your care team has prescribed.  Preventive care, family planning, and ways to prevent chronic diseases.  Shots (vaccines)   HPV shots (up to age 26), if you've never had them before.  Hepatitis B shots (up to age 59), if you've never had them before.  COVID-19 shot: Get this shot when it's due.  Flu shot: Get a flu shot every year.  Tetanus shot: Get a tetanus shot every 10 years.  Pneumococcal, hepatitis A, and RSV shots: Ask your care team if you need these based on your risk.  Shingles shot (for age 50 and up)  General health tests  Diabetes screening:  Starting at age 35, Get screened for diabetes at least every 3 years.  If you are younger than age 35, ask your care team if you should be screened for diabetes.  Cholesterol test:  At age 39, start having a cholesterol test every 5 years, or more often if advised.  Bone density scan (DEXA): At age 50, ask your care team if you should have this scan for osteoporosis (brittle bones).  Hepatitis C: Get tested at least once in your life.  STIs (sexually transmitted infections)  Before age 24: Ask your care team if you should be screened for STIs.  After age 24: Get screened for STIs if you're at risk. You are at risk for STIs (including HIV) if:  You are sexually active with more than one person.  You don't use condoms every time.  You or a partner was diagnosed with a sexually transmitted infection.  If you are at risk for HIV, ask about PrEP medicine to prevent HIV.  Get tested for HIV at least once in your life, whether you are at risk for HIV or not.  Cancer screening tests  Cervical cancer screening: If you have a cervix, begin getting regular cervical cancer screening tests starting at age 21.  Breast cancer scan (mammogram): If you've ever had breasts, begin having regular mammograms starting at age 40. This is a scan to check for breast cancer.  Colon cancer screening: It is important to start screening for colon cancer at age 45.  Have a colonoscopy test every 10 years (or more often if you're at risk) Or, ask your provider about stool tests like a FIT test every year or Cologuard test every 3 years.  To learn more about your testing options, visit:   .  For help making a decision, visit:   https://bit.ly/yq43603.  Prostate cancer screening test: If you have a prostate, ask your care team if a prostate cancer screening test (PSA) at age 55 is right for you.  Lung cancer screening: If you are a current or former smoker ages 50 to 80, ask your care team if ongoing lung cancer screenings are right for you.  For informational purposes only. Not to replace the advice of your health care provider. Copyright   2023 [employer]. All rights reserved. Clinically reviewed by the [facility]. [employer]ik[employer] [id number] - REV 01/24.  Learning About Sleeping Well  What does sleeping well mean?     Sleeping well means getting enough sleep to feel good and stay healthy. How much sleep is enough varies among people.  The number of hours you sleep and how you feel when you wake up are both important. If you do not feel refreshed, you probably need more sleep. Another sign of not getting enough sleep is feeling tired during the day.  Experts recommend that adults get at least 7 or more hours of sleep per day. Children and older adults need more sleep.  Why is getting enough sleep important?  Getting enough quality sleep is a basic part of good health. When your sleep suffers, your physical health, mood, and your thoughts can suffer too. You may find yourself feeling more grumpy or stressed. Not getting enough sleep also can lead to serious problems, including injury, accidents, anxiety, and depression.  What might cause poor sleeping?  Many things can cause sleep problems, including:  Changes to your sleep schedule.  Stress. Stress can be caused by fear about a single event, such as giving a speech. Or you may have ongoing stress, such as worry about work or school.  Depression, anxiety, and other mental or emotional conditions.  Changes in your sleep habits or surroundings. This includes changes that happen where you sleep, such as noise, light, or sleeping in a different bed. It also includes changes in your sleep pattern, such as having jet lag or working a late shift.  Health problems, such as pain, breathing problems, and restless legs syndrome.  Lack of regular exercise.  Using alcohol, nicotine, or caffeine before bed.  How can you help yourself?  Here are some tips that may help you sleep more soundly and wake up feeling more refreshed.  Your sleeping area   Use your bedroom only for sleeping and sex. A bit of light reading may help you fall asleep. But if it doesn't, do your  "reading elsewhere in the house. Try not to use your TV, computer, smartphone, or tablet while you are in bed.  Be sure your bed is big enough to stretch out comfortably, especially if you have a sleep partner.  Keep your bedroom quiet, dark, and cool. Use curtains, blinds, or a sleep mask to block out light. To block out noise, use earplugs, soothing music, or a \"white noise\" machine.  Your evening and bedtime routine   Create a relaxing bedtime routine. You might want to take a warm shower or bath, or listen to soothing music.  Go to bed at the same time every night. And get up at the same time every morning, even if you feel tired.  What to avoid   Limit caffeine (coffee, tea, caffeinated sodas) during the day, and don't have any for at least 6 hours before bedtime.  Avoid drinking alcohol before bedtime. Alcohol can cause you to wake up more often during the night.  Try not to smoke or use tobacco, especially in the evening. Nicotine can keep you awake.  Limit naps during the day, especially close to bedtime.  Avoid lying in bed awake for too long. If you can't fall asleep or if you wake up in the middle of the night and can't get back to sleep within about 20 minutes, get out of bed and go to another room until you feel sleepy.  Avoid taking medicine right before bed that may keep you awake or make you feel hyper or energized. Your doctor can tell you if your medicine may do this and if you can take it earlier in the day.  If you can't sleep   Imagine yourself in a peaceful, pleasant scene. Focus on the details and feelings of being in a place that is relaxing.  Get up and do a quiet or boring activity until you feel sleepy.  Avoid drinking any liquids before going to bed to help prevent waking up often to use the bathroom.  Where can you learn more?  Go to https://www.healthwise.net/patiented  Enter J942 in the search box to learn more about \"Learning About Sleeping Well.\"  Current as of: July 10, 2023  Content " Version: 14.2 2024 [facility] [employer].   Care instructions adapted under license by your healthcare professional. If you have questions about a medical condition or this instruction, always ask your healthcare professional. [employer] disclaims any warranty or liability for your use of this information.

## 2024-11-19 NOTE — PROGRESS NOTES
"  Assessment & Plan     Encounter for Medicare annual wellness exam  Exam completed today, routine health maintenance items updated as able.  Labs ordered.  Follow up one year or sooner as needed.  Recommend hepatitis B vaccine at pharmacy.    Type 2 diabetes mellitus with other circulatory complication, without long-term current use of insulin (H)  A1c increased slightly to 8.0.  Recommend we increase Ozempic to 0.5 mg once weekly and have patient restart her Jardiance.  Follow-up in 3 months or sooner as needed.  She is due for a diabetic eye exam, but her insurance will only cover 1 every 2 years.  - Albumin Random Urine Quantitative with Creat Ratio; Future  - Hemoglobin A1c  - Albumin Random Urine Quantitative with Creat Ratio  - Basic metabolic panel  (Ca, Cl, CO2, Creat, Gluc, K, Na, BUN); Future  - Basic metabolic panel  (Ca, Cl, CO2, Creat, Gluc, K, Na, BUN)  - Semaglutide, 1 MG/DOSE, (OZEMPIC) 4 MG/3ML pen; Inject 1 mg subcutaneously every 7 days.  - empagliflozin (JARDIANCE) 25 MG TABS tablet; Take 1 tablet (25 mg) by mouth daily.  - FOOT EXAM    Morbid obesity (H)  Patient has lost 12 pounds since being on Ozempic.  Continue to work on diet and lifestyle changes, monitor with increased dose of Ozempic for her diabetes.      Need for COVID-19 vaccine  - COVID-19 12+ (PFIZER)      BMI  Estimated body mass index is 33.91 kg/m  as calculated from the following:    Height as of this encounter: 1.575 m (5' 2\").    Weight as of this encounter: 84.1 kg (185 lb 6.4 oz).   Weight management plan: Discussed healthy diet and exercise guidelines    Counseling  Appropriate preventive services were addressed with this patient via screening, questionnaire, or discussion as appropriate for fall prevention, nutrition, physical activity, Tobacco-use cessation, social engagement, weight loss and cognition.  Checklist reviewing preventive services available has been given to the patient.  Reviewed patient's diet, addressing " concerns and/or questions.   She is at risk for lack of exercise and has been provided with information to increase physical activity for the benefit of her well-being.   Discussed possible causes of fatigue.     See Patient Instructions    Inés Diop is a 44 year old, presenting for the following health issues:  Diabetes (Follow Up) and Wellness Visit        11/19/2024    10:18 AM   Additional Questions   Roomed by PRIMO Webber-B   Accompanied by [facility] staff     History of Present Illness       Diabetes:   She presents for follow up of diabetes.  She is checking home blood glucose one time daily.   She checks blood glucose at bedtime.  Blood glucose is never over 200 and never under 70.  When her blood glucose is low, the patient is asymptomatic for confusion, blurred vision, lethargy and reports not feeling dizzy, shaky, or weak.   She has no concerns regarding her diabetes at this time.   She is not experiencing numbness or burning in feet, excessive thirst, blurry vision, weight changes or redness, sores or blisters on feet. The patient has not had a diabetic eye exam in the last 12 months.      She exercises with enough effort to increase her heart rate 9 or less minutes per day.  She exercises with enough effort to increase her heart rate 3 or less days per week. She is missing 7 dose(s) of medications per week.     No concerns today.    Of note, she tried to schedule an annual eye exam, and was told that her insurance does not cover yearly exams, they only do every 2 years.    Medication Followup of Multiple   Taking Medication as prescribed: yes  Side Effects:  None  Medication Helping Symptoms:  yes    Has lost 12 lbs since being on Ozempic. Stopped her Jardiance when she started Ozempic.    Annual Wellness Visit     Patient has been advised of split billing requirements and indicates understanding: Yes       Health Care Directive  Patient does not have a Health Care  Directive: Discussed advance care planning with patient; however, patient declined at this time.      11/19/2024   General Health   How would you rate your overall physical health? (!) FAIR   Feel stress (tense, anxious, or unable to sleep) Only a little             11/19/2024   Nutrition   Diet: Diabetic            11/19/2024   Exercise   Days per week of moderate/strenous exercise 2 days   Average minutes spent exercising at this level 30 min        (!) EXERCISE CONCERN      11/19/2024   Social Factors   Frequency of gathering with friends or relatives Once a week   Worry food won't last until get money to buy more No   Food not last or not have enough money for food? No   Do you have housing? (Housing is defined as stable permanent housing and does not include staying ouside in a car, in a tent, in an abandoned building, in an overnight shelter, or couch-surfing.) Yes   Are you worried about losing your housing? No   Lack of transportation? No   Unable to get utilities (heat,electricity)? No              11/19/2024   Fall Risk   Fallen 2 or more times in the past year? No    Trouble with walking or balance? No        Patient-reported          11/19/2024   Activities of Daily Living- Home Safety   Needs help with the following daily activites None of the above   Safety concerns in the home None of the above            11/19/2024   Dental   Dentist two times every year? Yes            11/19/2024   Hearing Screening   Hearing concerns? None of the above            11/19/2024   Driving Risk Screening   Patient/family members have concerns about driving No            11/19/2024   General Alertness/Fatigue Screening   Have you been more tired than usual lately? (!) YES            11/19/2024   Urinary Incontinence Screening   Bothered by leaking urine in past 6 months No            11/19/2024   TB Screening   Were you born outside of the [place]? No          Social History     Tobacco Use    Smoking status: Never     Passive  exposure: Past (per pt)    Smokeless tobacco: Never   Vaping Use    Vaping status: Never Used   Substance Use Topics    Alcohol use: No     Alcohol/week: 0.0 standard drinks of alcohol    Drug use: No       Today's PHQ-9 Score:       11/19/2024    10:03 AM   PHQ-9 SCORE   PHQ-9 Total Score MyChart 3 (Minimal depression)   PHQ-9 Total Score 3        Patient-reported         10/8/2024   LAST FHS-7 RESULTS   1st degree relative breast or ovarian cancer Unknown   Any relative bilateral breast cancer Unknown   Any male have breast cancer Unknown   Any ONE woman have BOTH breast AND ovarian cancer Unknown   Any woman with breast cancer before 50yrs Unknown   2 or more relatives with breast AND/OR ovarian cancer Unknown   2 or more relatives with breast AND/OR bowel cancer Unknown           Mammogram Screening - Mammogram every 1-2 years updated in Health Maintenance based on mutual decision making      History of abnormal Pap smear: No - age 30- 64 PAP with HPV every 5 years recommended        Latest Ref Rng & Units 3/17/2023     4:07 PM 8/16/2018    12:12 PM 8/16/2018    12:01 PM   PAP / HPV   PAP  Negative for Intraepithelial Lesion or Malignancy (NILM)      PAP (Historical)    NIL    HPV 16 DNA Negative Negative  Negative     HPV 18 DNA Negative Negative  Negative     Other HR HPV Negative Negative  Negative       ASCVD Risk   The 10-year ASCVD risk score (Raghav BAEZ, et al., 2019) is: 1%    Values used to calculate the score:      Age: 44 years      Sex: Female      Is Non- : No      Diabetic: Yes      Tobacco smoker: No      Systolic Blood Pressure: 120 mmHg      Is BP treated: No      HDL Cholesterol: 51 mg/dL      Total Cholesterol: 147 mg/dL        11/19/2024   Contraception/Family Planning   Questions about contraception or family planning No              Reviewed and updated as needed this visit by Provider                    Past Medical History:   Diagnosis Date    Allergy,  unspecified not elsewhere classified     Attention deficit disorder without mention of hyperactivity     Mild MR; difficulty attention span    Congenital heart disease     Corrected transposition of great vessels     still needs SBE prophlaxis    Diabetes mellitus type 2, noninsulin dependent (H) 2014    Impaired fasting glucose     103 7/06    Ventricular tachycardia (H) 03/04/2014    s/p ablation and ICD placement 3/13/14     Past Surgical History:   Procedure Laterality Date    CARDIAC SURGERY  Had 4 surgeries    transposition of the major arteries    COLONOSCOPY N/A 1/16/2024    Procedure: Colonoscopy FV random biopsies using cold bx forcep;  Surgeon: Shaylee Cody MD;  Location:  GI    SURGICAL HISTORY OF -   01/01/2014    ablation for v. tach; ICD placed    wisdom teeth removal      ZZC NONSPECIFIC PROCEDURE  11/01/1998    open heart surgery x 3, transposition of major vessels    ZZC NONSPECIFIC PROCEDURE  10/01/2003    Additional heart surgery     Lab work is in process  Labs reviewed in EPIC  BP Readings from Last 3 Encounters:   11/19/24 120/72   08/27/24 127/78   08/06/24 102/75    Wt Readings from Last 3 Encounters:   11/19/24 84.1 kg (185 lb 6.4 oz)   08/27/24 89.4 kg (197 lb)   08/06/24 87.1 kg (192 lb)                  Patient Active Problem List   Diagnosis    Corrected transposition of great vessels    Allergic state    Attention deficit disorder    Anxiety    GERD (gastroesophageal reflux disease)    Major depression in complete remission (H)    * * * SBE PROPHYLAXIS * * *    Mildly mentally retarded    Congenital heart disease    Ventricular tachycardia (H)    Cardiac ICD- Medtronic- single chamber- NOT dependent    Type 2 diabetes, HbA1C goal < 8% (H)    Type 2 diabetes mellitus with other circulatory complication, without long-term current use of insulin (H)    Patient [facility] by [facility]    Morbid obesity (H)    Moderate episode of  recurrent major depressive disorder (H)    Thoracic aortic ectasia (H)     Past Surgical History:   Procedure Laterality Date    CARDIAC SURGERY  Had 4 surgeries    transposition of the major arteries    COLONOSCOPY N/A 1/16/2024    Procedure: Colonoscopy FV random biopsies using cold bx forcep;  Surgeon: Shaylee Cody MD;  Location:  GI    SURGICAL HISTORY OF -   01/01/2014    ablation for v. tach; ICD placed    wisdom teeth removal      [facility] NONSPECIFIC PROCEDURE  11/01/1998    open heart surgery x 3, transposition of major vessels    [facility] NONSPECIFIC PROCEDURE  10/01/2003    Additional heart surgery       Social History     Tobacco Use    Smoking status: Never     Passive exposure: Past (per pt)    Smokeless tobacco: Never   Substance Use Topics    Alcohol use: No     Alcohol/week: 0.0 standard drinks of alcohol     Family History   Adopted: Yes   Problem Relation Age of Onset    Family History Negative Mother     Family History Negative Father     Unknown/Adopted No family hx of          Current Outpatient Medications   Medication Sig Dispense Refill    ACE NOT PRESCRIBED, INTENTIONAL, 1 each as needed for other ACE Inhibitor not prescribed due to Other: BP at goal, pending microalbumin test 0 each 0    amoxicillin (AMOXIL) 500 MG capsule Please take 2,000 mg (4 capsule) 30-60 min prior to all dental procedures 4 capsule 3    atorvastatin (LIPITOR) 80 MG tablet Take 1 tablet (80 mg) by mouth daily 90 tablet 3    blood glucose (NO BRAND SPECIFIED) test strip Use to test blood sugar 1 times daily or as directed. To accompany: Blood Glucose Monitor Brands: per insurance. 100 strip 6    blood glucose monitoring (NO BRAND SPECIFIED) meter device kit Use to test blood sugar 1 times daily or as directed. Preferred blood glucose meter OR supplies to accompany: Blood Glucose Monitor Brands: per insurance. 1 kit 0    etonogestrel (NEXPLANON) 68 MG IMPL 1 each (68 mg) by Subdermal route once      fluticasone  (FLONASE) 50 MCG/ACT nasal spray SHAKE LIQUID AND USE 1 SPRAY IN EACH NOSTRIL DAILY 16 g 11    semaglutide (OZEMPIC, 0.25 OR 0.5 MG/DOSE,) 2 MG/3ML pen Inject 0.5 mg subcutaneously every 7 days. 3 mL 2    sertraline (ZOLOFT) 50 MG tablet TAKE 1 TABLET BY MOUTH EVERY DAY 90 tablet 1    thin (NO BRAND SPECIFIED) lancets Use with lanceting device. To accompany: Blood Glucose Monitor Brands: per insurance. 100 each 6    empagliflozin (JARDIANCE) 25 MG TABS tablet Take 1 tablet (25 mg) by mouth daily (Patient not taking: Reported on 11/19/2024) 90 tablet 1     Allergies   Allergen Reactions    Adhesive Tape Rash     Recent Labs   Lab Test 11/19/24  1050 04/12/24  0728 03/05/24  1035 12/05/23  0810 09/15/23  0811 08/22/23  1042 04/28/23  0905 12/13/22  1056 06/03/22  1338 10/10/21  0514 03/23/21  0751 08/06/20  1050 02/28/20  0841   A1C 8.0*  --  7.9* 6.8*  --  9.4*  --    < > 7.9*   < >  --    < >  --    LDL  --  79  --   --  64 134*  --    < >  --    < >  --    < >  --    HDL  --  51  --   --  44* 43*  --    < >  --    < >  --    < >  --    TRIG  --  85  --   --  61 106  --    < >  --    < >  --    < >  --    ALT  --   --   --   --   --  26 40*  --  32   < > 32  --  47   CR  --   --   --   --   --  0.55 0.57   < > 0.54   < > 0.61  --  0.56   GFRESTIMATED  --   --   --   --   --  >90 >90   < > >90   < > >90  --  >90   GFRESTBLACK  --   --   --   --   --   --   --   --   --   --  >90  --  >90   POTASSIUM  --   --   --   --   --  4.3 4.0   < > 3.6   < > 3.9  --  3.9   TSH  --   --  1.93  --   --  1.85  --    < >  --    < >  --    < >  --     < > = values in this interval not displayed.        Current providers sharing in care for this patient include:  Patient Care Team:  Brianne Escobar MD as PCP - General (Family Medicine)  Fady Jameson RN as Nurse Coordinator (Cardiology)  Betsy Valle MD as MD (Cardiology)  Sierra Magaña as Personal Advocate & Liaison (PAL) (Family Medicine)  Sharyn,  Blanca RN as Specialty Care Coordinator (Cardiology)  Coming Brianne Peguero MD as Assigned PCP  March, Hu Causey MD as Assigned Heart and Vascular Provider  Luis Castellanos MD as Assigned Sleep Provider  Stanley Ivan PA-C as Assigned Surgical Provider    The following health maintenance items are reviewed in Epic and correct as of today:  Health Maintenance   Topic Date Due    HEPATITIS B IMMUNIZATION (3 of 3 - 19+ 3-dose series) 10/11/2023    DIABETIC FOOT EXAM  03/17/2024    EYE EXAM  05/03/2024    MEDICARE ANNUAL WELLNESS VISIT  08/22/2024    BMP  08/22/2024    MICROALBUMIN  08/22/2024    COVID-19 Vaccine (5 - 2024-25 season) 09/01/2024    A1C  09/05/2024    ANNUAL REVIEW OF HM ORDERS  09/25/2024    TSH W/FREE T4 REFLEX  03/05/2025    LIPID  04/12/2025    PHQ-9  05/19/2025    MAMMO SCREENING  10/08/2026    HPV TEST  03/17/2028    PAP  03/17/2028    ADVANCE CARE PLANNING  08/29/2028    COLORECTAL CANCER SCREENING  01/23/2031    DTAP/TDAP/TD IMMUNIZATION (3 - Td or Tdap) 06/03/2032    RSV VACCINE (1 - 1-dose 75+ series) 06/21/2053    HEPATITIS C SCREENING  Completed    HIV SCREENING  Completed    DEPRESSION ACTION PLAN  Completed    INFLUENZA VACCINE  Completed    Pneumococcal Vaccine: Pediatrics (0 to 5 Years) and At-Risk Patients (6 to 64 Years)  Completed    HPV IMMUNIZATION  Aged Out    MENINGITIS IMMUNIZATION  Aged Out    RSV MONOCLONAL ANTIBODY  Aged Out       Appropriate preventive services were discussed with this patient, including applicable screening as appropriate for fall prevention, nutrition, physical activity, Tobacco-use cessation, weight loss and cognition.  Checklist reviewing preventive services available has been given to the patient.          11/19/2024   Mini Cog   Clock Draw Score --   3 Item Recall 0 objects recalled                 Objective    /72 (BP Location: Right arm, Patient Position: Sitting, Cuff Size: Adult Regular)   Pulse 105   Temp 97.8  F (36.6  C)  "(Oral)   Resp 18   Ht 1.575 m (5' 2\")   Wt 84.1 kg (185 lb 6.4 oz)   SpO2 98%   BMI 33.91 kg/m    Body mass index is 33.91 kg/m .  Physical Exam   GENERAL: alert and no distress  EYES: Eyes grossly normal to inspection, PERRL and conjunctivae and sclerae normal  HENT: ear canals and TM's normal, nose and mouth without ulcers or lesions  NECK: no adenopathy, no asymmetry, masses, or scars  RESP: lungs clear to auscultation - no rales, rhonchi or wheezes  CV: regular rates and rhythm, normal S1 S2, no S3 or S4, systolic murmur, peripheral pulses strong, and no peripheral edema  ABDOMEN: bowel sounds normal  MS: no gross musculoskeletal defects noted, no edema  SKIN: no suspicious lesions or rashes  NEURO: Normal strength and tone, mentation intact and speech normal  PSYCH: mentation appears normal, affect normal/bright  Diabetic foot exam: normal DP and PT pulses, no trophic changes or ulcerative lesions, normal sensory exam, normal monofilament exam, and onychomycosis    Results for orders placed or performed in visit on 11/19/24 (from the past 24 hours)   Hemoglobin A1c   Result Value Ref Range    Estimated Average Glucose 183 (H) <117 mg/dL    Hemoglobin A1C 8.0 (H) 0.0 - 5.6 %   Extra Tube    Narrative    The following orders were created for panel order Extra Tube.  Procedure                               Abnormality         Status                     ---------                               -----------         ------                     Extra Serum Separator Tu...[[id number]]                      In process                 Extra Green Top (Lithium...[[id number]]                      In process                   Please view results for these tests on the individual orders.           Signed Electronically by: Brianne Peguero MD    "

## 2024-12-17 ENCOUNTER — TELEPHONE (OUTPATIENT)
Dept: FAMILY MEDICINE | Facility: CLINIC | Age: 44
End: 2024-12-17
Payer: MEDICARE

## 2024-12-17 NOTE — TELEPHONE ENCOUNTER
Please call PT Contact Betsy Mcghee at [phone] regarding a letter they need from Dr. Brett Peguero for PT Chik[place].

## 2024-12-18 ENCOUNTER — ANCILLARY PROCEDURE (OUTPATIENT)
Dept: CARDIOLOGY | Facility: CLINIC | Age: 44
End: 2024-12-18
Attending: INTERNAL MEDICINE
Payer: MEDICARE

## 2024-12-18 DIAGNOSIS — Z86.79 HX OF VENTRICULAR TACHYCARDIA: ICD-10-CM

## 2024-12-18 DIAGNOSIS — Q20.3 D-TGA (DEXTRO-TRANSPOSITION OF GREAT ARTERIES): ICD-10-CM

## 2024-12-18 PROCEDURE — 93296 REM INTERROG EVL PM/IDS: CPT

## 2024-12-18 PROCEDURE — 93295 DEV INTERROG REMOTE 1/2/MLT: CPT | Performed by: INTERNAL MEDICINE

## 2024-12-18 NOTE — TELEPHONE ENCOUNTER
I see a form from [employer] on 1-26-24 under media tab, but otherwise there are no other forms or letters in the chart.    Please check if there is a form to be completed.  There should be a form if it is from [facility]

## 2024-12-18 NOTE — TELEPHONE ENCOUNTER
Called Betsy, she states a letter is needed for [facility] for CDA  re-cert. To state she still has disability , 4 heart surgeries, pacemaker, and anxiety. Pt has had same letter requested in past may have been a different provider. Please call Betsy Mcghee at [phone] if additional questions. Betsy will be available tomorrow than out until 01/06/25. Ruth Behrens,

## 2024-12-19 NOTE — TELEPHONE ENCOUNTER
Called Betsy, she will see check to see if she check with patient if there is a form. Will not be until January. Ruth Behrens.

## 2024-12-26 DIAGNOSIS — Z13.6 CARDIOVASCULAR SCREENING; LDL GOAL LESS THAN 70: ICD-10-CM

## 2024-12-26 RX ORDER — ATORVASTATIN CALCIUM 80 MG/1
80 TABLET, FILM COATED ORAL DAILY
Qty: 90 TABLET | Refills: 0 | Status: SHIPPED | OUTPATIENT
Start: 2024-12-26

## 2024-12-29 LAB
MDC_IDC_EPISODE_DTM: NORMAL
MDC_IDC_EPISODE_DURATION: 1 S
MDC_IDC_EPISODE_DURATION: 15 S
MDC_IDC_EPISODE_DURATION: 17 S
MDC_IDC_EPISODE_DURATION: 30 S
MDC_IDC_EPISODE_DURATION: 33 S
MDC_IDC_EPISODE_DURATION: 9 S
MDC_IDC_EPISODE_ID: 711
MDC_IDC_EPISODE_ID: 712
MDC_IDC_EPISODE_ID: 713
MDC_IDC_EPISODE_ID: 714
MDC_IDC_EPISODE_ID: 715
MDC_IDC_EPISODE_ID: 716
MDC_IDC_EPISODE_TYPE: NORMAL
MDC_IDC_LEAD_CONNECTION_STATUS: NORMAL
MDC_IDC_LEAD_IMPLANT_DT: NORMAL
MDC_IDC_LEAD_LOCATION: NORMAL
MDC_IDC_LEAD_LOCATION_DETAIL_1: NORMAL
MDC_IDC_LEAD_MFG: NORMAL
MDC_IDC_LEAD_MODEL: NORMAL
MDC_IDC_LEAD_POLARITY_TYPE: NORMAL
MDC_IDC_LEAD_SERIAL: NORMAL
MDC_IDC_MSMT_BATTERY_DTM: NORMAL
MDC_IDC_MSMT_BATTERY_REMAINING_LONGEVITY: 27 MO
MDC_IDC_MSMT_BATTERY_RRT_TRIGGER: 2.73
MDC_IDC_MSMT_BATTERY_STATUS: NORMAL
MDC_IDC_MSMT_BATTERY_VOLTAGE: 2.94 V
MDC_IDC_MSMT_LEADCHNL_RV_IMPEDANCE_VALUE: 304 OHM
MDC_IDC_MSMT_LEADCHNL_RV_IMPEDANCE_VALUE: 4047 OHM
MDC_IDC_MSMT_LEADCHNL_RV_PACING_THRESHOLD_AMPLITUDE: 0.5 V
MDC_IDC_MSMT_LEADCHNL_RV_PACING_THRESHOLD_PULSEWIDTH: 0.4 MS
MDC_IDC_MSMT_LEADCHNL_RV_SENSING_INTR_AMPL: 6.12 MV
MDC_IDC_PG_IMPLANT_DTM: NORMAL
MDC_IDC_PG_MFG: NORMAL
MDC_IDC_PG_MODEL: NORMAL
MDC_IDC_PG_SERIAL: NORMAL
MDC_IDC_PG_TYPE: NORMAL
MDC_IDC_SESS_CLINIC_NAME: NORMAL
MDC_IDC_SESS_DTM: NORMAL
MDC_IDC_SESS_TYPE: NORMAL
MDC_IDC_SET_BRADY_HYSTRATE: NORMAL
MDC_IDC_SET_BRADY_LOWRATE: 40 {BEATS}/MIN
MDC_IDC_SET_BRADY_MODE: NORMAL
MDC_IDC_SET_LEADCHNL_RV_PACING_AMPLITUDE: 2 V
MDC_IDC_SET_LEADCHNL_RV_PACING_ANODE_ELECTRODE_1: NORMAL
MDC_IDC_SET_LEADCHNL_RV_PACING_ANODE_LOCATION_1: NORMAL
MDC_IDC_SET_LEADCHNL_RV_PACING_CAPTURE_MODE: NORMAL
MDC_IDC_SET_LEADCHNL_RV_PACING_CATHODE_ELECTRODE_1: NORMAL
MDC_IDC_SET_LEADCHNL_RV_PACING_CATHODE_LOCATION_1: NORMAL
MDC_IDC_SET_LEADCHNL_RV_PACING_POLARITY: NORMAL
MDC_IDC_SET_LEADCHNL_RV_PACING_PULSEWIDTH: 0.4 MS
MDC_IDC_SET_LEADCHNL_RV_SENSING_ANODE_ELECTRODE_1: NORMAL
MDC_IDC_SET_LEADCHNL_RV_SENSING_ANODE_LOCATION_1: NORMAL
MDC_IDC_SET_LEADCHNL_RV_SENSING_CATHODE_ELECTRODE_1: NORMAL
MDC_IDC_SET_LEADCHNL_RV_SENSING_CATHODE_LOCATION_1: NORMAL
MDC_IDC_SET_LEADCHNL_RV_SENSING_POLARITY: NORMAL
MDC_IDC_SET_LEADCHNL_RV_SENSING_SENSITIVITY: 0.3 MV
MDC_IDC_SET_ZONE_DETECTION_BEATS_DENOMINATOR: 32 {BEATS}
MDC_IDC_SET_ZONE_DETECTION_BEATS_DENOMINATOR: 32 {BEATS}
MDC_IDC_SET_ZONE_DETECTION_BEATS_DENOMINATOR: 40 {BEATS}
MDC_IDC_SET_ZONE_DETECTION_BEATS_NUMERATOR: 30 {BEATS}
MDC_IDC_SET_ZONE_DETECTION_BEATS_NUMERATOR: 32 {BEATS}
MDC_IDC_SET_ZONE_DETECTION_BEATS_NUMERATOR: 32 {BEATS}
MDC_IDC_SET_ZONE_DETECTION_INTERVAL: 270 MS
MDC_IDC_SET_ZONE_DETECTION_INTERVAL: 360 MS
MDC_IDC_SET_ZONE_DETECTION_INTERVAL: 370 MS
MDC_IDC_SET_ZONE_DETECTION_INTERVAL: NORMAL
MDC_IDC_SET_ZONE_STATUS: NORMAL
MDC_IDC_SET_ZONE_TYPE: NORMAL
MDC_IDC_SET_ZONE_VENDOR_TYPE: NORMAL
MDC_IDC_STAT_BRADY_DTM_END: NORMAL
MDC_IDC_STAT_BRADY_DTM_START: NORMAL
MDC_IDC_STAT_BRADY_RV_PERCENT_PACED: 0.01 %
MDC_IDC_STAT_EPISODE_RECENT_COUNT: 0
MDC_IDC_STAT_EPISODE_RECENT_COUNT: 1
MDC_IDC_STAT_EPISODE_RECENT_COUNT: 2
MDC_IDC_STAT_EPISODE_RECENT_COUNT: 3
MDC_IDC_STAT_EPISODE_RECENT_COUNT_DTM_END: NORMAL
MDC_IDC_STAT_EPISODE_RECENT_COUNT_DTM_START: NORMAL
MDC_IDC_STAT_EPISODE_TOTAL_COUNT: 0
MDC_IDC_STAT_EPISODE_TOTAL_COUNT: 1
MDC_IDC_STAT_EPISODE_TOTAL_COUNT: 26
MDC_IDC_STAT_EPISODE_TOTAL_COUNT: 283
MDC_IDC_STAT_EPISODE_TOTAL_COUNT_DTM_END: NORMAL
MDC_IDC_STAT_EPISODE_TOTAL_COUNT_DTM_START: NORMAL
MDC_IDC_STAT_EPISODE_TYPE: NORMAL
MDC_IDC_STAT_TACHYTHERAPY_ATP_DELIVERED_RECENT: 0
MDC_IDC_STAT_TACHYTHERAPY_ATP_DELIVERED_TOTAL: 0
MDC_IDC_STAT_TACHYTHERAPY_RECENT_DTM_END: NORMAL
MDC_IDC_STAT_TACHYTHERAPY_RECENT_DTM_START: NORMAL
MDC_IDC_STAT_TACHYTHERAPY_SHOCKS_ABORTED_RECENT: 0
MDC_IDC_STAT_TACHYTHERAPY_SHOCKS_ABORTED_TOTAL: 0
MDC_IDC_STAT_TACHYTHERAPY_SHOCKS_DELIVERED_RECENT: 0
MDC_IDC_STAT_TACHYTHERAPY_SHOCKS_DELIVERED_TOTAL: 1
MDC_IDC_STAT_TACHYTHERAPY_TOTAL_DTM_END: NORMAL
MDC_IDC_STAT_TACHYTHERAPY_TOTAL_DTM_START: NORMAL

## 2025-01-06 ASSESSMENT — SLEEP AND FATIGUE QUESTIONNAIRES
HOW LIKELY ARE YOU TO NOD OFF OR FALL ASLEEP WHILE SITTING AND TALKING TO SOMEONE: WOULD NEVER DOZE
HOW LIKELY ARE YOU TO NOD OFF OR FALL ASLEEP IN A CAR, WHILE STOPPED FOR A FEW MINUTES IN TRAFFIC: WOULD NEVER DOZE
HOW LIKELY ARE YOU TO NOD OFF OR FALL ASLEEP WHILE SITTING QUIETLY AFTER LUNCH WITHOUT ALCOHOL: WOULD NEVER DOZE
HOW LIKELY ARE YOU TO NOD OFF OR FALL ASLEEP WHILE LYING DOWN TO REST IN THE AFTERNOON WHEN CIRCUMSTANCES PERMIT: HIGH CHANCE OF DOZING
HOW LIKELY ARE YOU TO NOD OFF OR FALL ASLEEP WHILE SITTING AND READING: WOULD NEVER DOZE
HOW LIKELY ARE YOU TO NOD OFF OR FALL ASLEEP WHILE WATCHING TV: WOULD NEVER DOZE
HOW LIKELY ARE YOU TO NOD OFF OR FALL ASLEEP WHILE SITTING INACTIVE IN A PUBLIC PLACE: WOULD NEVER DOZE
HOW LIKELY ARE YOU TO NOD OFF OR FALL ASLEEP WHEN YOU ARE A PASSENGER IN A CAR FOR AN HOUR WITHOUT A BREAK: HIGH CHANCE OF DOZING

## 2025-01-08 ENCOUNTER — THERAPY VISIT (OUTPATIENT)
Dept: SLEEP MEDICINE | Facility: CLINIC | Age: 45
End: 2025-01-08
Payer: MEDICARE

## 2025-01-08 DIAGNOSIS — Q20.3 D-TGA (DEXTRO-TRANSPOSITION OF GREAT ARTERIES): ICD-10-CM

## 2025-01-08 DIAGNOSIS — G47.19 EXCESSIVE DAYTIME SLEEPINESS: ICD-10-CM

## 2025-01-08 DIAGNOSIS — Q24.9 CONGENITAL HEART DISEASE: ICD-10-CM

## 2025-01-08 DIAGNOSIS — Z86.79 HX OF VENTRICULAR TACHYCARDIA: ICD-10-CM

## 2025-01-23 ASSESSMENT — SLEEP AND FATIGUE QUESTIONNAIRES
HOW LIKELY ARE YOU TO NOD OFF OR FALL ASLEEP WHEN YOU ARE A PASSENGER IN A CAR FOR AN HOUR WITHOUT A BREAK: SLIGHT CHANCE OF DOZING
HOW LIKELY ARE YOU TO NOD OFF OR FALL ASLEEP IN A CAR, WHILE STOPPED FOR A FEW MINUTES IN TRAFFIC: WOULD NEVER DOZE
HOW LIKELY ARE YOU TO NOD OFF OR FALL ASLEEP WHILE SITTING AND READING: SLIGHT CHANCE OF DOZING
HOW LIKELY ARE YOU TO NOD OFF OR FALL ASLEEP WHILE SITTING AND TALKING TO SOMEONE: WOULD NEVER DOZE
HOW LIKELY ARE YOU TO NOD OFF OR FALL ASLEEP WHILE SITTING INACTIVE IN A PUBLIC PLACE: WOULD NEVER DOZE
HOW LIKELY ARE YOU TO NOD OFF OR FALL ASLEEP WHILE WATCHING TV: SLIGHT CHANCE OF DOZING
HOW LIKELY ARE YOU TO NOD OFF OR FALL ASLEEP WHILE SITTING QUIETLY AFTER LUNCH WITHOUT ALCOHOL: SLIGHT CHANCE OF DOZING
HOW LIKELY ARE YOU TO NOD OFF OR FALL ASLEEP WHILE LYING DOWN TO REST IN THE AFTERNOON WHEN CIRCUMSTANCES PERMIT: MODERATE CHANCE OF DOZING

## 2025-01-28 ENCOUNTER — OFFICE VISIT (OUTPATIENT)
Dept: SLEEP MEDICINE | Facility: CLINIC | Age: 45
End: 2025-01-28
Payer: MEDICARE

## 2025-01-28 VITALS
SYSTOLIC BLOOD PRESSURE: 123 MMHG | BODY MASS INDEX: 32.2 KG/M2 | HEART RATE: 66 BPM | DIASTOLIC BLOOD PRESSURE: 73 MMHG | OXYGEN SATURATION: 99 % | HEIGHT: 62 IN | WEIGHT: 175 LBS

## 2025-01-28 DIAGNOSIS — G47.9 SLEEP DISTURBANCE: Primary | ICD-10-CM

## 2025-01-28 PROCEDURE — 99213 OFFICE O/P EST LOW 20 MIN: CPT | Performed by: INTERNAL MEDICINE

## 2025-01-28 NOTE — PROGRESS NOTES
Sleep Study Follow-Up Visit:    Date on this visit: 1/28/2025    Chikis Hannon comes in today for follow-up of her sleep study done on 1/8/2025 at the [facility] for possible sleep apnea.    Sleep latency 12 minutes without Ambien.  REM achieved.   REM latency 118 minutes.  Sleep efficiency 75%. Total sleep time 411.5 minutes.    Sleep architecture:  Stage 1, 8.5% (5%), stage 2, 62.6% (45-55%), stage 3, 12.6% (15-20%), stage REM, 16.3% (20-25%).  AHI was 4.4, without significant desaturations. RDI 7.3.  REM AHI 16, consistent with moderate REM JEFFERSON.  Supine AHI 3.3, consistent with no SUPINE JEFFERSON.  Periodic Limb Movement Index 5.2/hour.       These findings were reviewed with patient.     Past medical/surgical history, family history, social history, medications and allergies were reviewed.      Problem List:  Patient Active Problem List    Diagnosis Date Noted    Thoracic aortic ectasia 01/02/2024     Priority: Medium    Moderate episode of recurrent major depressive disorder (H) 07/12/2022     Priority: Medium    Morbid obesity (H) 02/11/2021     Priority: Medium    Patient is followed by the [facility] 03/14/2016     Priority: Medium    Type 2 diabetes mellitus with other circulatory complication, without long-term current use of insulin (H) 11/12/2015     Priority: Medium    Type 2 diabetes, HbA1C goal < 8% (H) 09/03/2014     Priority: Medium    Cardiac ICD- Medtronic- single chamber- NOT dependent 03/20/2014     Priority: Medium     Implanted 6/01/2015 by Dr. Ray  Do you wish to do the replacement in the background? 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 Provider to review      Attention deficit disorder 02/01/2005     Priority: Medium     Mild MR; difficulty attention span  Problem list name updated by automated process. Provider to review          Impression/Plan:    Negative for clinically significant sleep apnea, hypoxemia  or movement abnormalities    PSG result was reviewed in detail with the patient.  Test is negative for clinically significant sleep apnea or hypoxemia.  PLM index was within normal range at 5.2/h.    Patient was reassured regarding absence of clinically significant sleep apnea.    She seems to have a delayed sleep phase circadian rhythm.  On weekends, sleep onset is around 12:30 AM and sleep onset is around 8 AM.  When she is working, she has to wake up at around 6:30 AM.  She usually compensates by taking a nap after work.  We discussed optimization of sleep-wake schedules and maintaining adequate sleep intake.    Electronically signed by Dr. Luis Castellanos, Diplomate, Sleep Medicine, ABPN

## 2025-01-28 NOTE — NURSING NOTE
"Chief Complaint   Patient presents with    Study Results       Initial /73   Pulse 66   Ht 1.575 m (5' 2\")   Wt 79.4 kg (175 lb)   SpO2 99%   BMI 32.01 kg/m   Estimated body mass index is 32.01 kg/m  as calculated from the following:    Height as of this encounter: 1.575 m (5' 2\").    Weight as of this encounter: 79.4 kg (175 lb).    Medication Reconciliation: complete  ESS 6  Isabela Sevilla MA   "

## 2025-02-08 DIAGNOSIS — E11.59 TYPE 2 DIABETES MELLITUS WITH OTHER CIRCULATORY COMPLICATION, WITHOUT LONG-TERM CURRENT USE OF INSULIN (H): ICD-10-CM

## 2025-02-10 RX ORDER — SEMAGLUTIDE 1.34 MG/ML
INJECTION, SOLUTION SUBCUTANEOUS
Qty: 3 ML | Refills: 0 | Status: SHIPPED | OUTPATIENT
Start: 2025-02-10

## 2025-02-11 ENCOUNTER — OFFICE VISIT (OUTPATIENT)
Dept: SURGERY | Facility: CLINIC | Age: 45
End: 2025-02-11
Payer: MEDICARE

## 2025-02-11 VITALS
DIASTOLIC BLOOD PRESSURE: 75 MMHG | WEIGHT: 170.2 LBS | SYSTOLIC BLOOD PRESSURE: 116 MMHG | OXYGEN SATURATION: 96 % | HEART RATE: 88 BPM | BODY MASS INDEX: 31.32 KG/M2 | HEIGHT: 62 IN

## 2025-02-11 DIAGNOSIS — E66.811 CLASS 1 OBESITY DUE TO EXCESS CALORIES WITH SERIOUS COMORBIDITY AND BODY MASS INDEX (BMI) OF 31.0 TO 31.9 IN ADULT: Primary | ICD-10-CM

## 2025-02-11 DIAGNOSIS — E66.09 CLASS 1 OBESITY DUE TO EXCESS CALORIES WITH SERIOUS COMORBIDITY AND BODY MASS INDEX (BMI) OF 31.0 TO 31.9 IN ADULT: Primary | ICD-10-CM

## 2025-02-11 DIAGNOSIS — E11.9 TYPE 2 DIABETES, HBA1C GOAL < 8% (H): ICD-10-CM

## 2025-02-11 PROBLEM — E66.01 MORBID OBESITY (H): Status: RESOLVED | Noted: 2021-02-11 | Resolved: 2025-02-11

## 2025-02-11 PROCEDURE — G2211 COMPLEX E/M VISIT ADD ON: HCPCS | Performed by: PHYSICIAN ASSISTANT

## 2025-02-11 PROCEDURE — 99213 OFFICE O/P EST LOW 20 MIN: CPT | Performed by: PHYSICIAN ASSISTANT

## 2025-02-11 NOTE — PROGRESS NOTES
2025      Return Medical Weight Management Note     Chikis Hannon  MRN:  [id number]  :  1980    Assessment & Plan   Problem List Items Addressed This Visit       Class 1 obesity due to excess calories with serious comorbidity and body mass index (BMI) of 31.0 to 31.9 in adult - Primary     Patient was congratulated on wt loss success thus far. Healthy habits to assist with further weight loss were discussed. Chikis will continue 1 mg Ozempic as prescribed by primary care - she reports this is an effective dose so fine to stay at this dose as long as it's working for her but discussed would defer to PCP recommendations related to blood sugar control - seeing them in a couple weeks. Reviewed muscle conservation recommendations - she has some exercises she has done prior she could restart as well as home weights. Provided handouts for strength.           Type 2 diabetes, HbA1C goal < 8% (H)     Discussed in clinic visit. Anticipate improvement with weight loss.  [place] benefit with Ozempic for DM II and weight management             AOM Considerations per DUSTY BrowningC:  Phentermine:   Not a candidate due to CAD  Topiramate:       GLP-1: great option.       Naltrexone:        Wellbutrin: not candidate due to CAD  Metformin: made very sick                                                      PATIENT INSTRUCTIONS:  Pt Instructions:  For weight management -with the 1 mg Ozempic weekly dose being effective, we'd recommend staying at this dose as long as it's effective. Please continue to work with Dr. Brett Peguero for recommendations with diabetes though (they may want dose changes depending on your sugar control).    Goals:  Focus on healthy food choices - prioritizing protein and veggies in your meals. I recommend eating every 4-6 hours to reduce the risk of low blood sugars and try to minimize snacking between meals. Stay well hydrated though the day as well.  Great job with exercising - please continue to  invest in yourself with regular exercise. Continue to strive for a range for 150-300 minutes of exercise for weight maintenance and incorporating strength training twice-three times a week to help preserve muscle!      Follow up:    Call [phone] to schedule next visit in next available. Be in touch on My[employer] for refills/concerns between visits    12 minutes spent on the date of the encounter doing chart review, history and exam, result review, counseling, developing plan of care, documentation, and further activities as noted      INTERVAL HISTORY:  Chikis returns for medical weight management follow up.  Last seen on 8/27/2024 with Stanley Ivan PA-C and plan for Ozempic prescribed from primary care - is at the 1 mg dose with DR. Brett Peguero. She states she is getting three small meals in daily - will do potato soup, tomato soup, or chicken. Trying to get in veggies.    For exercising for muscle conservation: she's been walking but also enjoys stationary bike and has free weights at home she could start using again.      WEIGHT METRICS:  Body mass index is 31.13 kg/m .   Current Weight: 170 lb 3.2 oz (77.2 kg)  Last Visits Weight: 197 lb (89.4 kg)  Initial Weight (lbs): 197 lbs  Cumulative weight loss (lbs): 26.8  Weight Loss Percentage: 13.6%    Wt Readings from Last 10 Encounters:   02/11/25 170 lb 3.2 oz (77.2 kg)   01/28/25 175 lb (79.4 kg)   11/19/24 185 lb 6.4 oz (84.1 kg)   08/27/24 197 lb (89.4 kg)   08/06/24 192 lb (87.1 kg)   06/11/24 189 lb (85.7 kg)   04/12/24 187 lb 4.8 oz (85 kg)   03/05/24 182 lb 14.4 oz (83 kg)   01/30/24 181 lb (82.1 kg)   01/16/24 183 lb (83 kg)                 2/11/2025     8:12 AM   Weight Loss Medication History Reviewed With Patient   Which weight loss medications are you currently taking on a regular basis? Ozempic   Are you having any side effects from the weight loss medication that we have prescribed you? No           2/11/2025     8:12 AM   Changes and Difficulties  "  I have made the following changes to my diet since my last visit: Eating less   With regards to my diet, I am still struggling with: Nothing   I have made the following changes to my activity/exercise since my last visit: Walking more   With regards to my activity/exercise, I am still struggling with: Nothing       LABS:  Labs reviewed in Epic    11/19/2024 BMP CO2 20, glucose 128, otherwise normal    BP Readings from Last 6 Encounters:   02/11/25 116/75   01/28/25 123/73   11/19/24 120/72   08/27/24 127/78   08/06/24 102/75   04/12/24 109/63       Pulse Readings from Last 6 Encounters:   02/11/25 88   01/28/25 66   11/19/24 105   08/27/24 91   08/06/24 99   04/12/24 86       PE:  /75   Pulse 88   Ht 5' 2\" (1.575 m)   Wt 170 lb 3.2 oz (77.2 kg)   SpO2 96%   BMI 31.13 kg/m    GENERAL: Healthy, alert and no distress  EYES: Eyes grossly normal to inspection.    RESP: No audible wheeze, cough, or visible cyanosis.  No increased work of breathing.    SKIN: Visible skin clear. No significant rash, abnormal pigmentation or lesions.  NEURO: Mentation and speech appropriate for age.  PSYCH: Mentation appears normal, affect normal/bright, judgement and insight intact, normal speech and appearance well-groomed.      Sincerely,      Risa Pagan PA-C         "

## 2025-02-11 NOTE — ASSESSMENT & PLAN NOTE
Patient was congratulated on wt loss success thus far. Healthy habits to assist with further weight loss were discussed. Chikis will continue 1 mg Ozempic as prescribed by primary care - she reports this is an effective dose so fine to stay at this dose as long as it's working for her but discussed would defer to PCP recommendations related to blood sugar control - seeing them in a couple weeks. Reviewed muscle conservation recommendations - she has some exercises she has done prior she could restart as well as home weights. Provided handouts for strength.

## 2025-02-11 NOTE — PATIENT INSTRUCTIONS
Nice to talk with you today. Below is the plan discussed.-  Risa Pagan, JESUS     Pt Instructions:  For weight management -with the 1 mg Ozempic weekly dose being effective, we'd recommend staying at this dose as long as it's effective. Please continue to work with Dr. Brett Peguero for recommendations with diabetes though (they may want dose changes depending on your sugar control).    Goals:  Focus on healthy food choices - prioritizing protein and veggies in your meals. I recommend eating every 4-6 hours to reduce the risk of low blood sugars and try to minimize snacking between meals. Stay well hydrated though the day as well.  Great job with exercising - please continue to invest in yourself with regular exercise. Continue to strive for a range for 150-300 minutes of exercise for weight maintenance and incorporating strength training twice-three times a week to help preserve muscle!      Follow up:    Call [phone] to schedule next visit in next available. Be in touch on [employer] for refills/concerns between visits    Seated Exercises for Arms and Legs  [email or web address]

## 2025-02-11 NOTE — ASSESSMENT & PLAN NOTE
Discussed in clinic visit. Anticipate improvement with weight loss.  [place] benefit with Ozempic for DM II and weight management

## 2025-02-25 ENCOUNTER — OFFICE VISIT (OUTPATIENT)
Dept: FAMILY MEDICINE | Facility: CLINIC | Age: 45
End: 2025-02-25
Payer: MEDICARE

## 2025-02-25 VITALS
DIASTOLIC BLOOD PRESSURE: 56 MMHG | BODY MASS INDEX: 32.02 KG/M2 | HEART RATE: 105 BPM | WEIGHT: 174 LBS | OXYGEN SATURATION: 97 % | TEMPERATURE: 97.9 F | RESPIRATION RATE: 19 BRPM | SYSTOLIC BLOOD PRESSURE: 107 MMHG | HEIGHT: 62 IN

## 2025-02-25 DIAGNOSIS — I47.20 VENTRICULAR TACHYCARDIA (H): ICD-10-CM

## 2025-02-25 DIAGNOSIS — F33.1 MODERATE EPISODE OF RECURRENT MAJOR DEPRESSIVE DISORDER (H): ICD-10-CM

## 2025-02-25 DIAGNOSIS — E11.59 TYPE 2 DIABETES MELLITUS WITH OTHER CIRCULATORY COMPLICATION, WITHOUT LONG-TERM CURRENT USE OF INSULIN (H): Primary | ICD-10-CM

## 2025-02-25 DIAGNOSIS — Z86.79 HISTORY OF VENTRICULAR TACHYCARDIA: ICD-10-CM

## 2025-02-25 LAB
ERYTHROCYTE [DISTWIDTH] IN BLOOD BY AUTOMATED COUNT: 12.7 % (ref 10–15)
EST. AVERAGE GLUCOSE BLD GHB EST-MCNC: 131 MG/DL
HBA1C MFR BLD: 6.2 % (ref 0–5.6)
HCT VFR BLD AUTO: 38.3 % (ref 35–47)
HGB BLD-MCNC: 13 G/DL (ref 11.7–15.7)
MCH RBC QN AUTO: 29.9 PG (ref 26.5–33)
MCHC RBC AUTO-ENTMCNC: 33.9 G/DL (ref 31.5–36.5)
MCV RBC AUTO: 88 FL (ref 78–100)
PLATELET # BLD AUTO: 162 10E3/UL (ref 150–450)
RBC # BLD AUTO: 4.35 10E6/UL (ref 3.8–5.2)
TSH SERPL DL<=0.005 MIU/L-ACNC: 1.59 UIU/ML (ref 0.3–4.2)
WBC # BLD AUTO: 8.8 10E3/UL (ref 4–11)

## 2025-02-25 PROCEDURE — 36415 COLL VENOUS BLD VENIPUNCTURE: CPT | Performed by: FAMILY MEDICINE

## 2025-02-25 PROCEDURE — 84443 ASSAY THYROID STIM HORMONE: CPT | Performed by: FAMILY MEDICINE

## 2025-02-25 PROCEDURE — 99213 OFFICE O/P EST LOW 20 MIN: CPT | Performed by: FAMILY MEDICINE

## 2025-02-25 PROCEDURE — 83036 HEMOGLOBIN GLYCOSYLATED A1C: CPT | Performed by: FAMILY MEDICINE

## 2025-02-25 PROCEDURE — 85027 COMPLETE CBC AUTOMATED: CPT | Performed by: FAMILY MEDICINE

## 2025-02-25 PROCEDURE — G2211 COMPLEX E/M VISIT ADD ON: HCPCS | Performed by: FAMILY MEDICINE

## 2025-02-25 RX ORDER — ATORVASTATIN CALCIUM 80 MG/1
80 TABLET, FILM COATED ORAL DAILY
Qty: 90 TABLET | Refills: 3 | Status: SHIPPED | OUTPATIENT
Start: 2025-02-25

## 2025-02-25 NOTE — PROGRESS NOTES
Assessment & Plan     Type 2 diabetes mellitus with other circulatory complication, without long-term current use of insulin (H)  Due for labs today.  If A1c is below 8.0 patient would like to continue current medications for another 3 months.  If above, then we will increase Ozempic to 2 mg.  Due for eye exam.    - TSH WITH FREE T4 REFLEX; Future  - Hemoglobin A1c; Future  - CBC with platelets; Future  - Adult Eye  Referral; Future  - TSH WITH FREE T4 REFLEX  - Hemoglobin A1c  - CBC with platelets    Moderate episode of recurrent major depressive disorder (H)  Controlled, continue Sertraline 50 mg once daily.    Ventricular tachycardia (H)  Resolved, s/p ablation.      The longitudinal plan of care for the diagnosis(es)/condition(s) as documented were addressed during this visit. Due to the added complexity in care, I will continue to support Chikis in the subsequent management and with ongoing continuity of care.      See Patient Instructions    Subjective   Chikis is a 44 year old, presenting for the following health issues:  Diabetes        2/25/2025    10:07 AM   Additional Questions   Roomed by Bernie BRICE CMA     History of Present Illness       Diabetes:   She presents for follow up of diabetes.  She is checking home blood glucose a few times a month.   She checks blood glucose at bedtime.  Blood glucose is never over 200 and never under 70.  When her blood glucose is low, the patient is asymptomatic for confusion, blurred vision, lethargy and reports not feeling dizzy, shaky, or weak.   She has no concerns regarding her diabetes at this time.   She is not experiencing numbness or burning in feet, excessive thirst, blurry vision, weight changes or redness, sores or blisters on feet. The patient has not had a diabetic eye exam in the last 12 months.          She eats 2-3 servings of fruits and vegetables daily.She consumes 1 sweetened beverage(s) daily.She exercises with enough effort to increase her  "heart rate 30 to 60 minutes per day.  She exercises with enough effort to increase her heart rate 3 or less days per week.   She is taking medications regularly.       Does not check sugars.  No problems with the Ozempic.  She feels good. She is down 9 lbs since last visit in November.    Current Outpatient Medications   Medication Sig Dispense Refill    ACE NOT PRESCRIBED, INTENTIONAL, 1 each as needed for other ACE Inhibitor not prescribed due to Other: BP at goal, pending microalbumin test 0 each 0    amoxicillin (AMOXIL) 500 MG capsule Please take 2,000 mg (4 capsule) 30-60 min prior to all dental procedures 4 capsule 3    atorvastatin (LIPITOR) 80 MG tablet TAKE 1 TABLET BY MOUTH EVERY DAY 90 tablet 0    blood glucose (NO BRAND SPECIFIED) test strip Use to test blood sugar 1 times daily or as directed. To accompany: Blood Glucose Monitor Brands: per insurance. 100 strip 6    blood glucose monitoring (NO BRAND SPECIFIED) meter device kit Use to test blood sugar 1 times daily or as directed. Preferred blood glucose meter OR supplies to accompany: Blood Glucose Monitor Brands: per insurance. 1 kit 0    empagliflozin (JARDIANCE) 25 MG TABS tablet Take 1 tablet (25 mg) by mouth daily. 90 tablet 1    etonogestrel (NEXPLANON) 68 MG IMPL 1 each (68 mg) by Subdermal route once      fluticasone (FLONASE) 50 MCG/ACT nasal spray SHAKE LIQUID AND USE 1 SPRAY IN EACH NOSTRIL DAILY 16 g 11    Semaglutide, 1 MG/DOSE, (OZEMPIC, 1 MG/DOSE,) 4 MG/3ML pen INJECT 1 MG SUBCUTANEOUSLY EVERY 7 DAYS 3 mL 0    sertraline (ZOLOFT) 50 MG tablet TAKE 1 TABLET BY MOUTH EVERY DAY 90 tablet 1    thin (NO BRAND SPECIFIED) lancets Use with lanceting device. To accompany: Blood Glucose Monitor Brands: per insurance. 100 each 6           Objective    /56 (BP Location: Right arm, Patient Position: Sitting, Cuff Size: Adult Regular)   Pulse 105   Temp 97.9  F (36.6  C) (Oral)   Resp 19   Ht 1.575 m (5' 2\")   Wt 78.9 kg (174 lb)   SpO2 " 97%   BMI 31.83 kg/m    Body mass index is 31.83 kg/m .  Physical Exam   GENERAL: alert and no distress  PSYCH: mentation appears normal, affect normal/bright    Last A1c 11-19-24: 8.0        Signed Electronically by: Brianne Peguero MD

## 2025-03-10 DIAGNOSIS — E11.59 TYPE 2 DIABETES MELLITUS WITH OTHER CIRCULATORY COMPLICATION, WITHOUT LONG-TERM CURRENT USE OF INSULIN (H): ICD-10-CM

## 2025-03-10 RX ORDER — SEMAGLUTIDE 1.34 MG/ML
INJECTION, SOLUTION SUBCUTANEOUS
Qty: 3 ML | Refills: 0 | Status: SHIPPED | OUTPATIENT
Start: 2025-03-10

## 2025-03-20 ENCOUNTER — ANCILLARY PROCEDURE (OUTPATIENT)
Dept: CARDIOLOGY | Facility: CLINIC | Age: 45
End: 2025-03-20
Attending: INTERNAL MEDICINE
Payer: MEDICARE

## 2025-03-20 DIAGNOSIS — Z95.810 AUTOMATIC IMPLANTABLE CARDIOVERTER-DEFIBRILLATOR IN SITU: ICD-10-CM

## 2025-03-20 PROCEDURE — 93296 REM INTERROG EVL PM/IDS: CPT

## 2025-03-24 ENCOUNTER — MYC REFILL (OUTPATIENT)
Dept: FAMILY MEDICINE | Facility: CLINIC | Age: 45
End: 2025-03-24
Payer: MEDICARE

## 2025-03-24 DIAGNOSIS — E11.59 TYPE 2 DIABETES MELLITUS WITH OTHER CIRCULATORY COMPLICATION, WITHOUT LONG-TERM CURRENT USE OF INSULIN (H): ICD-10-CM

## 2025-03-25 RX ORDER — SEMAGLUTIDE 1.34 MG/ML
1 INJECTION, SOLUTION SUBCUTANEOUS
Qty: 9 ML | Refills: 1 | Status: SHIPPED | OUTPATIENT
Start: 2025-03-25

## 2025-03-26 LAB
MDC_IDC_EPISODE_DTM: NORMAL
MDC_IDC_EPISODE_DURATION: 17 S
MDC_IDC_EPISODE_DURATION: 57 S
MDC_IDC_EPISODE_DURATION: 62 S
MDC_IDC_EPISODE_ID: 717
MDC_IDC_EPISODE_ID: 718
MDC_IDC_EPISODE_ID: 719
MDC_IDC_EPISODE_TYPE: NORMAL
MDC_IDC_LEAD_CONNECTION_STATUS: NORMAL
MDC_IDC_LEAD_IMPLANT_DT: NORMAL
MDC_IDC_LEAD_LOCATION: NORMAL
MDC_IDC_LEAD_LOCATION_DETAIL_1: NORMAL
MDC_IDC_LEAD_MFG: NORMAL
MDC_IDC_LEAD_MODEL: NORMAL
MDC_IDC_LEAD_POLARITY_TYPE: NORMAL
MDC_IDC_LEAD_SERIAL: NORMAL
MDC_IDC_MSMT_BATTERY_DTM: NORMAL
MDC_IDC_MSMT_BATTERY_REMAINING_LONGEVITY: 23 MO
MDC_IDC_MSMT_BATTERY_RRT_TRIGGER: 2.73
MDC_IDC_MSMT_BATTERY_STATUS: NORMAL
MDC_IDC_MSMT_BATTERY_VOLTAGE: 2.93 V
MDC_IDC_MSMT_LEADCHNL_RV_IMPEDANCE_VALUE: 266 OHM
MDC_IDC_MSMT_LEADCHNL_RV_IMPEDANCE_VALUE: 4047 OHM
MDC_IDC_MSMT_LEADCHNL_RV_PACING_THRESHOLD_AMPLITUDE: 0.62 V
MDC_IDC_MSMT_LEADCHNL_RV_PACING_THRESHOLD_PULSEWIDTH: 0.4 MS
MDC_IDC_MSMT_LEADCHNL_RV_SENSING_INTR_AMPL: 6.12 MV
MDC_IDC_PG_IMPLANT_DTM: NORMAL
MDC_IDC_PG_MFG: NORMAL
MDC_IDC_PG_MODEL: NORMAL
MDC_IDC_PG_SERIAL: NORMAL
MDC_IDC_PG_TYPE: NORMAL
MDC_IDC_SESS_CLINIC_NAME: NORMAL
MDC_IDC_SESS_DTM: NORMAL
MDC_IDC_SESS_TYPE: NORMAL
MDC_IDC_SET_BRADY_HYSTRATE: NORMAL
MDC_IDC_SET_BRADY_LOWRATE: 40 {BEATS}/MIN
MDC_IDC_SET_BRADY_MODE: NORMAL
MDC_IDC_SET_LEADCHNL_RV_PACING_AMPLITUDE: 2 V
MDC_IDC_SET_LEADCHNL_RV_PACING_ANODE_ELECTRODE_1: NORMAL
MDC_IDC_SET_LEADCHNL_RV_PACING_ANODE_LOCATION_1: NORMAL
MDC_IDC_SET_LEADCHNL_RV_PACING_CAPTURE_MODE: NORMAL
MDC_IDC_SET_LEADCHNL_RV_PACING_CATHODE_ELECTRODE_1: NORMAL
MDC_IDC_SET_LEADCHNL_RV_PACING_CATHODE_LOCATION_1: NORMAL
MDC_IDC_SET_LEADCHNL_RV_PACING_POLARITY: NORMAL
MDC_IDC_SET_LEADCHNL_RV_PACING_PULSEWIDTH: 0.4 MS
MDC_IDC_SET_LEADCHNL_RV_SENSING_ANODE_ELECTRODE_1: NORMAL
MDC_IDC_SET_LEADCHNL_RV_SENSING_ANODE_LOCATION_1: NORMAL
MDC_IDC_SET_LEADCHNL_RV_SENSING_CATHODE_ELECTRODE_1: NORMAL
MDC_IDC_SET_LEADCHNL_RV_SENSING_CATHODE_LOCATION_1: NORMAL
MDC_IDC_SET_LEADCHNL_RV_SENSING_POLARITY: NORMAL
MDC_IDC_SET_LEADCHNL_RV_SENSING_SENSITIVITY: 0.3 MV
MDC_IDC_SET_ZONE_DETECTION_BEATS_DENOMINATOR: 32 {BEATS}
MDC_IDC_SET_ZONE_DETECTION_BEATS_DENOMINATOR: 32 {BEATS}
MDC_IDC_SET_ZONE_DETECTION_BEATS_DENOMINATOR: 40 {BEATS}
MDC_IDC_SET_ZONE_DETECTION_BEATS_NUMERATOR: 30 {BEATS}
MDC_IDC_SET_ZONE_DETECTION_BEATS_NUMERATOR: 32 {BEATS}
MDC_IDC_SET_ZONE_DETECTION_BEATS_NUMERATOR: 32 {BEATS}
MDC_IDC_SET_ZONE_DETECTION_INTERVAL: 270 MS
MDC_IDC_SET_ZONE_DETECTION_INTERVAL: 360 MS
MDC_IDC_SET_ZONE_DETECTION_INTERVAL: 370 MS
MDC_IDC_SET_ZONE_DETECTION_INTERVAL: NORMAL
MDC_IDC_SET_ZONE_STATUS: NORMAL
MDC_IDC_SET_ZONE_TYPE: NORMAL
MDC_IDC_SET_ZONE_VENDOR_TYPE: NORMAL
MDC_IDC_STAT_BRADY_DTM_END: NORMAL
MDC_IDC_STAT_BRADY_DTM_START: NORMAL
MDC_IDC_STAT_BRADY_RV_PERCENT_PACED: 0.01 %
MDC_IDC_STAT_EPISODE_RECENT_COUNT: 0
MDC_IDC_STAT_EPISODE_RECENT_COUNT: 1
MDC_IDC_STAT_EPISODE_RECENT_COUNT: 3
MDC_IDC_STAT_EPISODE_RECENT_COUNT_DTM_END: NORMAL
MDC_IDC_STAT_EPISODE_RECENT_COUNT_DTM_START: NORMAL
MDC_IDC_STAT_EPISODE_TOTAL_COUNT: 0
MDC_IDC_STAT_EPISODE_TOTAL_COUNT: 1
MDC_IDC_STAT_EPISODE_TOTAL_COUNT: 26
MDC_IDC_STAT_EPISODE_TOTAL_COUNT: 284
MDC_IDC_STAT_EPISODE_TOTAL_COUNT_DTM_END: NORMAL
MDC_IDC_STAT_EPISODE_TOTAL_COUNT_DTM_START: NORMAL
MDC_IDC_STAT_EPISODE_TYPE: NORMAL
MDC_IDC_STAT_TACHYTHERAPY_ATP_DELIVERED_RECENT: 0
MDC_IDC_STAT_TACHYTHERAPY_ATP_DELIVERED_TOTAL: 0
MDC_IDC_STAT_TACHYTHERAPY_RECENT_DTM_END: NORMAL
MDC_IDC_STAT_TACHYTHERAPY_RECENT_DTM_START: NORMAL
MDC_IDC_STAT_TACHYTHERAPY_SHOCKS_ABORTED_RECENT: 0
MDC_IDC_STAT_TACHYTHERAPY_SHOCKS_ABORTED_TOTAL: 0
MDC_IDC_STAT_TACHYTHERAPY_SHOCKS_DELIVERED_RECENT: 0
MDC_IDC_STAT_TACHYTHERAPY_SHOCKS_DELIVERED_TOTAL: 1
MDC_IDC_STAT_TACHYTHERAPY_TOTAL_DTM_END: NORMAL
MDC_IDC_STAT_TACHYTHERAPY_TOTAL_DTM_START: NORMAL

## 2025-04-11 DIAGNOSIS — F41.9 ANXIETY: ICD-10-CM

## 2025-04-11 DIAGNOSIS — F33.1 MODERATE EPISODE OF RECURRENT MAJOR DEPRESSIVE DISORDER (H): ICD-10-CM

## 2025-06-03 ENCOUNTER — RESULTS FOLLOW-UP (OUTPATIENT)
Dept: FAMILY MEDICINE | Facility: CLINIC | Age: 45
End: 2025-06-03

## 2025-06-03 ENCOUNTER — LAB (OUTPATIENT)
Dept: LAB | Facility: CLINIC | Age: 45
End: 2025-06-03
Payer: MEDICARE

## 2025-06-03 DIAGNOSIS — E11.59 TYPE 2 DIABETES MELLITUS WITH OTHER CIRCULATORY COMPLICATION, WITHOUT LONG-TERM CURRENT USE OF INSULIN (H): ICD-10-CM

## 2025-06-03 DIAGNOSIS — Z13.6 SCREENING FOR CARDIOVASCULAR CONDITION: Primary | ICD-10-CM

## 2025-06-03 LAB
CHOLEST SERPL-MCNC: 117 MG/DL
FASTING STATUS PATIENT QL REPORTED: YES
HDLC SERPL-MCNC: 53 MG/DL
LDLC SERPL CALC-MCNC: 50 MG/DL
NONHDLC SERPL-MCNC: 64 MG/DL
TRIGL SERPL-MCNC: 70 MG/DL

## 2025-06-03 PROCEDURE — 80061 LIPID PANEL: CPT

## 2025-06-03 PROCEDURE — 36415 COLL VENOUS BLD VENIPUNCTURE: CPT

## 2025-06-09 ASSESSMENT — ANXIETY QUESTIONNAIRES
5. BEING SO RESTLESS THAT IT IS HARD TO SIT STILL: NOT AT ALL
1. FEELING NERVOUS, ANXIOUS, OR ON EDGE: NOT AT ALL
3. WORRYING TOO MUCH ABOUT DIFFERENT THINGS: NOT AT ALL
7. FEELING AFRAID AS IF SOMETHING AWFUL MIGHT HAPPEN: NOT AT ALL
GAD7 TOTAL SCORE: 0
GAD7 TOTAL SCORE: 0
2. NOT BEING ABLE TO STOP OR CONTROL WORRYING: NOT AT ALL
7. FEELING AFRAID AS IF SOMETHING AWFUL MIGHT HAPPEN: NOT AT ALL
GAD7 TOTAL SCORE: 0
6. BECOMING EASILY ANNOYED OR IRRITABLE: NOT AT ALL
4. TROUBLE RELAXING: NOT AT ALL

## 2025-06-09 ASSESSMENT — PATIENT HEALTH QUESTIONNAIRE - PHQ9
SUM OF ALL RESPONSES TO PHQ QUESTIONS 1-9: 0
SUM OF ALL RESPONSES TO PHQ QUESTIONS 1-9: 0

## 2025-06-10 ENCOUNTER — OFFICE VISIT (OUTPATIENT)
Dept: FAMILY MEDICINE | Facility: CLINIC | Age: 45
End: 2025-06-10
Payer: MEDICARE

## 2025-06-10 VITALS
BODY MASS INDEX: 31.91 KG/M2 | SYSTOLIC BLOOD PRESSURE: 103 MMHG | WEIGHT: 173.4 LBS | OXYGEN SATURATION: 99 % | TEMPERATURE: 97.5 F | HEIGHT: 62 IN | HEART RATE: 94 BPM | RESPIRATION RATE: 18 BRPM | DIASTOLIC BLOOD PRESSURE: 71 MMHG

## 2025-06-10 DIAGNOSIS — I95.1 ORTHOSTATIC HYPOTENSION: ICD-10-CM

## 2025-06-10 DIAGNOSIS — L29.2 VULVAR ITCHING: ICD-10-CM

## 2025-06-10 DIAGNOSIS — E11.59 TYPE 2 DIABETES MELLITUS WITH OTHER CIRCULATORY COMPLICATION, WITHOUT LONG-TERM CURRENT USE OF INSULIN (H): Primary | ICD-10-CM

## 2025-06-10 DIAGNOSIS — R23.3 ABNORMAL BRUISING: ICD-10-CM

## 2025-06-10 LAB
BASOPHILS # BLD AUTO: 0.1 10E3/UL (ref 0–0.2)
BASOPHILS NFR BLD AUTO: 1 %
EOSINOPHIL # BLD AUTO: 0.2 10E3/UL (ref 0–0.7)
EOSINOPHIL NFR BLD AUTO: 2 %
ERYTHROCYTE [DISTWIDTH] IN BLOOD BY AUTOMATED COUNT: 12.9 % (ref 10–15)
EST. AVERAGE GLUCOSE BLD GHB EST-MCNC: 140 MG/DL
HBA1C MFR BLD: 6.5 % (ref 0–5.6)
HCT VFR BLD AUTO: 42.3 % (ref 35–47)
HGB BLD-MCNC: 13.8 G/DL (ref 11.7–15.7)
IMM GRANULOCYTES # BLD: 0 10E3/UL
IMM GRANULOCYTES NFR BLD: 0 %
LYMPHOCYTES # BLD AUTO: 1.7 10E3/UL (ref 0.8–5.3)
LYMPHOCYTES NFR BLD AUTO: 17 %
MCH RBC QN AUTO: 28.5 PG (ref 26.5–33)
MCHC RBC AUTO-ENTMCNC: 32.6 G/DL (ref 31.5–36.5)
MCV RBC AUTO: 87 FL (ref 78–100)
MONOCYTES # BLD AUTO: 0.7 10E3/UL (ref 0–1.3)
MONOCYTES NFR BLD AUTO: 7 %
NEUTROPHILS # BLD AUTO: 7.4 10E3/UL (ref 1.6–8.3)
NEUTROPHILS NFR BLD AUTO: 73 %
PLATELET # BLD AUTO: 158 10E3/UL (ref 150–450)
RBC # BLD AUTO: 4.84 10E6/UL (ref 3.8–5.2)
WBC # BLD AUTO: 10.1 10E3/UL (ref 4–11)

## 2025-06-10 PROCEDURE — 3074F SYST BP LT 130 MM HG: CPT | Performed by: FAMILY MEDICINE

## 2025-06-10 PROCEDURE — 3078F DIAST BP <80 MM HG: CPT | Performed by: FAMILY MEDICINE

## 2025-06-10 PROCEDURE — 83036 HEMOGLOBIN GLYCOSYLATED A1C: CPT | Performed by: FAMILY MEDICINE

## 2025-06-10 PROCEDURE — 3044F HG A1C LEVEL LT 7.0%: CPT | Performed by: FAMILY MEDICINE

## 2025-06-10 PROCEDURE — 36415 COLL VENOUS BLD VENIPUNCTURE: CPT | Performed by: FAMILY MEDICINE

## 2025-06-10 PROCEDURE — 99214 OFFICE O/P EST MOD 30 MIN: CPT | Performed by: FAMILY MEDICINE

## 2025-06-10 PROCEDURE — 85025 COMPLETE CBC W/AUTO DIFF WBC: CPT | Performed by: FAMILY MEDICINE

## 2025-06-10 RX ORDER — CLOBETASOL PROPIONATE 0.5 MG/G
OINTMENT TOPICAL 2 TIMES DAILY
Qty: 80 G | Refills: 1 | Status: SHIPPED | OUTPATIENT
Start: 2025-06-10

## 2025-06-10 NOTE — PATIENT INSTRUCTIONS
Decrease Jardiance to 10 mg daily.    Increase Ozempic to 2 mg weekly.    Apply Clobetasol ointment twice per day for up to 14 days for itching, then as needed after that.

## 2025-06-10 NOTE — PROGRESS NOTES
"  Assessment & Plan     Type 2 diabetes mellitus with other circulatory complication, without long-term current use of insulin (H)  A1c today is slightly increased.  We would like to decrease Jardiance to 10 mg daily given vulvar itching, but patient would like to continue this for cardiac benefits.  Will increase Ozempic in response to the lower Jardiance dose, suspect she may creep up over 7.0 if we don't.  Follow up in 3 months or sooner as needed.  - Hemoglobin A1c; Future  - Hemoglobin A1c  - empagliflozin (JARDIANCE) 10 MG TABS tablet; Take 1 tablet (10 mg) by mouth daily.  - Semaglutide, 2 MG/DOSE, (OZEMPIC) 8 MG/3ML pen; Inject 2 mg subcutaneously every 7 days.  - Hemoglobin A1c; Future    Vulvar itching  Will see if decreasing Jardiance improves vulvar itching.  Will also manage with clobetasol PRN.  - clobetasol (TEMOVATE) 0.05 % external ointment; Apply topically 2 times daily.    Orthostatic hypotension  Discussed that BP is lower, and with position changes it is possible she is getting orthostatic hypotension.  Discussed increasing water intake and liberalizing salt intake some, sugar free gatorade would be good.  Monitor.    Abnormal Bruising  Have seen increased bruising with GLP-1 per patient reports, will monitor with the increased dose. Check labs at next visit.  - CBC  - CMP      The longitudinal plan of care for the diagnosis(es)/condition(s) as documented were addressed during this visit. Due to the added complexity in care, I will continue to support Chikis in the subsequent management and with ongoing continuity of care.      BMI  Estimated body mass index is 31.72 kg/m  as calculated from the following:    Height as of this encounter: 1.575 m (5' 2\").    Weight as of this encounter: 78.7 kg (173 lb 6.4 oz).         Follow-up   Return in about 3 months (around 9/10/2025) for Diabetes Recheck.        Inés Diop is a 44 year old, presenting for the following health issues:  Diabetes " (Follow up /), Consult (Itchy in groin area ), and Dizziness (Started 1 month go, usually while standing up)        6/10/2025     1:12 PM   Additional Questions   Roomed by zaira   Accompanied by jeannie - staff     History of Present Illness       Diabetes:   She presents for follow up of diabetes.  She is checking home blood glucose a few times a week.   She checks blood glucose at bedtime.  Blood glucose is never over 200 and never under 70. She is aware of hypoglycemia symptoms including shakiness and dizziness.    She has no concerns regarding her diabetes at this time.   She is not experiencing numbness or burning in feet, excessive thirst, blurry vision, weight changes or redness, sores or blisters on feet. The patient has not had a diabetic eye exam in the last 12 months.          She eats 2-3 servings of fruits and vegetables daily.She consumes 1 sweetened beverage(s) daily.She exercises with enough effort to increase her heart rate 30 to 60 minutes per day.  She exercises with enough effort to increase her heart rate 4 days per week.   She is taking medications regularly.          Diabetes Follow-up    How often are you checking your blood sugar? A few times a week  What time of day are you checking your blood sugars (select all that apply)?  At bedtime  Have you had any blood sugars above 200?  No  Have you had any blood sugars below 70?  No  What symptoms do you notice when your blood sugar is low?  Shaky and Dizzy  What concerns do you have today about your diabetes? None   Do you have any of these symptoms? (Select all that apply)  No numbness or tingling in feet.  No redness, sores or blisters on feet.  No complaints of excessive thirst.  No reports of blurry vision.  No significant changes to weight.  Have you had a diabetic eye exam in the last 12 months? No    Vulvar itching.    Has tolerating ozempic well, prep H, Monistat, vagisil, Tucks.    BP Readings from Last 2 Encounters:   06/10/25 103/71    02/25/25 107/56     Hemoglobin A1C (%)   Date Value   02/25/2025 6.2 (H)   11/19/2024 8.0 (H)   02/11/2021 7.9 (H)   08/06/2020 7.5 (H)     LDL Cholesterol Calculated (mg/dL)   Date Value   06/03/2025 50   04/12/2024 79   08/06/2020 137 (H)   04/25/2019 142 (H)       How many servings of fruits and vegetables do you eat daily?  2-3  On average, how many sweetened beverages do you drink each day (Examples: soda, juice, sweet tea, etc.  Do NOT count diet or artificially sweetened beverages)?   1  How many days per week do you exercise enough to make your heart beat faster? 4  How many minutes a day do you exercise enough to make your heart beat faster? 30 - 60  How many days per week do you miss taking your medication? 0    Dizziness  Onset/Duration: 1 month only last a couple seconds   Description:   Do you feel faint: No  Does it feel like the surroundings (bed, room) are moving: YES  Unsteady/off balance: YES, only once   Have you passed out or fallen: No  Intensity: mild  Progression of Symptoms: worsening  Accompanying Signs & Symptoms:  Heart palpitations or chest pain: No  Nausea, vomiting: No  Weakness or lack of coordination in arms or legs: No  Vision or speech changes: No  Numbness or tingling: YES- in fingers   Ringing in ears (Tinnitus): No  Hearing Loss: No  History:   Head trauma/concussion history: No  Previous similar symptoms: No  Recent bleeding history: No, but noticing she brusing easier    Any new medications (BP?): No  Precipitating factors:   Worse with activity: No  Worse with head movement: No  Alleviating factors:   Does staying in a fixed position give relief: YES  Therapies tried and outcome: None      Current Outpatient Medications   Medication Sig Dispense Refill    ACE NOT PRESCRIBED, INTENTIONAL, 1 each as needed for other ACE Inhibitor not prescribed due to Other: BP at goal, pending microalbumin test 0 each 0    amoxicillin (AMOXIL) 500 MG capsule Please take 2,000 mg (4 capsule)  "30-60 min prior to all dental procedures 4 capsule 3    atorvastatin (LIPITOR) 80 MG tablet Take 1 tablet (80 mg) by mouth daily. 90 tablet 3    blood glucose (NO BRAND SPECIFIED) test strip Use to test blood sugar 1 times daily or as directed. To accompany: Blood Glucose Monitor Brands: per insurance. 100 strip 6    blood glucose monitoring (NO BRAND SPECIFIED) meter device kit Use to test blood sugar 1 times daily or as directed. Preferred blood glucose meter OR supplies to accompany: Blood Glucose Monitor Brands: per insurance. 1 kit 0    clobetasol (TEMOVATE) 0.05 % external ointment Apply topically 2 times daily. 80 g 1    empagliflozin (JARDIANCE) 10 MG TABS tablet Take 1 tablet (10 mg) by mouth daily. 90 tablet 1    etonogestrel (NEXPLANON) 68 MG IMPL 1 each (68 mg) by Subdermal route once      fluticasone (FLONASE) 50 MCG/ACT nasal spray SHAKE LIQUID AND USE 1 SPRAY IN EACH NOSTRIL DAILY 16 g 11    Semaglutide, 1 MG/DOSE, (OZEMPIC, 1 MG/DOSE,) 4 MG/3ML pen Inject 1 mg subcutaneously every 7 days. 9 mL 1    Semaglutide, 2 MG/DOSE, (OZEMPIC) 8 MG/3ML pen Inject 2 mg subcutaneously every 7 days. 9 mL 1    sertraline (ZOLOFT) 50 MG tablet TAKE 1 TABLET BY MOUTH EVERY DAY 90 tablet 1    thin (NO BRAND SPECIFIED) lancets Use with lanceting device. To accompany: Blood Glucose Monitor Brands: per insurance. 100 each 6     No current facility-administered medications for this visit.           Objective    /71 (BP Location: Right arm, Patient Position: Sitting, Cuff Size: Adult Regular)   Pulse 94   Temp 97.5  F (36.4  C) (Temporal)   Resp 18   Ht 1.575 m (5' 2\")   Wt 78.7 kg (173 lb 6.4 oz)   SpO2 99%   BMI 31.72 kg/m    Body mass index is 31.72 kg/m .  Physical Exam   GENERAL: alert and no distress   (female): deferred  PSYCH: mentation appears normal, affect normal/bright    Lab on 06/03/2025   Component Date Value Ref Range Status    Cholesterol 06/03/2025 117  <200 mg/dL Final    Triglycerides " 06/03/2025 70  <150 mg/dL Final    Direct Measure HDL 06/03/2025 53  >=50 mg/dL Final    LDL Cholesterol Calculated 06/03/2025 50  <100 mg/dL Final    Non HDL Cholesterol 06/03/2025 64  <130 mg/dL Final    Patient Fasting > 8hrs? 06/03/2025 Yes   Final           Signed Electronically by: Brianne Peguero MD

## 2025-06-11 ENCOUNTER — RESULTS FOLLOW-UP (OUTPATIENT)
Dept: FAMILY MEDICINE | Facility: CLINIC | Age: 45
End: 2025-06-11

## 2025-06-20 ENCOUNTER — ANCILLARY PROCEDURE (OUTPATIENT)
Dept: CARDIOLOGY | Facility: CLINIC | Age: 45
End: 2025-06-20
Attending: INTERNAL MEDICINE
Payer: MEDICARE

## 2025-06-20 DIAGNOSIS — Z95.810 AUTOMATIC IMPLANTABLE CARDIOVERTER-DEFIBRILLATOR IN SITU: ICD-10-CM

## 2025-06-20 PROCEDURE — 93296 REM INTERROG EVL PM/IDS: CPT

## 2025-06-20 PROCEDURE — 93295 DEV INTERROG REMOTE 1/2/MLT: CPT | Performed by: INTERNAL MEDICINE

## 2025-06-24 LAB
MDC_IDC_EPISODE_DTM: NORMAL
MDC_IDC_EPISODE_DURATION: 0 S
MDC_IDC_EPISODE_DURATION: 1 S
MDC_IDC_EPISODE_DURATION: 10 S
MDC_IDC_EPISODE_DURATION: 11 S
MDC_IDC_EPISODE_DURATION: 110 S
MDC_IDC_EPISODE_DURATION: 134 S
MDC_IDC_EPISODE_DURATION: 155 S
MDC_IDC_EPISODE_DURATION: 2 S
MDC_IDC_EPISODE_DURATION: 2 S
MDC_IDC_EPISODE_DURATION: 21 S
MDC_IDC_EPISODE_DURATION: 3 S
MDC_IDC_EPISODE_DURATION: 3 S
MDC_IDC_EPISODE_DURATION: 40 S
MDC_IDC_EPISODE_DURATION: 7 S
MDC_IDC_EPISODE_DURATION: 8 S
MDC_IDC_EPISODE_DURATION: 9 S
MDC_IDC_EPISODE_ID: 720
MDC_IDC_EPISODE_ID: 721
MDC_IDC_EPISODE_ID: 722
MDC_IDC_EPISODE_ID: 723
MDC_IDC_EPISODE_ID: 724
MDC_IDC_EPISODE_ID: 725
MDC_IDC_EPISODE_ID: 726
MDC_IDC_EPISODE_ID: 727
MDC_IDC_EPISODE_ID: 728
MDC_IDC_EPISODE_ID: 731
MDC_IDC_EPISODE_ID: 732
MDC_IDC_EPISODE_ID: 733
MDC_IDC_EPISODE_ID: 734
MDC_IDC_EPISODE_ID: 735
MDC_IDC_EPISODE_ID: 736
MDC_IDC_EPISODE_ID: 737
MDC_IDC_EPISODE_ID: 738
MDC_IDC_EPISODE_ID: 739
MDC_IDC_EPISODE_ID: 740
MDC_IDC_EPISODE_ID: 741
MDC_IDC_EPISODE_ID: 742
MDC_IDC_EPISODE_ID: 743
MDC_IDC_EPISODE_ID: 744
MDC_IDC_EPISODE_ID: 745
MDC_IDC_EPISODE_ID: 746
MDC_IDC_EPISODE_TYPE: NORMAL
MDC_IDC_LEAD_CONNECTION_STATUS: NORMAL
MDC_IDC_LEAD_IMPLANT_DT: NORMAL
MDC_IDC_LEAD_LOCATION: NORMAL
MDC_IDC_LEAD_LOCATION_DETAIL_1: NORMAL
MDC_IDC_LEAD_MFG: NORMAL
MDC_IDC_LEAD_MODEL: NORMAL
MDC_IDC_LEAD_POLARITY_TYPE: NORMAL
MDC_IDC_LEAD_SERIAL: NORMAL
MDC_IDC_MSMT_BATTERY_DTM: NORMAL
MDC_IDC_MSMT_BATTERY_REMAINING_LONGEVITY: 21 MO
MDC_IDC_MSMT_BATTERY_RRT_TRIGGER: 2.73
MDC_IDC_MSMT_BATTERY_STATUS: NORMAL
MDC_IDC_MSMT_BATTERY_VOLTAGE: 2.88 V
MDC_IDC_MSMT_LEADCHNL_RV_IMPEDANCE_VALUE: 304 OHM
MDC_IDC_MSMT_LEADCHNL_RV_IMPEDANCE_VALUE: 4047 OHM
MDC_IDC_MSMT_LEADCHNL_RV_PACING_THRESHOLD_AMPLITUDE: 0.5 V
MDC_IDC_MSMT_LEADCHNL_RV_PACING_THRESHOLD_PULSEWIDTH: 0.4 MS
MDC_IDC_MSMT_LEADCHNL_RV_SENSING_INTR_AMPL: 6 MV
MDC_IDC_MSMT_LEADCHNL_RV_SENSING_INTR_AMPL: 6 MV
MDC_IDC_PG_IMPLANT_DTM: NORMAL
MDC_IDC_PG_MFG: NORMAL
MDC_IDC_PG_MODEL: NORMAL
MDC_IDC_PG_SERIAL: NORMAL
MDC_IDC_PG_TYPE: NORMAL
MDC_IDC_SESS_CLINIC_NAME: NORMAL
MDC_IDC_SESS_DTM: NORMAL
MDC_IDC_SESS_TYPE: NORMAL
MDC_IDC_SET_BRADY_HYSTRATE: NORMAL
MDC_IDC_SET_BRADY_LOWRATE: 40 {BEATS}/MIN
MDC_IDC_SET_BRADY_MODE: NORMAL
MDC_IDC_SET_LEADCHNL_RV_PACING_AMPLITUDE: 2 V
MDC_IDC_SET_LEADCHNL_RV_PACING_ANODE_ELECTRODE_1: NORMAL
MDC_IDC_SET_LEADCHNL_RV_PACING_ANODE_LOCATION_1: NORMAL
MDC_IDC_SET_LEADCHNL_RV_PACING_CAPTURE_MODE: NORMAL
MDC_IDC_SET_LEADCHNL_RV_PACING_CATHODE_ELECTRODE_1: NORMAL
MDC_IDC_SET_LEADCHNL_RV_PACING_CATHODE_LOCATION_1: NORMAL
MDC_IDC_SET_LEADCHNL_RV_PACING_POLARITY: NORMAL
MDC_IDC_SET_LEADCHNL_RV_PACING_PULSEWIDTH: 0.4 MS
MDC_IDC_SET_LEADCHNL_RV_SENSING_ANODE_ELECTRODE_1: NORMAL
MDC_IDC_SET_LEADCHNL_RV_SENSING_ANODE_LOCATION_1: NORMAL
MDC_IDC_SET_LEADCHNL_RV_SENSING_CATHODE_ELECTRODE_1: NORMAL
MDC_IDC_SET_LEADCHNL_RV_SENSING_CATHODE_LOCATION_1: NORMAL
MDC_IDC_SET_LEADCHNL_RV_SENSING_POLARITY: NORMAL
MDC_IDC_SET_LEADCHNL_RV_SENSING_SENSITIVITY: 0.3 MV
MDC_IDC_SET_ZONE_DETECTION_BEATS_DENOMINATOR: 32 {BEATS}
MDC_IDC_SET_ZONE_DETECTION_BEATS_DENOMINATOR: 32 {BEATS}
MDC_IDC_SET_ZONE_DETECTION_BEATS_DENOMINATOR: 40 {BEATS}
MDC_IDC_SET_ZONE_DETECTION_BEATS_NUMERATOR: 30 {BEATS}
MDC_IDC_SET_ZONE_DETECTION_BEATS_NUMERATOR: 32 {BEATS}
MDC_IDC_SET_ZONE_DETECTION_BEATS_NUMERATOR: 32 {BEATS}
MDC_IDC_SET_ZONE_DETECTION_INTERVAL: 270 MS
MDC_IDC_SET_ZONE_DETECTION_INTERVAL: 360 MS
MDC_IDC_SET_ZONE_DETECTION_INTERVAL: 370 MS
MDC_IDC_SET_ZONE_DETECTION_INTERVAL: NORMAL
MDC_IDC_SET_ZONE_STATUS: NORMAL
MDC_IDC_SET_ZONE_TYPE: NORMAL
MDC_IDC_SET_ZONE_VENDOR_TYPE: NORMAL
MDC_IDC_STAT_BRADY_DTM_END: NORMAL
MDC_IDC_STAT_BRADY_DTM_START: NORMAL
MDC_IDC_STAT_BRADY_RV_PERCENT_PACED: 0.02 %
MDC_IDC_STAT_EPISODE_RECENT_COUNT: 0
MDC_IDC_STAT_EPISODE_RECENT_COUNT: 1
MDC_IDC_STAT_EPISODE_RECENT_COUNT: 17
MDC_IDC_STAT_EPISODE_RECENT_COUNT: 4
MDC_IDC_STAT_EPISODE_RECENT_COUNT_DTM_END: NORMAL
MDC_IDC_STAT_EPISODE_RECENT_COUNT_DTM_START: NORMAL
MDC_IDC_STAT_EPISODE_TOTAL_COUNT: 0
MDC_IDC_STAT_EPISODE_TOTAL_COUNT: 2
MDC_IDC_STAT_EPISODE_TOTAL_COUNT: 288
MDC_IDC_STAT_EPISODE_TOTAL_COUNT: 43
MDC_IDC_STAT_EPISODE_TOTAL_COUNT_DTM_END: NORMAL
MDC_IDC_STAT_EPISODE_TOTAL_COUNT_DTM_START: NORMAL
MDC_IDC_STAT_EPISODE_TYPE: NORMAL
MDC_IDC_STAT_TACHYTHERAPY_ATP_DELIVERED_RECENT: 1
MDC_IDC_STAT_TACHYTHERAPY_ATP_DELIVERED_TOTAL: 1
MDC_IDC_STAT_TACHYTHERAPY_RECENT_DTM_END: NORMAL
MDC_IDC_STAT_TACHYTHERAPY_RECENT_DTM_START: NORMAL
MDC_IDC_STAT_TACHYTHERAPY_SHOCKS_ABORTED_RECENT: 1
MDC_IDC_STAT_TACHYTHERAPY_SHOCKS_ABORTED_TOTAL: 1
MDC_IDC_STAT_TACHYTHERAPY_SHOCKS_DELIVERED_RECENT: 0
MDC_IDC_STAT_TACHYTHERAPY_SHOCKS_DELIVERED_TOTAL: 1
MDC_IDC_STAT_TACHYTHERAPY_TOTAL_DTM_END: NORMAL
MDC_IDC_STAT_TACHYTHERAPY_TOTAL_DTM_START: NORMAL

## 2025-06-30 NOTE — ED PROVIDER NOTES
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 She described these episodes as a \"poking\" sensation that last a few seconds and then go away, occurring every 5-10 minutes. She complained of soreness in the area in between episodes. She reported she has had previous episodes which improve on their own but today's are longer lasting.     Of note, she was seen two months ago for noises from AICD.  She was seen on October 11 and had her AICD interrogated.  There were no episodes of ventricular tachycardia or shocks delivered.  [employer] rep came to the emergency department and felt the right ventricular lead was over sensing and there wereconcerns for right lead fracture or failure.  Initial plan was to transfer to facility with electrophysiology.  Unfortunately there were no beds available at that time.  After prolonged ED stay, patient was discharged home with urgent follow-up with her electrophysiologist Dr. Cervantes.  After prolonged discussion, it was determined that they did not want to intervene with lead replacement and wanted to \"wait and see\" how much time they would get out of this lead as \"the other leads were working well\" according to family member.    Review of Systems   Cardiovascular: Positive for chest pain.   All other systems reviewed and are negative.    Allergies:  Adhesive Tape    Medications:  Amoxil  Lipitor  Nexplanon  Neurontin  Glucophage  Zoloft    Past Medical History:     ADD  Congenital heart disease  Type 2 diabetes  Ventricular tachycardia  Corrected transposition of great vessels  Obesity  Anxiety  Depression  Mildly mentally retarded     Past Surgical History:    Cardiac surgery  Ablation for ventricular tachycardia, ICD placed  Open heart surgery x 3, transposition of major " CHIEF COMPLAINT:   Chief Complaint   Patient presents with    Office Visit     Left shoulder       Mckayla Mckeon returns to the clinic today for a recheck of the left shoulder pain  Received injection at last OV? Yes  ; date: 2025 that provided full pain relief, states it hasn't been hurting her at all; says ROM is better; still providing pain relif  Patient presents today unaccompanied  Patient reports that since last visit not taking anything for pain; denies numbness/tingling to left arm; will use heat as needed  Is patient currently in therapy? No, finished, doing home exercises  Dominant side: right  Is the patient Diabetic: No   Last Lab A1C:  Hemoglobin A1C (%)   Date Value   2020 6.4 (H)     Last Point of Care A1C:  Hemoglobin A1C, POC (%)   Date Value   2023 5.6       Latex:  Patient denies allergy to latex.  Medications reviewed with patient  Social History     Tobacco Use   Smoking Status Former    Current packs/day: 0.00    Average packs/day: 0.3 packs/day for 4.0 years (1.0 ttl pk-yrs)    Types: Cigarettes    Start date: 2001    Quit date: 2005    Years since quittin.5   Smokeless Tobacco Never     Patient's PCP and Pharmacy Verified.   Surgical and Medical History Reviewed.   vessels  Additional heart surgery    Social History:  Presents with female visitor.  Cardiologist: Dr. Ray    Physical Exam     Patient Vitals for the past 24 hrs:   BP Temp Temp src Pulse Resp SpO2   12/19/21 2000 131/74 -- -- 90 -- 99 %   12/19/21 1930 118/82 -- -- 91 -- 96 %   12/19/21 1900 132/80 -- -- 98 -- 96 %   12/19/21 1830 133/74 -- -- 95 -- 97 %   12/19/21 1823 -- 99  F (37.2  C) Oral -- -- --   12/19/21 1800 (!) 126/97 -- -- 100 -- 99 %   12/19/21 1745 121/69 -- -- 101 -- 98 %   12/19/21 1704 (!) 150/76 -- -- 110 18 100 %       Physical Exam    Eyes:    Conjunctiva normal  Neck:    Supple, no meningismus.     CV:     Tachycardic, regular rhythm.      Grade 3/6 systolic murmur     No rubs or gallops.       No unilateral leg swelling.       2+ radial pulses bilateral.       No lower extremity edema.  PULM:    Clear to auscultation bilateral.       No respiratory distress.      Good air exchange.     No rales or wheezing.     No stridor.  ABD:    Soft, non-tender, non-distended.       No pulsatile masses.       No rebound, guarding or rigidity.  MSK:     No gross deformity to all four extremities.   LYMPH:   No cervical lymphadenopathy.  NEURO:   Alert. Good muscle tone, no atrophy.  Skin:    Warm, dry and intact.    Psych:    Mildly anxious        Emergency Department Course   ECG  ECG obtained at 1706, ECG read at 1707  Sinus tachycardia  Possible left atrial enlargement  Incomplate right bundle branch block  Junctional ST depression, probably normal.  Prolonged QT  Abnormal ECG   No significant change as compared to prior, dated 10/10/21.  Rate 105 bpm. RI interval 172 ms. QRS duration 112 ms. QT/QTc 398/526 ms. P-R-T axes 60 -12 63.     Imaging:  CT Chest Pulmonary Embolism w Contrast   Final Result   IMPRESSION:   1.  No evidence for pulmonary embolism or other acute abnormality in the chest.      XR Chest Port 1 View   Final Result   IMPRESSION: No acute infiltrates or pneumothorax. Stable position  of the device and leads. Stable cardiac silhouette.        Report per radiology    Laboratory:  Labs Ordered and Resulted from Time of ED Arrival to Time of ED Departure   BASIC METABOLIC PANEL - Abnormal       Result Value    Sodium 141      Potassium 3.5      Chloride 108      Carbon Dioxide (CO2) 26      Anion Gap 7      Urea Nitrogen 11      Creatinine 0.51 (*)     Calcium 8.8      Glucose 140 (*)     GFR Estimate >90     D DIMER QUANTITATIVE - Abnormal    D-Dimer Quantitative 0.61 (*)    TROPONIN I - Normal    Troponin I High Sensitivity 5     TROPONIN I - Normal    Troponin I High Sensitivity 7     CBC WITH PLATELETS AND DIFFERENTIAL    WBC Count 9.1      RBC Count 4.69      Hemoglobin 13.4      Hematocrit 42.5      MCV 91      MCH 28.6      MCHC 31.5      RDW 12.5      Platelet Count 165      % Neutrophils 76      % Lymphocytes 17      % Monocytes 6      % Eosinophils 1      % Basophils 0      % Immature Granulocytes 0      NRBCs per 100 WBC 0      Absolute Neutrophils 6.8      Absolute Lymphocytes 1.6      Absolute Monocytes 0.6      Absolute Eosinophils 0.1      Absolute Basophils 0.0      Absolute Immature Granulocytes 0.0      Absolute NRBCs 0.0          Emergency Department Course:    Reviewed:  I reviewed nursing notes, vitals, past medical history and Care Everywhere    Assessments:   I obtained history and examined the patient as noted above.    I rechecked the patient and explained findings.    I rechecked and updated the patient.    Consults:   I consulted with [employer] regarding the patient's pacemaker status.     Disposition:  The patient was discharged to home.     Impression & Plan     Medical Decision Makin-year-old female with history of transposition of the great vessels status post surgical repair, ventricular tachycardia status post AICD presents to the ED with atypical chest pain and concerns of AICD dysfunction.  She was evaluated for atypical ACS.  EKG without  ischemic changes.  Initial troponin within normal limits.  Given short-lived symptoms, delta 2-hour troponin was undertaken and unremarkable. D-dimer elevated thus underwent CT scan the chest which is unremarkable for PE and aortic pathology.    Her AICD was interrogated.  There have been no episodes of dysrhythmia today or yesterday.  There have been no delivered shocks.  Previous evaluation in October 2021 revealed right ventricular lead oversensing but this is not currently present.  They also noted concerns of lead impedance of the right ventricular lead which she followed up with electrophysiology and they are aware of.  They have been decided not to surgically intervene despite this finding.  Impedance is still present on today's evaluation but unchanged from prior thus no indication for emergent evaluation.    Differential diagnosis would also include anxiety, atypical GERD, esophageal spasm, gastritis.  Patient safe for discharge home and recommended urgent follow-up with her primary cardiologist.      Diagnosis:    ICD-10-CM    1. Chest pain, unspecified type  R07.9        Discharge Medications:  New Prescriptions    No medications on file       Scribe Disclosure:  Kaela CAIN, am serving as a scribe at 5:00 PM on 12/19/2021 to document services personally performed by Sav Trinh MD based on my observations and the provider's statements to me.          Sav Trinh MD  12/19/21 214       Sav Trinh MD  12/19/21 1899

## 2025-07-14 ASSESSMENT — PATIENT HEALTH QUESTIONNAIRE - PHQ9
10. IF YOU CHECKED OFF ANY PROBLEMS, HOW DIFFICULT HAVE THESE PROBLEMS MADE IT FOR YOU TO DO YOUR WORK, TAKE CARE OF THINGS AT HOME, OR GET ALONG WITH OTHER PEOPLE: NOT DIFFICULT AT ALL
SUM OF ALL RESPONSES TO PHQ QUESTIONS 1-9: 1
SUM OF ALL RESPONSES TO PHQ QUESTIONS 1-9: 1

## 2025-07-15 ENCOUNTER — OFFICE VISIT (OUTPATIENT)
Dept: FAMILY MEDICINE | Facility: CLINIC | Age: 45
End: 2025-07-15
Payer: MEDICARE

## 2025-07-15 VITALS
RESPIRATION RATE: 12 BRPM | BODY MASS INDEX: 30.55 KG/M2 | DIASTOLIC BLOOD PRESSURE: 56 MMHG | HEIGHT: 62 IN | WEIGHT: 166 LBS | TEMPERATURE: 97.9 F | OXYGEN SATURATION: 99 % | SYSTOLIC BLOOD PRESSURE: 98 MMHG | HEART RATE: 95 BPM

## 2025-07-15 DIAGNOSIS — Z30.46 SURVEILLANCE OF PREVIOUSLY PRESCRIBED IMPLANTABLE SUBDERMAL CONTRACEPTIVE: Primary | ICD-10-CM

## 2025-07-15 PROCEDURE — 11983 REMOVE/INSERT DRUG IMPLANT: CPT | Performed by: FAMILY MEDICINE

## 2025-07-15 PROCEDURE — 3078F DIAST BP <80 MM HG: CPT | Performed by: FAMILY MEDICINE

## 2025-07-15 PROCEDURE — 99207 PR DROP WITH A PROCEDURE: CPT | Performed by: FAMILY MEDICINE

## 2025-07-15 PROCEDURE — 3074F SYST BP LT 130 MM HG: CPT | Performed by: FAMILY MEDICINE

## 2025-07-15 NOTE — PROGRESS NOTES
NEXPLANON REMOVAL/REINSERTION:    Is a pregnancy test required: No.  Was a consent obtained?  Yes    Subjective: Chikis Hannon is a 45 year old  presents for Nexplanon.    Patient has been given the opportunity to ask questions about all forms of birth control, including all options appropriate for Chikis Hannon. Discussed that no method of birth control, except abstinence is 100% effective against pregnancy or sexually transmitted infection.     Chikis Hannon understands planning removal and reinsertion today    The entire removal and insertion procedure was reviewed with the patient, including care after placement.      There were no vitals taken for this visit.    PROCEDURE NOTE: -- Nexplanon Removal/Insertion    Technique: On the right arm  Skin prep Betadine  Anesthesia 1% lidocaine, with epi  Procedure: Patient was placed supine with right arm exposed.  Implant located by palpitation. Abhi was made 8-10 cm above medial epicondyle and a guiding abhi 4 cm above the first.  Arm was prepped with Betadine. Incision point was anesthetized with 3 mL 1% lidocaine with epi.     Small incision (<5mm) was made at distal end of palpable implant, curved hemostat or mosquito forceps was used to isolate the implant and bring it to the incision, the fibrous capsule containing the implant  was incised and the implant was removed intact.    After stretching the skin with thumb and index finger around the insertion site, skin punctured with the tip of the needle inserted at 30 degrees and then lowered to horizontal position. While lifting the skin with the tip of the needle, inserted the needle to its full length. Applicator was then stabilized and the slider was unlocked by pushing it slightly down. Slider moved back completely until it stopped. Applicator was then removed.    Correct placement of the implant was confirmed by palpation in the patient's arm and visualizing the purple top of the obturator.   Bandage and  pressure dressing applied to insertion site.    EBL: minimal    Complications: none    NDC: 99094-243-65  LOT: [id number]  EXP: June 2027    ASSESSMENT: No diagnosis found.     PLAN:    Given 's handouts, including when to have Nexplanon removed, list of danger s/sx, side effects and follow up recommended. Encouraged condom use for prevention of STD. Back up contraception advised for 7 days. Advised to call for any fever, for prolonged or severe pain or bleeding, abnormal vaginal dischage. She was advised to use pain medications (ibuprofen) as needed for mild to moderate pain.     Brianne Peguero MD    Answers submitted by the patient for this visit:  Patient Health Questionnaire (Submitted on 7/14/2025)  If you checked off any problems, how difficult have these problems made it for you to do your work, take care of things at home, or get along with other people?: Not difficult at all  PHQ9 TOTAL SCORE: 1

## 2025-07-17 NOTE — PROGRESS NOTES
2025      Return Medical Weight Management Note     Chikis Hannon  MRN:  [id number]  :  1980    Assessment & Plan   Problem List Items Addressed This Visit       Class 1 obesity due to excess calories with serious comorbidity and body mass index (BMI) of 30.0 to 30.9 in adult - Primary    Pt was congratulated on weight loss success thus far. Will continue 2mg Ozempic weekly through PCP. We reviewed recommendations for muscle conservation including eating three meals daily, good protein intake, and strength training. Gave handouts on resistance band exercises and core exercises.          Type 2 diabetes mellitus with other circulatory complication, without long-term current use of insulin (H)        AOM Considerations:  Phentermine:   Not a candidate due to CAD  Topiramate:  Birth control: Nexplanon     GLP-1: great option. Currently on Ozempic.      Naltrexone:        Wellbutrin: not candidate due to CAD  Metformin: made very sick    PATIENT INSTRUCTIONS:    Continue 2mg Ozempic weekly.    Goals:  Great job getting a protein shake in the morning! Try to get a protein in at lunch.  Great job with your walking! Add strength training twice a week or a few minutes daily to help preserve muscle mass.  This is the most important thing you can do to maintain your weight.     Follow up:    Call [phone] to schedule next visit in January with Risa Pagan PA-C or myself.    14 minutes spent on the date of the encounter doing chart review, history and exam, result review, counseling, developing plan of care, documentation, and further activities as noted    The longitudinal plan of care for the diagnosis(es)/condition(s) as documented were addressed during this visit. Due to the added complexity in care, I will continue to support Chikis in the subsequent management and with ongoing continuity of care.      INTERVAL HISTORY:  Chikis returns for medical weight management follow up.  Last seen on 25 with Risa  JESUS Pagan and plan at that time was to continue 1mg Ozempic through PCP. PCP increased dose of Ozempic to 2mg weekly on 6/10/25 and lowered Jardiance dose.    Is liking Ozempic. Gets some mild nausea after weekly injections, but it goes away. Denies other side effects. Is liking current dose of Ozempic and has no concerns about medication.    WEIGHT METRICS:  Body mass index is 30 kg/m .   Current Weight: 164 lb (74.4 kg)  Last Visits Weight: 170 lb (77.1 kg)  Initial Weight (lbs): 197 lbs  Cumulative weight loss (lbs): 33  Weight Loss Percentage: 16.75%    Wt Readings from Last 10 Encounters:   07/29/25 164 lb (74.4 kg)   07/15/25 166 lb (75.3 kg)   06/10/25 173 lb 6.4 oz (78.7 kg)   02/25/25 174 lb (78.9 kg)   02/11/25 170 lb 3.2 oz (77.2 kg)   01/28/25 175 lb (79.4 kg)   11/19/24 185 lb 6.4 oz (84.1 kg)   08/27/24 197 lb (89.4 kg)   08/06/24 192 lb (87.1 kg)   06/11/24 189 lb (85.7 kg)                 7/24/2025     2:02 PM   Weight Loss Medication History Reviewed With Patient   Which weight loss medications are you currently taking on a regular basis? Ozempic   Are you having any side effects from the weight loss medication that we have prescribed you? No           7/24/2025     2:02 PM   Changes and Difficulties   I have made the following changes to my diet since my last visit: Eating less and more healthy   With regards to my diet, I am still struggling with: Nothing   I have made the following changes to my activity/exercise since my last visit: Walking and stationary bike   With regards to my activity/exercise, I am still struggling with: Nothing   Breakfast: protein shake  Lunch: frappuccino from [employer]  Dinner: grilled cheese, or chicken w/ green beans, or mac n cheese  Snacks: beef jerky, cookies, funyons      Hydration: 4-5 bottles of Water w/ flavor packets + electrolytes. Whole milk. 1 Coke zero/day.    Activity: Walking every day, aims for 10,000 steps 5 days/week. Bought a trampoline that she  uses sometimes. Has a stationary bike that she is not using.    LABS:  Hemoglobin A1C   Date Value Ref Range Status   06/10/2025 6.5 (H) 0.0 - 5.6 % Final     Comment:     Normal <5.7%   Prediabetes 5.7-6.4%    Diabetes 6.5% or higher     Note: Adopted from ADA consensus guidelines.   02/11/2021 7.9 (H) 0 - 5.6 % Final     Comment:     Normal <5.7% Prediabetes 5.7-6.4%  Diabetes 6.5% or higher - adopted from ADA   consensus guidelines.       Creatinine   Date Value Ref Range Status   11/19/2024 0.57 0.51 - 0.95 mg/dL Final   03/23/2021 0.61 0.52 - 1.04 mg/dL Final     GFR Estimate   Date Value Ref Range Status   11/19/2024 >90 >60 mL/min/1.73m2 Final     Comment:     eGFR calculated using 2021 CKD-EPI equation.   03/23/2021 >90 >60 mL/min/[1.73_m2] Final     Comment:     Non  GFR Calc  Starting 12/18/2018, serum creatinine based estimated GFR (eGFR) will be   calculated using the Chronic Kidney Disease Epidemiology Collaboration   (CKD-EPI) equation.       Carbon Dioxide   Date Value Ref Range Status   03/23/2021 27 20 - 32 mmol/L Final     Carbon Dioxide (CO2)   Date Value Ref Range Status   11/19/2024 20 (L) 22 - 29 mmol/L Final   06/03/2022 26 20 - 32 mmol/L Final     Chloride   Date Value Ref Range Status   11/19/2024 106 98 - 107 mmol/L Final   06/03/2022 104 94 - 109 mmol/L Final   03/23/2021 108 94 - 109 mmol/L Final     Urea Nitrogen   Date Value Ref Range Status   11/19/2024 8.6 6.0 - 20.0 mg/dL Final   06/03/2022 7 7 - 30 mg/dL Final   03/23/2021 10 7 - 30 mg/dL Final     ALT   Date Value Ref Range Status   08/22/2023 26 0 - 50 U/L Final     Comment:     Reference intervals for this test were updated on 6/12/2023 to more accurately reflect our healthy population. There may be differences in the flagging of prior results with similar values performed with this method. Interpretation of those prior results can be made in the context of the updated reference intervals.     03/23/2021 32 0 - 50  "U/L Final     AST   Date Value Ref Range Status   08/22/2023 26 0 - 45 U/L Final     Comment:     Reference intervals for this test were updated on 6/12/2023 to more accurately reflect our healthy population. There may be differences in the flagging of prior results with similar values performed with this method. Interpretation of those prior results can be made in the context of the updated reference intervals.   03/23/2021 15 0 - 45 U/L Final     TSH   Date Value Ref Range Status   02/25/2025 1.59 0.30 - 4.20 uIU/mL Final   03/11/2022 2.13 0.40 - 4.00 mU/L Final   02/11/2021 2.88 0.40 - 4.00 mU/L Final        BP Readings from Last 6 Encounters:   07/29/25 103/56   07/15/25 98/56   06/10/25 103/71   02/25/25 107/56   02/11/25 116/75   01/28/25 123/73       Pulse Readings from Last 6 Encounters:   07/29/25 95   07/15/25 95   06/10/25 94   02/25/25 105   02/11/25 88   01/28/25 66       PE:  /56   Pulse 95   Ht 5' 2\" (1.575 m)   Wt 164 lb (74.4 kg)   LMP 07/09/2025 (Exact Date)   SpO2 96%   BMI 30.00 kg/m    GENERAL: Healthy, alert and no distress  RESP: No audible wheeze, cough, or visible cyanosis.  No visible retractions or increased work of breathing.    SKIN: Visible skin clear. No significant rash, abnormal pigmentation or lesions.  PSYCH: Mentation appears normal, affect normal/bright, judgement and insight intact      Isabela Knight, KWAME   "

## 2025-07-29 ENCOUNTER — OFFICE VISIT (OUTPATIENT)
Dept: SURGERY | Facility: CLINIC | Age: 45
End: 2025-07-29
Payer: MEDICARE

## 2025-07-29 VITALS
BODY MASS INDEX: 30.18 KG/M2 | OXYGEN SATURATION: 96 % | WEIGHT: 164 LBS | SYSTOLIC BLOOD PRESSURE: 103 MMHG | HEIGHT: 62 IN | HEART RATE: 95 BPM | DIASTOLIC BLOOD PRESSURE: 56 MMHG

## 2025-07-29 DIAGNOSIS — E66.811 CLASS 1 OBESITY DUE TO EXCESS CALORIES WITH SERIOUS COMORBIDITY AND BODY MASS INDEX (BMI) OF 30.0 TO 30.9 IN ADULT: Primary | ICD-10-CM

## 2025-07-29 DIAGNOSIS — E11.59 TYPE 2 DIABETES MELLITUS WITH OTHER CIRCULATORY COMPLICATION, WITHOUT LONG-TERM CURRENT USE OF INSULIN (H): ICD-10-CM

## 2025-07-29 DIAGNOSIS — E66.09 CLASS 1 OBESITY DUE TO EXCESS CALORIES WITH SERIOUS COMORBIDITY AND BODY MASS INDEX (BMI) OF 30.0 TO 30.9 IN ADULT: Primary | ICD-10-CM

## 2025-07-29 PROCEDURE — 99214 OFFICE O/P EST MOD 30 MIN: CPT | Performed by: NURSE PRACTITIONER

## 2025-07-29 PROCEDURE — 3074F SYST BP LT 130 MM HG: CPT | Performed by: NURSE PRACTITIONER

## 2025-07-29 PROCEDURE — 3078F DIAST BP <80 MM HG: CPT | Performed by: NURSE PRACTITIONER

## 2025-07-29 PROCEDURE — G2211 COMPLEX E/M VISIT ADD ON: HCPCS | Performed by: NURSE PRACTITIONER

## 2025-07-29 NOTE — ASSESSMENT & PLAN NOTE
Pt was congratulated on weight loss success thus far. Will continue 2mg Ozempic weekly through PCP. We reviewed recommendations for muscle conservation including eating three meals daily, good protein intake, and strength training. Gave handouts on resistance band exercises and core exercises.

## 2025-07-29 NOTE — PATIENT INSTRUCTIONS
It was nice to talk with you today! Below is the plan discussed.-  ERIN Mar      Pt Instructions:  Continue 2mg Ozempic weekly.    Goals:  Great job getting a protein shake in the morning! Try to get a protein in at lunch.  Great job with your walking! Add strength training twice a week or a few minutes daily to help preserve muscle mass.  This is the most important thing you can do to maintain your weight.     Follow up:    Call [phone] to schedule next visit in January with Risa Pagan PA-C or myself.          LEAN PROTEIN SOURCES      Protein Source Portion Calories Grams of Protein                           Nonfat, plain Greek yogurt    (10 grams sugar or less) 3/4 cup (6 oz)  12-17   Light Yogurt (10 grams sugar or less) 3/4 cup (6 oz)  6-8   Protein Shake 1 shake 110-180 15-30   Skim/1% Milk or lactose-free milk 1 cup ( 8 oz)  8   Plain or light, flavored soymilk 1 cup  7-8   Plain or light, hemp milk 1 cup 110 6   Fat Free or 1% Cottage Cheese 1/2 cup 90 15   Part skim ricotta cheese 1/2 cup 100 14   Part skim or reduced fat cheese slices 1 ounce 65-80 8     Mozzarella String Cheese 1 80 8   Canned tuna, chicken, crab or salmon  (canned in water)  1/2 cup 100 15-20   White fish (broiled, grilled, baked) 3 ounces 100 21   [place]/Tuna (broiled, grilled, baked) 3 ounces 150-180 21   Shrimp, Scallops, Lobster, Crab 3 ounces 100 21   Pork loin, Pork Tenderloin 3 ounces 150 21   Boneless, skinless chicken /turkey breast                          (broiled, grilled, baked) 3 ounces 120 21   [place], [place], [place], and Venison 3 ounces 120 21   Lean cuts of red meat and pork (sirloin,   round, tenderloin, flank, ground 93%-96%) 3 ounces 170 21   Lean or Extra Lean Ground Turkey 1/2 cup 150 20   90-95% Lean [place] Burger 1 khadijah 140-180 21   Low-fat casserole with lean meat 3/4 cup 200 17   Luncheon Meats                                                        (turkey, lean ham, roast beef,  chicken) 3 ounces 100 21   Egg (boiled, poached, scrambled) 1 Egg 60 7   Egg Substitute 1/2 cup 70 10   Nuts (limit to 1 serving per day)  3 Tbsp. 150 7   Nut [place] (peanut, almond)  Limit to 1 serving or less daily 1 Tbsp. 90 4   Soy Burger (varies) 1  15   Garbanzo, Black, Rosa Beans 1/2 cup 110 7   Refried Beans 1/2 cup 100 7   Kidney and Lima beans 1/2 cup 110 7   Tempeh 3 oz 175 18   Vegan crumbles 1/2 cup 100 14   Tofu 1/2 cup 110 14   Chili (beans and extra lean beef or turkey) 1 cup 200 23   Lentil Stew/Soup 1 cup 150 12   Black Bean Soup 1 cup 175 12        Strength Training  Strength training is exercise that involves your own body weight or equipment to build muscle, endurance, and strength. Increasing muscle helps increase your metabolism.     [facility] for Strength training  [email or web address]  Muscle Conditionin Exercises  Resistance Training with Free Weights: 3 Exercises  Resistance Training with Surgical Tubin Exercises  Seated Exercises for Arms and Legs: 11 Exercises  Stretchin Exercises    Additional Outside Sources  No Equipment Home Exercise Lists from [facility] [employer]   [email or web address]  Strength training YouTube workouts   [email or web address]  Guide to Beginning Strength Training  [email or web address]

## 2025-08-05 ENCOUNTER — MYC REFILL (OUTPATIENT)
Dept: FAMILY MEDICINE | Facility: CLINIC | Age: 45
End: 2025-08-05
Payer: MEDICARE

## 2025-08-05 DIAGNOSIS — Z86.79 HX OF VENTRICULAR TACHYCARDIA: ICD-10-CM

## 2025-08-05 DIAGNOSIS — Q20.3 D-TGA (DEXTRO-TRANSPOSITION OF GREAT ARTERIES): ICD-10-CM

## 2025-08-05 DIAGNOSIS — Z29.89 SBE (SUBACUTE BACTERIAL ENDOCARDITIS) PROPHYLAXIS CANDIDATE: ICD-10-CM

## 2025-08-05 RX ORDER — AMOXICILLIN 500 MG/1
CAPSULE ORAL
Qty: 4 CAPSULE | Refills: 3 | Status: SHIPPED | OUTPATIENT
Start: 2025-08-05

## 2025-09-02 ENCOUNTER — HOSPITAL ENCOUNTER (OUTPATIENT)
Dept: CARDIOLOGY | Facility: CLINIC | Age: 45
Discharge: HOME | End: 2025-09-02
Attending: INTERNAL MEDICINE
Payer: MEDICARE

## 2025-09-02 ENCOUNTER — OFFICE VISIT (OUTPATIENT)
Dept: CARDIOLOGY | Facility: CLINIC | Age: 45
End: 2025-09-02
Attending: INTERNAL MEDICINE
Payer: MEDICARE

## 2025-09-02 VITALS
WEIGHT: 164.5 LBS | SYSTOLIC BLOOD PRESSURE: 106 MMHG | OXYGEN SATURATION: 98 % | HEART RATE: 91 BPM | DIASTOLIC BLOOD PRESSURE: 68 MMHG | BODY MASS INDEX: 30.09 KG/M2

## 2025-09-02 DIAGNOSIS — Z13.6 CARDIOVASCULAR SCREENING; LDL GOAL LESS THAN 70: ICD-10-CM

## 2025-09-02 DIAGNOSIS — Z29.89 SBE (SUBACUTE BACTERIAL ENDOCARDITIS) PROPHYLAXIS CANDIDATE: ICD-10-CM

## 2025-09-02 DIAGNOSIS — Z95.810 AUTOMATIC IMPLANTABLE CARDIOVERTER-DEFIBRILLATOR IN SITU: ICD-10-CM

## 2025-09-02 DIAGNOSIS — Q24.9 CONGENITAL HEART DISEASE: ICD-10-CM

## 2025-09-02 DIAGNOSIS — Z86.79 HX OF VENTRICULAR TACHYCARDIA: ICD-10-CM

## 2025-09-02 DIAGNOSIS — Q20.3 D-TGA (DEXTRO-TRANSPOSITION OF GREAT ARTERIES): ICD-10-CM

## 2025-09-02 LAB
MDC_IDC_EPISODE_DTM: NORMAL
MDC_IDC_EPISODE_DURATION: 0 S
MDC_IDC_EPISODE_DURATION: 0 S
MDC_IDC_EPISODE_DURATION: 1 S
MDC_IDC_EPISODE_DURATION: 10 S
MDC_IDC_EPISODE_DURATION: 100 S
MDC_IDC_EPISODE_DURATION: 100 S
MDC_IDC_EPISODE_DURATION: 12 S
MDC_IDC_EPISODE_DURATION: 13 S
MDC_IDC_EPISODE_DURATION: 137 S
MDC_IDC_EPISODE_DURATION: 155 S
MDC_IDC_EPISODE_DURATION: 19 S
MDC_IDC_EPISODE_DURATION: 2 S
MDC_IDC_EPISODE_DURATION: 227 S
MDC_IDC_EPISODE_DURATION: 239 S
MDC_IDC_EPISODE_DURATION: 25 S
MDC_IDC_EPISODE_DURATION: 251 S
MDC_IDC_EPISODE_DURATION: 272 S
MDC_IDC_EPISODE_DURATION: 3 S
MDC_IDC_EPISODE_DURATION: 31 S
MDC_IDC_EPISODE_DURATION: 34 S
MDC_IDC_EPISODE_DURATION: 36 S
MDC_IDC_EPISODE_DURATION: 4 S
MDC_IDC_EPISODE_DURATION: 4 S
MDC_IDC_EPISODE_DURATION: 468 S
MDC_IDC_EPISODE_DURATION: 47 S
MDC_IDC_EPISODE_DURATION: 51 S
MDC_IDC_EPISODE_DURATION: 51 S
MDC_IDC_EPISODE_DURATION: 52 S
MDC_IDC_EPISODE_DURATION: 66 S
MDC_IDC_EPISODE_DURATION: 754 S
MDC_IDC_EPISODE_DURATION: 8 S
MDC_IDC_EPISODE_DURATION: 9 S
MDC_IDC_EPISODE_ID: 747
MDC_IDC_EPISODE_ID: 748
MDC_IDC_EPISODE_ID: 749
MDC_IDC_EPISODE_ID: 750
MDC_IDC_EPISODE_ID: 751
MDC_IDC_EPISODE_ID: 752
MDC_IDC_EPISODE_ID: 753
MDC_IDC_EPISODE_ID: 754
MDC_IDC_EPISODE_ID: 755
MDC_IDC_EPISODE_ID: 756
MDC_IDC_EPISODE_ID: 757
MDC_IDC_EPISODE_ID: 758
MDC_IDC_EPISODE_ID: 759
MDC_IDC_EPISODE_ID: 760
MDC_IDC_EPISODE_ID: 761
MDC_IDC_EPISODE_ID: 762
MDC_IDC_EPISODE_ID: 763
MDC_IDC_EPISODE_ID: 764
MDC_IDC_EPISODE_ID: 765
MDC_IDC_EPISODE_ID: 766
MDC_IDC_EPISODE_ID: 767
MDC_IDC_EPISODE_ID: 768
MDC_IDC_EPISODE_ID: 769
MDC_IDC_EPISODE_ID: 770
MDC_IDC_EPISODE_ID: 771
MDC_IDC_EPISODE_ID: 772
MDC_IDC_EPISODE_ID: 773
MDC_IDC_EPISODE_ID: 774
MDC_IDC_EPISODE_ID: 775
MDC_IDC_EPISODE_ID: 776
MDC_IDC_EPISODE_ID: 777
MDC_IDC_EPISODE_ID: 778
MDC_IDC_EPISODE_ID: 779
MDC_IDC_EPISODE_ID: 780
MDC_IDC_EPISODE_ID: 781
MDC_IDC_EPISODE_ID: 782
MDC_IDC_EPISODE_ID: 783
MDC_IDC_EPISODE_ID: 784
MDC_IDC_EPISODE_ID: 785
MDC_IDC_EPISODE_TYPE: NORMAL
MDC_IDC_EPISODE_TYPE_INDUCED: NO
MDC_IDC_LEAD_CONNECTION_STATUS: NORMAL
MDC_IDC_LEAD_IMPLANT_DT: NORMAL
MDC_IDC_LEAD_LOCATION: NORMAL
MDC_IDC_LEAD_LOCATION_DETAIL_1: NORMAL
MDC_IDC_LEAD_MFG: NORMAL
MDC_IDC_LEAD_MODEL: NORMAL
MDC_IDC_LEAD_POLARITY_TYPE: NORMAL
MDC_IDC_LEAD_SERIAL: NORMAL
MDC_IDC_MSMT_BATTERY_DTM: NORMAL
MDC_IDC_MSMT_BATTERY_REMAINING_LONGEVITY: 14 MO
MDC_IDC_MSMT_BATTERY_RRT_TRIGGER: 2.73
MDC_IDC_MSMT_BATTERY_STATUS: NORMAL
MDC_IDC_MSMT_BATTERY_VOLTAGE: 2.9 V
MDC_IDC_MSMT_LEADCHNL_RV_IMPEDANCE_VALUE: 304 OHM
MDC_IDC_MSMT_LEADCHNL_RV_IMPEDANCE_VALUE: 4047 OHM
MDC_IDC_MSMT_LEADCHNL_RV_PACING_THRESHOLD_AMPLITUDE: 0.62 V
MDC_IDC_MSMT_LEADCHNL_RV_PACING_THRESHOLD_PULSEWIDTH: 0.4 MS
MDC_IDC_MSMT_LEADCHNL_RV_SENSING_INTR_AMPL: 5.75 MV
MDC_IDC_MSMT_LEADCHNL_RV_SENSING_INTR_AMPL: 6.12 MV
MDC_IDC_PG_IMPLANT_DTM: NORMAL
MDC_IDC_PG_MFG: NORMAL
MDC_IDC_PG_MODEL: NORMAL
MDC_IDC_PG_SERIAL: NORMAL
MDC_IDC_PG_TYPE: NORMAL
MDC_IDC_SESS_CLINIC_NAME: NORMAL
MDC_IDC_SESS_DTM: NORMAL
MDC_IDC_SESS_TYPE: NORMAL
MDC_IDC_SET_BRADY_HYSTRATE: NORMAL
MDC_IDC_SET_BRADY_LOWRATE: 40 {BEATS}/MIN
MDC_IDC_SET_BRADY_MODE: NORMAL
MDC_IDC_SET_LEADCHNL_RV_PACING_AMPLITUDE: 2 V
MDC_IDC_SET_LEADCHNL_RV_PACING_ANODE_ELECTRODE_1: NORMAL
MDC_IDC_SET_LEADCHNL_RV_PACING_ANODE_LOCATION_1: NORMAL
MDC_IDC_SET_LEADCHNL_RV_PACING_CAPTURE_MODE: NORMAL
MDC_IDC_SET_LEADCHNL_RV_PACING_CATHODE_ELECTRODE_1: NORMAL
MDC_IDC_SET_LEADCHNL_RV_PACING_CATHODE_LOCATION_1: NORMAL
MDC_IDC_SET_LEADCHNL_RV_PACING_POLARITY: NORMAL
MDC_IDC_SET_LEADCHNL_RV_PACING_PULSEWIDTH: 0.4 MS
MDC_IDC_SET_LEADCHNL_RV_SENSING_ANODE_ELECTRODE_1: NORMAL
MDC_IDC_SET_LEADCHNL_RV_SENSING_ANODE_LOCATION_1: NORMAL
MDC_IDC_SET_LEADCHNL_RV_SENSING_CATHODE_ELECTRODE_1: NORMAL
MDC_IDC_SET_LEADCHNL_RV_SENSING_CATHODE_LOCATION_1: NORMAL
MDC_IDC_SET_LEADCHNL_RV_SENSING_POLARITY: NORMAL
MDC_IDC_SET_LEADCHNL_RV_SENSING_SENSITIVITY: 0.3 MV
MDC_IDC_SET_ZONE_DETECTION_BEATS_DENOMINATOR: 32 {BEATS}
MDC_IDC_SET_ZONE_DETECTION_BEATS_DENOMINATOR: 32 {BEATS}
MDC_IDC_SET_ZONE_DETECTION_BEATS_DENOMINATOR: 40 {BEATS}
MDC_IDC_SET_ZONE_DETECTION_BEATS_NUMERATOR: 30 {BEATS}
MDC_IDC_SET_ZONE_DETECTION_BEATS_NUMERATOR: 32 {BEATS}
MDC_IDC_SET_ZONE_DETECTION_BEATS_NUMERATOR: 32 {BEATS}
MDC_IDC_SET_ZONE_DETECTION_INTERVAL: 270 MS
MDC_IDC_SET_ZONE_DETECTION_INTERVAL: 360 MS
MDC_IDC_SET_ZONE_DETECTION_INTERVAL: 370 MS
MDC_IDC_SET_ZONE_DETECTION_INTERVAL: NORMAL
MDC_IDC_SET_ZONE_STATUS: NORMAL
MDC_IDC_SET_ZONE_TYPE: NORMAL
MDC_IDC_SET_ZONE_VENDOR_TYPE: NORMAL
MDC_IDC_STAT_BRADY_DTM_END: NORMAL
MDC_IDC_STAT_BRADY_DTM_START: NORMAL
MDC_IDC_STAT_BRADY_RV_PERCENT_PACED: 0.01 %
MDC_IDC_STAT_EPISODE_RECENT_COUNT: 0
MDC_IDC_STAT_EPISODE_RECENT_COUNT: 1
MDC_IDC_STAT_EPISODE_RECENT_COUNT: 24
MDC_IDC_STAT_EPISODE_RECENT_COUNT: 33
MDC_IDC_STAT_EPISODE_RECENT_COUNT_DTM_END: NORMAL
MDC_IDC_STAT_EPISODE_RECENT_COUNT_DTM_START: NORMAL
MDC_IDC_STAT_EPISODE_TOTAL_COUNT: 0
MDC_IDC_STAT_EPISODE_TOTAL_COUNT: 2
MDC_IDC_STAT_EPISODE_TOTAL_COUNT: 295
MDC_IDC_STAT_EPISODE_TOTAL_COUNT: 57
MDC_IDC_STAT_EPISODE_TOTAL_COUNT_DTM_END: NORMAL
MDC_IDC_STAT_EPISODE_TOTAL_COUNT_DTM_START: NORMAL
MDC_IDC_STAT_EPISODE_TYPE: NORMAL
MDC_IDC_STAT_TACHYTHERAPY_ATP_DELIVERED_RECENT: 1
MDC_IDC_STAT_TACHYTHERAPY_ATP_DELIVERED_TOTAL: 1
MDC_IDC_STAT_TACHYTHERAPY_RECENT_DTM_END: NORMAL
MDC_IDC_STAT_TACHYTHERAPY_RECENT_DTM_START: NORMAL
MDC_IDC_STAT_TACHYTHERAPY_SHOCKS_ABORTED_RECENT: 1
MDC_IDC_STAT_TACHYTHERAPY_SHOCKS_ABORTED_TOTAL: 1
MDC_IDC_STAT_TACHYTHERAPY_SHOCKS_DELIVERED_RECENT: 0
MDC_IDC_STAT_TACHYTHERAPY_SHOCKS_DELIVERED_TOTAL: 1
MDC_IDC_STAT_TACHYTHERAPY_TOTAL_DTM_END: NORMAL
MDC_IDC_STAT_TACHYTHERAPY_TOTAL_DTM_START: NORMAL

## 2025-09-02 PROCEDURE — 93325 DOPPLER ECHO COLOR FLOW MAPG: CPT | Mod: 26 | Performed by: PEDIATRICS

## 2025-09-02 PROCEDURE — 93303 ECHO TRANSTHORACIC: CPT | Mod: 26 | Performed by: PEDIATRICS

## 2025-09-02 PROCEDURE — 93320 DOPPLER ECHO COMPLETE: CPT | Mod: 26 | Performed by: PEDIATRICS

## 2025-09-02 PROCEDURE — 93325 DOPPLER ECHO COLOR FLOW MAPG: CPT

## 2025-09-02 PROCEDURE — G0463 HOSPITAL OUTPT CLINIC VISIT: HCPCS | Performed by: INTERNAL MEDICINE

## 2025-09-02 ASSESSMENT — PAIN SCALES - GENERAL: PAINLEVEL_OUTOF10: NO PAIN (0)

## (undated) DEVICE — ENDO FORCEP ENDOJAW BIOPSY 2.8MMX230CM FB-220U

## (undated) DEVICE — KIT ENDO TURNOVER/PROCEDURE W/CLEAN A SCOPE LINERS 103888

## (undated) RX ORDER — FENTANYL CITRATE 50 UG/ML
INJECTION, SOLUTION INTRAMUSCULAR; INTRAVENOUS
Status: DISPENSED
Start: 2024-01-16

## (undated) RX ORDER — LORAZEPAM 2 MG/ML
INJECTION INTRAMUSCULAR
Status: DISPENSED
Start: 2023-09-07

## (undated) RX ORDER — SIMETHICONE 40MG/0.6ML
SUSPENSION, DROPS(FINAL DOSAGE FORM)(ML) ORAL
Status: DISPENSED
Start: 2024-01-16